# Patient Record
Sex: FEMALE | Race: WHITE | NOT HISPANIC OR LATINO | Employment: OTHER | URBAN - METROPOLITAN AREA
[De-identification: names, ages, dates, MRNs, and addresses within clinical notes are randomized per-mention and may not be internally consistent; named-entity substitution may affect disease eponyms.]

---

## 2017-01-10 ENCOUNTER — OFFICE VISIT (OUTPATIENT)
Dept: GASTROENTEROLOGY | Facility: CLINIC | Age: 77
End: 2017-01-10
Payer: MEDICARE

## 2017-01-10 VITALS
HEIGHT: 64 IN | SYSTOLIC BLOOD PRESSURE: 140 MMHG | HEART RATE: 98 BPM | DIASTOLIC BLOOD PRESSURE: 80 MMHG | BODY MASS INDEX: 44.04 KG/M2 | WEIGHT: 257.94 LBS

## 2017-01-10 DIAGNOSIS — R13.19 ESOPHAGEAL DYSPHAGIA: Primary | ICD-10-CM

## 2017-01-10 PROCEDURE — 99214 OFFICE O/P EST MOD 30 MIN: CPT | Mod: S$PBB,,, | Performed by: INTERNAL MEDICINE

## 2017-01-10 PROCEDURE — 99213 OFFICE O/P EST LOW 20 MIN: CPT | Mod: PBBFAC,PO | Performed by: INTERNAL MEDICINE

## 2017-01-10 PROCEDURE — 99999 PR PBB SHADOW E&M-EST. PATIENT-LVL III: CPT | Mod: PBBFAC,,, | Performed by: INTERNAL MEDICINE

## 2017-01-10 RX ORDER — ALBUTEROL SULFATE 90 UG/1
2 AEROSOL, METERED RESPIRATORY (INHALATION) EVERY 6 HOURS PRN
Qty: 1 INHALER | Refills: 11 | Status: SHIPPED | OUTPATIENT
Start: 2017-01-10 | End: 2017-10-17 | Stop reason: SDUPTHER

## 2017-01-10 NOTE — MR AVS SNAPSHOT
[facility]  [address] [facility] [address]  Phone: [phone]                  Wendy Ro   1/10/2017 9:40 AM   Office Visit    Description:  Female : 1940   Provider:  Angel Nixon MD   Department:  [facility]           Reason for Visit     Dysphagia           Diagnoses this Visit        Comments    Esophageal dysphagia    -  Primary            To Do List           Future Appointments        Provider Department Dept Phone    2017 10:00 AM SPECIMEN, [place] [facility] [phone]    2017 11:00 AM José Harrell MD [facility] [phone]    2017 8:10 AM LABORATORY, [place] [facility] [phone]    2017 11:40 AM Scarlett Gallardo DPM [facility] [phone]    2017 8:10 AM LABORATORY, [place] [facility] [phone]      Goals (5 Years of Data)     None      Follow-Up and Disposition     Return if symptoms worsen or fail to improve.      [facility]     [facility] Care [facility] -  Assistance  Registered nurses in the [facility] provide clinical advisement, health education, appointment booking, and other advisory services.  Call for this free service at [phone].             Medications           Message regarding Medications     Verify the changes and/or additions to your medication regime listed below are the same as discussed with your clinician today.  If any of these changes or additions are incorrect, please notify your healthcare provider.             Verify that the below list of medications is an accurate representation of the medications you are currently taking.  If none reported, the list may be blank. If incorrect, please contact your healthcare provider. Carry this list with you in case of emergency.           Current Medications     albuterol (VENTOLIN HFA) 90 mcg/Actuation  inhaler      alendronate (FOSAMAX) 70 MG tablet Take 1 tablet (70 mg total) by mouth every 7 days.    allopurinol (ZYLOPRIM) 100 MG tablet TAKE 1/2 TABLET BY MOUTH TWICE DAILY with the 150 mg already prescribed    allopurinol (ZYLOPRIM) 300 MG tablet Take 300 mg by mouth once daily.    amlodipine-olmesartan (JESSICA) 10-40 mg per tablet Take 1 tablet by mouth once daily.    calcium carbonate (OS-JESUS) 500 mg calcium (1,250 mg) tablet Take 1 tablet (500 mg total) by mouth once daily.    cetirizine (ZYRTEC) 10 MG tablet Take 10 mg by mouth once daily.    CHONDROITIN SULFATE A (CHONDROITIN SULFATE ORAL) Take by mouth.  Chondroitin Sulfate/Glucosamine Hydrochloride    DEXILANT 60 mg capsule Take 1 capsule (60 mg total) by mouth once daily.    docusate sodium (COLACE) 100 MG capsule Take 1 capsule (100 mg total) by mouth 2 (two) times daily.    ergocalciferol (VITAMIN D2) 50,000 unit Cap Take 50,000 Units by mouth.    escitalopram oxalate (LEXAPRO) 10 MG tablet     FERGON 240 mg (27 mg iron) tablet Take 1 tablet (325 mg total) by mouth 2 (two) times daily with meals.    ferrous gluconate (FERGON) 325 MG Tab Take 1 tablet (325 mg total) by mouth 2 (two) times daily with meals.    furosemide (LASIX) 40 MG tablet Take 1 tablet (40 mg total) by mouth once daily.    gabapentin (NEURONTIN) 300 MG capsule Take 300 mg by mouth 3 (three) times daily.    hydrOXYzine HCl (ATARAX) 25 MG tablet     indomethacin (INDOCIN SR) 75 mg CpSR CR capsule Take 1 capsule (75 mg total) by mouth 2 (two) times daily with meals.    lidocaine 5 % Crea Apply 1 application topically 2 (two) times daily as needed.    nystatin (MYCOSTATIN) powder Apply topically 4 (four) times daily.    ondansetron (ZOFRAN) 8 MG tablet Take 1 tablet (8 mg total) by mouth every 8 (eight) hours as needed.    pravastatin (PRAVACHOL) 20 MG tablet TAKE 1 TABLET BY MOUTH once daily    predniSONE (DELTASONE) 5 MG tablet Take 5 mg by mouth once daily.     SYNTHROID 50 mcg tablet  "Take 1 tablet (50 mcg total) by mouth once daily.    turmeric root extract 500 mg Cap Take by mouth 3 (three) times daily.    vit F-jotleol-hkae-rutin-hb196 (BIOFLEX) 399-48-92-40 mg Tab Take 2 tablets by mouth once daily.     VITAMIN D2 50,000 unit capsule Take 1 capsule (50,000 Units total) by mouth twice a week.           Clinical Reference Information           Vital Signs - Last Recorded  Most recent update: 1/10/2017  9:43 AM by Mal Clark LPN    BP Pulse Ht Wt BMI    (!) 140/80 98 5' 4" (1.626 m) 117 kg (257 lb 15 oz) 44.27 kg/m2      Blood Pressure          Most Recent Value    BP  (!)  140/80      Allergies as of 1/10/2017     Adenosine    Codeine    Duloxetine    Hydrocodone-acetaminophen    Keflex  [Cephalexin]    Macrolide Antibiotics    Opioids - Morphine Analogues    Opium    Opium (Anthroposophic)    Promethazine    Tramadol      Immunizations Administered on Date of Encounter - 1/10/2017     None      Orders Placed During Today's Visit      Normal Orders This Visit    Case request GI: ESOPHAGOGASTRODUODENOSCOPY (EGD)       "
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
## 2017-01-23 ENCOUNTER — LAB VISIT (OUTPATIENT)
Dept: LAB | Facility: HOSPITAL | Age: 77
End: 2017-01-23
Attending: FAMILY MEDICINE
Payer: MEDICARE

## 2017-01-23 DIAGNOSIS — N18.30 CHRONIC RENAL INSUFFICIENCY, STAGE III (MODERATE): ICD-10-CM

## 2017-01-23 LAB
ALBUMIN SERPL BCP-MCNC: 3.3 G/DL
ANION GAP SERPL CALC-SCNC: 8 MMOL/L
BUN SERPL-MCNC: 17 MG/DL
CALCIUM SERPL-MCNC: 9.3 MG/DL
CHLORIDE SERPL-SCNC: 106 MMOL/L
CO2 SERPL-SCNC: 29 MMOL/L
CREAT SERPL-MCNC: 1 MG/DL
EST. GFR  (AFRICAN AMERICAN): >60 ML/MIN/1.73 M^2
EST. GFR  (NON AFRICAN AMERICAN): 55.2 ML/MIN/1.73 M^2
GLUCOSE SERPL-MCNC: 115 MG/DL
PHOSPHATE SERPL-MCNC: 3.6 MG/DL
POTASSIUM SERPL-SCNC: 3.3 MMOL/L
PTH-INTACT SERPL-MCNC: 44 PG/ML
SODIUM SERPL-SCNC: 143 MMOL/L

## 2017-01-23 PROCEDURE — 80069 RENAL FUNCTION PANEL: CPT

## 2017-01-23 PROCEDURE — 36415 COLL VENOUS BLD VENIPUNCTURE: CPT | Mod: PO

## 2017-01-23 PROCEDURE — 83970 ASSAY OF PARATHORMONE: CPT

## 2017-01-27 ENCOUNTER — OFFICE VISIT (OUTPATIENT)
Dept: NEPHROLOGY | Facility: CLINIC | Age: 77
End: 2017-01-27
Payer: MEDICARE

## 2017-01-27 VITALS
OXYGEN SATURATION: 99 % | BODY MASS INDEX: 42.76 KG/M2 | WEIGHT: 249.13 LBS | HEART RATE: 96 BPM | TEMPERATURE: 98 F | SYSTOLIC BLOOD PRESSURE: 124 MMHG | DIASTOLIC BLOOD PRESSURE: 64 MMHG

## 2017-01-27 DIAGNOSIS — E11.40 CONTROLLED TYPE 2 DIABETES MELLITUS WITH DIABETIC NEUROPATHY, WITHOUT LONG-TERM CURRENT USE OF INSULIN: ICD-10-CM

## 2017-01-27 DIAGNOSIS — M1A.9XX0 CHRONIC GOUT WITHOUT TOPHUS, UNSPECIFIED CAUSE, UNSPECIFIED SITE: ICD-10-CM

## 2017-01-27 DIAGNOSIS — I15.2 HYPERTENSION ASSOCIATED WITH DIABETES: ICD-10-CM

## 2017-01-27 DIAGNOSIS — N28.1 RENAL CYST: ICD-10-CM

## 2017-01-27 DIAGNOSIS — E11.22 CONTROLLED TYPE 2 DIABETES MELLITUS WITH STAGE 3 CHRONIC KIDNEY DISEASE, WITHOUT LONG-TERM CURRENT USE OF INSULIN: ICD-10-CM

## 2017-01-27 DIAGNOSIS — N18.30 CONTROLLED TYPE 2 DIABETES MELLITUS WITH STAGE 3 CHRONIC KIDNEY DISEASE, WITHOUT LONG-TERM CURRENT USE OF INSULIN: ICD-10-CM

## 2017-01-27 DIAGNOSIS — N18.30 CHRONIC RENAL INSUFFICIENCY, STAGE III (MODERATE): Primary | ICD-10-CM

## 2017-01-27 DIAGNOSIS — E66.9 NON MORBID OBESITY, UNSPECIFIED OBESITY TYPE: ICD-10-CM

## 2017-01-27 DIAGNOSIS — E11.59 HYPERTENSION ASSOCIATED WITH DIABETES: ICD-10-CM

## 2017-01-27 PROCEDURE — 99999 PR PBB SHADOW E&M-EST. PATIENT-LVL II: CPT | Mod: PBBFAC,,, | Performed by: INTERNAL MEDICINE

## 2017-01-27 PROCEDURE — 99212 OFFICE O/P EST SF 10 MIN: CPT | Mod: PBBFAC,PO | Performed by: INTERNAL MEDICINE

## 2017-01-27 PROCEDURE — 99214 OFFICE O/P EST MOD 30 MIN: CPT | Mod: S$PBB,,, | Performed by: INTERNAL MEDICINE

## 2017-01-27 RX ORDER — ERGOCALCIFEROL 1.25 MG/1
50000 CAPSULE ORAL
COMMUNITY
End: 2017-01-27

## 2017-01-27 RX ORDER — AMLODIPINE AND OLMESARTAN MEDOXOMIL 10; 40 MG/1; MG/1
1 TABLET ORAL DAILY
COMMUNITY
End: 2017-08-18 | Stop reason: SDUPTHER

## 2017-01-27 RX ORDER — AMLODIPINE AND OLMESARTAN MEDOXOMIL 5; 40 MG/1; MG/1
1 TABLET ORAL
COMMUNITY
End: 2017-01-27

## 2017-01-27 RX ORDER — FENOFIBRIC ACID 135 MG/1
135 CAPSULE, DELAYED RELEASE ORAL
COMMUNITY
End: 2017-04-21 | Stop reason: ALTCHOICE

## 2017-01-27 NOTE — PROGRESS NOTES
"Subjective:       Patient ID: Wendy Ro is a 76 y.o. White female who presents for follow-up evaluation of Chronic Kidney Disease    HPI     She reports she is doing 'OK" she is having an EGD Monday for odynophagia. Also notes breakthrough reflux on PPI. Edema is about the same. Appetite is 'too good', her last Hba1c was 7.     Review of Systems   Constitutional: Negative for activity change, appetite change, fatigue and unexpected weight change.   HENT: Negative for facial swelling.    Respiratory: Negative for shortness of breath.    Cardiovascular: Positive for leg swelling. Negative for chest pain.   Gastrointestinal: Negative for abdominal pain.   Genitourinary: Negative for difficulty urinating and dysuria.   Musculoskeletal: Negative for arthralgias.   Neurological: Negative for weakness.       Objective:      Physical Exam   Constitutional: She is oriented to person, place, and time. She appears well-nourished. No distress.   HENT:   Mouth/Throat: Oropharynx is clear and moist.   Neck: No JVD present.   Cardiovascular: S1 normal and S2 normal.  Exam reveals no friction rub.    Pulmonary/Chest: Breath sounds normal. She has no wheezes. She has no rales.   Abdominal: Soft.   Musculoskeletal: She exhibits edema.   Neurological: She is alert and oriented to person, place, and time.   Skin: Skin is warm and dry.   Psychiatric: She has a normal mood and affect.   Vitals reviewed.      Assessment:       1. Chronic renal insufficiency, stage III (moderate)    2. Controlled type 2 diabetes mellitus with stage 3 chronic kidney disease, without long-term current use of insulin    3. Renal cyst    4. Hypertension associated with diabetes    5. Controlled type 2 diabetes mellitus with diabetic neuropathy, without long-term current use of insulin    6. Non morbid obesity, unspecified obesity type    7. Chronic gout without tophus, unspecified cause, unspecified site        Plan:             Mild CKD with stable " kidney function and proteinuria. Continue RAAS blockade for renal preservation    HTN is controlled    DM--fair control    MBD--no change in therapy    Gout--if flares continue consider changing to Uloric      RTC 6 months in french with labs prior

## 2017-01-30 ENCOUNTER — ANESTHESIA EVENT (OUTPATIENT)
Dept: ENDOSCOPY | Facility: HOSPITAL | Age: 77
End: 2017-01-30
Payer: MEDICARE

## 2017-01-30 ENCOUNTER — ANESTHESIA (OUTPATIENT)
Dept: ENDOSCOPY | Facility: HOSPITAL | Age: 77
End: 2017-01-30
Payer: MEDICARE

## 2017-01-30 ENCOUNTER — SURGERY (OUTPATIENT)
Age: 77
End: 2017-01-30

## 2017-01-30 ENCOUNTER — HOSPITAL ENCOUNTER (OUTPATIENT)
Facility: HOSPITAL | Age: 77
Discharge: HOME | End: 2017-01-30
Attending: INTERNAL MEDICINE | Admitting: INTERNAL MEDICINE
Payer: MEDICARE

## 2017-01-30 VITALS
WEIGHT: 248 LBS | OXYGEN SATURATION: 98 % | DIASTOLIC BLOOD PRESSURE: 91 MMHG | HEART RATE: 83 BPM | RESPIRATION RATE: 18 BRPM | TEMPERATURE: 98 F | HEIGHT: 64 IN | RESPIRATION RATE: 33 BRPM | SYSTOLIC BLOOD PRESSURE: 131 MMHG | BODY MASS INDEX: 42.34 KG/M2

## 2017-01-30 DIAGNOSIS — R13.10 DYSPHAGIA: ICD-10-CM

## 2017-01-30 LAB — POCT GLUCOSE: 125 MG/DL (ref 70–110)

## 2017-01-30 PROCEDURE — 25000003 PHARM REV CODE 250: Performed by: INTERNAL MEDICINE

## 2017-01-30 PROCEDURE — 63600175 PHARM REV CODE 636 W HCPCS: Performed by: NURSE ANESTHETIST, CERTIFIED REGISTERED

## 2017-01-30 PROCEDURE — 43235 EGD DIAGNOSTIC BRUSH WASH: CPT | Performed by: INTERNAL MEDICINE

## 2017-01-30 PROCEDURE — 37000008 HC ANESTHESIA 1ST 15 MINUTES: Performed by: INTERNAL MEDICINE

## 2017-01-30 PROCEDURE — 43235 EGD DIAGNOSTIC BRUSH WASH: CPT | Mod: ,,, | Performed by: INTERNAL MEDICINE

## 2017-01-30 PROCEDURE — 37000009 HC ANESTHESIA EA ADD 15 MINS: Performed by: INTERNAL MEDICINE

## 2017-01-30 PROCEDURE — 25000003 PHARM REV CODE 250: Performed by: NURSE ANESTHETIST, CERTIFIED REGISTERED

## 2017-01-30 PROCEDURE — 82962 GLUCOSE BLOOD TEST: CPT | Performed by: INTERNAL MEDICINE

## 2017-01-30 RX ORDER — SODIUM CHLORIDE, SODIUM LACTATE, POTASSIUM CHLORIDE, CALCIUM CHLORIDE 600; 310; 30; 20 MG/100ML; MG/100ML; MG/100ML; MG/100ML
INJECTION, SOLUTION INTRAVENOUS CONTINUOUS
Status: DISCONTINUED | OUTPATIENT
Start: 2017-01-30 | End: 2017-01-30 | Stop reason: HOSPADM

## 2017-01-30 RX ORDER — LIDOCAINE HCL/PF 100 MG/5ML
SYRINGE (ML) INTRAVENOUS
Status: DISCONTINUED | OUTPATIENT
Start: 2017-01-30 | End: 2017-01-30

## 2017-01-30 RX ORDER — PROPOFOL 10 MG/ML
INJECTION, EMULSION INTRAVENOUS
Status: DISCONTINUED | OUTPATIENT
Start: 2017-01-30 | End: 2017-01-30

## 2017-01-30 RX ORDER — ETOMIDATE 2 MG/ML
INJECTION INTRAVENOUS
Status: DISCONTINUED | OUTPATIENT
Start: 2017-01-30 | End: 2017-01-30

## 2017-01-30 RX ADMIN — SODIUM CHLORIDE, SODIUM LACTATE, POTASSIUM CHLORIDE, AND CALCIUM CHLORIDE: 600; 310; 30; 20 INJECTION, SOLUTION INTRAVENOUS at 08:01

## 2017-01-30 RX ADMIN — LIDOCAINE HYDROCHLORIDE 50 MG: 20 INJECTION, SOLUTION INTRAVENOUS at 09:01

## 2017-01-30 RX ADMIN — ETOMIDATE 20 MG: 2 INJECTION, SOLUTION INTRAVENOUS at 09:01

## 2017-01-30 RX ADMIN — PROPOFOL 30 MG: 10 INJECTION, EMULSION INTRAVENOUS at 09:01

## 2017-01-30 NOTE — PLAN OF CARE
Problem: Patient Care Overview  Goal: Plan of Care Review  Outcome: Outcome(s) achieved Date Met:  01/30/17  Family at bedside. Dr discussed findings with pt and family.pt doing well. No gi bleeding noted. No co of severe abd pain.vital signs stable.
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
## 2017-01-30 NOTE — TRANSFER OF CARE
"Anesthesia Transfer of Care Note    Patient: Wendy Ro    Procedure(s) Performed: Procedure(s) (LRB):  ESOPHAGOGASTRODUODENOSCOPY (EGD) (N/A)    Patient location: PACU    Anesthesia Type: MAC    Transport from OR: Transported from OR on room air with adequate spontaneous ventilation    Post pain: adequate analgesia    Post assessment: no apparent anesthetic complications    Post vital signs: stable    Level of consciousness: awake and alert    Nausea/Vomiting: no nausea/vomiting    Complications: none          Last vitals:   Visit Vitals    BP (!) 145/66 (BP Location: Right leg, Patient Position: Lying, BP Method: Automatic)    Pulse 89    Temp 36.7 °C (98.1 °F) (Oral)    Resp 16    Ht 5' 4" (1.626 m)    Wt 112.5 kg (248 lb)    SpO2 98%    Breastfeeding No    BMI 42.57 kg/m2     "

## 2017-01-30 NOTE — ANESTHESIA POSTPROCEDURE EVALUATION
"Anesthesia Post Evaluation    Patient: Wendy Ro    Procedure(s) Performed: Procedure(s) (LRB):  ESOPHAGOGASTRODUODENOSCOPY (EGD) (N/A)    Final Anesthesia Type: MAC  Patient location during evaluation: PACU  Patient participation: Yes- Able to Participate  Level of consciousness: awake and alert and oriented  Post-procedure vital signs: reviewed and stable  Pain management: adequate  Airway patency: patent    Anesthetic complications: no      Cardiovascular status: blood pressure returned to baseline  Respiratory status: unassisted, room air and spontaneous ventilation  Hydration status: euvolemic  Follow-up not needed.        Visit Vitals    BP (!) 145/66 (BP Location: Right leg, Patient Position: Lying, BP Method: Automatic)    Pulse 89    Temp 36.7 °C (98.1 °F) (Oral)    Resp 16    Ht 5' 4" (1.626 m)    Wt 112.5 kg (248 lb)    SpO2 98%    Breastfeeding No    BMI 42.57 kg/m2       Pain/Maureen Score: Maureen Score: 9 (1/30/2017  9:09 AM)      "

## 2017-01-30 NOTE — IP AVS SNAPSHOT
[address] [place] [address]           Patient Discharge Instructions     Our goal is to set you up for success. This packet includes information on your condition, medications, and your home care. It will help you to care for yourself so you don't get sicker and need to go back to the hospital.     Please ask your nurse if you have any questions.        There are many details to remember when preparing to leave the hospital. Here is what you will need to do:    1. Take your medicine. If you are prescribed medications, review your Medication List in the following pages. You may have new medications to  at the pharmacy and others that you'll need to stop taking. Review the instructions for how and when to take your medications. Talk with your doctor or nurses if you are unsure of what to do.     2. Go to your follow-up appointments. Specific follow-up information is listed in the following pages. Your may be contacted by a transition nurse or clinical provider about future appointments. Be sure we have all of the phone numbers to reach you, if needed. Please contact your provider's office if you are unable to make an appointment.     3. Watch for warning signs. Your doctor or nurse will give you detailed warning signs to watch for and when to call for assistance. These instructions may also include educational information about your condition. If you experience any of warning signs to your health, call your doctor.               ** Verify the list of medication(s) below is accurate and up to date. Carry this with you in case of emergency. If your medications have changed, please notify your healthcare provider.             Medication List      ASK your doctor about these medications        Additional Info                      albuterol 90 mcg/actuation inhaler   Commonly known as:  VENTOLIN HFA   Quantity:  1 Inhaler   Refills:  11   Dose:  2 puff     Instructions:  Inhale 2 puffs into the lungs every 6 (six) hours as needed for Wheezing.     Begin Date    AM    Noon    PM    Bedtime       alendronate 70 MG tablet   Commonly known as:  FOSAMAX   Quantity:  4 tablet   Refills:  11   Dose:  70 mg    Instructions:  Take 1 tablet (70 mg total) by mouth every 7 days.     Begin Date    AM    Noon    PM    Bedtime       * allopurinol 100 MG tablet   Commonly known as:  ZYLOPRIM   Quantity:  30 tablet   Refills:  11    Instructions:  TAKE 1/2 TABLET BY MOUTH TWICE DAILY with the 150 mg already prescribed     Begin Date    AM    Noon    PM    Bedtime       * allopurinol 300 MG tablet   Commonly known as:  ZYLOPRIM   Refills:  11   Dose:  300 mg    Instructions:  Take 300 mg by mouth once daily.     Begin Date    AM    Noon    PM    Bedtime       JESSICA 10-40 mg per tablet   Refills:  0   Dose:  1 tablet   Generic drug:  amlodipine-olmesartan    Instructions:  Take 1 tablet by mouth once daily.     Begin Date    AM    Noon    PM    Bedtime       BIOFLEX 170-59-55-40 mg Tab   Refills:  0   Dose:  2 tablet   Generic drug:  vit Z-mxdhucz-jejp-rutin-hb196    Instructions:  Take 2 tablets by mouth once daily.     Begin Date    AM    Noon    PM    Bedtime       calcium carbonate 500 mg calcium (1,250 mg) tablet   Commonly known as:  OS-JESUS   Refills:  0   Dose:  1 tablet    Instructions:  Take 1 tablet (500 mg total) by mouth once daily.     Begin Date    AM    Noon    PM    Bedtime       cetirizine 10 MG tablet   Commonly known as:  ZYRTEC   Refills:  0   Dose:  10 mg    Instructions:  Take 10 mg by mouth once daily.     Begin Date    AM    Noon    PM    Bedtime       DEXILANT 60 mg capsule   Quantity:  30 capsule   Refills:  11   Generic drug:  dexlansoprazole    Instructions:  Take 1 capsule (60 mg total) by mouth once daily.     Begin Date    AM    Noon    PM    Bedtime       docusate sodium 100 MG capsule   Commonly known as:  COLACE   Refills:  0   Dose:  100 mg     Instructions:  Take 1 capsule (100 mg total) by mouth 2 (two) times daily.     Begin Date    AM    Noon    PM    Bedtime       escitalopram oxalate 10 MG tablet   Commonly known as:  LEXAPRO   Refills:  0    Instructions:  daily as needed.     Begin Date    AM    Noon    PM    Bedtime       fenofibric acid 135 mg Cpdr   Commonly known as:  FIBRICOR   Refills:  0   Dose:  135 mg    Instructions:  Take 135 mg by mouth.     Begin Date    AM    Noon    PM    Bedtime       ferrous gluconate 325 MG Tab   Commonly known as:  FERGON   Quantity:  60 tablet   Refills:  11   Dose:  325 mg    Instructions:  Take 1 tablet (325 mg total) by mouth 2 (two) times daily with meals.     Begin Date    AM    Noon    PM    Bedtime       furosemide 40 MG tablet   Commonly known as:  LASIX   Quantity:  30 tablet   Refills:  11   Dose:  40 mg    Instructions:  Take 1 tablet (40 mg total) by mouth once daily.     Begin Date    AM    Noon    PM    Bedtime       lidocaine 5 % Crea   Quantity:  30 g   Refills:  0   Dose:  1 application    Instructions:  Apply 1 application topically 2 (two) times daily as needed.     Begin Date    AM    Noon    PM    Bedtime       NEURONTIN 300 MG capsule   Refills:  0   Dose:  300 mg   Generic drug:  gabapentin    Instructions:  Take 300 mg by mouth 3 (three) times daily.     Begin Date    AM    Noon    PM    Bedtime       nystatin powder   Commonly known as:  MYCOSTATIN   Quantity:  56.7 g   Refills:  11    Instructions:  Apply topically 4 (four) times daily.     Begin Date    AM    Noon    PM    Bedtime       ondansetron 8 MG tablet   Commonly known as:  ZOFRAN   Quantity:  24 tablet   Refills:  0   Dose:  8 mg    Instructions:  Take 1 tablet (8 mg total) by mouth every 8 (eight) hours as needed.     Begin Date    AM    Noon    PM    Bedtime       pravastatin 20 MG tablet   Commonly known as:  PRAVACHOL   Quantity:  30 tablet   Refills:  11    Instructions:  TAKE 1 TABLET BY MOUTH once daily     Begin Date     AM    Noon    PM    Bedtime       predniSONE 5 MG tablet   Commonly known as:  DELTASONE   Refills:  2   Dose:  5 mg   Indications:  Rheumatoid Arthritis    Instructions:  Take 5 mg by mouth once daily.     Begin Date    AM    Noon    PM    Bedtime       SYNTHROID 50 MCG tablet   Quantity:  30 tablet   Refills:  11   Generic drug:  levothyroxine    Instructions:  Take 1 tablet (50 mcg total) by mouth once daily.     Begin Date    AM    Noon    PM    Bedtime       turmeric root extract 500 mg Cap   Refills:  0    Instructions:  Take by mouth 3 (three) times daily.     Begin Date    AM    Noon    PM    Bedtime       VITAMIN D2 50,000 unit Cap   Quantity:  8 capsule   Refills:  11   Generic drug:  ergocalciferol    Instructions:  Take 1 capsule (50,000 Units total) by mouth twice a week.     Begin Date    AM    Noon    PM    Bedtime       * Notice:  This list has 2 medication(s) that are the same as other medications prescribed for you. Read the directions carefully, and ask your doctor or other care provider to review them with you.               Please bring to all follow up appointments:    1. A copy of your discharge instructions.  2. All medicines you are currently taking in their original bottles.  3. Identification and insurance card.    Please arrive 15 minutes ahead of scheduled appointment time.    Please call 24 hours in advance if you must reschedule your appointment and/or time.        Your Scheduled Appointments     Feb 17, 2017  8:10 AM CST   [facility] [place] [facility]    [address] [facility] [id number] [phone]            Feb 21, 2017 11:40 AM CST   Established Patient Visit with [facility] ([facility]    [address] [facility] [address]   [phone]            Jun 05, 2017  8:10 AM CDT   Non-Fasting Lab with [facility] [place] [facility]    [id number]  [address]  [place] [address]   [phone]            Jun 28, 2017  8:40 AM CDT   Non-Fasting Lab with [facility] [place] [facility]    [address] [facility]  [place] [address]   [phone]                  Admission Information     Date & Time Provider Department CSN    1/30/2017  7:32 AM Angel Nixon MD [facility] [id number]      Care Providers     Provider Role Specialty Primary office phone    Angel Nixon MD Attending Provider Gastroenterology [phone]    Angel Nixon MD Surgeon  Gastroenterology [phone]      Your Vitals Were     BP Pulse Temp Resp Height Weight    145/66 (BP Location: Right leg, Patient Position: Lying, BP Method: Automatic) 89 98.1 °F (36.7 °C) (Oral) 16 5' 4" (1.626 m) 112.5 kg (248 lb)    SpO2 BMI             98% 42.57 kg/m2         Recent Lab Values        7/23/2013 5/20/2014 11/3/2014 5/4/2015 11/10/2015 5/11/2016 6/16/2016 11/11/2016     10:25 AM 10:10 AM  9:38 AM  3:00 PM  7:50 AM  8:21 AM  9:33 AM  8:11 AM    A1C 5.9 5.7 6.0 5.8 6.0 6.2 6.0 7.0 (H)    Comment for A1C at  8:11 AM on 11/11/2016:  According to ADA guidelines, hemoglobin A1C <7.0% represents  optimal control in non-pregnant diabetic patients.  Different  metrics may apply to specific populations.   Standards of Medical Care in Diabetes - 2016.  For the purpose of screening for the presence of diabetes:  <5.7%     Consistent with the absence of diabetes  5.7-6.4%  Consistent with increasing risk for diabetes   (prediabetes)  >or=6.5%  Consistent with diabetes  Currently no consensus exists for use of hemoglobin A1C  for diagnosis of diabetes for children.        Allergies as of 1/30/2017        Reactions    Adenosine     Other reaction(s): asthma attack    Codeine     Other reaction(s): Nausea    Duloxetine     sleepy    Hydrocodone-acetaminophen     Other reaction(s): Nausea    Keflex  [Cephalexin]     Other reaction(s): pt " says can take penicillins  Other reaction(s): Nausea    Macrolide Antibiotics     Opioids - Morphine Analogues     Opium     Opium (Anthroposophic)     Promethazine     Other reaction(s): Nausea    Tramadol       [facility]     [facility] Nurse Care Line - 24/7 Assistance  Unless otherwise directed by your provider, please contact [facility], our nurse care line that is available for 24/7 assistance.     Registered nurses in the [facility] provide clinical advisement, health education, appointment booking, and other advisory services.  Call for this free service at [phone].        Advance Directives     An advance directive is a document which, in the event you are no longer able to make decisions for yourself, tells your healthcare team what kind of treatment you do or do not want to receive, or who you would like to make those decisions for you.  If you do not currently have an advance directive, [facility] encourages you to create one.  For more information call:  [phone]), [phone] ([phone]),  or log on to [email or web address].        Smoking Cessation     If you would like to quit smoking:   You may be eligible for free services if you are a [place] resident and started smoking cigarettes before September 1, 1988.  Call the Smoking Cessation [employer] (SCT) toll free at (864) 918-4838 or (764) 070-0852.   Call 8-981-QUIT-NOW if you do not meet the above criteria.            Language Assistance Services     ATTENTION: Language assistance services are available, free of charge. Please call [phone].      ATENCIÓN: Si habla español, tiene a osborne disposición servicios gratuitos de asistencia lingüística. Llame al [phone].     CHÚ Ý: N?u b?n nói Ti?ng Vi?t, có các d?ch v? h? tr? ngôn ng? mi?n phí dành cho b?n. G?i s? [phone].        Chronic Kindey Disease Education             Diabetes Discharge Instructions                                     [facility] complies with applicable Federal civil rights laws and does not discriminate on the basis of race, color, national origin, age, disability, or sex.

## 2017-01-30 NOTE — INTERVAL H&P NOTE
The patient has been examined and the H&P has been reviewed:    I concur with the findings and no changes have occurred since H&P was written.    Anesthesia/Surgery risks, benefits and alternative options discussed and understood by patient/family.          Active Hospital Problems    Diagnosis  POA    Dysphagia [R13.10]  Yes      Resolved Hospital Problems    Diagnosis Date Resolved POA   No resolved problems to display.

## 2017-01-30 NOTE — ANESTHESIA RELEASE NOTE
"Anesthesia Release from PACU Note    Patient: Wendy Ro    Procedure(s) Performed: Procedure(s) (LRB):  ESOPHAGOGASTRODUODENOSCOPY (EGD) (N/A)    Anesthesia type: MAC    Post pain: Adequate analgesia    Post assessment: no apparent anesthetic complications, tolerated procedure well and no evidence of recall    Last Vitals:   Visit Vitals    BP (!) 145/66 (BP Location: Right leg, Patient Position: Lying, BP Method: Automatic)    Pulse 89    Temp 36.7 °C (98.1 °F) (Oral)    Resp 16    Ht 5' 4" (1.626 m)    Wt 112.5 kg (248 lb)    SpO2 98%    Breastfeeding No    BMI 42.57 kg/m2       Post vital signs: stable    Level of consciousness: awake, alert  and oriented    Nausea/Vomiting: no nausea/no vomiting    Complications: none    Airway Patency: patent    Respiratory: unassisted, spontaneous ventilation, room air    Cardiovascular: stable    Hydration: euvolemic  "

## 2017-01-30 NOTE — BRIEF OP NOTE
[facility]  Brief Operative Note     SUMMARY     Surgery Date: 1/30/2017     Surgeon(s) and Role:     * Angel Nixon MD - Primary    Assisting Surgeon: None    Pre-op Diagnosis:  Esophageal dysphagia [R13.14]    Post-op Diagnosis:  Post-Op Diagnosis Codes:     * Esophageal dysphagia [R13.14]    Procedure(s) (LRB):  ESOPHAGOGASTRODUODENOSCOPY (EGD) (N/A)    Anesthesia: Monitor Anesthesia Care    Description of the findings of the procedure: Procedure completed. See Procedure note for details.     Findings/Key Components:  Procedure completed. See Procedure note for details.     Prosthetic/Devices: None    Estimated Blood Loss: None         Specimens:   Specimen     None          Discharge Note    SUMMARY     Admit Date: 1/30/2017    Discharge Date and Time: 1/30/2017    Hospital Course (synopsis of major diagnoses, care, treatment, and services provided during the course of the hospital stay): Procedure completed. See Procedure note for details.     Final Diagnosis: Post-Op Diagnosis Codes:     * Esophageal dysphagia [R13.14]    Disposition: Discharge home when discharge criteria met.    Follow Up/Patient Instructions: With primary care physician as previously scheduled.    Medications:  Reconciled Home Medications: Current Discharge Medication List      CONTINUE these medications which have NOT CHANGED    Details   alendronate (FOSAMAX) 70 MG tablet Take 1 tablet (70 mg total) by mouth every 7 days.  Qty: 4 tablet, Refills: 11      !! allopurinol (ZYLOPRIM) 100 MG tablet TAKE 1/2 TABLET BY MOUTH TWICE DAILY with the 150 mg already prescribed  Qty: 30 tablet, Refills: 11      !! allopurinol (ZYLOPRIM) 300 MG tablet Take 300 mg by mouth once daily.  Refills: 11      amlodipine-olmesartan (JESSICA) 10-40 mg per tablet Take 1 tablet by mouth once daily.      calcium carbonate (OS-JESUS) 500 mg calcium (1,250 mg) tablet Take 1 tablet (500 mg total) by mouth once daily.  Refills: 0      cetirizine  (ZYRTEC) 10 MG tablet Take 10 mg by mouth once daily.      DEXILANT 60 mg capsule Take 1 capsule (60 mg total) by mouth once daily.  Qty: 30 capsule, Refills: 11    Associated Diagnoses: Gastroesophageal reflux disease without esophagitis      docusate sodium (COLACE) 100 MG capsule Take 1 capsule (100 mg total) by mouth 2 (two) times daily.  Refills: 0    Associated Diagnoses: Slow transit constipation      escitalopram oxalate (LEXAPRO) 10 MG tablet daily as needed.       fenofibric acid (FIBRICOR) 135 mg CpDR Take 135 mg by mouth.      ferrous gluconate (FERGON) 325 MG Tab Take 1 tablet (325 mg total) by mouth 2 (two) times daily with meals.  Qty: 60 tablet, Refills: 11      furosemide (LASIX) 40 MG tablet Take 1 tablet (40 mg total) by mouth once daily.  Qty: 30 tablet, Refills: 11    Associated Diagnoses: Hypertension associated with diabetes      gabapentin (NEURONTIN) 300 MG capsule Take 300 mg by mouth 3 (three) times daily.      nystatin (MYCOSTATIN) powder Apply topically 4 (four) times daily.  Qty: 56.7 g, Refills: 11      pravastatin (PRAVACHOL) 20 MG tablet TAKE 1 TABLET BY MOUTH once daily  Qty: 30 tablet, Refills: 11      predniSONE (DELTASONE) 5 MG tablet Take 5 mg by mouth once daily.   Refills: 2      SYNTHROID 50 mcg tablet Take 1 tablet (50 mcg total) by mouth once daily.  Qty: 30 tablet, Refills: 11    Associated Diagnoses: Hypothyroidism due to acquired atrophy of thyroid      turmeric root extract 500 mg Cap Take by mouth 3 (three) times daily.      vit A-nlartbv-ixvc-rutin-hb196 (BIOFLEX) 550-28-17-40 mg Tab Take 2 tablets by mouth once daily.       VITAMIN D2 50,000 unit capsule Take 1 capsule (50,000 Units total) by mouth twice a week.  Qty: 8 capsule, Refills: 11    Associated Diagnoses: Vitamin D deficiency      albuterol (VENTOLIN HFA) 90 mcg/actuation inhaler Inhale 2 puffs into the lungs every 6 (six) hours as needed for Wheezing.  Qty: 1 Inhaler, Refills: 11      lidocaine 5 % Crea  Apply 1 application topically 2 (two) times daily as needed.  Qty: 30 g, Refills: 0      ondansetron (ZOFRAN) 8 MG tablet Take 1 tablet (8 mg total) by mouth every 8 (eight) hours as needed.  Qty: 24 tablet, Refills: 0    Associated Diagnoses: Nausea       !! - Potential duplicate medications found. Please discuss with provider.          Discharge Procedure Orders  Diet general     Activity as tolerated       Follow-up Information     Follow up with Uli James MD.    Specialty:  Family Medicine    Contact information:    [address] [facility] [address] [phone]

## 2017-02-17 ENCOUNTER — LAB VISIT (OUTPATIENT)
Dept: LAB | Facility: HOSPITAL | Age: 77
End: 2017-02-17
Attending: FAMILY MEDICINE
Payer: MEDICARE

## 2017-02-17 DIAGNOSIS — E78.5 HYPERLIPIDEMIA: ICD-10-CM

## 2017-02-17 LAB
ALBUMIN SERPL BCP-MCNC: 3.1 G/DL
ALP SERPL-CCNC: 57 U/L
ALT SERPL W/O P-5'-P-CCNC: 13 U/L
AST SERPL-CCNC: 17 U/L
BILIRUB DIRECT SERPL-MCNC: 0.2 MG/DL
BILIRUB SERPL-MCNC: 0.6 MG/DL
CHOLEST/HDLC SERPL: 4.3 {RATIO}
HDL/CHOLESTEROL RATIO: 23.5 %
HDLC SERPL-MCNC: 166 MG/DL
HDLC SERPL-MCNC: 39 MG/DL
LDLC SERPL CALC-MCNC: 88.8 MG/DL
NONHDLC SERPL-MCNC: 127 MG/DL
PROT SERPL-MCNC: 6.3 G/DL
TRIGL SERPL-MCNC: 191 MG/DL

## 2017-02-17 PROCEDURE — 80061 LIPID PANEL: CPT

## 2017-02-17 PROCEDURE — 80076 HEPATIC FUNCTION PANEL: CPT

## 2017-02-17 PROCEDURE — 36415 COLL VENOUS BLD VENIPUNCTURE: CPT | Mod: PO

## 2017-02-21 ENCOUNTER — OFFICE VISIT (OUTPATIENT)
Dept: PODIATRY | Facility: CLINIC | Age: 77
End: 2017-02-21
Payer: MEDICARE

## 2017-02-21 VITALS
DIASTOLIC BLOOD PRESSURE: 73 MMHG | SYSTOLIC BLOOD PRESSURE: 147 MMHG | BODY MASS INDEX: 42.51 KG/M2 | HEIGHT: 64 IN | WEIGHT: 249 LBS | HEART RATE: 83 BPM

## 2017-02-21 DIAGNOSIS — E11.49 TYPE II DIABETES MELLITUS WITH NEUROLOGICAL MANIFESTATIONS: Primary | ICD-10-CM

## 2017-02-21 DIAGNOSIS — B35.1 DERMATOPHYTOSIS OF NAIL: ICD-10-CM

## 2017-02-21 PROCEDURE — 11721 DEBRIDE NAIL 6 OR MORE: CPT | Mod: Q9,S$PBB,, | Performed by: PODIATRIST

## 2017-02-21 PROCEDURE — 99213 OFFICE O/P EST LOW 20 MIN: CPT | Mod: PBBFAC,PO | Performed by: PODIATRIST

## 2017-02-21 PROCEDURE — 99999 PR PBB SHADOW E&M-EST. PATIENT-LVL III: CPT | Mod: PBBFAC,,, | Performed by: PODIATRIST

## 2017-02-21 PROCEDURE — 99213 OFFICE O/P EST LOW 20 MIN: CPT | Mod: 25,S$PBB,, | Performed by: PODIATRIST

## 2017-02-21 NOTE — MR AVS SNAPSHOT
[facility]  [address] [facility]  [place] [address]  Phone: [phone]  Fax: [phone]                  Wendy Ro   2017 11:40 AM   Office Visit    Description:  Female : 1940   Provider:  Scarlett Gallardo DPM   Department:  [facility]           Reason for Visit     Routine Foot Care                To Do List           Future Appointments        Provider Department Dept Phone    2017 9:40 AM Scarlett Gallardo DPM [facility] [phone]    2017 8:10 AM [facility]AT[facility], [place] [facility] [phone]    2017 8:40 AM [facility], [place] [facility] [phone]    2017 9:05 AM [facility], [place] [facility] [phone]    2017 9:10 AM SPECIMEN, [place] [facility] [phone]      Goals (5 Years of Data)     None      [facility]     [facility] On Call Nurse Care Line -  Assistance  Registered nurses in the [facility] provide clinical advisement, health education, appointment booking, and other advisory services.  Call for this free service at [phone].             Medications           Message regarding Medications     Verify the changes and/or additions to your medication regime listed below are the same as discussed with your clinician today.  If any of these changes or additions are incorrect, please notify your healthcare provider.             Verify that the below list of medications is an accurate representation of the medications you are currently taking.  If none reported, the list may be blank. If incorrect, please contact your healthcare provider. Carry this list with you in case of emergency.           Current Medications     albuterol (VENTOLIN HFA) 90 mcg/actuation inhaler Inhale 2 puffs into the lungs every 6 (six) hours as needed for Wheezing.    alendronate (FOSAMAX) 70 MG tablet Take 1 tablet (70 mg total) by  mouth every 7 days.    allopurinol (ZYLOPRIM) 100 MG tablet TAKE 1/2 TABLET BY MOUTH TWICE DAILY with the 150 mg already prescribed    allopurinol (ZYLOPRIM) 300 MG tablet Take 300 mg by mouth once daily.    amlodipine-olmesartan (JESSICA) 10-40 mg per tablet Take 1 tablet by mouth once daily.    calcium carbonate (OS-JESUS) 500 mg calcium (1,250 mg) tablet Take 1 tablet (500 mg total) by mouth once daily.    cetirizine (ZYRTEC) 10 MG tablet Take 10 mg by mouth once daily.    DEXILANT 60 mg capsule Take 1 capsule (60 mg total) by mouth once daily.    docusate sodium (COLACE) 100 MG capsule Take 1 capsule (100 mg total) by mouth 2 (two) times daily.    escitalopram oxalate (LEXAPRO) 10 MG tablet daily as needed.     fenofibric acid (FIBRICOR) 135 mg CpDR Take 135 mg by mouth.    ferrous gluconate (FERGON) 325 MG Tab Take 1 tablet (325 mg total) by mouth 2 (two) times daily with meals.    furosemide (LASIX) 40 MG tablet Take 1 tablet (40 mg total) by mouth once daily.    gabapentin (NEURONTIN) 300 MG capsule Take 300 mg by mouth 3 (three) times daily.    lidocaine 5 % Crea Apply 1 application topically 2 (two) times daily as needed.    nystatin (MYCOSTATIN) powder Apply topically 4 (four) times daily.    ondansetron (ZOFRAN) 8 MG tablet Take 1 tablet (8 mg total) by mouth every 8 (eight) hours as needed.    pravastatin (PRAVACHOL) 20 MG tablet TAKE 1 TABLET BY MOUTH once daily    predniSONE (DELTASONE) 5 MG tablet Take 5 mg by mouth once daily.     SYNTHROID 50 mcg tablet Take 1 tablet (50 mcg total) by mouth once daily.    turmeric root extract 500 mg Cap Take by mouth 3 (three) times daily.    vit C-evaqavt-gzga-rutin-hb196 (BIOFLEX) 937-57-36-40 mg Tab Take 2 tablets by mouth once daily.     VITAMIN D2 50,000 unit capsule Take 1 capsule (50,000 Units total) by mouth twice a week.           Clinical Reference Information           Your Vitals Were     BP Pulse Height Weight BMI    147/73 (BP Location: Right arm, Patient  "Position: Sitting, BP Method: Automatic) 83 5' 4" (1.626 m) 112.9 kg (249 lb) 42.74 kg/m2      Blood Pressure          Most Recent Value    BP  (!)  147/73      Allergies as of 2/21/2017     Adenosine    Codeine    Duloxetine    Hydrocodone-acetaminophen    Keflex  [Cephalexin]    Macrolide Antibiotics    Opioids - Morphine Analogues    Opium    Opium (Anthroposophic)    Promethazine    Tramadol      Immunizations Administered on Date of Encounter - 2/21/2017     None      Language Assistance Services     ATTENTION: Language assistance services are available, free of charge. Please call [phone].      ATENCIÓN: Si habla español, tiene a osborne disposición servicios gratuitos de asistencia lingüística. Llame al [phone].     CHÚ Ý: N?u b?n nói Ti?ng Vi?t, có các d?ch v? h? tr? ngôn ng? mi?n phí dành cho b?n. G?i s? [phone].         [facility] complies with applicable Federal civil rights laws and does not discriminate on the basis of race, color, national origin, age, disability, or sex.        "
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
## 2017-02-27 RX ORDER — FERROUS GLUCONATE 240(27)MG
TABLET ORAL
Qty: 60 TABLET | Refills: 4 | Status: SHIPPED | OUTPATIENT
Start: 2017-02-27 | End: 2017-04-21 | Stop reason: SDUPTHER

## 2017-03-16 ENCOUNTER — LAB VISIT (OUTPATIENT)
Dept: LAB | Facility: HOSPITAL | Age: 77
End: 2017-03-16
Attending: INTERNAL MEDICINE
Payer: MEDICARE

## 2017-03-16 DIAGNOSIS — I11.9 MALIGNANT HYPERTENSIVE HEART DISEASE WITHOUT HEART FAILURE: Primary | ICD-10-CM

## 2017-03-16 LAB
ANION GAP SERPL CALC-SCNC: 12 MMOL/L
BUN SERPL-MCNC: 20 MG/DL
CALCIUM SERPL-MCNC: 9.8 MG/DL
CHLORIDE SERPL-SCNC: 106 MMOL/L
CO2 SERPL-SCNC: 25 MMOL/L
CREAT SERPL-MCNC: 1.3 MG/DL
EST. GFR  (AFRICAN AMERICAN): 46.4 ML/MIN/1.73 M^2
EST. GFR  (NON AFRICAN AMERICAN): 40.2 ML/MIN/1.73 M^2
GLUCOSE SERPL-MCNC: 127 MG/DL
POTASSIUM SERPL-SCNC: 3.6 MMOL/L
SODIUM SERPL-SCNC: 143 MMOL/L

## 2017-03-16 PROCEDURE — 80048 BASIC METABOLIC PNL TOTAL CA: CPT

## 2017-03-16 PROCEDURE — 36415 COLL VENOUS BLD VENIPUNCTURE: CPT | Mod: PO

## 2017-03-21 ENCOUNTER — TELEPHONE (OUTPATIENT)
Dept: NEPHROLOGY | Facility: CLINIC | Age: 77
End: 2017-03-21

## 2017-03-21 NOTE — TELEPHONE ENCOUNTER
"Patient called requesting last clinic note and labs sent to Dr. Faulkner.  I told her Dr. Harrell will be seeing patients in [place] starting on 4.21.  She said she has "trouble getting up and down."    I told her I would be happy to help get her information to Dr. Faulkner.  She will go by D to sign a SEBASTIAN.  "

## 2017-04-05 ENCOUNTER — TELEPHONE (OUTPATIENT)
Dept: NEPHROLOGY | Facility: CLINIC | Age: 77
End: 2017-04-05

## 2017-04-05 NOTE — TELEPHONE ENCOUNTER
----- Message from Karely Zapata sent at 4/5/2017 11:46 AM CDT -----  Contact: [phone]  Patient is requesting a call back from the nurse.  Please call the patient upon request at phone number [phone].

## 2017-04-06 NOTE — TELEPHONE ENCOUNTER
Spoke with patient yesterday. Requested neprhology info for appt was faxed 3.28.17 and again yesterday.  Signed SEBASTIAN was forwarded to HIM for complete processing.

## 2017-04-21 ENCOUNTER — OFFICE VISIT (OUTPATIENT)
Dept: NEPHROLOGY | Facility: CLINIC | Age: 77
End: 2017-04-21
Payer: MEDICARE

## 2017-04-21 VITALS
BODY MASS INDEX: 41.4 KG/M2 | OXYGEN SATURATION: 98 % | DIASTOLIC BLOOD PRESSURE: 74 MMHG | HEART RATE: 72 BPM | WEIGHT: 241.19 LBS | SYSTOLIC BLOOD PRESSURE: 142 MMHG

## 2017-04-21 DIAGNOSIS — N18.30 CHRONIC RENAL INSUFFICIENCY, STAGE III (MODERATE): Primary | ICD-10-CM

## 2017-04-21 DIAGNOSIS — E11.22 CONTROLLED TYPE 2 DIABETES MELLITUS WITH STAGE 3 CHRONIC KIDNEY DISEASE, WITHOUT LONG-TERM CURRENT USE OF INSULIN: ICD-10-CM

## 2017-04-21 DIAGNOSIS — I15.2 HYPERTENSION ASSOCIATED WITH DIABETES: ICD-10-CM

## 2017-04-21 DIAGNOSIS — R60.0 LOCALIZED EDEMA: ICD-10-CM

## 2017-04-21 DIAGNOSIS — E11.59 HYPERTENSION ASSOCIATED WITH DIABETES: ICD-10-CM

## 2017-04-21 DIAGNOSIS — M1A.9XX0 CHRONIC GOUT WITHOUT TOPHUS, UNSPECIFIED CAUSE, UNSPECIFIED SITE: ICD-10-CM

## 2017-04-21 DIAGNOSIS — N28.1 RENAL CYST: ICD-10-CM

## 2017-04-21 DIAGNOSIS — E66.9 NON MORBID OBESITY, UNSPECIFIED OBESITY TYPE: ICD-10-CM

## 2017-04-21 DIAGNOSIS — N18.30 CONTROLLED TYPE 2 DIABETES MELLITUS WITH STAGE 3 CHRONIC KIDNEY DISEASE, WITHOUT LONG-TERM CURRENT USE OF INSULIN: ICD-10-CM

## 2017-04-21 PROCEDURE — 99999 PR PBB SHADOW E&M-EST. PATIENT-LVL II: CPT | Mod: PBBFAC,,, | Performed by: INTERNAL MEDICINE

## 2017-04-21 PROCEDURE — 99214 OFFICE O/P EST MOD 30 MIN: CPT | Mod: S$PBB,,, | Performed by: INTERNAL MEDICINE

## 2017-04-21 PROCEDURE — 99212 OFFICE O/P EST SF 10 MIN: CPT | Mod: PBBFAC,PO | Performed by: INTERNAL MEDICINE

## 2017-04-21 RX ORDER — HYDRALAZINE HYDROCHLORIDE 10 MG/1
TABLET, FILM COATED ORAL
Refills: 11 | COMMUNITY
Start: 2017-02-26 | End: 2017-04-21 | Stop reason: SINTOL

## 2017-04-25 NOTE — PROGRESS NOTES
Subjective:       Patient ID: Wendy Ro is a 76 y.o. White female who presents for new evaluation of Chronic Kidney Disease; Leg Pain; and Edema    HPI     She developed LE edema last fall when her gabapentin was increased. No SOB nor orthopnea. She saw her cardiologist (Dr. Bess) and had doppler venous which per pt was negative for DVT. She was given the diagnosis of venous insufficiency and was prescribed compression stockings although she is having trouble with the fit. She has not increased salt load in her diet. She continues to ambulate with a walker. She is also on amlodipine but this is not new for her. She self discontinued Lasix since she felt she was urinating less    Review of Systems   Constitutional: Negative for activity change and appetite change.   HENT: Negative for facial swelling.    Eyes: Negative for visual disturbance.   Respiratory: Negative for shortness of breath.    Cardiovascular: Positive for leg swelling. Negative for chest pain.   Gastrointestinal: Negative for abdominal pain.   Genitourinary: Negative for difficulty urinating and dysuria.   Musculoskeletal: Negative for arthralgias.   Neurological: Negative for weakness.       Objective:      Physical Exam   Constitutional: She is oriented to person, place, and time. She appears well-nourished. No distress.   HENT:   Mouth/Throat: Oropharynx is clear and moist.   Neck: No JVD present.   Cardiovascular: S1 normal and S2 normal.  Exam reveals no friction rub.    Pulmonary/Chest: Breath sounds normal. She has no wheezes. She has no rales.   Abdominal: Soft.   Musculoskeletal: She exhibits edema.   Neurological: She is alert and oriented to person, place, and time.   Skin: Skin is warm and dry.   Psychiatric: She has a normal mood and affect.   Vitals reviewed.      Assessment:       1. Chronic renal insufficiency, stage III (moderate)    2. Hypertension associated with diabetes    3. Renal cyst    4. Non morbid obesity,  unspecified obesity type    5. Localized edema    6. Chronic gout without tophus, unspecified cause, unspecified site    7. Controlled type 2 diabetes mellitus with stage 3 chronic kidney disease, without long-term current use of insulin        Plan:             Reassured the patient that her CKD is stable and not the cause of increased edema.     Her edema is likely multifactorial--hydrostatic, mild CKD, and venous insufficiency. I think the worsening of her edema however is from the increase of gabapentin. I have asked her to decrease to her original dose of 300mg QD from 300mg TID. Additionally her amlodipine may be contributing to her edema.  I informed her that she will need some dose of maintenance Lasix for her hydrostatic edema, likely 20mg QD    She is to let me know how her edema is by mid week next week. Will also send my note to Dr. James

## 2017-04-27 ENCOUNTER — PATIENT MESSAGE (OUTPATIENT)
Dept: FAMILY MEDICINE | Facility: CLINIC | Age: 77
End: 2017-04-27

## 2017-04-28 ENCOUNTER — TELEPHONE (OUTPATIENT)
Dept: NEPHROLOGY | Facility: CLINIC | Age: 77
End: 2017-04-28

## 2017-04-28 NOTE — TELEPHONE ENCOUNTER
"She says the swelling in her legs has gone down since she decreased  gabapentin to once a day.  Haven't taken the fluid pill "I have been going to the bathroom 5-6 times a day. "  Reports drinking 16 oz bottle x3 a day.      March 20--Before she got the stockings her R leg was 19inches and her left leg was 19 inches.  Ankles  10 1/2 inches.  She put her legs up for 4 hours with no change.      March 22--R leg 17.5 inches Left leg 15.5  Inches Ankles 9.5 inches L and 10 inches for R  That night right  leg was 17 inches and  ankle 11inches and left was 15.5 inchesand ankle 11 inches.  Still without stockings.     She was taking the fluid pill and staying the same.    April 22 at 9pm L 17.5 inches R 18 inches both ankles 11 inches.  This is the day she started wearing the stockings.     SUN am  Calf 17 inches bilateral.  L ankle 10.5 inches and R ankle was 11 inches    April 23.  Bilateral calf 17 inches  And bilateral ankle 11inches    April 24 Left 16 inches and Right 17 inches and  Both ankles 11  Inches .  They were hurting so bad she took them off at 5p.  L 16.5 and R 17.5  Bilateral ankle 10.5.   She took off stockings at 5pm and measured again at 9p Calf 16.5  Inches L and 18 inches on R.  Bilateral ankles 11 inches.    April 25th L 16 inches R 17.5 inches ankles 10.5 inches bilateral  At 0930pm  17 inches bilateral and ankles 10.5 inches    April 26th  L 17 inches and R 18 inches at 0700  Bilateral ankles 11 inches.   0930pm.  L 17 inches and R 17.5 inches With ankles 10.5  Inches bilateral.      April 27th  L 16 inches R 17 inches  bilateral 10  Inches At 9pm L 16.5 inches R 16.5 inches and bilateral ankles 10.5 inches     April 28th   L 16 inches R 17 inches bilateral ankles 10.5 inches     She said they look the same to her.              "

## 2017-04-28 NOTE — TELEPHONE ENCOUNTER
Pt was to call me around Wednesday to tell me how her leg swelling is after I decreased her gabapentin. Please call her. Thank you

## 2017-05-02 ENCOUNTER — OFFICE VISIT (OUTPATIENT)
Dept: PODIATRY | Facility: CLINIC | Age: 77
End: 2017-05-02
Payer: MEDICARE

## 2017-05-02 ENCOUNTER — CLINICAL SUPPORT (OUTPATIENT)
Dept: FAMILY MEDICINE | Facility: CLINIC | Age: 77
End: 2017-05-02
Payer: MEDICARE

## 2017-05-02 VITALS
SYSTOLIC BLOOD PRESSURE: 135 MMHG | DIASTOLIC BLOOD PRESSURE: 71 MMHG | BODY MASS INDEX: 40.23 KG/M2 | SYSTOLIC BLOOD PRESSURE: 137 MMHG | WEIGHT: 234.38 LBS | HEART RATE: 72 BPM | DIASTOLIC BLOOD PRESSURE: 64 MMHG

## 2017-05-02 DIAGNOSIS — B35.1 DERMATOPHYTOSIS OF NAIL: ICD-10-CM

## 2017-05-02 DIAGNOSIS — E11.49 TYPE II DIABETES MELLITUS WITH NEUROLOGICAL MANIFESTATIONS: Primary | ICD-10-CM

## 2017-05-02 DIAGNOSIS — I15.2 HYPERTENSION ASSOCIATED WITH DIABETES: Primary | ICD-10-CM

## 2017-05-02 DIAGNOSIS — E11.59 HYPERTENSION ASSOCIATED WITH DIABETES: Primary | ICD-10-CM

## 2017-05-02 PROCEDURE — 99499 UNLISTED E&M SERVICE: CPT | Mod: S$PBB,,, | Performed by: FAMILY MEDICINE

## 2017-05-02 PROCEDURE — 99999 PR PBB SHADOW E&M-EST. PATIENT-LVL I: CPT | Mod: PBBFAC,,,

## 2017-05-02 PROCEDURE — 99499 UNLISTED E&M SERVICE: CPT | Mod: S$PBB,,, | Performed by: PODIATRIST

## 2017-05-02 PROCEDURE — 99999 PR PBB SHADOW E&M-EST. PATIENT-LVL II: CPT | Mod: PBBFAC,,, | Performed by: PODIATRIST

## 2017-05-02 PROCEDURE — 11721 DEBRIDE NAIL 6 OR MORE: CPT | Mod: Q9,S$PBB,, | Performed by: PODIATRIST

## 2017-05-02 PROCEDURE — 99211 OFF/OP EST MAY X REQ PHY/QHP: CPT | Mod: PBBFAC,27,25,PO

## 2017-05-02 RX ORDER — DULOXETIN HYDROCHLORIDE 60 MG/1
60 CAPSULE, DELAYED RELEASE ORAL DAILY
Refills: 5 | COMMUNITY
Start: 2017-04-12 | End: 2017-09-07

## 2017-05-02 NOTE — MR AVS SNAPSHOT
[facility]  [address]  [place] [address]  Phone: [phone]  Fax: [phone]                  Wendy Ro   2017 9:40 AM   Office Visit    Description:  Female : 1940   Provider:  Scarlett Gallardo DPM   Department:  [facility]           Reason for Visit     Routine Foot Care                To Do List           Future Appointments        Provider Department Dept Phone    2017 10:00 AM José Harrell MD [facility] [phone]    2017 8:10 AM LABORATORY, [place] [facility] [phone]    2017 8:40 AM LABORATORY, [place] [facility] [phone]    2017 9:40 AM Scarlett Gallardo DPM [facility] [phone]    2017 9:05 AM LABORATORY, [place] [facility] [phone]      Goals (5 Years of Data)     None      [facility]     [facility] Nurse Care Line -  Assistance  Unless otherwise directed by your provider, please contact [facility], our nurse care line that is available for  assistance.     Registered nurses in the [facility] provide: appointment scheduling, clinical advisement, health education, and other advisory services.  Call: [phone] (toll free)               Medications           Message regarding Medications     Verify the changes and/or additions to your medication regime listed below are the same as discussed with your clinician today.  If any of these changes or additions are incorrect, please notify your healthcare provider.             Verify that the below list of medications is an accurate representation of the medications you are currently taking.  If none reported, the list may be blank. If incorrect, please contact your healthcare provider. Carry this list with you in case of emergency.           Current Medications     albuterol (VENTOLIN HFA) 90 mcg/actuation inhaler Inhale 2 puffs into the  lungs every 6 (six) hours as needed for Wheezing.    alendronate (FOSAMAX) 70 MG tablet Take 1 tablet (70 mg total) by mouth every 7 days.    allopurinol (ZYLOPRIM) 100 MG tablet TAKE 1/2 TABLET BY MOUTH TWICE DAILY with the 150 mg already prescribed    allopurinol (ZYLOPRIM) 300 MG tablet Take 300 mg by mouth once daily.    amlodipine-olmesartan (JESSICA) 10-40 mg per tablet Take 1 tablet by mouth once daily.    calcium carbonate (OS-JESUS) 500 mg calcium (1,250 mg) tablet Take 1 tablet (500 mg total) by mouth once daily.    cetirizine (ZYRTEC) 10 MG tablet Take 10 mg by mouth once daily.    DEXILANT 60 mg capsule Take 1 capsule (60 mg total) by mouth once daily.    docusate sodium (COLACE) 100 MG capsule Take 1 capsule (100 mg total) by mouth 2 (two) times daily.    duloxetine (CYMBALTA) 60 MG capsule Take 60 mg by mouth once daily.    escitalopram oxalate (LEXAPRO) 10 MG tablet daily as needed.     ferrous gluconate (FERGON) 325 MG Tab Take 1 tablet (325 mg total) by mouth 2 (two) times daily with meals.    furosemide (LASIX) 40 MG tablet Take 1 tablet (40 mg total) by mouth once daily.    gabapentin (NEURONTIN) 300 MG capsule Take 300 mg by mouth 3 (three) times daily.    lidocaine 5 % Crea Apply 1 application topically 2 (two) times daily as needed.    nystatin (MYCOSTATIN) powder Apply topically 4 (four) times daily.    ondansetron (ZOFRAN) 8 MG tablet Take 1 tablet (8 mg total) by mouth every 8 (eight) hours as needed.    pravastatin (PRAVACHOL) 20 MG tablet TAKE 1 TABLET BY MOUTH once daily    predniSONE (DELTASONE) 5 MG tablet Take 5 mg by mouth once daily.     SYNTHROID 50 mcg tablet Take 1 tablet (50 mcg total) by mouth once daily.    turmeric root extract 500 mg Cap Take by mouth 3 (three) times daily.    vit D-swukbxm-dcts-rutin-hb196 (BIOFLEX) 848-29-91-40 mg Tab Take 2 tablets by mouth once daily.     VITAMIN D2 50,000 unit capsule Take 1 capsule (50,000 Units total) by mouth twice a week.            Clinical Reference Information           Your Vitals Were     BP Weight BMI          135/71 (BP Location: Left arm, Patient Position: Sitting) 106.3 kg (234 lb 6.4 oz) 40.23 kg/m2        Blood Pressure          Most Recent Value    BP  135/71      Allergies as of 5/2/2017     Adenosine    Codeine    Duloxetine    Hydrocodone-acetaminophen    Keflex  [Cephalexin]    Macrolide Antibiotics    Opioids - Morphine Analogues    Opium    Opium (Anthroposophic)    Promethazine    Tramadol      Immunizations Administered on Date of Encounter - 5/2/2017     None      Language Assistance Services     ATTENTION: Language assistance services are available, free of charge. Please call [phone].      ATENCIÓN: Si habla español, tiene a osborne disposición servicios gratuitos de asistencia lingüística. Llame al [phone].     CHÚ Ý: N?u b?n nói Ti?ng Vi?t, có các d?ch v? h? tr? ngôn ng? mi?n phí dành cho b?n. G?i s? [phone].         [facility] complies with applicable Federal civil rights laws and does not discriminate on the basis of race, color, national origin, age, disability, or sex.
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
## 2017-05-05 ENCOUNTER — OFFICE VISIT (OUTPATIENT)
Dept: NEPHROLOGY | Facility: CLINIC | Age: 77
End: 2017-05-05
Payer: MEDICARE

## 2017-05-05 VITALS
SYSTOLIC BLOOD PRESSURE: 118 MMHG | WEIGHT: 229.25 LBS | OXYGEN SATURATION: 98 % | HEART RATE: 66 BPM | HEIGHT: 64 IN | DIASTOLIC BLOOD PRESSURE: 68 MMHG | BODY MASS INDEX: 39.14 KG/M2

## 2017-05-05 DIAGNOSIS — E66.9 NON MORBID OBESITY, UNSPECIFIED OBESITY TYPE: ICD-10-CM

## 2017-05-05 DIAGNOSIS — R60.0 LOCALIZED EDEMA: ICD-10-CM

## 2017-05-05 DIAGNOSIS — E11.59 HYPERTENSION ASSOCIATED WITH DIABETES: ICD-10-CM

## 2017-05-05 DIAGNOSIS — I15.2 HYPERTENSION ASSOCIATED WITH DIABETES: ICD-10-CM

## 2017-05-05 DIAGNOSIS — N18.30 CHRONIC RENAL INSUFFICIENCY, STAGE III (MODERATE): Primary | ICD-10-CM

## 2017-05-05 DIAGNOSIS — E55.9 VITAMIN D DEFICIENCY: ICD-10-CM

## 2017-05-05 DIAGNOSIS — N28.1 RENAL CYST: ICD-10-CM

## 2017-05-05 PROCEDURE — 99214 OFFICE O/P EST MOD 30 MIN: CPT | Mod: PBBFAC,PN | Performed by: INTERNAL MEDICINE

## 2017-05-05 PROCEDURE — 99999 PR PBB SHADOW E&M-EST. PATIENT-LVL IV: CPT | Mod: PBBFAC,,, | Performed by: INTERNAL MEDICINE

## 2017-05-05 PROCEDURE — 99213 OFFICE O/P EST LOW 20 MIN: CPT | Mod: S$PBB,,, | Performed by: INTERNAL MEDICINE

## 2017-05-08 PROBLEM — R60.9 EDEMA: Status: ACTIVE | Noted: 2017-05-08

## 2017-05-08 NOTE — PROGRESS NOTES
Subjective:       Patient ID: Wendy Ro is a 76 y.o. White female who presents for re-evaluation of progression of Chronic Kidney Disease and Edema    HPI     She decreased gabapentin as instructed and edema is significantly better, she has lost close to 20lbs of fluid weight. She will take an extra gabapentin on occasion when her pain is severe. She also is wearing the compression stockings. No SOB    Review of Systems   Respiratory: Negative for cough and shortness of breath.    Cardiovascular: Positive for leg swelling (much improved).       Objective:      Physical Exam   Constitutional: She is oriented to person, place, and time. She appears well-nourished. No distress.   HENT:   Mouth/Throat: Oropharynx is clear and moist.   Neck: No JVD present.   Cardiovascular: S1 normal and S2 normal.    Pulmonary/Chest: Breath sounds normal. She has no wheezes. She has no rales.   Abdominal: Soft.   Musculoskeletal: She exhibits no edema.   Neurological: She is alert and oriented to person, place, and time.   Skin: Skin is warm and dry.   Psychiatric: She has a normal mood and affect.   Vitals reviewed.      Assessment:       1. Chronic renal insufficiency, stage III (moderate)    2. Renal cyst    3. Hypertension associated with diabetes    4. Non morbid obesity, unspecified obesity type    5. Localized edema        Plan:             Medication induced edema--much improved with decrease of gabapentin. She has not resumed Lasix but I advised her she should use it prn. Additionally she is on Norvasc which can contribute to her edema but at this point I don't recommend stopping it      RTC August in [place] with labs prior

## 2017-05-25 DIAGNOSIS — E11.9 TYPE 2 DIABETES MELLITUS WITHOUT COMPLICATION: ICD-10-CM

## 2017-05-31 DIAGNOSIS — E55.9 VITAMIN D DEFICIENCY: ICD-10-CM

## 2017-05-31 DIAGNOSIS — E03.4 HYPOTHYROIDISM DUE TO ACQUIRED ATROPHY OF THYROID: ICD-10-CM

## 2017-06-01 RX ORDER — LEVOTHYROXINE SODIUM 50 UG/1
50 TABLET ORAL
Qty: 30 TABLET | Refills: 11 | Status: SHIPPED | OUTPATIENT
Start: 2017-06-01 | End: 2017-10-17 | Stop reason: SDUPTHER

## 2017-06-01 RX ORDER — ERGOCALCIFEROL 1.25 MG/1
50000 CAPSULE ORAL
Qty: 4 CAPSULE | Refills: 11 | Status: SHIPPED | OUTPATIENT
Start: 2017-06-01 | End: 2017-10-17 | Stop reason: SDUPTHER

## 2017-06-05 ENCOUNTER — LAB VISIT (OUTPATIENT)
Dept: LAB | Facility: HOSPITAL | Age: 77
End: 2017-06-05
Attending: FAMILY MEDICINE
Payer: MEDICARE

## 2017-06-05 DIAGNOSIS — N28.9 ABNORMAL KIDNEY FUNCTION: ICD-10-CM

## 2017-06-05 LAB
ANION GAP SERPL CALC-SCNC: 9 MMOL/L
BUN SERPL-MCNC: 15 MG/DL
CALCIUM SERPL-MCNC: 9.2 MG/DL
CHLORIDE SERPL-SCNC: 105 MMOL/L
CO2 SERPL-SCNC: 27 MMOL/L
CREAT SERPL-MCNC: 1 MG/DL
EST. GFR  (AFRICAN AMERICAN): >60 ML/MIN/1.73 M^2
EST. GFR  (NON AFRICAN AMERICAN): 55.2 ML/MIN/1.73 M^2
GLUCOSE SERPL-MCNC: 101 MG/DL
POTASSIUM SERPL-SCNC: 4.5 MMOL/L
SODIUM SERPL-SCNC: 141 MMOL/L

## 2017-06-05 PROCEDURE — 80048 BASIC METABOLIC PNL TOTAL CA: CPT

## 2017-06-05 PROCEDURE — 36415 COLL VENOUS BLD VENIPUNCTURE: CPT | Mod: PO

## 2017-06-28 ENCOUNTER — LAB VISIT (OUTPATIENT)
Dept: LAB | Facility: HOSPITAL | Age: 77
End: 2017-06-28
Attending: FAMILY MEDICINE
Payer: MEDICARE

## 2017-06-28 DIAGNOSIS — E11.59 HYPERTENSION ASSOCIATED WITH DIABETES: ICD-10-CM

## 2017-06-28 DIAGNOSIS — E11.9 TYPE 2 DIABETES MELLITUS WITHOUT COMPLICATION: ICD-10-CM

## 2017-06-28 DIAGNOSIS — I15.2 HYPERTENSION ASSOCIATED WITH DIABETES: ICD-10-CM

## 2017-06-28 LAB
ANION GAP SERPL CALC-SCNC: 7 MMOL/L
BUN SERPL-MCNC: 14 MG/DL
CALCIUM SERPL-MCNC: 9.4 MG/DL
CHLORIDE SERPL-SCNC: 106 MMOL/L
CO2 SERPL-SCNC: 28 MMOL/L
CREAT SERPL-MCNC: 0.9 MG/DL
EST. GFR  (AFRICAN AMERICAN): >60 ML/MIN/1.73 M^2
EST. GFR  (NON AFRICAN AMERICAN): >60 ML/MIN/1.73 M^2
GLUCOSE SERPL-MCNC: 103 MG/DL
POTASSIUM SERPL-SCNC: 4.1 MMOL/L
SODIUM SERPL-SCNC: 141 MMOL/L

## 2017-06-28 PROCEDURE — 80048 BASIC METABOLIC PNL TOTAL CA: CPT

## 2017-06-28 PROCEDURE — 36415 COLL VENOUS BLD VENIPUNCTURE: CPT | Mod: PO

## 2017-06-28 PROCEDURE — 83036 HEMOGLOBIN GLYCOSYLATED A1C: CPT

## 2017-06-29 LAB
ESTIMATED AVG GLUCOSE: 128 MG/DL
HBA1C MFR BLD HPLC: 6.1 %

## 2017-07-06 DIAGNOSIS — K21.9 GASTROESOPHAGEAL REFLUX DISEASE WITHOUT ESOPHAGITIS: ICD-10-CM

## 2017-07-06 RX ORDER — DEXLANSOPRAZOLE 60 MG/1
1 CAPSULE, DELAYED RELEASE ORAL DAILY
Qty: 30 CAPSULE | Refills: 1 | Status: SHIPPED | OUTPATIENT
Start: 2017-07-06 | End: 2017-10-05 | Stop reason: SDUPTHER

## 2017-07-12 DIAGNOSIS — M1A.00X0 IDIOPATHIC CHRONIC GOUT WITHOUT TOPHUS, UNSPECIFIED SITE: ICD-10-CM

## 2017-07-12 RX ORDER — ALLOPURINOL 300 MG/1
TABLET ORAL
Qty: 30 TABLET | Refills: 11 | Status: SHIPPED | OUTPATIENT
Start: 2017-07-12 | End: 2017-10-18 | Stop reason: SDUPTHER

## 2017-07-18 ENCOUNTER — OFFICE VISIT (OUTPATIENT)
Dept: PODIATRY | Facility: CLINIC | Age: 77
End: 2017-07-18
Payer: MEDICARE

## 2017-07-18 VITALS
WEIGHT: 227.94 LBS | HEART RATE: 78 BPM | BODY MASS INDEX: 38.91 KG/M2 | DIASTOLIC BLOOD PRESSURE: 66 MMHG | RESPIRATION RATE: 18 BRPM | SYSTOLIC BLOOD PRESSURE: 123 MMHG | HEIGHT: 64 IN

## 2017-07-18 DIAGNOSIS — B35.1 DERMATOPHYTOSIS OF NAIL: ICD-10-CM

## 2017-07-18 DIAGNOSIS — E11.49 TYPE II DIABETES MELLITUS WITH NEUROLOGICAL MANIFESTATIONS: Primary | ICD-10-CM

## 2017-07-18 PROCEDURE — 99999 PR PBB SHADOW E&M-EST. PATIENT-LVL III: CPT | Mod: PBBFAC,,, | Performed by: PODIATRIST

## 2017-07-18 PROCEDURE — 11721 DEBRIDE NAIL 6 OR MORE: CPT | Mod: Q9,S$PBB,, | Performed by: PODIATRIST

## 2017-07-18 PROCEDURE — 99213 OFFICE O/P EST LOW 20 MIN: CPT | Mod: PBBFAC,PO | Performed by: PODIATRIST

## 2017-07-18 PROCEDURE — 11721 DEBRIDE NAIL 6 OR MORE: CPT | Mod: Q9,PBBFAC,PO | Performed by: PODIATRIST

## 2017-07-18 PROCEDURE — 99499 UNLISTED E&M SERVICE: CPT | Mod: S$PBB,,, | Performed by: PODIATRIST
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
## 2017-07-28 ENCOUNTER — LAB VISIT (OUTPATIENT)
Dept: LAB | Facility: HOSPITAL | Age: 77
End: 2017-07-28
Attending: INTERNAL MEDICINE
Payer: MEDICARE

## 2017-07-28 DIAGNOSIS — N18.30 CHRONIC RENAL INSUFFICIENCY, STAGE III (MODERATE): ICD-10-CM

## 2017-07-28 LAB
ALBUMIN SERPL BCP-MCNC: 3.1 G/DL
ANION GAP SERPL CALC-SCNC: 9 MMOL/L
BUN SERPL-MCNC: 15 MG/DL
CALCIUM SERPL-MCNC: 8.9 MG/DL
CHLORIDE SERPL-SCNC: 109 MMOL/L
CO2 SERPL-SCNC: 24 MMOL/L
CREAT SERPL-MCNC: 0.9 MG/DL
EST. GFR  (AFRICAN AMERICAN): >60 ML/MIN/1.73 M^2
EST. GFR  (NON AFRICAN AMERICAN): >60 ML/MIN/1.73 M^2
GLUCOSE SERPL-MCNC: 90 MG/DL
PHOSPHATE SERPL-MCNC: 3.1 MG/DL
POTASSIUM SERPL-SCNC: 4.2 MMOL/L
PTH-INTACT SERPL-MCNC: 46 PG/ML
SODIUM SERPL-SCNC: 142 MMOL/L

## 2017-07-28 PROCEDURE — 83970 ASSAY OF PARATHORMONE: CPT

## 2017-07-28 PROCEDURE — 80069 RENAL FUNCTION PANEL: CPT

## 2017-07-28 PROCEDURE — 36415 COLL VENOUS BLD VENIPUNCTURE: CPT | Mod: PO

## 2017-07-31 ENCOUNTER — TELEPHONE (OUTPATIENT)
Dept: NEPHROLOGY | Facility: CLINIC | Age: 77
End: 2017-07-31

## 2017-08-11 ENCOUNTER — OFFICE VISIT (OUTPATIENT)
Dept: NEPHROLOGY | Facility: CLINIC | Age: 77
End: 2017-08-11
Payer: MEDICARE

## 2017-08-11 VITALS
HEIGHT: 64 IN | BODY MASS INDEX: 40.31 KG/M2 | HEART RATE: 80 BPM | SYSTOLIC BLOOD PRESSURE: 148 MMHG | WEIGHT: 236.13 LBS | DIASTOLIC BLOOD PRESSURE: 70 MMHG

## 2017-08-11 DIAGNOSIS — E55.9 VITAMIN D DEFICIENCY: ICD-10-CM

## 2017-08-11 DIAGNOSIS — N18.9 CHRONIC KIDNEY DISEASE, UNSPECIFIED CKD STAGE: Primary | ICD-10-CM

## 2017-08-11 DIAGNOSIS — N18.30 CHRONIC RENAL INSUFFICIENCY, STAGE III (MODERATE): ICD-10-CM

## 2017-08-11 PROCEDURE — 99213 OFFICE O/P EST LOW 20 MIN: CPT | Mod: S$PBB,,, | Performed by: INTERNAL MEDICINE

## 2017-08-11 PROCEDURE — 1125F AMNT PAIN NOTED PAIN PRSNT: CPT | Mod: ,,, | Performed by: INTERNAL MEDICINE

## 2017-08-11 PROCEDURE — 3078F DIAST BP <80 MM HG: CPT | Mod: ,,, | Performed by: INTERNAL MEDICINE

## 2017-08-11 PROCEDURE — 99999 PR PBB SHADOW E&M-EST. PATIENT-LVL IV: CPT | Mod: PBBFAC,,, | Performed by: INTERNAL MEDICINE

## 2017-08-11 PROCEDURE — 3077F SYST BP >= 140 MM HG: CPT | Mod: ,,, | Performed by: INTERNAL MEDICINE

## 2017-08-11 PROCEDURE — 1159F MED LIST DOCD IN RCRD: CPT | Mod: ,,, | Performed by: INTERNAL MEDICINE

## 2017-08-11 PROCEDURE — 99214 OFFICE O/P EST MOD 30 MIN: CPT | Mod: PBBFAC,PN | Performed by: INTERNAL MEDICINE

## 2017-08-18 RX ORDER — AMLODIPINE AND OLMESARTAN MEDOXOMIL 10; 40 MG/1; MG/1
1 TABLET ORAL DAILY
Qty: 30 TABLET | Refills: 0 | Status: SHIPPED | OUTPATIENT
Start: 2017-08-18 | End: 2017-10-05 | Stop reason: SDUPTHER

## 2017-09-07 ENCOUNTER — OFFICE VISIT (OUTPATIENT)
Dept: FAMILY MEDICINE | Facility: CLINIC | Age: 77
End: 2017-09-07
Payer: MEDICARE

## 2017-09-07 ENCOUNTER — PATIENT MESSAGE (OUTPATIENT)
Dept: FAMILY MEDICINE | Facility: CLINIC | Age: 77
End: 2017-09-07

## 2017-09-07 VITALS
DIASTOLIC BLOOD PRESSURE: 76 MMHG | BODY MASS INDEX: 38.58 KG/M2 | WEIGHT: 226 LBS | HEIGHT: 64 IN | SYSTOLIC BLOOD PRESSURE: 133 MMHG | HEART RATE: 90 BPM

## 2017-09-07 DIAGNOSIS — B34.9 VIRAL ILLNESS: Primary | ICD-10-CM

## 2017-09-07 PROCEDURE — 1159F MED LIST DOCD IN RCRD: CPT | Mod: ,,, | Performed by: NURSE PRACTITIONER

## 2017-09-07 PROCEDURE — 3075F SYST BP GE 130 - 139MM HG: CPT | Mod: ,,, | Performed by: NURSE PRACTITIONER

## 2017-09-07 PROCEDURE — 96372 THER/PROPH/DIAG INJ SC/IM: CPT | Mod: PBBFAC,PO

## 2017-09-07 PROCEDURE — 99213 OFFICE O/P EST LOW 20 MIN: CPT | Mod: PBBFAC,PO | Performed by: NURSE PRACTITIONER

## 2017-09-07 PROCEDURE — 99999 PR PBB SHADOW E&M-EST. PATIENT-LVL III: CPT | Mod: PBBFAC,,, | Performed by: NURSE PRACTITIONER

## 2017-09-07 PROCEDURE — 99213 OFFICE O/P EST LOW 20 MIN: CPT | Mod: S$PBB,,, | Performed by: NURSE PRACTITIONER

## 2017-09-07 PROCEDURE — 3078F DIAST BP <80 MM HG: CPT | Mod: ,,, | Performed by: NURSE PRACTITIONER

## 2017-09-07 RX ORDER — DIPHENOXYLATE HYDROCHLORIDE AND ATROPINE SULFATE 2.5; .025 MG/1; MG/1
1 TABLET ORAL 4 TIMES DAILY PRN
Qty: 30 TABLET | Refills: 0 | Status: SHIPPED | OUTPATIENT
Start: 2017-09-07 | End: 2017-09-17

## 2017-09-07 RX ORDER — METHYLPREDNISOLONE ACETATE 40 MG/ML
40 INJECTION, SUSPENSION INTRA-ARTICULAR; INTRALESIONAL; INTRAMUSCULAR; SOFT TISSUE
Status: COMPLETED | OUTPATIENT
Start: 2017-09-07 | End: 2017-09-07

## 2017-09-07 RX ORDER — ONDANSETRON 8 MG/1
8 TABLET, ORALLY DISINTEGRATING ORAL EVERY 6 HOURS PRN
Qty: 30 TABLET | Refills: 0 | Status: SHIPPED | OUTPATIENT
Start: 2017-09-07 | End: 2020-08-04 | Stop reason: SDUPTHER

## 2017-09-07 RX ADMIN — METHYLPREDNISOLONE ACETATE 40 MG: 40 INJECTION, SUSPENSION INTRALESIONAL; INTRAMUSCULAR; INTRASYNOVIAL; SOFT TISSUE at 08:09

## 2017-09-07 NOTE — PROGRESS NOTES
Subjective:       Patient ID: Wendy Ro is a 77 y.o. female.    Chief Complaint: No chief complaint on file.    Diarrhea    This is a new problem. Episode onset: 3 days. The problem occurs 2 to 4 times per day. The problem has been unchanged. The stool consistency is described as watery. The patient states that diarrhea does not awaken her from sleep. Associated symptoms include arthralgias, chills, a fever, myalgias and sweats. Pertinent negatives include no abdominal pain, coughing, headaches or vomiting. Associated symptoms comments: Nausea, myalgia. Exacerbated by: eating. There are no known risk factors. She has tried nothing for the symptoms.       Review of Systems   Constitutional: Positive for chills, fatigue and fever. Negative for unexpected weight change.   HENT: Negative for ear pain and sore throat.    Eyes: Negative for pain and visual disturbance.   Respiratory: Negative for cough and shortness of breath.    Cardiovascular: Negative for chest pain and palpitations.   Gastrointestinal: Positive for diarrhea and nausea. Negative for abdominal pain and vomiting.   Musculoskeletal: Positive for arthralgias and myalgias.   Skin: Negative for color change and rash.   Neurological: Negative for dizziness and headaches.   Psychiatric/Behavioral: Negative for dysphoric mood and sleep disturbance. The patient is not nervous/anxious.        Vitals:    09/07/17 0748   BP: 133/76   Pulse: 90       Objective:     Current Outpatient Prescriptions   Medication Sig Dispense Refill    albuterol (VENTOLIN HFA) 90 mcg/actuation inhaler Inhale 2 puffs into the lungs every 6 (six) hours as needed for Wheezing. 1 Inhaler 11    alendronate (FOSAMAX) 70 MG tablet Take 1 tablet (70 mg total) by mouth every 7 days. 4 tablet 11    allopurinol (ZYLOPRIM) 300 MG tablet Take 1 tablet (300 mg total) by mouth once daily. 30 tablet 11    amlodipine-olmesartan (JESSICA) 10-40 mg per tablet Take 1 tablet by mouth once daily. 30  tablet 0    calcium carbonate (OS-JESUS) 500 mg calcium (1,250 mg) tablet Take 1 tablet (500 mg total) by mouth once daily.  0    cetirizine (ZYRTEC) 10 MG tablet Take 10 mg by mouth once daily.      dexlansoprazole (DEXILANT) 60 mg capsule Take 1 capsule (60 mg total) by mouth once daily. 30 capsule 1    docusate sodium (COLACE) 100 MG capsule Take 1 capsule (100 mg total) by mouth 2 (two) times daily. (Patient taking differently: Take 100 mg by mouth once daily. )  0    ergocalciferol (VITAMIN D2) 50,000 unit Cap Take 1 capsule (50,000 Units total) by mouth every 7 days. 4 capsule 11    escitalopram oxalate (LEXAPRO) 10 MG tablet daily as needed.       ferrous gluconate (FERGON) 325 MG Tab Take 1 tablet (325 mg total) by mouth 2 (two) times daily with meals. 60 tablet 11    furosemide (LASIX) 40 MG tablet Take 1 tablet (40 mg total) by mouth once daily. (Patient taking differently: Take 20 mg by mouth once daily. ) 30 tablet 11    gabapentin (NEURONTIN) 300 MG capsule Take 300 mg by mouth 3 (three) times daily.      levothyroxine (SYNTHROID) 50 MCG tablet Take 1 tablet (50 mcg total) by mouth before breakfast. 30 tablet 11    lidocaine 5 % Crea Apply 1 application topically 2 (two) times daily as needed. 30 g 0    nystatin (MYCOSTATIN) powder Apply topically 4 (four) times daily. (Patient taking differently: Apply topically 4 (four) times daily. Abdominal folds) 56.7 g 11    pravastatin (PRAVACHOL) 20 MG tablet TAKE 1 TABLET BY MOUTH once daily 30 tablet 11    turmeric root extract 500 mg Cap Take by mouth 3 (three) times daily.      vit Z-[id number]-giar-[id number] (BIOFLEX) 610-28-86-40 mg Tab Take 2 tablets by mouth once daily.       diphenoxylate-atropine 2.5-0.025 mg (LOMOTIL) 2.5-0.025 mg per tablet Take 1 tablet by mouth 4 (four) times daily as needed for Diarrhea. 30 tablet 0    ondansetron (ZOFRAN-ODT) 8 MG TbDL Take 1 tablet (8 mg total) by mouth every 6 (six) hours as needed. 30 tablet 0      No current facility-administered medications for this visit.        Physical Exam   Constitutional: She is oriented to person, place, and time. She appears well-developed and well-nourished. No distress.   HENT:   Head: Normocephalic and atraumatic.   Mouth/Throat: Mucous membranes are dry.   Eyes: EOM are normal. Pupils are equal, round, and reactive to light.   Neck: Normal range of motion. Neck supple.   Cardiovascular: Normal rate and regular rhythm.    Pulmonary/Chest: Effort normal and breath sounds normal.   Abdominal: Soft. Normal appearance. Bowel sounds are increased. There is generalized tenderness.   Musculoskeletal: Normal range of motion.   Neurological: She is alert and oriented to person, place, and time.   Skin: Skin is warm and dry. No rash noted.   Psychiatric: She has a normal mood and affect. Judgment normal.   Nursing note and vitals reviewed.      Assessment:       1. Viral illness        Plan:   Viral illness  -     ondansetron (ZOFRAN-ODT) 8 MG TbDL; Take 1 tablet (8 mg total) by mouth every 6 (six) hours as needed.  Dispense: 30 tablet; Refill: 0  -     diphenoxylate-atropine 2.5-0.025 mg (LOMOTIL) 2.5-0.025 mg per tablet; Take 1 tablet by mouth 4 (four) times daily as needed for Diarrhea.  Dispense: 30 tablet; Refill: 0  -     methylPREDNISolone acetate injection 40 mg; Inject 1 mL (40 mg total) into the muscle one time.    increase fluids, progress to bland diet    Return if symptoms worsen or fail to improve.

## 2017-09-19 ENCOUNTER — TELEPHONE (OUTPATIENT)
Dept: FAMILY MEDICINE | Facility: CLINIC | Age: 77
End: 2017-09-19

## 2017-09-19 DIAGNOSIS — Z12.31 ENCOUNTER FOR SCREENING MAMMOGRAM FOR BREAST CANCER: Primary | ICD-10-CM

## 2017-09-19 NOTE — TELEPHONE ENCOUNTER
----- Message from Maria T Parr sent at 9/19/2017  4:10 PM CDT -----  Contact: self   Patient would like to request order for a mammogram. Please call back at [phone].        Thanks,  Maria T Parr

## 2017-09-19 NOTE — TELEPHONE ENCOUNTER
I have signed for the following orders AND/OR meds.  Please call the patient and ask the patient to schedule the testing AND/OR inform about any medications that were sent.      Orders Placed This Encounter   Procedures    Mammo Digital Screening Bilateral With CAD     Standing Status:   Future     Standing Expiration Date:   11/19/2018     Order Specific Question:   May the Radiologist modify the order per protocol to meet the clinical needs of the patient?     Answer:   Yes

## 2017-10-03 ENCOUNTER — OFFICE VISIT (OUTPATIENT)
Dept: PODIATRY | Facility: CLINIC | Age: 77
End: 2017-10-03
Payer: MEDICARE

## 2017-10-03 VITALS
WEIGHT: 224.63 LBS | DIASTOLIC BLOOD PRESSURE: 68 MMHG | BODY MASS INDEX: 38.35 KG/M2 | SYSTOLIC BLOOD PRESSURE: 118 MMHG | HEIGHT: 64 IN | HEART RATE: 85 BPM

## 2017-10-03 DIAGNOSIS — B35.1 DERMATOPHYTOSIS OF NAIL: ICD-10-CM

## 2017-10-03 DIAGNOSIS — E11.49 TYPE II DIABETES MELLITUS WITH NEUROLOGICAL MANIFESTATIONS: Primary | ICD-10-CM

## 2017-10-03 PROCEDURE — 99213 OFFICE O/P EST LOW 20 MIN: CPT | Mod: PBBFAC,PO,25 | Performed by: PODIATRIST

## 2017-10-03 PROCEDURE — 99999 PR PBB SHADOW E&M-EST. PATIENT-LVL III: CPT | Mod: PBBFAC,,, | Performed by: PODIATRIST

## 2017-10-03 PROCEDURE — 99499 UNLISTED E&M SERVICE: CPT | Mod: S$PBB,,, | Performed by: PODIATRIST

## 2017-10-03 PROCEDURE — 11721 DEBRIDE NAIL 6 OR MORE: CPT | Mod: Q9,S$PBB,, | Performed by: PODIATRIST

## 2017-10-03 PROCEDURE — 11721 DEBRIDE NAIL 6 OR MORE: CPT | Mod: Q9,PBBFAC,PO | Performed by: PODIATRIST
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
## 2017-10-04 ENCOUNTER — PATIENT MESSAGE (OUTPATIENT)
Dept: FAMILY MEDICINE | Facility: CLINIC | Age: 77
End: 2017-10-04

## 2017-10-05 DIAGNOSIS — K21.9 GASTROESOPHAGEAL REFLUX DISEASE WITHOUT ESOPHAGITIS: ICD-10-CM

## 2017-10-06 RX ORDER — ALLOPURINOL 100 MG/1
TABLET ORAL
Qty: 30 TABLET | Refills: 0 | Status: SHIPPED | OUTPATIENT
Start: 2017-10-06 | End: 2017-10-18

## 2017-10-06 RX ORDER — AMLODIPINE AND OLMESARTAN MEDOXOMIL 10; 40 MG/1; MG/1
1 TABLET ORAL DAILY
Qty: 30 TABLET | Refills: 0 | Status: SHIPPED | OUTPATIENT
Start: 2017-10-06 | End: 2017-10-17 | Stop reason: SDUPTHER

## 2017-10-09 RX ORDER — DEXLANSOPRAZOLE 60 MG/1
1 CAPSULE, DELAYED RELEASE ORAL DAILY
Qty: 30 CAPSULE | Refills: 5 | Status: SHIPPED | OUTPATIENT
Start: 2017-10-09 | End: 2017-10-17 | Stop reason: SDUPTHER

## 2017-10-17 RX ORDER — GABAPENTIN 300 MG/1
300 CAPSULE ORAL 3 TIMES DAILY
Qty: 90 CAPSULE | Refills: 11 | Status: CANCELLED | OUTPATIENT
Start: 2017-10-17

## 2017-10-17 RX ORDER — ESCITALOPRAM OXALATE 10 MG/1
10 TABLET ORAL DAILY
Qty: 30 TABLET | Refills: 11 | Status: CANCELLED | OUTPATIENT
Start: 2017-10-17

## 2017-10-18 ENCOUNTER — OFFICE VISIT (OUTPATIENT)
Dept: FAMILY MEDICINE | Facility: CLINIC | Age: 77
End: 2017-10-18
Payer: MEDICARE

## 2017-10-18 ENCOUNTER — HOSPITAL ENCOUNTER (OUTPATIENT)
Dept: RADIOLOGY | Facility: HOSPITAL | Age: 77
Discharge: HOME | End: 2017-10-18
Attending: FAMILY MEDICINE
Payer: MEDICARE

## 2017-10-18 VITALS
SYSTOLIC BLOOD PRESSURE: 128 MMHG | WEIGHT: 223.75 LBS | HEIGHT: 64 IN | HEART RATE: 68 BPM | TEMPERATURE: 98 F | DIASTOLIC BLOOD PRESSURE: 69 MMHG | BODY MASS INDEX: 38.2 KG/M2

## 2017-10-18 VITALS — WEIGHT: 224 LBS | HEIGHT: 64 IN | BODY MASS INDEX: 38.24 KG/M2

## 2017-10-18 DIAGNOSIS — E11.40 CONTROLLED TYPE 2 DIABETES MELLITUS WITH DIABETIC NEUROPATHY, WITHOUT LONG-TERM CURRENT USE OF INSULIN: ICD-10-CM

## 2017-10-18 DIAGNOSIS — K21.9 GASTROESOPHAGEAL REFLUX DISEASE WITHOUT ESOPHAGITIS: ICD-10-CM

## 2017-10-18 DIAGNOSIS — Z12.31 ENCOUNTER FOR SCREENING MAMMOGRAM FOR BREAST CANCER: ICD-10-CM

## 2017-10-18 DIAGNOSIS — M1A.9XX0 CHRONIC GOUT WITHOUT TOPHUS, UNSPECIFIED CAUSE, UNSPECIFIED SITE: ICD-10-CM

## 2017-10-18 DIAGNOSIS — E11.43 DIABETIC AUTONOMIC NEUROPATHY ASSOCIATED WITH TYPE 2 DIABETES MELLITUS: ICD-10-CM

## 2017-10-18 DIAGNOSIS — R53.83 FATIGUE, UNSPECIFIED TYPE: Primary | ICD-10-CM

## 2017-10-18 DIAGNOSIS — E11.59 HYPERTENSION ASSOCIATED WITH DIABETES: ICD-10-CM

## 2017-10-18 DIAGNOSIS — M1A.00X0 IDIOPATHIC CHRONIC GOUT WITHOUT TOPHUS, UNSPECIFIED SITE: ICD-10-CM

## 2017-10-18 DIAGNOSIS — E11.22 CONTROLLED TYPE 2 DIABETES MELLITUS WITH STAGE 3 CHRONIC KIDNEY DISEASE, WITHOUT LONG-TERM CURRENT USE OF INSULIN: ICD-10-CM

## 2017-10-18 DIAGNOSIS — G47.9 SLEEP DISORDER: ICD-10-CM

## 2017-10-18 DIAGNOSIS — E78.2 COMBINED HYPERLIPIDEMIA ASSOCIATED WITH TYPE 2 DIABETES MELLITUS: ICD-10-CM

## 2017-10-18 DIAGNOSIS — N18.30 CHRONIC RENAL INSUFFICIENCY, STAGE III (MODERATE): ICD-10-CM

## 2017-10-18 DIAGNOSIS — E55.9 VITAMIN D DEFICIENCY: ICD-10-CM

## 2017-10-18 DIAGNOSIS — N18.30 CONTROLLED TYPE 2 DIABETES MELLITUS WITH STAGE 3 CHRONIC KIDNEY DISEASE, WITHOUT LONG-TERM CURRENT USE OF INSULIN: ICD-10-CM

## 2017-10-18 DIAGNOSIS — E11.69 COMBINED HYPERLIPIDEMIA ASSOCIATED WITH TYPE 2 DIABETES MELLITUS: ICD-10-CM

## 2017-10-18 DIAGNOSIS — J45.20 MILD INTERMITTENT ASTHMA WITHOUT COMPLICATION: ICD-10-CM

## 2017-10-18 DIAGNOSIS — M81.0 OSTEOPOROSIS, UNSPECIFIED OSTEOPOROSIS TYPE, UNSPECIFIED PATHOLOGICAL FRACTURE PRESENCE: ICD-10-CM

## 2017-10-18 DIAGNOSIS — I15.2 HYPERTENSION ASSOCIATED WITH DIABETES: ICD-10-CM

## 2017-10-18 DIAGNOSIS — E66.2 MORBID (SEVERE) OBESITY WITH ALVEOLAR HYPOVENTILATION: ICD-10-CM

## 2017-10-18 DIAGNOSIS — M06.9 RHEUMATOID ARTHRITIS, INVOLVING UNSPECIFIED SITE, UNSPECIFIED RHEUMATOID FACTOR PRESENCE: ICD-10-CM

## 2017-10-18 DIAGNOSIS — E03.4 HYPOTHYROIDISM DUE TO ACQUIRED ATROPHY OF THYROID: ICD-10-CM

## 2017-10-18 LAB
CREAT UR-MCNC: 114 MG/DL
PROT UR-MCNC: <7 MG/DL
PROT/CREAT RATIO, UR: NORMAL

## 2017-10-18 PROCEDURE — 99999 PR PBB SHADOW E&M-EST. PATIENT-LVL V: CPT | Mod: PBBFAC,,, | Performed by: FAMILY MEDICINE

## 2017-10-18 PROCEDURE — 77067 SCR MAMMO BI INCL CAD: CPT | Mod: TC

## 2017-10-18 PROCEDURE — 77067 SCR MAMMO BI INCL CAD: CPT | Mod: 26,,, | Performed by: RADIOLOGY

## 2017-10-18 PROCEDURE — 99215 OFFICE O/P EST HI 40 MIN: CPT | Mod: S$PBB,,, | Performed by: FAMILY MEDICINE

## 2017-10-18 PROCEDURE — 82570 ASSAY OF URINE CREATININE: CPT

## 2017-10-18 PROCEDURE — 77063 BREAST TOMOSYNTHESIS BI: CPT | Mod: 26,,, | Performed by: RADIOLOGY

## 2017-10-18 PROCEDURE — 99215 OFFICE O/P EST HI 40 MIN: CPT | Mod: PBBFAC,PO,25 | Performed by: FAMILY MEDICINE

## 2017-10-18 PROCEDURE — G0008 ADMIN INFLUENZA VIRUS VAC: HCPCS | Mod: PBBFAC,PO

## 2017-10-18 RX ORDER — FUROSEMIDE 40 MG/1
20 TABLET ORAL DAILY
Qty: 15 TABLET | Refills: 11 | Status: SHIPPED | OUTPATIENT
Start: 2017-10-18 | End: 2019-03-11 | Stop reason: SDUPTHER

## 2017-10-18 RX ORDER — PREDNISONE 5 MG/1
TABLET ORAL
Refills: 0 | COMMUNITY
Start: 2017-10-12

## 2017-10-18 RX ORDER — LEVOTHYROXINE SODIUM 50 UG/1
50 TABLET ORAL
Qty: 30 TABLET | Refills: 11 | Status: SHIPPED | OUTPATIENT
Start: 2017-10-18 | End: 2018-11-08 | Stop reason: SDUPTHER

## 2017-10-18 RX ORDER — ALBUTEROL SULFATE 90 UG/1
2 AEROSOL, METERED RESPIRATORY (INHALATION) EVERY 6 HOURS PRN
Qty: 1 INHALER | Refills: 11 | Status: SHIPPED | OUTPATIENT
Start: 2017-10-18 | End: 2019-03-11 | Stop reason: SDUPTHER

## 2017-10-18 RX ORDER — DEXLANSOPRAZOLE 60 MG/1
1 CAPSULE, DELAYED RELEASE ORAL DAILY
Qty: 30 CAPSULE | Refills: 11 | Status: SHIPPED | OUTPATIENT
Start: 2017-10-18 | End: 2018-11-08 | Stop reason: SDUPTHER

## 2017-10-18 RX ORDER — AMLODIPINE AND OLMESARTAN MEDOXOMIL 10; 40 MG/1; MG/1
1 TABLET ORAL DAILY
Qty: 30 TABLET | Refills: 0 | Status: SHIPPED | OUTPATIENT
Start: 2017-10-18 | End: 2017-12-18 | Stop reason: SDUPTHER

## 2017-10-18 RX ORDER — ERGOCALCIFEROL 1.25 MG/1
50000 CAPSULE ORAL
Qty: 4 CAPSULE | Refills: 11 | Status: SHIPPED | OUTPATIENT
Start: 2017-10-18 | End: 2018-11-08 | Stop reason: SDUPTHER

## 2017-10-18 RX ORDER — ALLOPURINOL 100 MG/1
100 TABLET ORAL DAILY
COMMUNITY
End: 2017-10-18 | Stop reason: SDUPTHER

## 2017-10-18 RX ORDER — ALLOPURINOL 100 MG/1
100 TABLET ORAL DAILY
Qty: 30 TABLET | Refills: 11 | Status: SHIPPED | OUTPATIENT
Start: 2017-10-18 | End: 2018-11-08 | Stop reason: SDUPTHER

## 2017-10-18 RX ORDER — PRAVASTATIN SODIUM 20 MG/1
20 TABLET ORAL DAILY
Qty: 30 TABLET | Refills: 11 | Status: SHIPPED | OUTPATIENT
Start: 2017-10-18 | End: 2018-08-28 | Stop reason: SDUPTHER

## 2017-10-18 RX ORDER — ALLOPURINOL 300 MG/1
300 TABLET ORAL DAILY
Qty: 30 TABLET | Refills: 11 | Status: SHIPPED | OUTPATIENT
Start: 2017-10-18 | End: 2018-11-08 | Stop reason: SDUPTHER

## 2017-10-18 RX ORDER — ALENDRONATE SODIUM 70 MG/1
70 TABLET ORAL
Qty: 4 TABLET | Refills: 11 | Status: SHIPPED | OUTPATIENT
Start: 2017-10-18 | End: 2017-12-18 | Stop reason: SDUPTHER

## 2017-10-18 RX ORDER — DIPHENOXYLATE HYDROCHLORIDE AND ATROPINE SULFATE 2.5; .025 MG/1; MG/1
TABLET ORAL
COMMUNITY
Start: 2017-09-07 | End: 2017-10-18

## 2017-10-18 NOTE — PROGRESS NOTES
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  -     pravastatin (PRAVACHOL) 20 MG tablet; Take 1 tablet (20 mg total) by mouth once daily.  -     Lipid panel; Future  -     Hepatic function panel; Future    Encounter for screening mammogram for breast cancer    Rheumatoid arthritis, involving unspecified site, unspecified rheumatoid factor presence  Comments:  sees Dr. Lemos for this.    Sleep disorder   -     Polysomnogram (CPAP will be added if patient meets diagnostic criteria.); Future    Morbid (severe) obesity with alveolar hypoventilation   -     Polysomnogram (CPAP will be added if patient meets diagnostic criteria.); Future    Other orders  -     Cancel: escitalopram oxalate (LEXAPRO) 10 MG tablet; Take 1 tablet (10 mg total) by mouth once daily.  -     ferrous gluconate (FERGON) 325 MG Tab; Take 1 tablet (325 mg total) by mouth 2 (two) times daily with meals.  -     Cancel: gabapentin (NEURONTIN) 300 MG capsule; Take 1 capsule (300 mg total) by mouth 3 (three) times daily.  -     Cancel: Mammo Digital Screening Bilat with CAD; Future  -     Cancel: Ambulatory referral to Optometry  -     Influenza - High Dose (65+) (PF) (IM)  -     allopurinol (ZYLOPRIM) 100 MG tablet; Take 1 tablet (100 mg total) by mouth once daily.    continue all of her current medicines.

## 2017-11-01 ENCOUNTER — TELEPHONE (OUTPATIENT)
Dept: FAMILY MEDICINE | Facility: CLINIC | Age: 77
End: 2017-11-01

## 2017-11-01 NOTE — TELEPHONE ENCOUNTER
----- Message from Uday Byrne sent at 11/1/2017  9:06 AM CDT -----  Contact: [phone] or [phone]   Pt would like to consult with the nurse about sleep study, pt having trouble with transportation.  Please call back at [phone] or [phone].  Formerly [facility]-

## 2017-11-01 NOTE — TELEPHONE ENCOUNTER
Pt cannot make it to [place] for the sleep study due to transportation issues. Please put in an external referral to have the sleep study done at [place].

## 2017-11-01 NOTE — TELEPHONE ENCOUNTER
Pt wants to have her sleep study done here in [place] because she is having issues getting to [place]. New order pending to have sleep study at [place].

## 2017-11-02 NOTE — TELEPHONE ENCOUNTER
I can't do that as an external.   Write it on a prescription and have one of the NP's sign it physically to be faxed to [place].

## 2017-11-13 RX ORDER — FERROUS GLUCONATE 240(27)MG
TABLET ORAL
Qty: 60 TABLET | Refills: 4 | Status: SHIPPED | OUTPATIENT
Start: 2017-11-13 | End: 2018-04-27 | Stop reason: SDUPTHER

## 2017-11-17 ENCOUNTER — OFFICE VISIT (OUTPATIENT)
Dept: OPTOMETRY | Facility: CLINIC | Age: 77
End: 2017-11-17
Payer: MEDICARE

## 2017-11-17 DIAGNOSIS — Z96.1 BILATERAL PSEUDOPHAKIA: ICD-10-CM

## 2017-11-17 DIAGNOSIS — H43.813 POSTERIOR VITREOUS DETACHMENT, BILATERAL: ICD-10-CM

## 2017-11-17 DIAGNOSIS — Z13.5 GLAUCOMA SCREENING: ICD-10-CM

## 2017-11-17 DIAGNOSIS — E11.9 DIABETES MELLITUS TYPE 2 WITHOUT RETINOPATHY: Primary | ICD-10-CM

## 2017-11-17 DIAGNOSIS — H31.012: ICD-10-CM

## 2017-11-17 PROCEDURE — 99999 PR PBB SHADOW E&M-EST. PATIENT-LVL III: CPT | Mod: PBBFAC,,, | Performed by: OPTOMETRIST

## 2017-11-17 PROCEDURE — 99213 OFFICE O/P EST LOW 20 MIN: CPT | Mod: PBBFAC,PO | Performed by: OPTOMETRIST

## 2017-11-17 PROCEDURE — 92014 COMPRE OPH EXAM EST PT 1/>: CPT | Mod: S$PBB,,, | Performed by: OPTOMETRIST

## 2017-11-17 RX ORDER — GABAPENTIN 300 MG/1
CAPSULE ORAL
Refills: 5 | COMMUNITY
Start: 2017-11-13 | End: 2018-04-27

## 2017-11-17 NOTE — PROGRESS NOTES
HPI     Blurred Vision    Additional comments: at near only -- distance VA good           Annual Exam    Additional comments: DLE 8-16 (glenn)   ocular health exam            Comments   Agree above  Diabetic exam  No new oc/ vis complaint         Last edited by FARRAH Cortes, OD on 11/17/2017  9:52 AM. (History)        ROS     Positive for: Eyes    Negative for: Constitutional, Gastrointestinal, Neurological, Skin,   Genitourinary, Musculoskeletal, HENT, Endocrine, Cardiovascular,   Respiratory, Psychiatric, Allergic/Imm, Heme/Lymph    Last edited by FARRAH Cortes, OD on 11/17/2017  9:47 AM. (History)        Assessment /Plan     For exam results, see Encounter Report.    Diabetes mellitus type 2 without retinopathy    Posterior vitreous detachment, bilateral    Chorioretinal scar, macular, left    Glaucoma screening    Bilateral pseudophakia      1. No ret/ no csme, gave info, control glucose, annual DFE  2. RD precautions given  3. Longstanding / Stable w/ good VA, continue amsler  Repeat OCT next exam  4. Not suspect  5. Stable OU    NC refraction--ok continue otc readers  Discussed and educated patient on current findings /plan.  RTC 1 year, prn if any changes / issues

## 2017-12-06 ENCOUNTER — TELEPHONE (OUTPATIENT)
Dept: FAMILY MEDICINE | Facility: CLINIC | Age: 77
End: 2017-12-06

## 2017-12-06 DIAGNOSIS — R53.83 FATIGUE, UNSPECIFIED TYPE: ICD-10-CM

## 2017-12-06 DIAGNOSIS — I15.2 HYPERTENSION ASSOCIATED WITH DIABETES: ICD-10-CM

## 2017-12-06 DIAGNOSIS — E11.59 HYPERTENSION ASSOCIATED WITH DIABETES: ICD-10-CM

## 2017-12-06 DIAGNOSIS — G47.30 SLEEP APNEA, UNSPECIFIED TYPE: Primary | ICD-10-CM

## 2017-12-06 NOTE — TELEPHONE ENCOUNTER
DONAL FROM THE SLEEP STUDY DEPT AT [facility] CALLED AND STATES THE ORDER SHE GOT FOR THE PATIENT HAS THE CODE (SLE 3 POLYSOMNOGRAM) SHE HAS NEVER SEEN THIS CODE. SHE SAID NORMALLY PEOPLE PUT SPLIT NIGHT OR TITRATION     ALSO, SHE HAS MEDICARE AND YOU WILL NEED TO ADD TO DIAGNOSIS MAYBE SUCH AS SLEEP APNEA.     WILL NEED OFFICE NOTES. PT IS SCHEDULED FOR NEXT WEEK.

## 2017-12-06 NOTE — TELEPHONE ENCOUNTER
According to Dr. James's previous note, patient scored an 11 on the ESS, putting her at an abnormal sleepiness level.  He also noted she's had increased fatigue.  According to her problem list, she has previously been treated with a machine for sleep apnea in the past.  The order is the normal sleep study order.  Please ask [facility] which order they would like for us to use for the sleep study/CPAP eval.

## 2017-12-06 NOTE — TELEPHONE ENCOUNTER
Dr. James, the order you put in for Ms. Ro's sleep study at [facility] needs to be changed. Please see my previous note regarding what Yoko from [facility] sleep department said.

## 2017-12-18 DIAGNOSIS — E11.59 HYPERTENSION ASSOCIATED WITH DIABETES: ICD-10-CM

## 2017-12-18 DIAGNOSIS — I15.2 HYPERTENSION ASSOCIATED WITH DIABETES: ICD-10-CM

## 2017-12-18 RX ORDER — ALENDRONATE SODIUM 70 MG/1
TABLET ORAL
Qty: 4 TABLET | Refills: 11 | Status: SHIPPED | OUTPATIENT
Start: 2017-12-18 | End: 2019-03-11 | Stop reason: SDUPTHER

## 2017-12-18 RX ORDER — AMLODIPINE AND OLMESARTAN MEDOXOMIL 10; 40 MG/1; MG/1
1 TABLET ORAL DAILY
Qty: 90 TABLET | Refills: 3 | Status: SHIPPED | OUTPATIENT
Start: 2017-12-18 | End: 2018-11-09

## 2017-12-19 ENCOUNTER — OFFICE VISIT (OUTPATIENT)
Dept: PODIATRY | Facility: CLINIC | Age: 77
End: 2017-12-19
Payer: MEDICARE

## 2017-12-19 VITALS
SYSTOLIC BLOOD PRESSURE: 128 MMHG | HEIGHT: 64 IN | HEART RATE: 103 BPM | DIASTOLIC BLOOD PRESSURE: 70 MMHG | BODY MASS INDEX: 39.27 KG/M2 | WEIGHT: 230 LBS

## 2017-12-19 DIAGNOSIS — B35.1 DERMATOPHYTOSIS OF NAIL: ICD-10-CM

## 2017-12-19 DIAGNOSIS — E11.49 TYPE II DIABETES MELLITUS WITH NEUROLOGICAL MANIFESTATIONS: Primary | ICD-10-CM

## 2017-12-19 PROCEDURE — 99999 PR PBB SHADOW E&M-EST. PATIENT-LVL III: CPT | Mod: PBBFAC,,, | Performed by: PODIATRIST

## 2017-12-19 PROCEDURE — 99213 OFFICE O/P EST LOW 20 MIN: CPT | Mod: PBBFAC,PO,25 | Performed by: PODIATRIST

## 2017-12-19 PROCEDURE — 99499 UNLISTED E&M SERVICE: CPT | Mod: S$PBB,,, | Performed by: PODIATRIST

## 2017-12-19 PROCEDURE — 11721 DEBRIDE NAIL 6 OR MORE: CPT | Mod: Q9,PBBFAC,PO | Performed by: PODIATRIST

## 2017-12-19 PROCEDURE — 11721 DEBRIDE NAIL 6 OR MORE: CPT | Mod: Q9,S$PBB,, | Performed by: PODIATRIST
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
## 2018-01-05 ENCOUNTER — TELEPHONE (OUTPATIENT)
Dept: FAMILY MEDICINE | Facility: CLINIC | Age: 78
End: 2018-01-05

## 2018-01-05 DIAGNOSIS — G47.30 SLEEP APNEA, UNSPECIFIED TYPE: Primary | ICD-10-CM

## 2018-01-05 NOTE — TELEPHONE ENCOUNTER
I sent to you this AM in the stack of papers.  She needs to schedule an appointment with Dr. Shanell Cedillo at [place] if she wants to stay in [place] or with Dr. Pitt at [facility].

## 2018-01-05 NOTE — TELEPHONE ENCOUNTER
Pt states she had her sleep study done on 12/14/17 in [place] and they were supposed to fax the results to you. Pt is asking what next step is.

## 2018-01-05 NOTE — TELEPHONE ENCOUNTER
----- Message from Zarina Roche sent at 1/5/2018  4:01 PM CST -----  Contact: Pt  Pt calling in regards to she had a sleep study done in [facility] in Dec 2017 and would like to speak with the nurse as to what the next step is. Pt can be reached at .[phone] (home)

## 2018-01-07 ENCOUNTER — PATIENT MESSAGE (OUTPATIENT)
Dept: FAMILY MEDICINE | Facility: CLINIC | Age: 78
End: 2018-01-07

## 2018-01-07 NOTE — PROGRESS NOTES
Patient, Wendy Ro (MRN #[id number]), presented with a recorded BMI of 39.48 kg/m^2 and a documented comorbidity(s):  - Diabetes Mellitus Type 2  to which the severe obesity is a contributing factor. This is consistent with the definition of severe obesity (BMI 35.0-35.9) with comorbidity (ICD-10 E66.01, Z68.35). The patient's severe obesity was monitored, evaluated, addressed and/or treated. This addendum to the medical record is made on 01/07/2018.

## 2018-01-08 NOTE — TELEPHONE ENCOUNTER
I have signed for the following orders AND/OR meds.  Please call the patient and ask the patient to schedule the testing AND/OR inform about any medications that were sent.      Orders Placed This Encounter   Procedures    Ambulatory referral to Pulmonology     Referral Type:   Consultation     Referred to Provider:   Shanell Cedillo MD     Requested Specialty:   Pulmonary Disease     Number of Visits Requested:   1

## 2018-02-16 ENCOUNTER — LAB VISIT (OUTPATIENT)
Dept: LAB | Facility: HOSPITAL | Age: 78
End: 2018-02-16
Attending: INTERNAL MEDICINE
Payer: MEDICARE

## 2018-02-16 DIAGNOSIS — N18.9 CHRONIC KIDNEY DISEASE, UNSPECIFIED CKD STAGE: ICD-10-CM

## 2018-02-16 LAB
CREAT UR-MCNC: 93 MG/DL
PROT UR-MCNC: <7 MG/DL
PROT/CREAT RATIO, UR: NORMAL

## 2018-02-16 PROCEDURE — 82570 ASSAY OF URINE CREATININE: CPT

## 2018-02-20 ENCOUNTER — OFFICE VISIT (OUTPATIENT)
Dept: PODIATRY | Facility: CLINIC | Age: 78
End: 2018-02-20
Payer: MEDICARE

## 2018-02-20 ENCOUNTER — TELEPHONE (OUTPATIENT)
Dept: FAMILY MEDICINE | Facility: CLINIC | Age: 78
End: 2018-02-20

## 2018-02-20 VITALS
HEART RATE: 83 BPM | BODY MASS INDEX: 38.69 KG/M2 | DIASTOLIC BLOOD PRESSURE: 77 MMHG | HEIGHT: 64 IN | WEIGHT: 226.63 LBS | SYSTOLIC BLOOD PRESSURE: 137 MMHG

## 2018-02-20 DIAGNOSIS — B35.1 DERMATOPHYTOSIS OF NAIL: ICD-10-CM

## 2018-02-20 DIAGNOSIS — E11.49 TYPE II DIABETES MELLITUS WITH NEUROLOGICAL MANIFESTATIONS: ICD-10-CM

## 2018-02-20 DIAGNOSIS — E11.9 TYPE 2 DIABETES MELLITUS WITHOUT COMPLICATION, UNSPECIFIED LONG TERM INSULIN USE STATUS: Primary | ICD-10-CM

## 2018-02-20 DIAGNOSIS — E11.9 COMPREHENSIVE DIABETIC FOOT EXAMINATION, TYPE 2 DM, ENCOUNTER FOR: Primary | ICD-10-CM

## 2018-02-20 PROCEDURE — 99213 OFFICE O/P EST LOW 20 MIN: CPT | Mod: S$PBB,25,, | Performed by: PODIATRIST

## 2018-02-20 PROCEDURE — 11721 DEBRIDE NAIL 6 OR MORE: CPT | Mod: Q9,PBBFAC,PO | Performed by: PODIATRIST

## 2018-02-20 PROCEDURE — 99213 OFFICE O/P EST LOW 20 MIN: CPT | Mod: PBBFAC,PO | Performed by: PODIATRIST

## 2018-02-20 PROCEDURE — 99999 PR PBB SHADOW E&M-EST. PATIENT-LVL III: CPT | Mod: PBBFAC,,, | Performed by: PODIATRIST

## 2018-02-20 PROCEDURE — 1159F MED LIST DOCD IN RCRD: CPT | Mod: ,,, | Performed by: PODIATRIST

## 2018-02-20 PROCEDURE — 11721 DEBRIDE NAIL 6 OR MORE: CPT | Mod: Q9,S$PBB,, | Performed by: PODIATRIST

## 2018-02-20 RX ORDER — ESCITALOPRAM OXALATE 10 MG/1
10 TABLET ORAL
COMMUNITY
Start: 2015-12-17 | End: 2018-08-01

## 2018-02-20 RX ORDER — COLCHICINE 0.6 MG/1
0.6 TABLET ORAL
COMMUNITY
Start: 2015-12-16 | End: 2018-08-01

## 2018-02-20 NOTE — TELEPHONE ENCOUNTER
----- Message from Uli James MD sent at 2/19/2018 11:14 PM CST -----  Book a1c in 6 months.  Notify of the appointment via MyChart or letter.   Refill Diabetes Meds x 6 months.    The patient's Health Maintenance that is due is below:  Lipid Panel due on 02/17/2018

## 2018-02-20 NOTE — PROGRESS NOTES
Patient, Wendy Ro (MRN #[id number]), presented with a recorded BMI of 38.9 kg/m^2 and a documented comorbidity(s):  - Diabetes Mellitus Type 2  to which the severe obesity is a contributing factor. This is consistent with the definition of severe obesity (BMI 35.0-35.9) with comorbidity (ICD-10 E66.01, Z68.35). The patient's severe obesity was monitored, evaluated, addressed and/or treated. This addendum to the medical record is made on 02/20/2018.

## 2018-02-20 NOTE — TELEPHONE ENCOUNTER
I have signed for the following orders AND/OR meds.  Please call the patient and ask the patient to schedule the testing AND/OR inform about any medications that were sent.      Orders Placed This Encounter   Procedures    Hemoglobin A1c     Standing Status:   Standing     Number of Occurrences:   99     Standing Expiration Date:   8/20/2033
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
## 2018-03-12 ENCOUNTER — TELEPHONE (OUTPATIENT)
Dept: FAMILY MEDICINE | Facility: CLINIC | Age: 78
End: 2018-03-12

## 2018-03-12 DIAGNOSIS — G47.30 SLEEP APNEA, UNSPECIFIED TYPE: ICD-10-CM

## 2018-03-12 NOTE — TELEPHONE ENCOUNTER
She had an abnormal sleep study at [facility] and needs to be on cpap.  Please confirm that she has gotten this done or get her in with a pulmonary specialist to follow and initiate this.         Wendy Ro Study Date: 2017 MR#: [id number]  1  POLYSOMNOGRAPHY REPORT  TYPE OF STUDY: CPAP Titration Study  Wendy Ro Study Date: 2017 MR#: [id number]  : 1940 BMI: 38.28 Neck: 143/4 inches  Referring Physician: Dr. Uli James  Interpreting Physician: Dr. Shanell Cedillo  HISTORY: Patient is a 77-year-old is 5 foot 4 inches and 223 pounds.  PMHx: Asthma, arthritis, HTN, kidney disease, diabetes, acid reflux  MEDICATIONS: Albuterol, alendronate, allopurinol, amlodipine-olmesartan, Bioflex, calcium  carbonate, cetirizine, dexlansoprazole, docusate sodium, ergocalciferol, ferrous gluconate,  furosemide, levothyroxine, lidocaine, nystatin, ondansetron, pravastatin, prednisone, turmeric  root extract  DAY OF STUDY: Nap: none  Alcohol: none  Caffeine: coffee  STUDY PROTOCOL: An attended all night polysomnogram with an initiation of positive  airway pressure therapy or a bi-level ventilation, using the following parameters: Left and right  frontal, central occipital EEG derivations, left and right EOG, submental EMG, left and right  tibialis EMG, CPAP flow, thoracic and abdominal respiratory effort with RIP belts, ECG,  snoring, and pulse oximetry.  TITRATION: Patient was titrated with a [place] mask with heated humidifier. CPAP pressure  titrated at 4, 5, 6 and 7 cm H2O.  POSITION: Patient spent 0 % in supine and 100 % in non-supine position.  SLEEP ARCHITECTURE: The total recording time was 472 minutes; the total sleep time was  352 minutes; sleep onset latency was 76.6 minutes. The patient had 7.97 % of N1 sleep, 56.9 %  N2, 15.79 % N3 and 19.35 % REM sleep with a REM latency of 48.5 minutes. The arousal  index was 7.85 per hour and the sleep efficiency was 74.5 %.  OXIMETRY: Patient had a mean oxygen  saturation of 93.13 %.  CARDIAC: Heart rate was 70.76 beat per minute. There was ___ significant cardiac rhythm  irregularities noted.  PERIODIC LIMB MOVEMENT: Leg movement index was 24.75 per hour with 1.4 per hour  associated with arousal.  ASSESSMENT:  1. ICSD-10 code: G47.33. Therapeutic CPAP at 7 cm of water in the side-lying position and in  Wendy Accmarco antonioo Study Date: 12/14/2017 MR#: [id number]  2

## 2018-04-27 ENCOUNTER — OFFICE VISIT (OUTPATIENT)
Dept: NEPHROLOGY | Facility: CLINIC | Age: 78
End: 2018-04-27
Payer: MEDICARE

## 2018-04-27 ENCOUNTER — LAB VISIT (OUTPATIENT)
Dept: LAB | Facility: HOSPITAL | Age: 78
End: 2018-04-27
Attending: FAMILY MEDICINE
Payer: MEDICARE

## 2018-04-27 VITALS
WEIGHT: 233.94 LBS | DIASTOLIC BLOOD PRESSURE: 73 MMHG | HEART RATE: 96 BPM | SYSTOLIC BLOOD PRESSURE: 151 MMHG | BODY MASS INDEX: 40.15 KG/M2

## 2018-04-27 DIAGNOSIS — M1A.9XX0 CHRONIC GOUT WITHOUT TOPHUS, UNSPECIFIED CAUSE, UNSPECIFIED SITE: ICD-10-CM

## 2018-04-27 DIAGNOSIS — N18.30 STAGE 3 CHRONIC KIDNEY DISEASE: ICD-10-CM

## 2018-04-27 DIAGNOSIS — M06.9 RHEUMATOID ARTHRITIS, INVOLVING UNSPECIFIED SITE, UNSPECIFIED RHEUMATOID FACTOR PRESENCE: ICD-10-CM

## 2018-04-27 DIAGNOSIS — E55.9 VITAMIN D DEFICIENCY: ICD-10-CM

## 2018-04-27 DIAGNOSIS — D50.9 IRON DEFICIENCY ANEMIA, UNSPECIFIED IRON DEFICIENCY ANEMIA TYPE: Primary | ICD-10-CM

## 2018-04-27 DIAGNOSIS — N18.30 CONTROLLED TYPE 2 DIABETES MELLITUS WITH STAGE 3 CHRONIC KIDNEY DISEASE, WITHOUT LONG-TERM CURRENT USE OF INSULIN: ICD-10-CM

## 2018-04-27 DIAGNOSIS — N18.9 CHRONIC KIDNEY DISEASE, UNSPECIFIED CKD STAGE: ICD-10-CM

## 2018-04-27 DIAGNOSIS — N28.1 RENAL CYST: ICD-10-CM

## 2018-04-27 DIAGNOSIS — I15.2 HYPERTENSION ASSOCIATED WITH DIABETES: ICD-10-CM

## 2018-04-27 DIAGNOSIS — E11.9 TYPE 2 DIABETES MELLITUS WITHOUT COMPLICATION: ICD-10-CM

## 2018-04-27 DIAGNOSIS — E11.22 CONTROLLED TYPE 2 DIABETES MELLITUS WITH STAGE 3 CHRONIC KIDNEY DISEASE, WITHOUT LONG-TERM CURRENT USE OF INSULIN: ICD-10-CM

## 2018-04-27 DIAGNOSIS — E11.59 HYPERTENSION ASSOCIATED WITH DIABETES: ICD-10-CM

## 2018-04-27 LAB
ESTIMATED AVG GLUCOSE: 131 MG/DL
HBA1C MFR BLD HPLC: 6.2 %

## 2018-04-27 PROCEDURE — 99214 OFFICE O/P EST MOD 30 MIN: CPT | Mod: PBBFAC,PO | Performed by: INTERNAL MEDICINE

## 2018-04-27 PROCEDURE — 99214 OFFICE O/P EST MOD 30 MIN: CPT | Mod: S$PBB,,, | Performed by: INTERNAL MEDICINE

## 2018-04-27 PROCEDURE — 83036 HEMOGLOBIN GLYCOSYLATED A1C: CPT

## 2018-04-27 PROCEDURE — 36415 COLL VENOUS BLD VENIPUNCTURE: CPT | Mod: PO

## 2018-04-27 PROCEDURE — 99999 PR PBB SHADOW E&M-EST. PATIENT-LVL IV: CPT | Mod: PBBFAC,,, | Performed by: INTERNAL MEDICINE

## 2018-04-30 ENCOUNTER — TELEPHONE (OUTPATIENT)
Dept: FAMILY MEDICINE | Facility: CLINIC | Age: 78
End: 2018-04-30

## 2018-04-30 NOTE — TELEPHONE ENCOUNTER
----- Message from Uli James MD sent at 4/27/2018  4:56 PM CDT -----  Book a1c in 6 months.  Notify of the appointment via MyChart or letter.   Refill Diabetes Meds x 6 months.    The patient's Health Maintenance that is due is below:  Lipid Panel due on 02/17/2018

## 2018-05-03 PROBLEM — N18.9 CKD (CHRONIC KIDNEY DISEASE): Status: ACTIVE | Noted: 2018-05-03

## 2018-05-03 NOTE — PROGRESS NOTES
Subjective:       Patient ID: Wendy Ro is a 77 y.o. White female who presents for re-evaluation of progression of Follow-up; Chronic Kidney Disease; and Edema    Edema   Associated symptoms include arthralgias. Pertinent negatives include no chest pain, coughing, fatigue or weakness.        She reports she is doing well. Her weight is stable and LE edema is controlled. No LUTS. No NSAID use. She follows a low sodium diet but admits to not pushing fluids due to increased frequency. No gout. Last Hba1c was 6.2    Review of Systems   Constitutional: Negative for activity change, appetite change, fatigue and unexpected weight change.   HENT: Negative for facial swelling.    Eyes: Negative for visual disturbance.   Respiratory: Negative for cough and shortness of breath.    Cardiovascular: Positive for leg swelling (much improved). Negative for chest pain.   Gastrointestinal: Negative for abdominal distention.   Genitourinary: Negative for difficulty urinating and dysuria.   Musculoskeletal: Positive for arthralgias and back pain.   Neurological: Negative for weakness.       Objective:      Physical Exam   Constitutional: She is oriented to person, place, and time. She appears well-nourished. No distress.   HENT:   Mouth/Throat: Oropharynx is clear and moist.   Neck: No JVD present.   Cardiovascular: S1 normal and S2 normal.  Exam reveals no friction rub.    Pulmonary/Chest: Breath sounds normal. She has no wheezes. She has no rales.   Abdominal: Soft.   Musculoskeletal: She exhibits no edema.   Neurological: She is alert and oriented to person, place, and time.   Skin: Skin is warm and dry.   Psychiatric: She has a normal mood and affect.   Vitals reviewed.      Assessment:       1. Iron deficiency anemia, unspecified iron deficiency anemia type    2. Chronic kidney disease, unspecified CKD stage    3. Stage 3 chronic kidney disease    4. Renal cyst    5. Hypertension associated with diabetes    6. Class 2  obesity with serious comorbidity and body mass index (BMI) of 38.0 to 38.9 in adult, unspecified obesity type    7. Controlled type 2 diabetes mellitus with stage 3 chronic kidney disease, without long-term current use of insulin    8. Vitamin D deficiency     9. Chronic gout without tophus, unspecified cause, unspecified site    10. Rheumatoid arthritis, involving unspecified site, unspecified rheumatoid factor presence        Plan:             CKD with stable kidney function. Continue RAAS blockade for renal preservation    HTN--typically controlled. Reviewed previous doctor visits and is usually controlled. BP log in one week    DM is controlled    Gout--continue allopurinol and prn colchicine     Mineral and Bone Disease--vitamin D is adequate      RTC 6 months

## 2018-05-15 ENCOUNTER — OFFICE VISIT (OUTPATIENT)
Dept: PODIATRY | Facility: CLINIC | Age: 78
End: 2018-05-15
Payer: MEDICARE

## 2018-05-15 VITALS
BODY MASS INDEX: 39.95 KG/M2 | DIASTOLIC BLOOD PRESSURE: 76 MMHG | RESPIRATION RATE: 20 BRPM | WEIGHT: 234 LBS | HEART RATE: 68 BPM | HEIGHT: 64 IN | SYSTOLIC BLOOD PRESSURE: 136 MMHG

## 2018-05-15 DIAGNOSIS — B35.1 DERMATOPHYTOSIS OF NAIL: ICD-10-CM

## 2018-05-15 DIAGNOSIS — E11.49 TYPE II DIABETES MELLITUS WITH NEUROLOGICAL MANIFESTATIONS: Primary | ICD-10-CM

## 2018-05-15 PROCEDURE — 11721 DEBRIDE NAIL 6 OR MORE: CPT | Mod: S$PBB,,, | Performed by: PODIATRIST

## 2018-05-15 PROCEDURE — 99213 OFFICE O/P EST LOW 20 MIN: CPT | Mod: PBBFAC,PO,25 | Performed by: PODIATRIST

## 2018-05-15 PROCEDURE — 99499 UNLISTED E&M SERVICE: CPT | Mod: S$PBB,,, | Performed by: PODIATRIST

## 2018-05-15 PROCEDURE — 99999 PR PBB SHADOW E&M-EST. PATIENT-LVL III: CPT | Mod: PBBFAC,,, | Performed by: PODIATRIST

## 2018-05-15 PROCEDURE — 11721 DEBRIDE NAIL 6 OR MORE: CPT | Mod: Q9,PBBFAC,PO | Performed by: PODIATRIST
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
## 2018-07-13 ENCOUNTER — TELEPHONE (OUTPATIENT)
Dept: FAMILY MEDICINE | Facility: CLINIC | Age: 78
End: 2018-07-13

## 2018-07-13 NOTE — TELEPHONE ENCOUNTER
----- Message from Chadwick Pastor sent at 7/13/2018  3:29 PM CDT -----  Contact: Pt  Please give pt a call at ..[phone] (home) regarding an appt for severe depression.

## 2018-07-13 NOTE — TELEPHONE ENCOUNTER
Scheduled pt with next available appointment with Dr. James 08/01/18 @ 11:20, pt advised Dr. James is out of the clinic for 2 weeks, offered pt sooner appt with a nurse practitioner, pt refused.

## 2018-07-16 ENCOUNTER — PATIENT MESSAGE (OUTPATIENT)
Dept: ADMINISTRATIVE | Facility: HOSPITAL | Age: 78
End: 2018-07-16

## 2018-07-17 ENCOUNTER — TELEPHONE (OUTPATIENT)
Dept: FAMILY MEDICINE | Facility: CLINIC | Age: 78
End: 2018-07-17

## 2018-07-17 ENCOUNTER — LAB VISIT (OUTPATIENT)
Dept: LAB | Facility: HOSPITAL | Age: 78
End: 2018-07-17
Attending: FAMILY MEDICINE
Payer: MEDICARE

## 2018-07-17 ENCOUNTER — OFFICE VISIT (OUTPATIENT)
Dept: PODIATRY | Facility: CLINIC | Age: 78
End: 2018-07-17
Payer: MEDICARE

## 2018-07-17 VITALS
HEART RATE: 81 BPM | WEIGHT: 232.19 LBS | HEIGHT: 64 IN | SYSTOLIC BLOOD PRESSURE: 137 MMHG | BODY MASS INDEX: 39.64 KG/M2 | DIASTOLIC BLOOD PRESSURE: 73 MMHG

## 2018-07-17 DIAGNOSIS — E78.2 COMBINED HYPERLIPIDEMIA ASSOCIATED WITH TYPE 2 DIABETES MELLITUS: ICD-10-CM

## 2018-07-17 DIAGNOSIS — E11.9 TYPE 2 DIABETES MELLITUS WITHOUT COMPLICATION: ICD-10-CM

## 2018-07-17 DIAGNOSIS — E11.49 TYPE II DIABETES MELLITUS WITH NEUROLOGICAL MANIFESTATIONS: Primary | ICD-10-CM

## 2018-07-17 DIAGNOSIS — B35.1 DERMATOPHYTOSIS OF NAIL: ICD-10-CM

## 2018-07-17 DIAGNOSIS — E11.69 COMBINED HYPERLIPIDEMIA ASSOCIATED WITH TYPE 2 DIABETES MELLITUS: ICD-10-CM

## 2018-07-17 LAB
CHOLEST SERPL-MCNC: 181 MG/DL
CHOLEST/HDLC SERPL: 4 {RATIO}
ESTIMATED AVG GLUCOSE: 140 MG/DL
HBA1C MFR BLD HPLC: 6.5 %
HDLC SERPL-MCNC: 45 MG/DL
HDLC SERPL: 24.9 %
LDLC SERPL CALC-MCNC: 112.4 MG/DL
NONHDLC SERPL-MCNC: 136 MG/DL
TRIGL SERPL-MCNC: 118 MG/DL

## 2018-07-17 PROCEDURE — 11721 DEBRIDE NAIL 6 OR MORE: CPT | Mod: Q9,S$PBB,, | Performed by: PODIATRIST

## 2018-07-17 PROCEDURE — 99999 PR PBB SHADOW E&M-EST. PATIENT-LVL III: CPT | Mod: PBBFAC,,, | Performed by: PODIATRIST

## 2018-07-17 PROCEDURE — 99499 UNLISTED E&M SERVICE: CPT | Mod: S$PBB,,, | Performed by: PODIATRIST

## 2018-07-17 PROCEDURE — 83036 HEMOGLOBIN GLYCOSYLATED A1C: CPT

## 2018-07-17 PROCEDURE — 36415 COLL VENOUS BLD VENIPUNCTURE: CPT | Mod: PO

## 2018-07-17 PROCEDURE — 11721 DEBRIDE NAIL 6 OR MORE: CPT | Mod: Q9,PBBFAC,PO | Performed by: PODIATRIST

## 2018-07-17 PROCEDURE — 99213 OFFICE O/P EST LOW 20 MIN: CPT | Mod: PBBFAC,PO,25 | Performed by: PODIATRIST

## 2018-07-17 PROCEDURE — 80061 LIPID PANEL: CPT
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
## 2018-07-17 NOTE — TELEPHONE ENCOUNTER
----- Message from Suze Kelly sent at 7/17/2018  9:36 AM CDT -----   Patient would like a letter for jury duty

## 2018-07-17 NOTE — TELEPHONE ENCOUNTER
----- Message from Suze Kelly sent at 7/17/2018  9:46 AM CDT -----   patient would like letter for jury duty due to transportation and  osteoporosis

## 2018-07-17 NOTE — LETTER
[facility], [place]  [address] [address] [address]  Phone: [phone]  Fax: [phone] July 17, 2018    Wendy Ro  [id number] [id number]      To Whom It May Concern:    Wendy Ro is unable to participate in jury duty due to mobility status complicated by multiple disease processes and limited transportation availability.    If you have any questions or concerns, please feel free to call my office.    Sincerely,        Tex Bear NP

## 2018-07-18 ENCOUNTER — PATIENT OUTREACH (OUTPATIENT)
Dept: ADMINISTRATIVE | Facility: HOSPITAL | Age: 78
End: 2018-07-18

## 2018-08-01 ENCOUNTER — OFFICE VISIT (OUTPATIENT)
Dept: FAMILY MEDICINE | Facility: CLINIC | Age: 78
End: 2018-08-01
Payer: MEDICARE

## 2018-08-01 ENCOUNTER — LAB VISIT (OUTPATIENT)
Dept: LAB | Facility: HOSPITAL | Age: 78
End: 2018-08-01
Attending: FAMILY MEDICINE
Payer: MEDICARE

## 2018-08-01 VITALS
TEMPERATURE: 98 F | HEIGHT: 64 IN | DIASTOLIC BLOOD PRESSURE: 81 MMHG | BODY MASS INDEX: 41.04 KG/M2 | HEART RATE: 71 BPM | WEIGHT: 240.38 LBS | SYSTOLIC BLOOD PRESSURE: 139 MMHG

## 2018-08-01 DIAGNOSIS — F33.2 SEVERE EPISODE OF RECURRENT MAJOR DEPRESSIVE DISORDER, WITHOUT PSYCHOTIC FEATURES: Primary | ICD-10-CM

## 2018-08-01 DIAGNOSIS — E66.01 CLASS 3 SEVERE OBESITY DUE TO EXCESS CALORIES WITH SERIOUS COMORBIDITY AND BODY MASS INDEX (BMI) OF 40.0 TO 44.9 IN ADULT: ICD-10-CM

## 2018-08-01 DIAGNOSIS — R25.2 SPASM: ICD-10-CM

## 2018-08-01 LAB
ALBUMIN SERPL BCP-MCNC: 3.5 G/DL
ALP SERPL-CCNC: 51 U/L
ALT SERPL W/O P-5'-P-CCNC: 13 U/L
ANION GAP SERPL CALC-SCNC: 8 MMOL/L
AST SERPL-CCNC: 15 U/L
BILIRUB SERPL-MCNC: 0.7 MG/DL
BUN SERPL-MCNC: 20 MG/DL
CALCIUM SERPL-MCNC: 9.9 MG/DL
CHLORIDE SERPL-SCNC: 107 MMOL/L
CO2 SERPL-SCNC: 28 MMOL/L
CREAT SERPL-MCNC: 1 MG/DL
EST. GFR  (AFRICAN AMERICAN): >60 ML/MIN/1.73 M^2
EST. GFR  (NON AFRICAN AMERICAN): 54.5 ML/MIN/1.73 M^2
GLUCOSE SERPL-MCNC: 108 MG/DL
MAGNESIUM SERPL-MCNC: 1.8 MG/DL
POTASSIUM SERPL-SCNC: 4.3 MMOL/L
PROT SERPL-MCNC: 6.6 G/DL
SODIUM SERPL-SCNC: 143 MMOL/L

## 2018-08-01 PROCEDURE — 36415 COLL VENOUS BLD VENIPUNCTURE: CPT | Mod: PO

## 2018-08-01 PROCEDURE — 99999 PR PBB SHADOW E&M-EST. PATIENT-LVL IV: CPT | Mod: PBBFAC,,, | Performed by: FAMILY MEDICINE

## 2018-08-01 PROCEDURE — 99214 OFFICE O/P EST MOD 30 MIN: CPT | Mod: S$PBB,,, | Performed by: FAMILY MEDICINE

## 2018-08-01 PROCEDURE — 83735 ASSAY OF MAGNESIUM: CPT

## 2018-08-01 PROCEDURE — 99214 OFFICE O/P EST MOD 30 MIN: CPT | Mod: PBBFAC,PO | Performed by: FAMILY MEDICINE

## 2018-08-01 PROCEDURE — 80053 COMPREHEN METABOLIC PANEL: CPT

## 2018-08-01 RX ORDER — BUPROPION HYDROCHLORIDE 150 MG/1
TABLET, EXTENDED RELEASE ORAL
Qty: 60 TABLET | Refills: 0 | Status: SHIPPED | OUTPATIENT
Start: 2018-08-01 | End: 2018-11-16
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
## 2018-08-02 NOTE — PROGRESS NOTES
The kidney function is a little lolw but this can be from medicine also.  The rest of the electrolytes all look great. The magnesium is also normal.    Please hydrate well and continue all medicine.  Recheck the labs in 1 year at the annual physical..

## 2018-08-18 RX ORDER — FERROUS GLUCONATE 240(27)MG
TABLET ORAL
Qty: 60 TABLET | Refills: 4 | Status: SHIPPED | OUTPATIENT
Start: 2018-08-18 | End: 2019-03-11

## 2018-08-23 ENCOUNTER — PATIENT OUTREACH (OUTPATIENT)
Dept: ADMINISTRATIVE | Facility: HOSPITAL | Age: 78
End: 2018-08-23

## 2018-08-28 DIAGNOSIS — E11.69 COMBINED HYPERLIPIDEMIA ASSOCIATED WITH TYPE 2 DIABETES MELLITUS: ICD-10-CM

## 2018-08-28 DIAGNOSIS — E78.2 COMBINED HYPERLIPIDEMIA ASSOCIATED WITH TYPE 2 DIABETES MELLITUS: ICD-10-CM

## 2018-08-28 RX ORDER — PRAVASTATIN SODIUM 20 MG/1
20 TABLET ORAL DAILY
Qty: 90 TABLET | Refills: 3 | Status: SHIPPED | OUTPATIENT
Start: 2018-08-28 | End: 2019-03-11 | Stop reason: SDUPTHER

## 2018-10-12 ENCOUNTER — LAB VISIT (OUTPATIENT)
Dept: LAB | Facility: HOSPITAL | Age: 78
End: 2018-10-12
Attending: FAMILY MEDICINE
Payer: MEDICARE

## 2018-10-12 DIAGNOSIS — D50.9 IRON DEFICIENCY ANEMIA, UNSPECIFIED IRON DEFICIENCY ANEMIA TYPE: ICD-10-CM

## 2018-10-12 DIAGNOSIS — N18.9 CHRONIC KIDNEY DISEASE, UNSPECIFIED CKD STAGE: ICD-10-CM

## 2018-10-12 LAB
ALBUMIN SERPL BCP-MCNC: 3.1 G/DL
ANION GAP SERPL CALC-SCNC: 7 MMOL/L
BASOPHILS # BLD AUTO: 0.05 K/UL
BASOPHILS NFR BLD: 0.7 %
BUN SERPL-MCNC: 16 MG/DL
CALCIUM SERPL-MCNC: 9.3 MG/DL
CHLORIDE SERPL-SCNC: 110 MMOL/L
CO2 SERPL-SCNC: 26 MMOL/L
CREAT SERPL-MCNC: 1 MG/DL
DIFFERENTIAL METHOD: ABNORMAL
EOSINOPHIL # BLD AUTO: 0.1 K/UL
EOSINOPHIL NFR BLD: 1.5 %
ERYTHROCYTE [DISTWIDTH] IN BLOOD BY AUTOMATED COUNT: 14.2 %
EST. GFR  (AFRICAN AMERICAN): >60 ML/MIN/1.73 M^2
EST. GFR  (NON AFRICAN AMERICAN): 54.5 ML/MIN/1.73 M^2
FERRITIN SERPL-MCNC: 213 NG/ML
GLUCOSE SERPL-MCNC: 119 MG/DL
HCT VFR BLD AUTO: 39.1 %
HGB BLD-MCNC: 11.7 G/DL
IMM GRANULOCYTES # BLD AUTO: 0.04 K/UL
IMM GRANULOCYTES NFR BLD AUTO: 0.6 %
IRON SERPL-MCNC: 64 UG/DL
LYMPHOCYTES # BLD AUTO: 2.9 K/UL
LYMPHOCYTES NFR BLD: 40.6 %
MCH RBC QN AUTO: 30.8 PG
MCHC RBC AUTO-ENTMCNC: 29.9 G/DL
MCV RBC AUTO: 103 FL
MONOCYTES # BLD AUTO: 0.6 K/UL
MONOCYTES NFR BLD: 8.6 %
NEUTROPHILS # BLD AUTO: 3.5 K/UL
NEUTROPHILS NFR BLD: 48 %
NRBC BLD-RTO: 0 /100 WBC
PHOSPHATE SERPL-MCNC: 3 MG/DL
PLATELET # BLD AUTO: 314 K/UL
PMV BLD AUTO: 11.1 FL
POTASSIUM SERPL-SCNC: 3.8 MMOL/L
PTH-INTACT SERPL-MCNC: 61 PG/ML
RBC # BLD AUTO: 3.8 M/UL
SATURATED IRON: 26 %
SODIUM SERPL-SCNC: 143 MMOL/L
TOTAL IRON BINDING CAPACITY: 247 UG/DL
TRANSFERRIN SERPL-MCNC: 167 MG/DL
WBC # BLD AUTO: 7.25 K/UL

## 2018-10-12 PROCEDURE — 82728 ASSAY OF FERRITIN: CPT

## 2018-10-12 PROCEDURE — 83970 ASSAY OF PARATHORMONE: CPT

## 2018-10-12 PROCEDURE — 80069 RENAL FUNCTION PANEL: CPT

## 2018-10-12 PROCEDURE — 83540 ASSAY OF IRON: CPT

## 2018-10-12 PROCEDURE — 85025 COMPLETE CBC W/AUTO DIFF WBC: CPT

## 2018-10-30 ENCOUNTER — IMMUNIZATION (OUTPATIENT)
Dept: FAMILY MEDICINE | Facility: CLINIC | Age: 78
End: 2018-10-30
Payer: MEDICARE

## 2018-10-30 ENCOUNTER — OFFICE VISIT (OUTPATIENT)
Dept: PODIATRY | Facility: CLINIC | Age: 78
End: 2018-10-30
Payer: MEDICARE

## 2018-10-30 VITALS
DIASTOLIC BLOOD PRESSURE: 76 MMHG | WEIGHT: 241.81 LBS | BODY MASS INDEX: 41.28 KG/M2 | HEART RATE: 73 BPM | SYSTOLIC BLOOD PRESSURE: 147 MMHG | HEIGHT: 64 IN

## 2018-10-30 DIAGNOSIS — E11.49 TYPE II DIABETES MELLITUS WITH NEUROLOGICAL MANIFESTATIONS: Primary | ICD-10-CM

## 2018-10-30 DIAGNOSIS — B35.1 DERMATOPHYTOSIS OF NAIL: ICD-10-CM

## 2018-10-30 PROCEDURE — 99999 PR PBB SHADOW E&M-EST. PATIENT-LVL III: CPT | Mod: PBBFAC,,, | Performed by: PODIATRIST

## 2018-10-30 PROCEDURE — 99213 OFFICE O/P EST LOW 20 MIN: CPT | Mod: PBBFAC,25,PO | Performed by: PODIATRIST

## 2018-10-30 PROCEDURE — 11721 DEBRIDE NAIL 6 OR MORE: CPT | Mod: Q9,PBBFAC,PO | Performed by: PODIATRIST

## 2018-10-30 PROCEDURE — 11721 DEBRIDE NAIL 6 OR MORE: CPT | Mod: Q9,S$PBB,, | Performed by: PODIATRIST

## 2018-10-30 PROCEDURE — 99499 UNLISTED E&M SERVICE: CPT | Mod: S$PBB,,, | Performed by: PODIATRIST

## 2018-10-30 PROCEDURE — 90662 IIV NO PRSV INCREASED AG IM: CPT | Mod: PBBFAC,PO

## 2018-11-08 DIAGNOSIS — E03.4 HYPOTHYROIDISM DUE TO ACQUIRED ATROPHY OF THYROID: ICD-10-CM

## 2018-11-08 DIAGNOSIS — E55.9 VITAMIN D DEFICIENCY: ICD-10-CM

## 2018-11-08 DIAGNOSIS — K21.9 GASTROESOPHAGEAL REFLUX DISEASE WITHOUT ESOPHAGITIS: ICD-10-CM

## 2018-11-08 DIAGNOSIS — M1A.00X0 IDIOPATHIC CHRONIC GOUT WITHOUT TOPHUS, UNSPECIFIED SITE: ICD-10-CM

## 2018-11-08 RX ORDER — ALLOPURINOL 100 MG/1
100 TABLET ORAL DAILY
Qty: 30 TABLET | Refills: 11 | Status: SHIPPED | OUTPATIENT
Start: 2018-11-08 | End: 2019-03-11 | Stop reason: SDUPTHER

## 2018-11-08 RX ORDER — ERGOCALCIFEROL 1.25 MG/1
50000 CAPSULE ORAL
Qty: 4 CAPSULE | Refills: 11 | Status: SHIPPED | OUTPATIENT
Start: 2018-11-08 | End: 2019-03-11 | Stop reason: SDUPTHER

## 2018-11-08 RX ORDER — ALLOPURINOL 300 MG/1
300 TABLET ORAL DAILY
Qty: 30 TABLET | Refills: 11 | Status: SHIPPED | OUTPATIENT
Start: 2018-11-08 | End: 2019-03-11 | Stop reason: SDUPTHER

## 2018-11-08 RX ORDER — LEVOTHYROXINE SODIUM 50 UG/1
50 TABLET ORAL
Qty: 30 TABLET | Refills: 11 | Status: SHIPPED | OUTPATIENT
Start: 2018-11-08 | End: 2019-03-11 | Stop reason: SDUPTHER

## 2018-11-08 RX ORDER — DEXLANSOPRAZOLE 60 MG/1
CAPSULE, DELAYED RELEASE ORAL
Qty: 30 CAPSULE | Refills: 11 | Status: SHIPPED | OUTPATIENT
Start: 2018-11-08 | End: 2019-03-11

## 2018-11-09 ENCOUNTER — OFFICE VISIT (OUTPATIENT)
Dept: NEPHROLOGY | Facility: CLINIC | Age: 78
End: 2018-11-09
Payer: MEDICARE

## 2018-11-09 VITALS
HEART RATE: 82 BPM | SYSTOLIC BLOOD PRESSURE: 152 MMHG | WEIGHT: 188.81 LBS | DIASTOLIC BLOOD PRESSURE: 90 MMHG | BODY MASS INDEX: 32.41 KG/M2

## 2018-11-09 DIAGNOSIS — M06.9 RHEUMATOID ARTHRITIS, INVOLVING UNSPECIFIED SITE, UNSPECIFIED RHEUMATOID FACTOR PRESENCE: ICD-10-CM

## 2018-11-09 DIAGNOSIS — N18.30 CKD (CHRONIC KIDNEY DISEASE) STAGE 3, GFR 30-59 ML/MIN: Primary | ICD-10-CM

## 2018-11-09 DIAGNOSIS — E11.40 CONTROLLED TYPE 2 DIABETES MELLITUS WITH DIABETIC NEUROPATHY, WITHOUT LONG-TERM CURRENT USE OF INSULIN: ICD-10-CM

## 2018-11-09 DIAGNOSIS — E53.8 FOLATE DEFICIENCY: ICD-10-CM

## 2018-11-09 DIAGNOSIS — N18.30 CONTROLLED TYPE 2 DIABETES MELLITUS WITH STAGE 3 CHRONIC KIDNEY DISEASE, WITHOUT LONG-TERM CURRENT USE OF INSULIN: ICD-10-CM

## 2018-11-09 DIAGNOSIS — E53.8 B12 DEFICIENCY: ICD-10-CM

## 2018-11-09 DIAGNOSIS — J30.1 SEASONAL ALLERGIC RHINITIS DUE TO POLLEN: ICD-10-CM

## 2018-11-09 DIAGNOSIS — E11.59 HYPERTENSION ASSOCIATED WITH DIABETES: ICD-10-CM

## 2018-11-09 DIAGNOSIS — E11.22 CONTROLLED TYPE 2 DIABETES MELLITUS WITH STAGE 3 CHRONIC KIDNEY DISEASE, WITHOUT LONG-TERM CURRENT USE OF INSULIN: ICD-10-CM

## 2018-11-09 DIAGNOSIS — N18.30 STAGE 3 CHRONIC KIDNEY DISEASE: ICD-10-CM

## 2018-11-09 DIAGNOSIS — N28.1 RENAL CYST: ICD-10-CM

## 2018-11-09 DIAGNOSIS — M1A.9XX0 CHRONIC GOUT WITHOUT TOPHUS, UNSPECIFIED CAUSE, UNSPECIFIED SITE: ICD-10-CM

## 2018-11-09 DIAGNOSIS — K21.9 GASTROESOPHAGEAL REFLUX DISEASE WITHOUT ESOPHAGITIS: ICD-10-CM

## 2018-11-09 DIAGNOSIS — I15.2 HYPERTENSION ASSOCIATED WITH DIABETES: ICD-10-CM

## 2018-11-09 DIAGNOSIS — E03.4 HYPOTHYROIDISM DUE TO ACQUIRED ATROPHY OF THYROID: ICD-10-CM

## 2018-11-09 DIAGNOSIS — E66.01 CLASS 3 SEVERE OBESITY DUE TO EXCESS CALORIES WITH SERIOUS COMORBIDITY AND BODY MASS INDEX (BMI) OF 40.0 TO 44.9 IN ADULT: ICD-10-CM

## 2018-11-09 DIAGNOSIS — J45.20 MILD INTERMITTENT ASTHMA WITHOUT COMPLICATION: ICD-10-CM

## 2018-11-09 DIAGNOSIS — K76.0 FATTY LIVER: ICD-10-CM

## 2018-11-09 DIAGNOSIS — E55.9 VITAMIN D DEFICIENCY: ICD-10-CM

## 2018-11-09 DIAGNOSIS — K31.84 GASTROPARESIS: ICD-10-CM

## 2018-11-09 PROCEDURE — 99214 OFFICE O/P EST MOD 30 MIN: CPT | Mod: S$PBB,,, | Performed by: INTERNAL MEDICINE

## 2018-11-09 PROCEDURE — 99999 PR PBB SHADOW E&M-EST. PATIENT-LVL II: CPT | Mod: PBBFAC,,, | Performed by: INTERNAL MEDICINE

## 2018-11-09 PROCEDURE — 99212 OFFICE O/P EST SF 10 MIN: CPT | Mod: PBBFAC,PO | Performed by: INTERNAL MEDICINE

## 2018-11-09 RX ORDER — TURMERIC 100 %
POWDER (GRAM) MISCELLANEOUS
COMMUNITY
End: 2019-03-11

## 2018-11-09 RX ORDER — ALBUTEROL SULFATE 90 UG/1
AEROSOL, METERED RESPIRATORY (INHALATION)
COMMUNITY
End: 2019-03-11

## 2018-11-09 RX ORDER — METHYLPREDNISOLONE 4 MG
TABLET, DOSE PACK ORAL
COMMUNITY
End: 2019-03-11

## 2018-11-09 RX ORDER — METOPROLOL SUCCINATE 25 MG/1
TABLET, EXTENDED RELEASE ORAL
COMMUNITY
End: 2019-03-11

## 2018-11-09 RX ORDER — COLCHICINE 0.6 MG/1
0.6 TABLET ORAL DAILY PRN
COMMUNITY
Start: 2015-12-16 | End: 2019-03-11 | Stop reason: SDUPTHER

## 2018-11-10 PROBLEM — R60.9 EDEMA: Status: RESOLVED | Noted: 2017-05-08 | Resolved: 2018-11-10

## 2018-11-11 NOTE — PROGRESS NOTES
Subjective:       Patient ID: Wendy Ro is a 78 y.o. White female who presents for re-evaluation of progression of Follow-up and Chronic Kidney Disease    Edema   Associated symptoms include arthralgias. Pertinent negatives include no chest pain, coughing, fatigue or weakness.      She reports she is doing well. Her weight is stable and LE edema is controlled, using Lasix prn. No LUTS. No NSAID use. She follows a low sodium diet but admits to not pushing fluids due to increased frequency. No gout. Last Hba1c was 6.4. She does plenty of 'brain exercises'     Her sister passed away 10/15 from renal failure, declined HD. 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   HENT: Negative for facial swelling.    Eyes: Negative for visual disturbance.   Respiratory: Negative for cough and shortness of breath.    Cardiovascular: Positive for leg swelling (much improved). Negative for chest pain.   Gastrointestinal: Negative for abdominal distention.   Genitourinary: Negative for difficulty urinating and dysuria.   Musculoskeletal: Positive for arthralgias and back pain.   Neurological: Negative for weakness.       Objective:      Physical Exam   Constitutional: She is oriented to person, place, and time. She appears well-nourished. No distress.   HENT:   Mouth/Throat: Oropharynx is clear and moist.   Neck: No JVD present.   Cardiovascular: S1 normal and S2 normal. Exam reveals no friction rub.   Pulmonary/Chest: Breath sounds normal. She has no wheezes. She has no rales.   Abdominal: Soft.   Musculoskeletal: She exhibits no edema.   Neurological: She is alert and oriented to person, place, and time.   Skin: Skin is warm and dry.   Psychiatric: She has a normal mood and affect.   Vitals reviewed.      Assessment:       1. CKD (chronic kidney disease) stage 3, GFR 30-59 ml/min    2. B12 deficiency    3. Folate  deficiency    4. Stage 3 chronic kidney disease    5. Renal cyst    6. Hypertension associated with diabetes    7. Controlled type 2 diabetes mellitus with stage 3 chronic kidney disease, without long-term current use of insulin    8. Vitamin D deficiency     9. Controlled type 2 diabetes mellitus with diabetic neuropathy, without long-term current use of insulin    10. Chronic gout without tophus, unspecified cause, unspecified site    11. Rheumatoid arthritis, involving unspecified site, unspecified rheumatoid factor presence    12. Fatty liver    13. Hypothyroidism due to acquired atrophy of thyroid    14. Mild intermittent asthma without complication    15. Gastroesophageal reflux disease without esophagitis    16. Gastroparesis    17. Class 3 severe obesity due to excess calories with serious comorbidity and body mass index (BMI) of 40.0 to 44.9 in adult    18. Seasonal allergic rhinitis due to pollen        Plan:             CKD with stable kidney function. Continue RAAS blockade for renal preservation    HTN--typically controlled. Reviewed previous doctor visits and is usually controlled. BP log in one week    DM is controlled    Gout--continue allopurinol and prn colchicine     Mineral and Bone Disease--vitamin D is adequate and PTH at goal. Continue D2    Anemia--with increased MCV will check B12 and folate. Might be increased 'reticulocyting'. No EtOH use    Volume status--stable      RTC 5 months
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
## 2018-11-16 ENCOUNTER — OFFICE VISIT (OUTPATIENT)
Dept: FAMILY MEDICINE | Facility: CLINIC | Age: 78
End: 2018-11-16
Payer: MEDICARE

## 2018-11-16 VITALS
HEART RATE: 77 BPM | TEMPERATURE: 98 F | BODY MASS INDEX: 42.34 KG/M2 | DIASTOLIC BLOOD PRESSURE: 66 MMHG | HEIGHT: 64 IN | SYSTOLIC BLOOD PRESSURE: 130 MMHG | WEIGHT: 248 LBS

## 2018-11-16 DIAGNOSIS — J06.9 UPPER RESPIRATORY TRACT INFECTION, UNSPECIFIED TYPE: ICD-10-CM

## 2018-11-16 DIAGNOSIS — M06.9 RHEUMATOID ARTHRITIS, INVOLVING UNSPECIFIED SITE, UNSPECIFIED RHEUMATOID FACTOR PRESENCE: ICD-10-CM

## 2018-11-16 PROCEDURE — 99999 PR PBB SHADOW E&M-EST. PATIENT-LVL III: CPT | Mod: PBBFAC,,, | Performed by: NURSE PRACTITIONER

## 2018-11-16 PROCEDURE — 99214 OFFICE O/P EST MOD 30 MIN: CPT | Mod: S$PBB,,, | Performed by: NURSE PRACTITIONER

## 2018-11-16 PROCEDURE — 99213 OFFICE O/P EST LOW 20 MIN: CPT | Mod: PBBFAC,PO | Performed by: NURSE PRACTITIONER

## 2018-11-16 RX ORDER — PREDNISONE 20 MG/1
20 TABLET ORAL 2 TIMES DAILY
Qty: 10 TABLET | Refills: 0 | Status: SHIPPED | OUTPATIENT
Start: 2018-11-16 | End: 2018-11-21

## 2018-11-16 RX ORDER — AMLODIPINE AND OLMESARTAN MEDOXOMIL 10; 40 MG/1; MG/1
1 TABLET ORAL DAILY
Qty: 90 TABLET | Refills: 1 | Status: SHIPPED | OUTPATIENT
Start: 2018-11-16 | End: 2019-03-11

## 2018-11-16 NOTE — PROGRESS NOTES
Subjective:       Patient ID: Wendy Ro is a 78 y.o. female.    Chief Complaint: Shortness of Breath    URI    The current episode started 1 to 4 weeks ago. The problem has been gradually worsening. There has been no fever. Associated symptoms include congestion, coughing and wheezing. Pertinent negatives include no diarrhea. Treatments tried: Albuterol, Zyrtec. The treatment provided no relief.     She has a history of rheumatoid arthritis, she has difficulty ambulating extended distances without having to stop.  She would like to try to get a walker that has a seat so that she can have a place to sit and help enable her to become more active    Review of Systems   Constitutional: Negative for fatigue and unexpected weight change.   HENT: Positive for congestion.    Eyes: Negative for pain and visual disturbance.   Respiratory: Positive for cough and wheezing.    Cardiovascular: Negative for palpitations.   Gastrointestinal: Negative for diarrhea.   Musculoskeletal: Negative for arthralgias and myalgias.   Skin: Negative for color change.   Neurological: Negative for dizziness.   Psychiatric/Behavioral: Negative for dysphoric mood and sleep disturbance. The patient is not nervous/anxious.        Vitals:    11/16/18 0748   BP: 130/66   Pulse: 77   Temp: 97.5 °F (36.4 °C)       Objective:     Current Outpatient Medications   Medication Sig Dispense Refill    albuterol (VENTOLIN HFA) 90 mcg/actuation inhaler Inhale 2 puffs into the lungs every 6 (six) hours as needed for Wheezing. 1 Inhaler 11    albuterol (VENTOLIN HFA) 90 mcg/actuation inhaler Ventolin HFA 90 mcg/actuation aerosol inhaler      ALBUTEROL SULFATE INHL Inhale into the lungs.      alendronate (FOSAMAX) 70 MG tablet TAKE 1 TABLET BY MOUTH EVERY 7 DAYS 4 tablet 11    allopurinol (ZYLOPRIM) 100 MG tablet Take 1 tablet (100 mg total) by mouth once daily. 30 tablet 11    allopurinol (ZYLOPRIM) 300 MG tablet Take 1 tablet (300 mg total) by mouth  once daily. 30 tablet 11    calcium carbonate (OS-JESUS) 500 mg calcium (1,250 mg) tablet Take 1 tablet (500 mg total) by mouth once daily.  0    cetirizine (ZYRTEC) 10 MG tablet Take 10 mg by mouth once daily.      DEXILANT 60 mg capsule Take 1 capsule (60 mg total) by mouth once daily. 30 capsule 11    docusate sodium (COLACE) 100 MG capsule Take 1 capsule (100 mg total) by mouth 2 (two) times daily. (Patient taking differently: Take 100 mg by mouth once daily. )  0    ergocalciferol (ERGOCALCIFEROL) 50,000 unit Cap Take 1 capsule (50,000 Units total) by mouth every 7 days. 4 capsule 11    FERGON 240 mg (27 mg iron) tablet Take 1 tablet (325 mg total) by mouth 2 (two) times daily with meals. 60 tablet 4    furosemide (LASIX) 40 MG tablet Take 0.5 tablets (20 mg total) by mouth once daily. (Patient taking differently: Take 20 mg by mouth daily as needed. ) 15 tablet 11    levothyroxine (SYNTHROID) 50 MCG tablet Take 1 tablet (50 mcg total) by mouth before breakfast. 30 tablet 11    lidocaine 5 % Crea Apply 1 application topically 2 (two) times daily as needed. 30 g 0    nystatin (MYCOSTATIN) powder Apply topically 4 (four) times daily. (Patient taking differently: Apply topically 4 (four) times daily. Abdominal folds) 56.7 g 11    ondansetron (ZOFRAN-ODT) 8 MG TbDL Take 1 tablet (8 mg total) by mouth every 6 (six) hours as needed. 30 tablet 0    pravastatin (PRAVACHOL) 20 MG tablet Take 1 tablet (20 mg total) by mouth once daily. 90 tablet 3    predniSONE (DELTASONE) 5 MG tablet Take 1 tablet (5 mg total) by mouth daily.  0    turmeric root extract 500 mg Cap Take by mouth 3 (three) times daily.      vit T-[id number]-pxes-rutin-hb196 (BIOFLEX) 846-76-69-40 mg Tab Take 2 tablets by mouth once daily.       amlodipine-olmesartan (JESSICA) 10-40 mg per tablet Take 1 tablet by mouth once daily. 90 tablet 1    colchicine (COLCRYS) 0.6 mg tablet Take 0.6 mg by mouth.      ferrous gluconate (FERGON) 325 MG Tab  Take 1 tablet (325 mg total) by mouth 2 (two) times daily with meals. 60 tablet 11    glucosamine sulfate (GLUCOSAMINE) 500 mg Tab Take by mouth.      iron/folic acid/B12/C/docusate (FERRALET 90 ORAL) Take by mouth.      metoprolol succinate (TOPROL-XL) 25 MG 24 hr tablet metoprolol succinate ER 25 mg tablet,extended release 24 hr      turmeric, bulk, 100 % Powd turmeric (bulk)   500 mg 1 po qd      walker Misc 1 Units by Misc.(Non-Drug; Combo Route) route once daily. Rolling walker with seat 1 each 0     No current facility-administered medications for this visit.        Physical Exam   Constitutional: She is oriented to person, place, and time. She appears well-developed and well-nourished. No distress.   HENT:   Head: Normocephalic and atraumatic.   Right Ear: Tympanic membrane normal.   Left Ear: Tympanic membrane normal.   Nose: Rhinorrhea present.   Mouth/Throat: Posterior oropharyngeal edema present.   Eyes: EOM are normal. Pupils are equal, round, and reactive to light.   Neck: Normal range of motion. Neck supple.   Cardiovascular: Normal rate and regular rhythm.   Pulmonary/Chest: Effort normal and breath sounds normal.   Dry cough   Musculoskeletal: Normal range of motion.   Neurological: She is alert and oriented to person, place, and time.   Skin: Skin is warm and dry. No rash noted.   Psychiatric: She has a normal mood and affect. Judgment normal.   Nursing note and vitals reviewed.      Assessment:       1. Upper respiratory tract infection, unspecified type    2. Rheumatoid arthritis, involving unspecified site, unspecified rheumatoid factor presence        Plan:   Upper respiratory tract infection, unspecified type    Rheumatoid arthritis, involving unspecified site, unspecified rheumatoid factor presence  -     walker Misc; 1 Units by Misc.(Non-Drug; Combo Route) route once daily. Rolling walker with seat  Dispense: 1 each; Refill: 0    Other orders  -     predniSONE (DELTASONE) 20 MG tablet;  Take 1 tablet (20 mg total) by mouth 2 (two) times daily. for 5 days  Dispense: 10 tablet; Refill: 0  -     amlodipine-olmesartan (JESSICA) 10-40 mg per tablet; Take 1 tablet by mouth once daily.  Dispense: 90 tablet; Refill: 1        No Follow-up on file.    There are no Patient Instructions on file for this visit.

## 2019-01-08 ENCOUNTER — TELEPHONE (OUTPATIENT)
Dept: FAMILY MEDICINE | Facility: CLINIC | Age: 79
End: 2019-01-08

## 2019-01-08 NOTE — TELEPHONE ENCOUNTER
----- Message from Marquita Doherty sent at 1/8/2019 12:45 PM CST -----  Contact: [employer] [facility] - gabby   Calling to get a PA on dexalant and Amlodipine omasartin ( herminia) and can be reached at [phone]//thanks/dbw

## 2019-02-04 ENCOUNTER — TELEPHONE (OUTPATIENT)
Dept: FAMILY MEDICINE | Facility: CLINIC | Age: 79
End: 2019-02-04

## 2019-02-04 DIAGNOSIS — Z12.39 SCREENING BREAST EXAMINATION: Primary | ICD-10-CM

## 2019-02-04 NOTE — TELEPHONE ENCOUNTER
----- Message from Jessica Peralta sent at 2/4/2019  7:48 AM CST -----  Contact: Pt request via [facility]   Message     ----- Message from [facility] System Message sent at 2/3/2019  9:04 PM CST -----    Appointment Request From: Wendy Ro    With Provider: Mammogram    Preferred Date Range: Any    Preferred Times: Any time    Reason for visit: Annual    Comments:  Annual visit

## 2019-02-25 ENCOUNTER — HOSPITAL ENCOUNTER (OUTPATIENT)
Dept: RADIOLOGY | Facility: HOSPITAL | Age: 79
Discharge: HOME | End: 2019-02-25
Attending: FAMILY MEDICINE
Payer: MEDICARE

## 2019-02-25 VITALS — WEIGHT: 248 LBS | HEIGHT: 64 IN | BODY MASS INDEX: 42.34 KG/M2

## 2019-02-25 DIAGNOSIS — Z12.39 SCREENING BREAST EXAMINATION: ICD-10-CM

## 2019-02-25 PROCEDURE — 77067 MAMMO DIGITAL SCREENING BILAT WITH TOMOSYNTHESIS_CAD: ICD-10-PCS | Mod: 26,,, | Performed by: RADIOLOGY

## 2019-02-25 PROCEDURE — 77063 BREAST TOMOSYNTHESIS BI: CPT | Mod: 26,,, | Performed by: RADIOLOGY

## 2019-02-25 PROCEDURE — 77067 SCR MAMMO BI INCL CAD: CPT | Mod: 26,,, | Performed by: RADIOLOGY

## 2019-02-25 PROCEDURE — 77067 SCR MAMMO BI INCL CAD: CPT | Mod: TC,PO

## 2019-02-25 PROCEDURE — 77063 MAMMO DIGITAL SCREENING BILAT WITH TOMOSYNTHESIS_CAD: ICD-10-PCS | Mod: 26,,, | Performed by: RADIOLOGY

## 2019-03-11 ENCOUNTER — HOSPITAL ENCOUNTER (OUTPATIENT)
Dept: RADIOLOGY | Facility: HOSPITAL | Age: 79
Discharge: HOME | End: 2019-03-11
Attending: FAMILY MEDICINE
Payer: MEDICARE

## 2019-03-11 ENCOUNTER — OFFICE VISIT (OUTPATIENT)
Dept: FAMILY MEDICINE | Facility: CLINIC | Age: 79
End: 2019-03-11
Payer: MEDICARE

## 2019-03-11 VITALS
SYSTOLIC BLOOD PRESSURE: 112 MMHG | WEIGHT: 249.81 LBS | HEIGHT: 64 IN | HEART RATE: 80 BPM | BODY MASS INDEX: 42.65 KG/M2 | DIASTOLIC BLOOD PRESSURE: 59 MMHG | TEMPERATURE: 98 F

## 2019-03-11 DIAGNOSIS — N18.30 CONTROLLED TYPE 2 DIABETES MELLITUS WITH STAGE 3 CHRONIC KIDNEY DISEASE, WITHOUT LONG-TERM CURRENT USE OF INSULIN: ICD-10-CM

## 2019-03-11 DIAGNOSIS — E11.22 CONTROLLED TYPE 2 DIABETES MELLITUS WITH STAGE 3 CHRONIC KIDNEY DISEASE, WITHOUT LONG-TERM CURRENT USE OF INSULIN: ICD-10-CM

## 2019-03-11 DIAGNOSIS — E79.0 HYPERURICEMIA: ICD-10-CM

## 2019-03-11 DIAGNOSIS — E11.69 COMBINED HYPERLIPIDEMIA ASSOCIATED WITH TYPE 2 DIABETES MELLITUS: ICD-10-CM

## 2019-03-11 DIAGNOSIS — I15.2 HYPERTENSION ASSOCIATED WITH DIABETES: ICD-10-CM

## 2019-03-11 DIAGNOSIS — E78.2 COMBINED HYPERLIPIDEMIA ASSOCIATED WITH TYPE 2 DIABETES MELLITUS: ICD-10-CM

## 2019-03-11 DIAGNOSIS — M81.0 OSTEOPOROSIS, UNSPECIFIED OSTEOPOROSIS TYPE, UNSPECIFIED PATHOLOGICAL FRACTURE PRESENCE: ICD-10-CM

## 2019-03-11 DIAGNOSIS — M1A.00X0 IDIOPATHIC CHRONIC GOUT WITHOUT TOPHUS, UNSPECIFIED SITE: ICD-10-CM

## 2019-03-11 DIAGNOSIS — M15.9 PRIMARY OSTEOARTHRITIS INVOLVING MULTIPLE JOINTS: ICD-10-CM

## 2019-03-11 DIAGNOSIS — E03.4 HYPOTHYROIDISM DUE TO ACQUIRED ATROPHY OF THYROID: ICD-10-CM

## 2019-03-11 DIAGNOSIS — E66.01 CLASS 3 SEVERE OBESITY DUE TO EXCESS CALORIES WITH SERIOUS COMORBIDITY AND BODY MASS INDEX (BMI) OF 40.0 TO 44.9 IN ADULT: ICD-10-CM

## 2019-03-11 DIAGNOSIS — J45.20 MILD INTERMITTENT ASTHMA WITHOUT COMPLICATION: ICD-10-CM

## 2019-03-11 DIAGNOSIS — E55.9 VITAMIN D DEFICIENCY: ICD-10-CM

## 2019-03-11 DIAGNOSIS — K21.9 GASTROESOPHAGEAL REFLUX DISEASE, ESOPHAGITIS PRESENCE NOT SPECIFIED: Primary | ICD-10-CM

## 2019-03-11 DIAGNOSIS — N18.30 STAGE 3 CHRONIC KIDNEY DISEASE: ICD-10-CM

## 2019-03-11 DIAGNOSIS — E11.59 HYPERTENSION ASSOCIATED WITH DIABETES: ICD-10-CM

## 2019-03-11 DIAGNOSIS — J43.2 CENTRILOBULAR EMPHYSEMA: ICD-10-CM

## 2019-03-11 PROBLEM — M15.0 PRIMARY OSTEOARTHRITIS INVOLVING MULTIPLE JOINTS: Status: ACTIVE | Noted: 2019-03-11

## 2019-03-11 PROCEDURE — 77080 DEXA BONE DENSITY SPINE HIP: ICD-10-PCS | Mod: 26,,, | Performed by: RADIOLOGY

## 2019-03-11 PROCEDURE — 77080 DXA BONE DENSITY AXIAL: CPT | Mod: 26,,, | Performed by: RADIOLOGY

## 2019-03-11 PROCEDURE — 99214 OFFICE O/P EST MOD 30 MIN: CPT | Mod: S$PBB,,, | Performed by: FAMILY MEDICINE

## 2019-03-11 PROCEDURE — 99999 PR PBB SHADOW E&M-EST. PATIENT-LVL III: ICD-10-PCS | Mod: PBBFAC,,, | Performed by: FAMILY MEDICINE

## 2019-03-11 PROCEDURE — 99214 PR OFFICE/OUTPT VISIT, EST, LEVL IV, 30-39 MIN: ICD-10-PCS | Mod: S$PBB,,, | Performed by: FAMILY MEDICINE

## 2019-03-11 PROCEDURE — 99999 PR PBB SHADOW E&M-EST. PATIENT-LVL III: CPT | Mod: PBBFAC,,, | Performed by: FAMILY MEDICINE

## 2019-03-11 PROCEDURE — 77080 DXA BONE DENSITY AXIAL: CPT | Mod: TC,PO

## 2019-03-11 PROCEDURE — 99213 OFFICE O/P EST LOW 20 MIN: CPT | Mod: PBBFAC,25,PO | Performed by: FAMILY MEDICINE

## 2019-03-11 RX ORDER — ALLOPURINOL 300 MG/1
300 TABLET ORAL DAILY
Qty: 30 TABLET | Refills: 11 | Status: SHIPPED | OUTPATIENT
Start: 2019-03-11 | End: 2020-08-04 | Stop reason: SDUPTHER

## 2019-03-11 RX ORDER — PANTOPRAZOLE SODIUM 40 MG/1
40 TABLET, DELAYED RELEASE ORAL DAILY
Qty: 30 TABLET | Refills: 11 | Status: SHIPPED | OUTPATIENT
Start: 2019-03-11 | End: 2020-05-04 | Stop reason: SDUPTHER

## 2019-03-11 RX ORDER — FUROSEMIDE 40 MG/1
20 TABLET ORAL DAILY PRN
Qty: 30 TABLET | Refills: 11 | Status: SHIPPED | OUTPATIENT
Start: 2019-03-11 | End: 2020-11-30

## 2019-03-11 RX ORDER — NYSTATIN 100000 [USP'U]/G
POWDER TOPICAL 4 TIMES DAILY
Qty: 60 G | Refills: 11 | Status: SHIPPED | OUTPATIENT
Start: 2019-03-11

## 2019-03-11 RX ORDER — PRAVASTATIN SODIUM 20 MG/1
20 TABLET ORAL DAILY
Qty: 30 TABLET | Refills: 11 | Status: SHIPPED | OUTPATIENT
Start: 2019-03-11 | End: 2021-03-11

## 2019-03-11 RX ORDER — ALLOPURINOL 100 MG/1
100 TABLET ORAL DAILY
Qty: 30 TABLET | Refills: 11 | Status: SHIPPED | OUTPATIENT
Start: 2019-03-11 | End: 2019-05-31

## 2019-03-11 RX ORDER — ALBUTEROL SULFATE 90 UG/1
2 AEROSOL, METERED RESPIRATORY (INHALATION) EVERY 6 HOURS PRN
Qty: 1 INHALER | Refills: 11 | Status: SHIPPED | OUTPATIENT
Start: 2019-03-11

## 2019-03-11 RX ORDER — OLMESARTAN MEDOXOMIL 40 MG/1
40 TABLET ORAL DAILY
Qty: 30 TABLET | Refills: 11 | Status: SHIPPED | OUTPATIENT
Start: 2019-03-11 | End: 2019-05-14 | Stop reason: ALTCHOICE

## 2019-03-11 RX ORDER — ALENDRONATE SODIUM 70 MG/1
70 TABLET ORAL
Qty: 4 TABLET | Refills: 11 | Status: SHIPPED | OUTPATIENT
Start: 2019-03-11 | End: 2021-03-22 | Stop reason: SDUPTHER

## 2019-03-11 RX ORDER — COLCHICINE 0.6 MG/1
0.6 TABLET ORAL DAILY PRN
Qty: 30 TABLET | Refills: 5 | Status: SHIPPED | OUTPATIENT
Start: 2019-03-11 | End: 2019-05-31

## 2019-03-11 RX ORDER — ERGOCALCIFEROL 1.25 MG/1
50000 CAPSULE ORAL
Qty: 4 CAPSULE | Refills: 11 | Status: SHIPPED | OUTPATIENT
Start: 2019-03-11 | End: 2020-03-10

## 2019-03-11 RX ORDER — AMLODIPINE BESYLATE 10 MG/1
10 TABLET ORAL DAILY
Qty: 30 TABLET | Refills: 11 | Status: SHIPPED | OUTPATIENT
Start: 2019-03-11 | End: 2019-08-19 | Stop reason: ALTCHOICE

## 2019-03-11 RX ORDER — LEVOTHYROXINE SODIUM 50 UG/1
50 TABLET ORAL
Qty: 30 TABLET | Refills: 11 | Status: SHIPPED | OUTPATIENT
Start: 2019-03-11 | End: 2020-08-04 | Stop reason: SDUPTHER
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
## 2019-03-18 ENCOUNTER — OFFICE VISIT (OUTPATIENT)
Dept: OPTOMETRY | Facility: CLINIC | Age: 79
End: 2019-03-18
Payer: MEDICARE

## 2019-03-18 DIAGNOSIS — Z13.5 GLAUCOMA SCREENING: ICD-10-CM

## 2019-03-18 DIAGNOSIS — H31.012: ICD-10-CM

## 2019-03-18 DIAGNOSIS — Z96.1 BILATERAL PSEUDOPHAKIA: ICD-10-CM

## 2019-03-18 DIAGNOSIS — H43.813 POSTERIOR VITREOUS DETACHMENT, BILATERAL: ICD-10-CM

## 2019-03-18 DIAGNOSIS — E11.9 DIABETES MELLITUS TYPE 2 WITHOUT RETINOPATHY: Primary | ICD-10-CM

## 2019-03-18 PROCEDURE — 99999 PR PBB SHADOW E&M-EST. PATIENT-LVL II: ICD-10-PCS | Mod: PBBFAC,,, | Performed by: OPTOMETRIST

## 2019-03-18 PROCEDURE — 92014 COMPRE OPH EXAM EST PT 1/>: CPT | Mod: S$PBB,,, | Performed by: OPTOMETRIST

## 2019-03-18 PROCEDURE — 99999 PR PBB SHADOW E&M-EST. PATIENT-LVL II: CPT | Mod: PBBFAC,,, | Performed by: OPTOMETRIST

## 2019-03-18 PROCEDURE — 92014 PR EYE EXAM, EST PATIENT,COMPREHESV: ICD-10-PCS | Mod: S$PBB,,, | Performed by: OPTOMETRIST

## 2019-03-18 PROCEDURE — 99212 OFFICE O/P EST SF 10 MIN: CPT | Mod: PBBFAC,PO | Performed by: OPTOMETRIST

## 2019-03-18 NOTE — PROGRESS NOTES
HPI     Annual Exam      Additional comments: DLE 11-17 (rosie)    ocular health exam               Diabetic Eye Exam      Additional comments: pt does not check BSL              Blurred Vision      Additional comments: at near only -- distance [facility] good              Comments     Hemoglobin A1C       Date                     Value               Ref Range             Status                10/12/2018               6.4 (H)             4.0 - 5.6 %           Final              Comment:    ADA Screening Guidelines:  5.7-6.4%  Consistent with   prediabetes  >or=6.5%  Consistent with diabetes  High levels of fetal   hemoglobin interfere with the HbA1C  assay. Heterozygous hemoglobin   variants (HbS, HgC, etc)do  not significantly interfere with this assay.     However, presence of multiple variants may affect accuracy.         07/17/2018               6.5 (H)             4.0 - 5.6 %           Final              Comment:    ADA Screening Guidelines:  5.7-6.4%  Consistent with   prediabetes  >or=6.5%  Consistent with diabetes  High levels of fetal   hemoglobin interfere with the HbA1C  assay. Heterozygous hemoglobin   variants (HbS, HgC, etc)do  not significantly interfere with this assay.     However, presence of multiple variants may affect accuracy.         04/27/2018               6.2 (H)             4.0 - 5.6 %           Final              Comment:    According to ADA guidelines, hemoglobin A1c <7.0% represents  optimal   control in non-pregnant diabetic patients. Different  metrics may apply to   specific patient populations.   Standards of Medical Care in   Diabetes-2016.  For the purpose of screening for the presence of   diabetes:  <5.7%     Consistent with the absence of diabetes  5.7-6.4%    Consistent with increasing risk for diabetes   (prediabetes)  >or=6.5%    Consistent with diabetes  Currently, no consensus exists for use of   hemoglobin A1c  for diagnosis of diabetes for children.  This Hemoglobin   A1c assay  has significant interference with fetal   hemoglobin   (HbF).   The results are invalid for patients with abnormal amounts of   HbF,     including those with known Hereditary Persistence   of Fetal Hemoglobin.   Heterozygous hemoglobin variants (HbAS, HbAC,   HbAD, HbAE, HbA2) do not   significantly interfere with this assay;   however, presence of multiple   variants in a sample may impact the %   interference.    ----------            Last edited by Juanita Ochoa on 3/18/2019 10:26 AM. (History)        ROS     Positive for: Eyes    Negative for: Constitutional, Gastrointestinal, Neurological, Skin,   Genitourinary, Musculoskeletal, HENT, Endocrine, Cardiovascular,   Respiratory, Psychiatric, Allergic/Imm, Heme/Lymph    Last edited by FARRAH Cortes, OD on 3/18/2019 10:36 AM. (History)        Assessment /Plan     For exam results, see Encounter Report.    Diabetes mellitus type 2 without retinopathy    Posterior vitreous detachment, bilateral    Chorioretinal scar, macular, left    Glaucoma screening    Bilateral pseudophakia      1. No ret/ no csme, gave info, control glucose, annual DFE  2. RD precautions given  3. Stable OS, good overall [facility] OU  4. Not suspect  5. Stable OU  Ok continue with otc only for near  No refraction charge    Discussed and educated patient on current findings /plan.  RTC 1 year, prn if any changes / issues

## 2019-03-18 NOTE — PATIENT INSTRUCTIONS
DIABETES AND THE EYE / DIABETIC RETINOPATHY    Diabetic retinopathy is a condition occurring in persons with diabetes, which causes progressive damage to the retina, the light sensitive lining at the back of the eye. It is a serious sight-threatening complication of diabetes.    Diabetic retinopathy is the result of damage to the tiny blood vessels that nourish the retina. They leak blood and other fluids that cause swelling of retinal tissue and clouding of vision. The condition usually affects both eyes. The longer a person has diabetes, the more likely they will develop diabetic retinopathy. If left untreated, diabetic retinopathy can cause blindness.  There are two basic types of diabetic retinopathy:    Background or nonproliferative diabetic retinopathy (NPDR)  Nonproliferative diabetic retinopathy (NPDR) is the earliest stage of diabetic retinopathy. With this condition, damaged blood vessels in the retina begin to leak extra fluid and small amounts of blood into the eye. Sometimes, deposits of cholesterol or other fats from the blood may leak into the retina. Many people with diabetes have mild NPDR, which usually does not affect their vision. However, if their vision is affected, it is the result of macular edema and macular ischemia.    If vision is affected due to macular changes, a consult with a Retina Specialist may be advised.  This is an ophthalmologist that treats retina conditions, including diabetic retinopathy.     Proliferative diabetic retinopathy (PDR)  Proliferative diabetic retinopathy (PDR) mainly occurs when many of the blood vessels in the retina close, preventing enough blood flow. In an attempt to supply blood to the area where the original vessels closed, the retina responds by growing new blood vessels. This is called neovascularization. However, these new blood vessels are abnormal and do not supply the retina with proper blood flow. The new vessels are also often accompanied by scar  "tissue that may cause the retina to wrinkle or detach. PDR may cause more severe vision loss than NPDR because it can affect both central and peripheral vision.     A patient diagnosed with proliferative diabetic eye disease will be referred to a retinal specialist for consultation.    Often there are no visual symptoms in the early stages of diabetic retinopathy. That is why our eye care professionals recommend that everyone with diabetes have a comprehensive dilated eye examination once a year. Early detection and treatment can limit the potential for significant vision loss from diabetic retinopathy.        FLASHES / FLOATERS / POSTERIOR VITREOUS DETACHMENT    Call the clinic if you have any further changes in symptoms.  Including:  Increased numbers of floaters or flashing lights, dimness or darkness that moves through or stays constant in your vision, or any pain in the eye (s).            DRY EYES:  Use Over The Counter artificial tears as needed for dry eye symptoms.  Some common brands include:  Systane, Optive, and Refresh.  These drops can be used as frequently as desired, but may be most helpful use during long periods of concentrated work.  For example, reading / working at the computer.  Avoid drops that "get redness out", as these contain medication that may further irritate the eyes.    ALLERGY EYES / SYMPTOMS:    Over the counter medications include--Zaditor and Alaway  Use as directed 1-2 drops daily for symptoms of itching / watering eyes.  These drops will not help for dry eye or exposure symptoms.    Using the Amsler Grid  If you are at risk for vision loss, you may be told to check your eyesight regularly using the Amsler grid. Below is the grid and instructions for using it.         The Amsler grid helps you track changes in your vision.    How to Use the Amsler Grid  1. Use the grid in a well-lighted area.  2. Wear glasses or contact lenses if you usually wear them.  3. Hold the grid at your " normal reading distance (about 16 inches).  4. Cover your left eye.  5. With your right eye, look at the dot in the center of the grid.  6. While looking at the dot, notice if any of the lines look wavy, if any lines disappear, or if the boxes change shape.  7. Write down on a piece of paper any vision changes from the last time you used the grid.  8. Now repeat the exercise, this time covering your right eye.  9. Call your doctor right away if you notice any vision changes.  How Often Should I Check My Vision?  Use the Amsler grid as often as your eye doctor suggests. Keep the grid where youll remember to use it. Call your eye doctor right away if you notice any changes with your eyesight. This includes if your vision improves.  © 4706-7012 [employer], [address] [place] [address], [place] [address]. All rights reserved. This information is not intended as a substitute for professional medical care. Always follow your healthcare professional's instructions.

## 2019-03-25 ENCOUNTER — LAB VISIT (OUTPATIENT)
Dept: LAB | Facility: HOSPITAL | Age: 79
End: 2019-03-25
Attending: FAMILY MEDICINE
Payer: MEDICARE

## 2019-03-25 DIAGNOSIS — E03.4 HYPOTHYROIDISM DUE TO ACQUIRED ATROPHY OF THYROID: ICD-10-CM

## 2019-03-25 DIAGNOSIS — E53.8 B12 DEFICIENCY: ICD-10-CM

## 2019-03-25 DIAGNOSIS — E79.0 HYPERURICEMIA: ICD-10-CM

## 2019-03-25 DIAGNOSIS — E11.22 CONTROLLED TYPE 2 DIABETES MELLITUS WITH STAGE 3 CHRONIC KIDNEY DISEASE, WITHOUT LONG-TERM CURRENT USE OF INSULIN: ICD-10-CM

## 2019-03-25 DIAGNOSIS — N18.30 CKD (CHRONIC KIDNEY DISEASE) STAGE 3, GFR 30-59 ML/MIN: ICD-10-CM

## 2019-03-25 DIAGNOSIS — E55.9 VITAMIN D DEFICIENCY: ICD-10-CM

## 2019-03-25 DIAGNOSIS — E53.8 FOLATE DEFICIENCY: ICD-10-CM

## 2019-03-25 DIAGNOSIS — N18.30 CONTROLLED TYPE 2 DIABETES MELLITUS WITH STAGE 3 CHRONIC KIDNEY DISEASE, WITHOUT LONG-TERM CURRENT USE OF INSULIN: ICD-10-CM

## 2019-03-25 DIAGNOSIS — E78.2 COMBINED HYPERLIPIDEMIA ASSOCIATED WITH TYPE 2 DIABETES MELLITUS: ICD-10-CM

## 2019-03-25 DIAGNOSIS — E11.69 COMBINED HYPERLIPIDEMIA ASSOCIATED WITH TYPE 2 DIABETES MELLITUS: ICD-10-CM

## 2019-03-25 LAB
25(OH)D3+25(OH)D2 SERPL-MCNC: 24 NG/ML (ref 30–96)
ALBUMIN SERPL BCP-MCNC: 3.2 G/DL (ref 3.5–5.2)
ANION GAP SERPL CALC-SCNC: 9 MMOL/L (ref 8–16)
BUN SERPL-MCNC: 23 MG/DL (ref 8–23)
CALCIUM SERPL-MCNC: 9.4 MG/DL (ref 8.7–10.5)
CHLORIDE SERPL-SCNC: 108 MMOL/L (ref 95–110)
CHOLEST SERPL-MCNC: 189 MG/DL (ref 120–199)
CHOLEST/HDLC SERPL: 3.9 {RATIO} (ref 2–5)
CO2 SERPL-SCNC: 25 MMOL/L (ref 23–29)
CREAT SERPL-MCNC: 1.3 MG/DL (ref 0.5–1.4)
EST. GFR  (AFRICAN AMERICAN): 45.7 ML/MIN/1.73 M^2
EST. GFR  (NON AFRICAN AMERICAN): 39.7 ML/MIN/1.73 M^2
ESTIMATED AVG GLUCOSE: 163 MG/DL (ref 68–131)
FOLATE SERPL-MCNC: 5.8 NG/ML (ref 4–24)
GLUCOSE SERPL-MCNC: 116 MG/DL (ref 70–110)
HBA1C MFR BLD HPLC: 7.3 % (ref 4–5.6)
HDLC SERPL-MCNC: 49 MG/DL (ref 40–75)
HDLC SERPL: 25.9 % (ref 20–50)
LDLC SERPL CALC-MCNC: 118.2 MG/DL (ref 63–159)
NONHDLC SERPL-MCNC: 140 MG/DL
PHOSPHATE SERPL-MCNC: 3.5 MG/DL (ref 2.7–4.5)
POTASSIUM SERPL-SCNC: 3.8 MMOL/L (ref 3.5–5.1)
SODIUM SERPL-SCNC: 142 MMOL/L (ref 136–145)
TRIGL SERPL-MCNC: 109 MG/DL (ref 30–150)
TSH SERPL DL<=0.005 MIU/L-ACNC: 2.18 UIU/ML (ref 0.4–4)
URATE SERPL-MCNC: 7.2 MG/DL (ref 2.4–5.7)
VIT B12 SERPL-MCNC: <146 PG/ML (ref 210–950)

## 2019-03-25 PROCEDURE — 36415 COLL VENOUS BLD VENIPUNCTURE: CPT | Mod: PO

## 2019-03-25 PROCEDURE — 83036 HEMOGLOBIN GLYCOSYLATED A1C: CPT

## 2019-03-25 PROCEDURE — 84443 ASSAY THYROID STIM HORMONE: CPT

## 2019-03-25 PROCEDURE — 80069 RENAL FUNCTION PANEL: CPT

## 2019-03-25 PROCEDURE — 84550 ASSAY OF BLOOD/URIC ACID: CPT

## 2019-03-25 PROCEDURE — 82746 ASSAY OF FOLIC ACID SERUM: CPT

## 2019-03-25 PROCEDURE — 80061 LIPID PANEL: CPT

## 2019-03-25 PROCEDURE — 82607 VITAMIN B-12: CPT

## 2019-03-25 PROCEDURE — 82306 VITAMIN D 25 HYDROXY: CPT

## 2019-03-26 ENCOUNTER — TELEPHONE (OUTPATIENT)
Dept: NEPHROLOGY | Facility: CLINIC | Age: 79
End: 2019-03-26

## 2019-03-26 DIAGNOSIS — E53.8 B12 DEFICIENCY: ICD-10-CM

## 2019-03-26 NOTE — TELEPHONE ENCOUNTER
Pt's B12 level from yesterday labs is very low. I noticed her levels throughout the past few years range low to high--she apparently was in B12 replacmeent in the past    Please find out   1. Which doctor previously prescribed so that I can inform them she is in need again and how did she receive the B12? Injection into muscle then oral tablets? Etc.    Thank you

## 2019-03-27 NOTE — TELEPHONE ENCOUNTER
States she used to see Dr. Brooke and got B12 shots and now she has changed to [place]. She was told she didn't need them anymore.  Advised patient she might need some supplement again and Dr. Harrell might be contacting her primary care to manage the B12.

## 2019-03-28 PROBLEM — E53.8 B12 DEFICIENCY: Status: ACTIVE | Noted: 2019-03-28

## 2019-03-28 RX ORDER — MAGNESIUM 200 MG
1 TABLET ORAL DAILY
Qty: 30 TABLET | Refills: 11 | Status: SHIPPED | OUTPATIENT
Start: 2019-03-28 | End: 2020-08-04 | Stop reason: SDUPTHER

## 2019-03-28 NOTE — TELEPHONE ENCOUNTER
She is B12 deficient.    Book a b12 in 3 months.   Start oral b12 that I started and sent to the pharmacy.       I have signed for the following orders AND/OR meds.  Please call the patient and ask the patient to schedule the testing AND/OR inform about any medications that were sent.      Orders Placed This Encounter   Procedures    Vitamin B12     Standing Status:   Future     Standing Expiration Date:   3/28/2020       Medications Ordered This Encounter   Medications    cyanocobalamin, vitamin B-12, 1,000 mcg Subl     Sig: Place 1 tablet under the tongue once daily.     Dispense:  30 tablet     Refill:  11

## 2019-04-01 RX ORDER — METFORMIN HYDROCHLORIDE 500 MG/1
500 TABLET ORAL 2 TIMES DAILY WITH MEALS
Qty: 60 TABLET | Refills: 5 | Status: SHIPPED | OUTPATIENT
Start: 2019-04-01 | End: 2019-12-30 | Stop reason: SDUPTHER

## 2019-04-01 RX ORDER — METFORMIN HYDROCHLORIDE 500 MG/1
500 TABLET ORAL 2 TIMES DAILY WITH MEALS
COMMUNITY
End: 2019-04-01 | Stop reason: SDUPTHER

## 2019-04-01 NOTE — TELEPHONE ENCOUNTER
----- Message from Uli James MD sent at 3/29/2019  5:11 PM CDT -----  The patient has an abnormal a1c.  Book the patient for an a1c in 3 months and send the patient the appointment for this. The patient will need to have a change in the medicine to include METFORMIN 500 MG TWICE A DAY.    Please make the change in the medcard to reflect this change and send a prescription for all of the patient's diabetes medications to the pharmacy for 3 months.

## 2019-04-02 ENCOUNTER — OFFICE VISIT (OUTPATIENT)
Dept: PODIATRY | Facility: CLINIC | Age: 79
End: 2019-04-02
Payer: MEDICARE

## 2019-04-02 VITALS
DIASTOLIC BLOOD PRESSURE: 67 MMHG | BODY MASS INDEX: 42.99 KG/M2 | WEIGHT: 250.44 LBS | SYSTOLIC BLOOD PRESSURE: 141 MMHG

## 2019-04-02 DIAGNOSIS — B35.1 DERMATOPHYTOSIS OF NAIL: ICD-10-CM

## 2019-04-02 DIAGNOSIS — E11.49 TYPE II DIABETES MELLITUS WITH NEUROLOGICAL MANIFESTATIONS: Primary | ICD-10-CM

## 2019-04-02 PROCEDURE — 99213 PR OFFICE/OUTPT VISIT, EST, LEVL III, 20-29 MIN: ICD-10-PCS | Mod: S$PBB,25,, | Performed by: PODIATRIST

## 2019-04-02 PROCEDURE — 11721 DEBRIDE NAIL 6 OR MORE: CPT | Mod: Q9,S$PBB,, | Performed by: PODIATRIST

## 2019-04-02 PROCEDURE — 99999 PR PBB SHADOW E&M-EST. PATIENT-LVL III: CPT | Mod: PBBFAC,,, | Performed by: PODIATRIST

## 2019-04-02 PROCEDURE — 11721 PR DEBRIDEMENT OF NAILS, 6 OR MORE: ICD-10-PCS | Mod: Q9,S$PBB,, | Performed by: PODIATRIST

## 2019-04-02 PROCEDURE — 11721 DEBRIDE NAIL 6 OR MORE: CPT | Mod: Q9,PBBFAC,PO | Performed by: PODIATRIST

## 2019-04-02 PROCEDURE — 99999 PR PBB SHADOW E&M-EST. PATIENT-LVL III: ICD-10-PCS | Mod: PBBFAC,,, | Performed by: PODIATRIST

## 2019-04-02 PROCEDURE — 99213 OFFICE O/P EST LOW 20 MIN: CPT | Mod: S$PBB,25,, | Performed by: PODIATRIST

## 2019-04-02 PROCEDURE — 99213 OFFICE O/P EST LOW 20 MIN: CPT | Mod: PBBFAC,PO | Performed by: PODIATRIST
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
## 2019-04-09 ENCOUNTER — TELEPHONE (OUTPATIENT)
Dept: FAMILY MEDICINE | Facility: CLINIC | Age: 79
End: 2019-04-09

## 2019-04-09 RX ORDER — DIPHENOXYLATE HYDROCHLORIDE AND ATROPINE SULFATE 2.5; .025 MG/1; MG/1
1 TABLET ORAL 4 TIMES DAILY PRN
Qty: 20 TABLET | Refills: 0 | Status: SHIPPED | OUTPATIENT
Start: 2019-04-09 | End: 2019-04-19

## 2019-04-09 NOTE — TELEPHONE ENCOUNTER
----- Message from Joseph Judd sent at 4/9/2019  8:13 AM CDT -----  Contact: pt   .Type:  Needs Medical Advice    Who Called:  Pt   Symptoms (please be specific): diarrehea   How long has patient had these symptoms:   Since Sunday   Pharmacy name and phone #:  [facility]   Would the patient rather a call back or a response via [facility]?  Callback   Best Call Back Number:  ..[phone] (home)    Additional Information:              ..  [facility] [place] - [place] [id number] [address]  Phone: [phone] Fax: [phone]

## 2019-04-09 NOTE — TELEPHONE ENCOUNTER
I have signed for the following orders AND/OR meds.  Please call the patient and ask the patient to schedule the testing AND/OR inform about any medications that were sent.      No orders of the defined types were placed in this encounter.      Medications Ordered This Encounter   Medications    diphenoxylate-atropine 2.5-0.025 mg (LOMOTIL) 2.5-0.025 mg per tablet     Sig: Take 1 tablet by mouth 4 (four) times daily as needed for Diarrhea.     Dispense:  20 tablet     Refill:  0

## 2019-04-09 NOTE — TELEPHONE ENCOUNTER
Pt states that she has been having diarrhea since Sunday. Pt states she cant come in for an appt because when ever she stands she goes to the bathroom on herself. Wants to know if you can call her in something to help with this. Please Advise.

## 2019-05-03 ENCOUNTER — OFFICE VISIT (OUTPATIENT)
Dept: NEPHROLOGY | Facility: CLINIC | Age: 79
End: 2019-05-03
Payer: MEDICARE

## 2019-05-03 ENCOUNTER — TELEPHONE (OUTPATIENT)
Dept: NEPHROLOGY | Facility: CLINIC | Age: 79
End: 2019-05-03

## 2019-05-03 VITALS
HEART RATE: 92 BPM | WEIGHT: 246 LBS | OXYGEN SATURATION: 97 % | DIASTOLIC BLOOD PRESSURE: 82 MMHG | BODY MASS INDEX: 42 KG/M2 | SYSTOLIC BLOOD PRESSURE: 130 MMHG | HEIGHT: 64 IN

## 2019-05-03 DIAGNOSIS — E79.0 HYPERURICEMIA: ICD-10-CM

## 2019-05-03 DIAGNOSIS — N18.30 CONTROLLED TYPE 2 DIABETES MELLITUS WITH STAGE 3 CHRONIC KIDNEY DISEASE, WITHOUT LONG-TERM CURRENT USE OF INSULIN: ICD-10-CM

## 2019-05-03 DIAGNOSIS — N18.30 STAGE 3 CHRONIC KIDNEY DISEASE: ICD-10-CM

## 2019-05-03 DIAGNOSIS — E53.8 B12 DEFICIENCY: ICD-10-CM

## 2019-05-03 DIAGNOSIS — E55.9 VITAMIN D DEFICIENCY: ICD-10-CM

## 2019-05-03 DIAGNOSIS — E03.4 HYPOTHYROIDISM DUE TO ACQUIRED ATROPHY OF THYROID: ICD-10-CM

## 2019-05-03 DIAGNOSIS — E78.2 COMBINED HYPERLIPIDEMIA ASSOCIATED WITH TYPE 2 DIABETES MELLITUS: ICD-10-CM

## 2019-05-03 DIAGNOSIS — E11.22 CONTROLLED TYPE 2 DIABETES MELLITUS WITH STAGE 3 CHRONIC KIDNEY DISEASE, WITHOUT LONG-TERM CURRENT USE OF INSULIN: ICD-10-CM

## 2019-05-03 DIAGNOSIS — K76.0 FATTY LIVER: ICD-10-CM

## 2019-05-03 DIAGNOSIS — E11.59 HYPERTENSION ASSOCIATED WITH DIABETES: ICD-10-CM

## 2019-05-03 DIAGNOSIS — N28.1 RENAL CYST: ICD-10-CM

## 2019-05-03 DIAGNOSIS — G47.30 SLEEP APNEA, UNSPECIFIED TYPE: ICD-10-CM

## 2019-05-03 DIAGNOSIS — I15.2 HYPERTENSION ASSOCIATED WITH DIABETES: ICD-10-CM

## 2019-05-03 DIAGNOSIS — E11.69 COMBINED HYPERLIPIDEMIA ASSOCIATED WITH TYPE 2 DIABETES MELLITUS: ICD-10-CM

## 2019-05-03 DIAGNOSIS — D64.9 ANEMIA, UNSPECIFIED TYPE: Primary | ICD-10-CM

## 2019-05-03 DIAGNOSIS — M1A.9XX0 CHRONIC GOUT WITHOUT TOPHUS, UNSPECIFIED CAUSE, UNSPECIFIED SITE: ICD-10-CM

## 2019-05-03 PROCEDURE — 99215 OFFICE O/P EST HI 40 MIN: CPT | Mod: PBBFAC,PO | Performed by: INTERNAL MEDICINE

## 2019-05-03 PROCEDURE — 99214 OFFICE O/P EST MOD 30 MIN: CPT | Mod: S$PBB,,, | Performed by: INTERNAL MEDICINE

## 2019-05-03 PROCEDURE — 99999 PR PBB SHADOW E&M-EST. PATIENT-LVL V: ICD-10-PCS | Mod: PBBFAC,,, | Performed by: INTERNAL MEDICINE

## 2019-05-03 PROCEDURE — 99214 PR OFFICE/OUTPT VISIT, EST, LEVL IV, 30-39 MIN: ICD-10-PCS | Mod: S$PBB,,, | Performed by: INTERNAL MEDICINE

## 2019-05-03 PROCEDURE — 99999 PR PBB SHADOW E&M-EST. PATIENT-LVL V: CPT | Mod: PBBFAC,,, | Performed by: INTERNAL MEDICINE

## 2019-05-03 RX ORDER — TRAMADOL HYDROCHLORIDE 50 MG/1
50 TABLET ORAL EVERY 8 HOURS PRN
Qty: 25 TABLET | Refills: 0 | Status: SHIPPED | OUTPATIENT
Start: 2019-05-03 | End: 2019-08-01

## 2019-05-03 RX ORDER — TRAMADOL HYDROCHLORIDE 50 MG/1
50 TABLET ORAL EVERY 8 HOURS PRN
Qty: 25 TABLET | Refills: 0 | Status: SHIPPED | OUTPATIENT
Start: 2019-05-03 | End: 2019-05-03 | Stop reason: SDUPTHER

## 2019-05-03 RX ORDER — TURMERIC 100 %
POWDER (GRAM) MISCELLANEOUS
COMMUNITY
End: 2019-05-03 | Stop reason: ALTCHOICE

## 2019-05-03 RX ORDER — ZINC GLUCONATE 50 MG
TABLET ORAL
Refills: 11 | COMMUNITY
Start: 2019-03-28 | End: 2019-05-03 | Stop reason: SDUPTHER

## 2019-05-03 RX ORDER — FERROUS GLUCONATE 240(27)MG
TABLET ORAL
Refills: 4 | COMMUNITY
Start: 2019-04-10 | End: 2019-05-03 | Stop reason: ALTCHOICE

## 2019-05-03 NOTE — TELEPHONE ENCOUNTER
Pt is requesting a referral to Hematology for anemia, order in. Pt lives in [place], (Dr. Vázquez is in [place] 1st and 3rd Weds qmonth) Order in. Also RTC 6 mo with labs prior, orders in. Thank you
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
## 2019-05-07 ENCOUNTER — TELEPHONE (OUTPATIENT)
Dept: NEPHROLOGY | Facility: CLINIC | Age: 79
End: 2019-05-07

## 2019-05-07 NOTE — TELEPHONE ENCOUNTER
----- Message from Maria T Parr sent at 5/7/2019  8:24 AM CDT -----  Contact: VANESSA  Patient;s son Roger Ro will be picking up rx. Please call back at [phone].      Thanks,  Maria T Parr

## 2019-05-10 ENCOUNTER — TELEPHONE (OUTPATIENT)
Dept: NEPHROLOGY | Facility: CLINIC | Age: 79
End: 2019-05-10

## 2019-05-14 ENCOUNTER — TELEPHONE (OUTPATIENT)
Dept: FAMILY MEDICINE | Facility: CLINIC | Age: 79
End: 2019-05-14

## 2019-05-14 RX ORDER — TELMISARTAN 80 MG/1
80 TABLET ORAL DAILY
Qty: 90 TABLET | Refills: 3 | Status: SHIPPED | OUTPATIENT
Start: 2019-05-14 | End: 2019-12-23

## 2019-05-14 NOTE — TELEPHONE ENCOUNTER
The patient is currently on an ARB medication named BENICAR and that type of medicine is not obtainable in the pharmacy.   A different blood pressure medicine may treat the blood pressure a little differently than the other medication that is being discontinued.  Therefore, we will need to schedule the patient to be seen in the clinic by my nurse practitioner so that the medication can be adjusted if needed.      I have signed for the following medication to be sent to the pharmacy.  Please call the patient and ask the patient to change to the new medication and stop the old one above.     No orders of the defined types were placed in this encounter.      Medications Ordered This Encounter   Medications    telmisartan (MICARDIS) 80 MG Tab     Sig: Take 1 tablet (80 mg total) by mouth once daily.     Dispense:  90 tablet     Refill:  3

## 2019-05-14 NOTE — TELEPHONE ENCOUNTER
Pt states her Benicar is on back order at the pharmacy. States they told her that they are not able to get this from the  and don't know when they can. Please advise. Pt is concerned her B/P will go too high.

## 2019-05-14 NOTE — TELEPHONE ENCOUNTER
----- Message from Carolina Trinh sent at 5/14/2019  3:37 PM CDT -----  Contact: pt  The pt states her Olmesarean 40mg medication is on back order, the pt wants to be advised and can be reached at [phone]///[id number]

## 2019-05-15 ENCOUNTER — PATIENT MESSAGE (OUTPATIENT)
Dept: FAMILY MEDICINE | Facility: CLINIC | Age: 79
End: 2019-05-15

## 2019-05-17 ENCOUNTER — PATIENT OUTREACH (OUTPATIENT)
Dept: ADMINISTRATIVE | Facility: HOSPITAL | Age: 79
End: 2019-05-17

## 2019-05-17 NOTE — PROGRESS NOTES
Health Maintenance reviewed. Spoke with pt concerning last eye exam.  Last exam was done by Dr. Cortes in [place].

## 2019-05-30 NOTE — PROGRESS NOTES
Subjective:       Patient ID: Wendy Ro is a 78 y.o. female.    Chief Complaint: Follow-up and Blood Pressure Check    Hypertension   This is a chronic problem. The current episode started more than 1 year ago. The problem is unchanged. The problem is controlled. Pertinent negatives include no chest pain, headaches, palpitations or shortness of breath. Past treatments include calcium channel blockers and angiotensin blockers.       Review of Systems   Constitutional: Negative for fatigue, fever and unexpected weight change.   HENT: Negative for ear pain and sore throat.    Eyes: Negative for pain and visual disturbance.   Respiratory: Negative for cough and shortness of breath.    Cardiovascular: Negative for chest pain and palpitations.   Gastrointestinal: Negative for abdominal pain, diarrhea and vomiting.   Musculoskeletal: Negative for arthralgias and myalgias.   Skin: Negative for color change and rash.   Neurological: Negative for dizziness and headaches.   Psychiatric/Behavioral: Negative for dysphoric mood and sleep disturbance. The patient is not nervous/anxious.        Vitals:    05/31/19 0807   BP: 134/74   Pulse: 70   Temp: 97.9 °F (36.6 °C)       Objective:     Current Outpatient Medications   Medication Sig Dispense Refill    albuterol (VENTOLIN HFA) 90 mcg/actuation inhaler Inhale 2 puffs into the lungs every 6 (six) hours as needed for Wheezing. 1 Inhaler 11    alendronate (FOSAMAX) 70 MG tablet Take 1 tablet (70 mg total) by mouth every 7 days. 4 tablet 11    allopurinol (ZYLOPRIM) 100 MG tablet Take 100 mg by mouth.      allopurinol (ZYLOPRIM) 300 MG tablet Take 1 tablet (300 mg total) by mouth once daily. 30 tablet 11    amLODIPine (NORVASC) 10 MG tablet Take 1 tablet (10 mg total) by mouth once daily. 30 tablet 11    calcium carbonate (OS-JESUS) 500 mg calcium (1,250 mg) tablet Take 1 tablet (500 mg total) by mouth once daily.  0    cetirizine (ZYRTEC) 10 MG tablet Take 10 mg by mouth  once daily.      cyanocobalamin, vitamin B-12, 1,000 mcg Subl Place 1 tablet under the tongue once daily. 30 tablet 11    docusate sodium (COLACE) 100 MG capsule Take 1 capsule (100 mg total) by mouth 2 (two) times daily. (Patient taking differently: Take 100 mg by mouth once daily. )  0    ergocalciferol (ERGOCALCIFEROL) 50,000 unit Cap Take 1 capsule (50,000 Units total) by mouth every 7 days. 4 capsule 11    ferrous gluconate (FERGON) 325 MG Tab Take 1 tablet (325 mg total) by mouth 2 (two) times daily with meals. 60 tablet 11    furosemide (LASIX) 40 MG tablet Take 0.5 tablets (20 mg total) by mouth daily as needed. 30 tablet 11    levothyroxine (SYNTHROID) 50 MCG tablet Take 1 tablet (50 mcg total) by mouth before breakfast. 30 tablet 11    lidocaine 5 % Crea Apply 1 application topically 2 (two) times daily as needed. 30 g 0    metFORMIN (GLUCOPHAGE) 500 MG tablet Take 1 tablet (500 mg total) by mouth 2 (two) times daily with meals. 60 tablet 5    nystatin (MYCOSTATIN) powder Apply topically 4 (four) times daily. 60 g 11    ondansetron (ZOFRAN-ODT) 8 MG TbDL Take 1 tablet (8 mg total) by mouth every 6 (six) hours as needed. 30 tablet 0    pantoprazole (PROTONIX) 40 MG tablet Take 1 tablet (40 mg total) by mouth once daily. 30 tablet 11    pravastatin (PRAVACHOL) 20 MG tablet Take 1 tablet (20 mg total) by mouth once daily. 30 tablet 11    predniSONE (DELTASONE) 5 MG tablet Take 1 tablet (5 mg total) by mouth daily.  0    telmisartan (MICARDIS) 40 MG Tab Take 80 mg by mouth once daily.  3    telmisartan (MICARDIS) 80 MG Tab Take 1 tablet (80 mg total) by mouth once daily. 90 tablet 3    traMADol (ULTRAM) 50 mg tablet Take 1 tablet (50 mg total) by mouth every 8 (eight) hours as needed for Pain. 25 tablet 0    turmeric root extract 500 mg Cap Take by mouth 3 (three) times daily.      vit [id number]-[id number] (BIOFLEX) 310-75-80-40 mg Tab Take 2 tablets by mouth once daily.        No  current facility-administered medications for this visit.        Physical Exam   Constitutional: She is oriented to person, place, and time. She appears well-developed and well-nourished. No distress.   HENT:   Head: Normocephalic and atraumatic.   Eyes: Pupils are equal, round, and reactive to light. EOM are normal.   Neck: Normal range of motion. Neck supple.   Cardiovascular: Normal rate and regular rhythm.   Pulmonary/Chest: Effort normal and breath sounds normal.   Musculoskeletal: Normal range of motion.   Neurological: She is alert and oriented to person, place, and time.   Skin: Skin is warm and dry. No rash noted.   Psychiatric: She has a normal mood and affect. Judgment normal.   Nursing note and vitals reviewed.      Assessment:       1. Hypertension associated with diabetes        Plan:   Hypertension associated with diabetes    continue current medications    No follow-ups on file.    There are no Patient Instructions on file for this visit.

## 2019-05-31 ENCOUNTER — OFFICE VISIT (OUTPATIENT)
Dept: FAMILY MEDICINE | Facility: CLINIC | Age: 79
End: 2019-05-31
Payer: MEDICARE

## 2019-05-31 VITALS
SYSTOLIC BLOOD PRESSURE: 134 MMHG | WEIGHT: 249.56 LBS | DIASTOLIC BLOOD PRESSURE: 74 MMHG | HEIGHT: 64 IN | BODY MASS INDEX: 42.61 KG/M2 | HEART RATE: 70 BPM | TEMPERATURE: 98 F

## 2019-05-31 DIAGNOSIS — I15.2 HYPERTENSION ASSOCIATED WITH DIABETES: Primary | ICD-10-CM

## 2019-05-31 DIAGNOSIS — E11.59 HYPERTENSION ASSOCIATED WITH DIABETES: Primary | ICD-10-CM

## 2019-05-31 PROCEDURE — 99213 OFFICE O/P EST LOW 20 MIN: CPT | Mod: S$PBB,,, | Performed by: NURSE PRACTITIONER

## 2019-05-31 PROCEDURE — 99213 OFFICE O/P EST LOW 20 MIN: CPT | Mod: PBBFAC,PO | Performed by: NURSE PRACTITIONER

## 2019-05-31 PROCEDURE — 99999 PR PBB SHADOW E&M-EST. PATIENT-LVL III: ICD-10-PCS | Mod: PBBFAC,,, | Performed by: NURSE PRACTITIONER

## 2019-05-31 PROCEDURE — 99999 PR PBB SHADOW E&M-EST. PATIENT-LVL III: CPT | Mod: PBBFAC,,, | Performed by: NURSE PRACTITIONER

## 2019-05-31 PROCEDURE — 99213 PR OFFICE/OUTPT VISIT, EST, LEVL III, 20-29 MIN: ICD-10-PCS | Mod: S$PBB,,, | Performed by: NURSE PRACTITIONER

## 2019-05-31 RX ORDER — OLMESARTAN MEDOXOMIL 40 MG/1
40 TABLET ORAL
COMMUNITY
Start: 2019-03-11 | End: 2019-05-31 | Stop reason: RX

## 2019-05-31 RX ORDER — TELMISARTAN 40 MG/1
80 TABLET ORAL DAILY
Refills: 3 | COMMUNITY
Start: 2019-05-14 | End: 2019-08-01

## 2019-05-31 RX ORDER — ALLOPURINOL 100 MG/1
100 TABLET ORAL
COMMUNITY
Start: 2019-03-11 | End: 2020-08-04 | Stop reason: SDUPTHER

## 2019-06-27 ENCOUNTER — LAB VISIT (OUTPATIENT)
Dept: LAB | Facility: HOSPITAL | Age: 79
End: 2019-06-27
Attending: FAMILY MEDICINE
Payer: MEDICARE

## 2019-06-27 DIAGNOSIS — E53.8 B12 DEFICIENCY: ICD-10-CM

## 2019-06-27 LAB — VIT B12 SERPL-MCNC: 383 PG/ML (ref 210–950)

## 2019-06-27 PROCEDURE — 82607 VITAMIN B-12: CPT

## 2019-07-02 ENCOUNTER — TELEPHONE (OUTPATIENT)
Dept: PODIATRY | Facility: CLINIC | Age: 79
End: 2019-07-02

## 2019-07-02 NOTE — TELEPHONE ENCOUNTER
Called patient to reschedule Podiatry appointment on Aug. 6, 2019 due to book out. No answer, left voice message requesting call back.

## 2019-07-14 RX ORDER — FERROUS GLUCONATE 240(27)MG
TABLET ORAL
Qty: 60 TABLET | Refills: 4 | Status: SHIPPED | OUTPATIENT
Start: 2019-07-14 | End: 2019-08-01

## 2019-07-17 ENCOUNTER — INITIAL CONSULT (OUTPATIENT)
Dept: HEMATOLOGY/ONCOLOGY | Facility: CLINIC | Age: 79
End: 2019-07-17
Payer: MEDICARE

## 2019-07-17 ENCOUNTER — LAB VISIT (OUTPATIENT)
Dept: LAB | Facility: HOSPITAL | Age: 79
End: 2019-07-17
Attending: INTERNAL MEDICINE
Payer: MEDICARE

## 2019-07-17 VITALS
DIASTOLIC BLOOD PRESSURE: 69 MMHG | HEIGHT: 64 IN | OXYGEN SATURATION: 97 % | BODY MASS INDEX: 44.05 KG/M2 | SYSTOLIC BLOOD PRESSURE: 137 MMHG | TEMPERATURE: 98 F | WEIGHT: 258 LBS | HEART RATE: 107 BPM

## 2019-07-17 DIAGNOSIS — D63.1 ANEMIA IN STAGE 3 CHRONIC KIDNEY DISEASE: ICD-10-CM

## 2019-07-17 DIAGNOSIS — N18.30 ANEMIA IN STAGE 3 CHRONIC KIDNEY DISEASE: ICD-10-CM

## 2019-07-17 DIAGNOSIS — D51.8 MACROCYTIC ANEMIA WITH VITAMIN B12 DEFICIENCY: ICD-10-CM

## 2019-07-17 DIAGNOSIS — D51.8 MACROCYTIC ANEMIA WITH VITAMIN B12 DEFICIENCY: Primary | ICD-10-CM

## 2019-07-17 LAB
ALBUMIN SERPL BCP-MCNC: 3.3 G/DL (ref 3.5–5.2)
ALP SERPL-CCNC: 55 U/L (ref 55–135)
ALT SERPL W/O P-5'-P-CCNC: 11 U/L (ref 10–44)
ANION GAP SERPL CALC-SCNC: 10 MMOL/L (ref 8–16)
AST SERPL-CCNC: 13 U/L (ref 10–40)
BASOPHILS # BLD AUTO: 0.02 K/UL (ref 0–0.2)
BASOPHILS NFR BLD: 0.2 % (ref 0–1.9)
BILIRUB SERPL-MCNC: 0.4 MG/DL (ref 0.1–1)
BUN SERPL-MCNC: 27 MG/DL (ref 8–23)
CALCIUM SERPL-MCNC: 9.6 MG/DL (ref 8.7–10.5)
CHLORIDE SERPL-SCNC: 109 MMOL/L (ref 95–110)
CO2 SERPL-SCNC: 23 MMOL/L (ref 23–29)
CREAT SERPL-MCNC: 1.3 MG/DL (ref 0.5–1.4)
CRP SERPL-MCNC: 10.9 MG/L (ref 0–8.2)
DIFFERENTIAL METHOD: ABNORMAL
EOSINOPHIL # BLD AUTO: 0 K/UL (ref 0–0.5)
EOSINOPHIL NFR BLD: 0.1 % (ref 0–8)
ERYTHROCYTE [DISTWIDTH] IN BLOOD BY AUTOMATED COUNT: 13.6 % (ref 11.5–14.5)
EST. GFR  (AFRICAN AMERICAN): 45.4 ML/MIN/1.73 M^2
EST. GFR  (NON AFRICAN AMERICAN): 39.4 ML/MIN/1.73 M^2
FERRITIN SERPL-MCNC: 115 NG/ML (ref 20–300)
GLUCOSE SERPL-MCNC: 169 MG/DL (ref 70–110)
HCT VFR BLD AUTO: 37.7 % (ref 37–48.5)
HGB BLD-MCNC: 11.9 G/DL (ref 12–16)
IRON SERPL-MCNC: 55 UG/DL (ref 30–160)
LYMPHOCYTES # BLD AUTO: 1.6 K/UL (ref 1–4.8)
LYMPHOCYTES NFR BLD: 17.3 % (ref 18–48)
MCH RBC QN AUTO: 31.3 PG (ref 27–31)
MCHC RBC AUTO-ENTMCNC: 31.6 G/DL (ref 32–36)
MCV RBC AUTO: 99 FL (ref 82–98)
MONOCYTES # BLD AUTO: 0.4 K/UL (ref 0.3–1)
MONOCYTES NFR BLD: 4.6 % (ref 4–15)
NEUTROPHILS # BLD AUTO: 7.3 K/UL (ref 1.8–7.7)
NEUTROPHILS NFR BLD: 77.8 % (ref 38–73)
PLATELET # BLD AUTO: 270 K/UL (ref 150–350)
PMV BLD AUTO: 9.3 FL (ref 9.2–12.9)
POTASSIUM SERPL-SCNC: 4.7 MMOL/L (ref 3.5–5.1)
PROT SERPL-MCNC: 6.5 G/DL (ref 6–8.4)
RBC # BLD AUTO: 3.8 M/UL (ref 4–5.4)
SATURATED IRON: 18 % (ref 20–50)
SODIUM SERPL-SCNC: 142 MMOL/L (ref 136–145)
TOTAL IRON BINDING CAPACITY: 303 UG/DL (ref 250–450)
TRANSFERRIN SERPL-MCNC: 205 MG/DL (ref 200–375)
WBC # BLD AUTO: 9.42 K/UL (ref 3.9–12.7)

## 2019-07-17 PROCEDURE — 85025 COMPLETE CBC W/AUTO DIFF WBC: CPT | Mod: PO

## 2019-07-17 PROCEDURE — 83615 LACTATE (LD) (LDH) ENZYME: CPT

## 2019-07-17 PROCEDURE — 86334 IMMUNOFIX E-PHORESIS SERUM: CPT

## 2019-07-17 PROCEDURE — 84165 PROTEIN E-PHORESIS SERUM: CPT

## 2019-07-17 PROCEDURE — 84165 PROTEIN E-PHORESIS SERUM: CPT | Mod: 26,,, | Performed by: PATHOLOGY

## 2019-07-17 PROCEDURE — 82728 ASSAY OF FERRITIN: CPT

## 2019-07-17 PROCEDURE — 83540 ASSAY OF IRON: CPT

## 2019-07-17 PROCEDURE — 99999 PR PBB SHADOW E&M-EST. PATIENT-LVL IV: ICD-10-PCS | Mod: PBBFAC,,, | Performed by: INTERNAL MEDICINE

## 2019-07-17 PROCEDURE — 86334 IMMUNOFIX E-PHORESIS SERUM: CPT | Mod: 26,,, | Performed by: PATHOLOGY

## 2019-07-17 PROCEDURE — 84165 PATHOLOGIST INTERPRETATION SPE: ICD-10-PCS | Mod: 26,,, | Performed by: PATHOLOGY

## 2019-07-17 PROCEDURE — 99999 PR PBB SHADOW E&M-EST. PATIENT-LVL IV: CPT | Mod: PBBFAC,,, | Performed by: INTERNAL MEDICINE

## 2019-07-17 PROCEDURE — 83010 ASSAY OF HAPTOGLOBIN QUANT: CPT

## 2019-07-17 PROCEDURE — 99204 OFFICE O/P NEW MOD 45 MIN: CPT | Mod: S$PBB,,, | Performed by: INTERNAL MEDICINE

## 2019-07-17 PROCEDURE — 86140 C-REACTIVE PROTEIN: CPT

## 2019-07-17 PROCEDURE — 83520 IMMUNOASSAY QUANT NOS NONAB: CPT | Mod: 59

## 2019-07-17 PROCEDURE — 80053 COMPREHEN METABOLIC PANEL: CPT

## 2019-07-17 PROCEDURE — 99214 OFFICE O/P EST MOD 30 MIN: CPT | Mod: PBBFAC,PO | Performed by: INTERNAL MEDICINE

## 2019-07-17 PROCEDURE — 36415 COLL VENOUS BLD VENIPUNCTURE: CPT | Mod: PO

## 2019-07-17 PROCEDURE — 99204 PR OFFICE/OUTPT VISIT, NEW, LEVL IV, 45-59 MIN: ICD-10-PCS | Mod: S$PBB,,, | Performed by: INTERNAL MEDICINE

## 2019-07-17 PROCEDURE — 86334 PATHOLOGIST INTERPRETATION IFE: ICD-10-PCS | Mod: 26,,, | Performed by: PATHOLOGY

## 2019-07-17 PROCEDURE — 82746 ASSAY OF FOLIC ACID SERUM: CPT

## 2019-07-17 NOTE — LETTER
July 17, 2019      José Harrell MD  [address] [facility] [address]  [address]  [facility] [address]           [facility]  [address] [address] [place] [address]  Phone: [phone]          Patient: Wendy Ro   MR Number: [id number]   YOB: 1940   Date of Visit: 7/17/2019       Dear Dr. José Harrell:    Thank you for referring Wendy Ro to me for evaluation. Attached you will find relevant portions of my assessment and plan of care.    If you have questions, please do not hesitate to call me. I look forward to following Wendy Ro along with you.    Sincerely,    Nathaniel Vázquez MD    Enclosure  CC:  No Recipients    If you would like to receive this communication electronically, please contact [email or web address] or [phone] to request more information on [employer] access.    For providers and/or their staff who would like to refer a patient to [facility], please contact us through our one-stop-shop provider referral line, [facility] , at [phone].    If you feel you have received this communication in error or would no longer like to receive these types of communications, please e-mail [email or web address]

## 2019-07-17 NOTE — PROGRESS NOTES
Subjective:      Patient ID: Wendy Ro is a 79 y.o. female.    Chief Complaint: Anemia    The patient is a 79-year-old  female who presents to the Hematology Oncology Clinic today in consultation to discuss further evaluation and  management recommendations for anemia.  The patient has been referred to me by Dr. José Harrell with Nephrology.  I have reviewed all the patient's relevant clinical history available in the medical record and have utilized this in my evaluation and management recommendations today.  Today the patient reports that she has chronic fatigue.  She has been taking oral vitamin B12 for treatment of vitamin B12 deficiency.  She denies any fevers, chills or night sweats.  She denies any loss of appetite or unintentional weight loss.  She denies any melena, hematochezia, hematemesis, hemoptysis or hematuria.  She denies any chest pain.  She has chronic dyspnea with exertion.  She uses the assistance of walker for ambulation.  She denies any nausea, vomiting or abdominal pain.  She denies any bowel or urinary complaints.    Review of Systems   Constitutional: Positive for fatigue. Negative for activity change, appetite change, chills, diaphoresis, fever and unexpected weight change.   HENT: Negative for congestion, dental problem, ear discharge, ear pain, hearing loss, mouth sores, postnasal drip, sinus pressure, sore throat, tinnitus, trouble swallowing and voice change.    Eyes: Negative for photophobia, pain and visual disturbance.   Respiratory: Negative for cough, chest tightness, shortness of breath and wheezing.    Cardiovascular: Negative for chest pain, palpitations and leg swelling.   Gastrointestinal: Negative for abdominal distention, abdominal pain, blood in stool, constipation, diarrhea, nausea and vomiting.   Endocrine: Negative for cold intolerance, heat intolerance, polydipsia, polyphagia and polyuria.   Genitourinary: Negative for difficulty urinating, dysuria,  flank pain, frequency, hematuria, urgency, vaginal bleeding and vaginal discharge.   Musculoskeletal: Positive for arthralgias. Negative for back pain, gait problem, joint swelling and myalgias.   Skin: Negative for pallor and rash.   Allergic/Immunologic: Negative for immunocompromised state.   Neurological: Negative for dizziness, syncope, weakness, light-headedness, numbness and headaches.   Hematological: Negative for adenopathy. Does not bruise/bleed easily.   Psychiatric/Behavioral: Negative for agitation, confusion and dysphoric mood. The patient is not nervous/anxious.        Medication List with Changes/Refills   Current Medications    ALBUTEROL (VENTOLIN HFA) 90 MCG/ACTUATION INHALER    Inhale 2 puffs into the lungs every 6 (six) hours as needed for Wheezing.    ALENDRONATE (FOSAMAX) 70 MG TABLET    Take 1 tablet (70 mg total) by mouth every 7 days.    ALLOPURINOL (ZYLOPRIM) 100 MG TABLET    Take 100 mg by mouth.    ALLOPURINOL (ZYLOPRIM) 300 MG TABLET    Take 1 tablet (300 mg total) by mouth once daily.    AMLODIPINE (NORVASC) 10 MG TABLET    Take 1 tablet (10 mg total) by mouth once daily.    CALCIUM CARBONATE (OS-JESUS) 500 MG CALCIUM (1,250 MG) TABLET    Take 1 tablet (500 mg total) by mouth once daily.    CETIRIZINE (ZYRTEC) 10 MG TABLET    Take 10 mg by mouth once daily.    CYANOCOBALAMIN, VITAMIN B-12, 1,000 MCG SUBL    Place 1 tablet under the tongue once daily.    DOCUSATE SODIUM (COLACE) 100 MG CAPSULE    Take 1 capsule (100 mg total) by mouth 2 (two) times daily.    ERGOCALCIFEROL (ERGOCALCIFEROL) 50,000 UNIT CAP    Take 1 capsule (50,000 Units total) by mouth every 7 days.    FERGON 240 MG (27 MG IRON) TABLET    Take 1 tablet (325 mg total) by mouth 2 (two) times daily with meals.    FERROUS GLUCONATE (FERGON) 325 MG TAB    Take 1 tablet (325 mg total) by mouth 2 (two) times daily with meals.    FUROSEMIDE (LASIX) 40 MG TABLET    Take 0.5 tablets (20 mg total) by mouth daily as needed.     LEVOTHYROXINE (SYNTHROID) 50 MCG TABLET    Take 1 tablet (50 mcg total) by mouth before breakfast.    LIDOCAINE 5 % CREA    Apply 1 application topically 2 (two) times daily as needed.    METFORMIN (GLUCOPHAGE) 500 MG TABLET    Take 1 tablet (500 mg total) by mouth 2 (two) times daily with meals.    NYSTATIN (MYCOSTATIN) POWDER    Apply topically 4 (four) times daily.    ONDANSETRON (ZOFRAN-ODT) 8 MG TBDL    Take 1 tablet (8 mg total) by mouth every 6 (six) hours as needed.    PANTOPRAZOLE (PROTONIX) 40 MG TABLET    Take 1 tablet (40 mg total) by mouth once daily.    PRAVASTATIN (PRAVACHOL) 20 MG TABLET    Take 1 tablet (20 mg total) by mouth once daily.    PREDNISONE (DELTASONE) 5 MG TABLET    Take 1 tablet (5 mg total) by mouth daily.    TELMISARTAN (MICARDIS) 40 MG TAB    Take 80 mg by mouth once daily.    TELMISARTAN (MICARDIS) 80 MG TAB    Take 1 tablet (80 mg total) by mouth once daily.    TRAMADOL (ULTRAM) 50 MG TABLET    Take 1 tablet (50 mg total) by mouth every 8 (eight) hours as needed for Pain.    TURMERIC ROOT EXTRACT 500 MG CAP    Take by mouth 3 (three) times daily.    VIT R-[id number]-YDYK-RUTIN- (BIOFLEX) 309-12-59-40 MG TAB    Take 2 tablets by mouth once daily.      Review of patient's allergies indicates:   Allergen Reactions    Adenosine      Other reaction(s): asthma attack    Codeine Nausea And Vomiting     Other reaction(s): Nausea    Duloxetine      sleepy    Hydrocodone-acetaminophen Nausea And Vomiting     Other reaction(s): Nausea    Keflex  [cephalexin] Nausea Only     Other reaction(s): pt says can take penicillins  Other reaction(s): Nausea    Macrolide antibiotics     Opioids - morphine analogues     Opium (anthroposophic)     Promethazine Nausea And Vomiting     Other reaction(s): Nausea    Tramadol        Lab: I have reviewed all of the patient's relevant lab work available in the medical record and have utilized this in my evaluation and management recommendations  today    Imaging: I have reviewed all of the patient's diagnostic/imaging results available in the medical record and have utilized this in my evaluation and management recommendations today.      Objective:     Vitals:    07/17/19 1540   BP: 137/69   Pulse: 107   Temp: 98.1 °F (36.7 °C)       Physical Exam   Constitutional: She is oriented to person, place, and time. She appears well-developed and well-nourished. She is active and cooperative.   HENT:   Head: Normocephalic and atraumatic.   Nose: Nose normal.   Mouth/Throat: Oropharynx is clear and moist. No oropharyngeal exudate.   Eyes: Pupils are equal, round, and reactive to light. No scleral icterus.   Neck: Neck supple. No thyromegaly present.   Cardiovascular: Normal rate, regular rhythm, normal heart sounds and intact distal pulses.   No murmur heard.  Pulmonary/Chest: Effort normal and breath sounds normal. No stridor. No respiratory distress. She has no wheezes. She has no rales.   Abdominal: Soft. Bowel sounds are normal. She exhibits no distension, no ascites and no mass. There is no hepatosplenomegaly. There is no tenderness. There is no rigidity, no rebound and no guarding.   Musculoskeletal: She exhibits no edema or tenderness.   Lymphadenopathy:     She has no cervical adenopathy.   Neurological: She is alert and oriented to person, place, and time. No cranial nerve deficit. She exhibits normal muscle tone. Coordination normal.   Skin: Skin is warm and dry. No rash noted. No pallor.   Psychiatric: She has a normal mood and affect. Her behavior is normal. Judgment and thought content normal.   Nursing note and vitals reviewed.      Assessment:     1. Macrocytic anemia with vitamin B12 deficiency    2. Anemia in stage 3 chronic kidney disease        Plan:   1.  I had a detailed discussion with the patient today with regard to the various possible etiologies for her anemia.  I will check lab work today for further evaluation.  We discussed that  etiologies included chronic kidney disease.  2.  She will continue daily vitamin B12 oral supplementation.  Her most recent vitamin B12 level was normal.    Further evaluation/management/follow up recommendations will be determined after review of lab results.  She knows to call for any additional questions or new problems.  Macrocytic anemia with vitamin B12 deficiency  -     CBC auto differential; Future; Expected date: 07/17/2019  -     CMP; Future; Expected date: 07/17/2019  -     Iron and TIBC; Future; Expected date: 07/17/2019  -     Ferritin; Future; Expected date: 07/17/2019  -     C-REACTIVE PROTEIN; Future; Expected date: 07/17/2019  -     Folate; Future; Expected date: 07/17/2019  -     Protein electrophoresis, serum; Future; Expected date: 07/17/2019  -     Immunoglobulin free LT chains blood; Future; Expected date: 07/17/2019  -     IMMUNOFIXATION ELECTROPHORESIS, SERUM; Future; Expected date: 07/17/2019  -     Lactate dehydrogenase; Future; Expected date: 07/17/2019  -     HAPTOGLOBIN; Future; Expected date: 07/17/2019    Anemia in stage 3 chronic kidney disease

## 2019-07-18 LAB
ALBUMIN SERPL ELPH-MCNC: 3.32 G/DL (ref 3.35–5.55)
ALPHA1 GLOB SERPL ELPH-MCNC: 0.29 G/DL (ref 0.17–0.41)
ALPHA2 GLOB SERPL ELPH-MCNC: 0.92 G/DL (ref 0.43–0.99)
B-GLOBULIN SERPL ELPH-MCNC: 0.81 G/DL (ref 0.5–1.1)
FOLATE SERPL-MCNC: 7.5 NG/ML (ref 4–24)
GAMMA GLOB SERPL ELPH-MCNC: 0.76 G/DL (ref 0.67–1.58)
HAPTOGLOB SERPL-MCNC: 244 MG/DL (ref 30–250)
INTERPRETATION SERPL IFE-IMP: NORMAL
KAPPA LC SER QL IA: 3.21 MG/DL (ref 0.33–1.94)
KAPPA LC/LAMBDA SER IA: 1.13 (ref 0.26–1.65)
LAMBDA LC SER QL IA: 2.83 MG/DL (ref 0.57–2.63)
LDH SERPL L TO P-CCNC: 203 U/L (ref 110–260)
PATHOLOGIST INTERPRETATION IFE: NORMAL
PATHOLOGIST INTERPRETATION SPE: NORMAL
PROT SERPL-MCNC: 6.1 G/DL (ref 6–8.4)

## 2019-08-01 ENCOUNTER — HOSPITAL ENCOUNTER (OUTPATIENT)
Dept: RADIOLOGY | Facility: HOSPITAL | Age: 79
Discharge: HOME | End: 2019-08-01
Attending: NURSE PRACTITIONER
Payer: MEDICARE

## 2019-08-01 ENCOUNTER — OFFICE VISIT (OUTPATIENT)
Dept: FAMILY MEDICINE | Facility: CLINIC | Age: 79
End: 2019-08-01
Payer: MEDICARE

## 2019-08-01 VITALS — WEIGHT: 250 LBS | TEMPERATURE: 99 F | HEIGHT: 64 IN | BODY MASS INDEX: 42.68 KG/M2

## 2019-08-01 DIAGNOSIS — R60.9 EDEMA, UNSPECIFIED TYPE: Primary | ICD-10-CM

## 2019-08-01 DIAGNOSIS — R60.9 EDEMA, UNSPECIFIED TYPE: ICD-10-CM

## 2019-08-01 DIAGNOSIS — L03.90 CELLULITIS, UNSPECIFIED CELLULITIS SITE: ICD-10-CM

## 2019-08-01 PROCEDURE — 93010 ELECTROCARDIOGRAM REPORT: CPT | Mod: S$PBB,,, | Performed by: INTERNAL MEDICINE

## 2019-08-01 PROCEDURE — 93010 EKG 12-LEAD: ICD-10-PCS | Mod: S$PBB,,, | Performed by: INTERNAL MEDICINE

## 2019-08-01 PROCEDURE — 93970 EXTREMITY STUDY: CPT | Mod: 26,,, | Performed by: RADIOLOGY

## 2019-08-01 PROCEDURE — 99999 PR PBB SHADOW E&M-EST. PATIENT-LVL V: CPT | Mod: PBBFAC,,, | Performed by: NURSE PRACTITIONER

## 2019-08-01 PROCEDURE — 99214 PR OFFICE/OUTPT VISIT, EST, LEVL IV, 30-39 MIN: ICD-10-PCS | Mod: S$PBB,,, | Performed by: NURSE PRACTITIONER

## 2019-08-01 PROCEDURE — 99999 PR PBB SHADOW E&M-EST. PATIENT-LVL V: ICD-10-PCS | Mod: PBBFAC,,, | Performed by: NURSE PRACTITIONER

## 2019-08-01 PROCEDURE — 93970 US LOWER EXTREMITY VEINS BILATERAL: ICD-10-PCS | Mod: 26,,, | Performed by: RADIOLOGY

## 2019-08-01 PROCEDURE — 93970 EXTREMITY STUDY: CPT | Mod: TC,PO

## 2019-08-01 PROCEDURE — 99215 OFFICE O/P EST HI 40 MIN: CPT | Mod: PBBFAC,PO | Performed by: NURSE PRACTITIONER

## 2019-08-01 PROCEDURE — 93005 ELECTROCARDIOGRAM TRACING: CPT | Mod: PBBFAC,PO | Performed by: INTERNAL MEDICINE

## 2019-08-01 PROCEDURE — 99214 OFFICE O/P EST MOD 30 MIN: CPT | Mod: S$PBB,,, | Performed by: NURSE PRACTITIONER

## 2019-08-01 RX ORDER — DOXYCYCLINE HYCLATE 100 MG
100 TABLET ORAL 2 TIMES DAILY
Qty: 20 TABLET | Refills: 0 | Status: SHIPPED | OUTPATIENT
Start: 2019-08-01 | End: 2019-08-11
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
## 2019-08-02 ENCOUNTER — PATIENT MESSAGE (OUTPATIENT)
Dept: FAMILY MEDICINE | Facility: CLINIC | Age: 79
End: 2019-08-02

## 2019-08-05 ENCOUNTER — PATIENT OUTREACH (OUTPATIENT)
Dept: ADMINISTRATIVE | Facility: HOSPITAL | Age: 79
End: 2019-08-05

## 2019-08-19 ENCOUNTER — OFFICE VISIT (OUTPATIENT)
Dept: FAMILY MEDICINE | Facility: CLINIC | Age: 79
End: 2019-08-19
Payer: MEDICARE

## 2019-08-19 VITALS
TEMPERATURE: 98 F | HEART RATE: 98 BPM | DIASTOLIC BLOOD PRESSURE: 74 MMHG | HEIGHT: 64 IN | BODY MASS INDEX: 42.11 KG/M2 | WEIGHT: 246.63 LBS | SYSTOLIC BLOOD PRESSURE: 134 MMHG

## 2019-08-19 DIAGNOSIS — R60.9 EDEMA, UNSPECIFIED TYPE: Primary | ICD-10-CM

## 2019-08-19 DIAGNOSIS — I15.2 HYPERTENSION ASSOCIATED WITH DIABETES: ICD-10-CM

## 2019-08-19 DIAGNOSIS — E11.59 HYPERTENSION ASSOCIATED WITH DIABETES: ICD-10-CM

## 2019-08-19 PROCEDURE — 99999 PR PBB SHADOW E&M-EST. PATIENT-LVL IV: ICD-10-PCS | Mod: PBBFAC,,, | Performed by: FAMILY MEDICINE

## 2019-08-19 PROCEDURE — 99213 PR OFFICE/OUTPT VISIT, EST, LEVL III, 20-29 MIN: ICD-10-PCS | Mod: S$PBB,,, | Performed by: FAMILY MEDICINE

## 2019-08-19 PROCEDURE — 99999 PR PBB SHADOW E&M-EST. PATIENT-LVL IV: CPT | Mod: PBBFAC,,, | Performed by: FAMILY MEDICINE

## 2019-08-19 PROCEDURE — 99213 OFFICE O/P EST LOW 20 MIN: CPT | Mod: S$PBB,,, | Performed by: FAMILY MEDICINE

## 2019-08-19 PROCEDURE — 99214 OFFICE O/P EST MOD 30 MIN: CPT | Mod: PBBFAC,PO | Performed by: FAMILY MEDICINE

## 2019-08-19 RX ORDER — METOPROLOL SUCCINATE 100 MG/1
100 TABLET, EXTENDED RELEASE ORAL DAILY
Qty: 30 TABLET | Refills: 0 | Status: SHIPPED | OUTPATIENT
Start: 2019-08-19 | End: 2019-10-21 | Stop reason: SDUPTHER

## 2019-08-19 NOTE — PROGRESS NOTES
"Subjective:      Patient ID: Wendy Ro is a 79 y.o. female.    Chief Complaint: Leg Swelling    HPIshe is f/u on her swelling. She states that she is feeling better. She was getting ace wraps and this helped her. She is able to wear her stockings.  They are sore to touch.     Edema   This is a chronic.  It is better today than before.  The current episode started more than 1 year ago. The problem occurs daily. The problem had been gradually worsening   Pertinent negatives include no abdominal pain, anorexia, arthralgias, change in bowel habit, chest pain, chills, congestion, coughing, diaphoresis, fatigue, fever, headaches, joint swelling, myalgias, nausea, neck pain, numbness, rash, sore throat, swollen glands, urinary symptoms, vertigo, visual change, vomiting or weakness. Associated symptoms comments: Bilateral leg pain, redness, blistering. Nothing aggravates the symptoms. She has tried nothing for the symptoms. The treatment provided no relief.   She had been trying to decrease her use of the lasix and this is what swole her.      I reviewed her labs and U/S of the legs.     Review of Systems    Objective:   /74   Pulse 98   Temp 98.1 °F (36.7 °C) (Oral)   Ht 5' 4" (1.626 m)   Wt 111.9 kg (246 lb 9.6 oz)   BMI 42.33 kg/m²     Physical Exam   Constitutional: She is oriented to person, place, and time. She appears well-developed and well-nourished.   Eyes: Pupils are equal, round, and reactive to light. EOM are normal.   Cardiovascular: Normal rate and regular rhythm.   Pulmonary/Chest: Effort normal and breath sounds normal. No stridor. No respiratory distress. She has no wheezes.   Abdominal: Soft.   Musculoskeletal: She exhibits edema (it is mild now.) and tenderness.   Neurological: She is alert and oriented to person, place, and time.       Assessment:     1. Edema, unspecified type    2. Hypertension associated with diabetes        Plan:   Wendy was seen today for leg " swelling.    Diagnoses and all orders for this visit:    Edema, unspecified type    Hypertension associated with diabetes    Other orders  -     metoprolol succinate (TOPROL-XL) 100 MG 24 hr tablet; Take 1 tablet (100 mg total) by mouth once daily.      Stop the norvasc.  rtc 2 weeks w trenna to check the bp.  Decrease water intake.

## 2019-10-08 ENCOUNTER — LAB VISIT (OUTPATIENT)
Dept: LAB | Facility: HOSPITAL | Age: 79
End: 2019-10-08
Attending: FAMILY MEDICINE
Payer: MEDICARE

## 2019-10-08 ENCOUNTER — OFFICE VISIT (OUTPATIENT)
Dept: FAMILY MEDICINE | Facility: CLINIC | Age: 79
End: 2019-10-08
Payer: MEDICARE

## 2019-10-08 VITALS
DIASTOLIC BLOOD PRESSURE: 86 MMHG | TEMPERATURE: 99 F | SYSTOLIC BLOOD PRESSURE: 146 MMHG | HEART RATE: 108 BPM | BODY MASS INDEX: 42 KG/M2 | WEIGHT: 246 LBS | HEIGHT: 64 IN

## 2019-10-08 DIAGNOSIS — K52.9 GASTROENTERITIS: ICD-10-CM

## 2019-10-08 DIAGNOSIS — R53.83 FATIGUE, UNSPECIFIED TYPE: ICD-10-CM

## 2019-10-08 DIAGNOSIS — R19.7 DIARRHEA, UNSPECIFIED TYPE: ICD-10-CM

## 2019-10-08 DIAGNOSIS — R60.9 EDEMA, UNSPECIFIED TYPE: Primary | ICD-10-CM

## 2019-10-08 DIAGNOSIS — L03.90 CELLULITIS, UNSPECIFIED CELLULITIS SITE: ICD-10-CM

## 2019-10-08 DIAGNOSIS — R60.9 EDEMA, UNSPECIFIED TYPE: ICD-10-CM

## 2019-10-08 LAB
ANION GAP SERPL CALC-SCNC: 12 MMOL/L (ref 8–16)
BASOPHILS # BLD AUTO: 0.04 K/UL (ref 0–0.2)
BASOPHILS NFR BLD: 0.4 % (ref 0–1.9)
BNP SERPL-MCNC: 20 PG/ML (ref 0–99)
BUN SERPL-MCNC: 16 MG/DL (ref 8–23)
CALCIUM SERPL-MCNC: 9.2 MG/DL (ref 8.7–10.5)
CHLORIDE SERPL-SCNC: 106 MMOL/L (ref 95–110)
CO2 SERPL-SCNC: 23 MMOL/L (ref 23–29)
CREAT SERPL-MCNC: 1.4 MG/DL (ref 0.5–1.4)
DIFFERENTIAL METHOD: ABNORMAL
EOSINOPHIL # BLD AUTO: 0.2 K/UL (ref 0–0.5)
EOSINOPHIL NFR BLD: 2 % (ref 0–8)
ERYTHROCYTE [DISTWIDTH] IN BLOOD BY AUTOMATED COUNT: 14.3 % (ref 11.5–14.5)
EST. GFR  (AFRICAN AMERICAN): 41.5 ML/MIN/1.73 M^2
EST. GFR  (NON AFRICAN AMERICAN): 36 ML/MIN/1.73 M^2
GLUCOSE SERPL-MCNC: 117 MG/DL (ref 70–110)
HCT VFR BLD AUTO: 38.9 % (ref 37–48.5)
HGB BLD-MCNC: 11.4 G/DL (ref 12–16)
IMM GRANULOCYTES # BLD AUTO: 0.03 K/UL (ref 0–0.04)
IMM GRANULOCYTES NFR BLD AUTO: 0.3 % (ref 0–0.5)
LYMPHOCYTES # BLD AUTO: 2.7 K/UL (ref 1–4.8)
LYMPHOCYTES NFR BLD: 29.3 % (ref 18–48)
MCH RBC QN AUTO: 30.3 PG (ref 27–31)
MCHC RBC AUTO-ENTMCNC: 29.3 G/DL (ref 32–36)
MCV RBC AUTO: 104 FL (ref 82–98)
MONOCYTES # BLD AUTO: 0.7 K/UL (ref 0.3–1)
MONOCYTES NFR BLD: 7.2 % (ref 4–15)
NEUTROPHILS # BLD AUTO: 5.6 K/UL (ref 1.8–7.7)
NEUTROPHILS NFR BLD: 60.8 % (ref 38–73)
NRBC BLD-RTO: 0 /100 WBC
PLATELET # BLD AUTO: 264 K/UL (ref 150–350)
PMV BLD AUTO: 11.3 FL (ref 9.2–12.9)
POTASSIUM SERPL-SCNC: 3.8 MMOL/L (ref 3.5–5.1)
RBC # BLD AUTO: 3.76 M/UL (ref 4–5.4)
SODIUM SERPL-SCNC: 141 MMOL/L (ref 136–145)
TSH SERPL DL<=0.005 MIU/L-ACNC: 3.96 UIU/ML (ref 0.4–4)
WBC # BLD AUTO: 9.17 K/UL (ref 3.9–12.7)

## 2019-10-08 PROCEDURE — 36415 COLL VENOUS BLD VENIPUNCTURE: CPT | Mod: PO

## 2019-10-08 PROCEDURE — 99214 PR OFFICE/OUTPT VISIT, EST, LEVL IV, 30-39 MIN: ICD-10-PCS | Mod: S$PBB,,, | Performed by: NURSE PRACTITIONER

## 2019-10-08 PROCEDURE — 99214 OFFICE O/P EST MOD 30 MIN: CPT | Mod: S$PBB,,, | Performed by: NURSE PRACTITIONER

## 2019-10-08 PROCEDURE — 99999 PR PBB SHADOW E&M-EST. PATIENT-LVL V: CPT | Mod: PBBFAC,,, | Performed by: NURSE PRACTITIONER

## 2019-10-08 PROCEDURE — 80048 BASIC METABOLIC PNL TOTAL CA: CPT

## 2019-10-08 PROCEDURE — 85025 COMPLETE CBC W/AUTO DIFF WBC: CPT

## 2019-10-08 PROCEDURE — 99215 OFFICE O/P EST HI 40 MIN: CPT | Mod: PBBFAC,PO | Performed by: NURSE PRACTITIONER

## 2019-10-08 PROCEDURE — 84443 ASSAY THYROID STIM HORMONE: CPT

## 2019-10-08 PROCEDURE — 99999 PR PBB SHADOW E&M-EST. PATIENT-LVL V: ICD-10-PCS | Mod: PBBFAC,,, | Performed by: NURSE PRACTITIONER

## 2019-10-08 PROCEDURE — 83880 ASSAY OF NATRIURETIC PEPTIDE: CPT

## 2019-10-08 RX ORDER — DICYCLOMINE HYDROCHLORIDE 10 MG/1
10 CAPSULE ORAL 2 TIMES DAILY PRN
Qty: 10 CAPSULE | Refills: 0 | Status: SHIPPED | OUTPATIENT
Start: 2019-10-08 | End: 2019-10-13

## 2019-10-08 RX ORDER — DOXYCYCLINE HYCLATE 100 MG
100 TABLET ORAL 2 TIMES DAILY
Qty: 20 TABLET | Refills: 0 | Status: SHIPPED | OUTPATIENT
Start: 2019-10-08 | End: 2019-10-18
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
## 2019-10-14 ENCOUNTER — TELEPHONE (OUTPATIENT)
Dept: FAMILY MEDICINE | Facility: CLINIC | Age: 79
End: 2019-10-14

## 2019-10-14 ENCOUNTER — TELEPHONE (OUTPATIENT)
Dept: VASCULAR SURGERY | Facility: CLINIC | Age: 79
End: 2019-10-14

## 2019-10-14 DIAGNOSIS — R60.9 EDEMA, UNSPECIFIED TYPE: Primary | ICD-10-CM

## 2019-10-14 NOTE — TELEPHONE ENCOUNTER
----- Message from Brynn ePna sent at 10/14/2019  2:30 PM CDT -----  Contact: Self  Patient requesting a call back regarding appointment on 10/17/19. She states that Dr. Puga's office called her and advised her that they do not do what she was scheduled for. They have cancelled the appointment. Please call patient back at [phone]

## 2019-10-14 NOTE — TELEPHONE ENCOUNTER
Pt informed Dr. Lorie montemayor does not handle edema in the extremities. Informed pt that will put in external referral to see provider outside [facility].

## 2019-10-14 NOTE — TELEPHONE ENCOUNTER
Pt informed message has been sent to Dr. Farrar's staff for scheduling her an appt. Pt informed staff may contact her for scheduling.

## 2019-10-14 NOTE — TELEPHONE ENCOUNTER
Dr Moulton does not treat edema or cellulitis.  If due to venous insufficiency, Dr Moulton is not accepting vein patients at this time.

## 2019-10-14 NOTE — TELEPHONE ENCOUNTER
----- Message from Shanell Alonzo LPN sent at 10/14/2019  3:01 PM CDT -----  Hello, would you be able to schedule this pt to be seen in the [facility]. Please contact us or the pt if this is possible. Thank you in advance.

## 2019-10-14 NOTE — TELEPHONE ENCOUNTER
Patient says she saw [facility] and was referred to Dr Puga for her legs. She states Dr Puga's office called and cancelled the appointment, stating that they do not see patients for this particular problem. She would like to be referred to someone else. Possibly local.

## 2019-10-17 NOTE — TELEPHONE ENCOUNTER
([facility] staff: read all previous notes in this message)     At her last visit she received a printed prescription for tramadol from me and we discussed her doctor trying gabapentin/Neurontin again.     Is this what she is referring to?       [address] [facility]        NAME: Rhonda Almeida is a 68 y o  female  : 1945    MRN: [id number]  DATE: 2019  TIME: 3:35 PM    Assessment and Plan   Injury of right wrist, initial encounter Zonia Noguera  1  Injury of right wrist, initial encounter  XR wrist 3+ vw right    Ambulatory referral to Orthopedic Surgery    CANCELED: XR hand 3+ vw left   2  Pain of right hand  Ambulatory referral to Orthopedic Surgery     RIGHT WRIST     INDICATION:   T14 90XA: Injury, unspecified, initial encounter      COMPARISON:  None     VIEWS:  XR WRIST 3+ VW RIGHT         FINDINGS:     There is no acute fracture or dislocation  Faint sclerotic density in the distal radius appears chronic      Degenerative changes of the 1st and 2nd carpal metacarpal (CMC) articulation      No lytic or blastic lesions are seen      Soft tissues are unremarkable      IMPRESSION:     No acute osseous abnormality  RIGHT HAND     INDICATION:   T14 90XA: Injury, unspecified, initial encounter      COMPARISON:  None     VIEWS:  XR HAND 3+ VW RIGHT         For the purposes of institution wide universal language the following terms will apply: (thumb=1st digit/finger, index finger=2nd digit/finger, long finger=3rd digit/finger, ring=4th digit/finger and small finger=5th digit/finger)     FINDINGS:     There is no acute fracture or dislocation  Faint sclerotic density in the distal radius appears chronic      Degenerative changes of the 1st and 2nd carpal metacarpal [facility]) articulation  No lytic or blastic lesions are seen      Soft tissues are unremarkable      IMPRESSION:     No acute osseous abnormality  Discussed x-ray results/chornic appearing changes at length with the patient  Answered all questions  Pre made thumb spica Splint- placed by medical staff for comfort, NV intact post-splinting    Patient Instructions     RICE- rest, ice, compression, elevation  Wrist splint while awake for comfort       Call Ortho today and follow-up with them in the next 1-2 days for further evaluation and treatment  Call Tristan Youssef to schedule an appointment: [phone]  Or the direct Ortho number at [phone] to schedule an appointment   Go to the ER immediately if any numbness, tingling, weakness, change in intensity or location of pain, arm pain or swelling, or other new or concerning symptoms    Chief Complaint     Chief Complaint   Patient presents with    Wrist Injury     Patient states she fell down hurting right wrist and hand it happened Saturday   Hand Injury         History of Present Illness       26-year-old female presents with right hand and wrist pain after an injury that occurred on Saturday/5 days ago  States it was a mechanical fall- denies any preceding symptoms such as chest pain, SOB, dizziness or other inciting factors  She states she was going up one stair outside her house when she went to step on a lantern bug when she missed the step, and fell forward onto a bush  She denies hitting her head or losing consciousness  States she used her right wrist/hand to get up and she thinks she moved it wrong in the process  Has not needed to take anything for the pain but noticed it looked slightly bruised and swollen around the right thumb  Also notes pain resolves at rest but worse with movements or palpation  Denies any numbness, tingling, weakness, headaches, dizziness, chest pain, shortness of breath, abdominal pain, neck pain, back pain, GI/ symptoms or other complaints  Denies any blood thinner use  Patient states she is only here to have her right wrist/hand evaluated  She is right-hand dominant      Review of Systems   Review of Systems   Constitutional: Negative for chills, fatigue and fever  HENT: Negative for facial swelling  Respiratory: Negative for chest tightness, shortness of breath and wheezing  Cardiovascular: Negative for chest pain and palpitations     Gastrointestinal: Negative for abdominal pain, diarrhea, nausea and vomiting  Musculoskeletal: Positive for arthralgias and joint swelling  Negative for back pain, myalgias and neck pain  Skin: Negative for rash and wound  Neurological: Negative for dizziness, syncope, weakness, light-headedness, numbness and headaches  All other systems reviewed and are negative          Current Medications       Current Outpatient Medications:     albuterol (2 5 mg/3 mL) 0 083 % nebulizer solution, Take 3 mL (2 5 mg total) by nebulization every 6 (six) hours as needed for wheezing, Disp: 75 mL, Rfl: 0    albuterol (PROAIR HFA) 90 mcg/act inhaler, Inhale 2 puffs every 6 (six) hours as needed , Disp: , Rfl:     ALPRAZolam (XANAX) 0 5 mg tablet, Take 1 tablet (0 5 mg total) by mouth 2 (two) times a day as needed for anxiety, Disp: 60 tablet, Rfl: 1    aspirin 81 MG tablet, Take 81 mg by mouth daily, Disp: , Rfl:     BAQSIMI TWO PACK 3 MG/DOSE POWD, Use as directed, Disp: , Rfl: 0    cholecalciferol (VITAMIN D3) 1,000 units tablet, Take 2,000 Units by mouth daily, Disp: , Rfl:     Co-Enzyme Q-10 100 MG CAPS, Take 1 capsule by mouth 2 (two) times a day  , Disp: , Rfl:     diclofenac sodium (VOLTAREN) 1 %, Apply 2 g topically 4 (four) times a day (Patient taking differently: Apply 2 g topically as needed ), Disp: 1 Tube, Rfl: 0    diltiazem (TIAZAC) 300 MG 24 hr capsule, TAKE 1 CAPSULE BY MOUTH EVERY DAY IN THE MORNING, Disp: , Rfl: 2    DULoxetine (CYMBALTA) 60 mg delayed release capsule, TAKE 1 CAPSULE BY MOUTH EVERY MORNING, Disp: 30 capsule, Rfl: 0    enalapril (VASOTEC) 20 mg tablet, Take 20 mg by mouth daily, Disp: , Rfl:     fluticasone (FLONASE) 50 mcg/act nasal spray, 2 sprays into each nostril daily, Disp: 16 g, Rfl: 0    insulin aspart (NovoLOG) 100 units/mL injection, Inject 12 Units under the skin 3 (three) times a day before meals (Patient taking differently: Inject under the skin 3 (three) times a day before meals ), Disp: , Rfl: 0    Insulin Disposable Pump (OMNIPOD DASH 5 PACK) MISC, CHANGE PODS EVERY 2 DAYS UTD, Disp: , Rfl: 3    insulin glargine (LANTUS) 100 units/mL subcutaneous injection, Inject 26 Units under the skin daily at bedtime, Disp: , Rfl:     isosorbide mononitrate (IMDUR) 30 mg 24 hr tablet, TAKE 1 TABLET DAILY AT BEDTIME, 30 DAYS, #30, 4 REFILLS, EXTENDED RELEASE , Disp: , Rfl: 6    levothyroxine 50 mcg tablet, Take 50 mcg by mouth daily, Disp: , Rfl:     metFORMIN (GLUCOPHAGE) 500 mg tablet, Take 500 mg by mouth 2 (two) times a day with meals, Disp: , Rfl:     nitroglycerin (NITROLINGUAL) 0 4 mg/spray spray, USE ONE SPRAY Q 5 MINUTES AS NEEDED FOR CHEST PAIN   IF NO RELIEF, CALL 911 , Disp: , Rfl: 1    rosuvastatin (CRESTOR) 20 MG tablet, Take 20 mg by mouth every evening, Disp: , Rfl: 2    tiZANidine (ZANAFLEX) 2 mg tablet, Take 2 mg by mouth daily at bedtime, Disp: , Rfl:     Current Allergies     Allergies as of 10/17/2019 - Reviewed 10/17/2019   Allergen Reaction Noted    Molds & smuts  09/13/2018    Other  09/13/2018    Pollen extract Other (See Comments) 04/17/2018            The following portions of the patient's history were reviewed and updated as appropriate: allergies, current medications, past family history, past medical history, past social history, past surgical history and problem list      Past Medical History:   Diagnosis Date    Acute embolism and thrombosis of unspecified deep veins of unspecified lower extremity ([address] )     Last Assessed:  5/18/17    Anemia     Anosmia     Anxiety     Arthritis     Asthma     Back pain     Bilateral macular retinal edema     CAD (coronary artery disease)     Cataract     Cervical disc herniation     Cervical radiculopathy     Cervical spinal stenosis     Cervical spondylolysis     Chronic mastoiditis     Complex endometrial hyperplasia     Depression     Diabetes mellitus ([address] )     DVT (deep venous thrombosis) ([address] )     Fibromyalgia     Hyperlipidemia     Hypertension     Hypothyroid     Lumbar radiculopathy     Obese     Spinal stenosis     Stomach ulcer     Thyroid disease        Past Surgical History:   Procedure Laterality Date    BACK SURGERY      CARPAL TUNNEL RELEASE      CATARACT EXTRACTION      CHOLECYSTECTOMY      COLONOSCOPY      CORONARY ANGIOPLASTY      CORONARY ARTERY BYPASS GRAFT      CYSTOSCOPY N/A 6/20/2017    Procedure: Katt Wall;  Surgeon: Germán Mckeon MD;  [facility]: [facility];  Service: Gynecology Oncology    WY LAPAROSCOPY W TOT HYSTERECTUTERUS <=250 GRAM  W TUBE/OVARY N/A 6/20/2017    Procedure: ROBOTIC HYSTERECTOMY; BILATERAL SALPINO-OOPHERECTOMY; umbilical hernia repair ;  Surgeon: Germán Mckeon MD;  Location: [facility];  Service: Gynecology Oncology    TONSILECTOMY AND ADNOIDECTOMY      UMBILICAL HERNIA REPAIR         Family History   Problem Relation Age of Onset    Arthritis Mother     Leukemia Mother     Other Mother         Anxiety, major depressive disorder, recurrent episode with atypical features    Coronary artery disease Father         Heart problem    Diabetes Father     Other Father         Infectious disease    Alzheimer's disease Maternal Grandmother     Other Maternal Grandfather         Heart problem    Other Daughter         Anxiety, major depressive disorder, recurrent episode with atypical features    Alcohol abuse Other         Grandparent    Cancer Family     Diabetes Family     Hypertension Family     Other Family         Reported prior back trouble, thyroid disorder         Medications have been verified  Objective   /66 (BP Location: Left arm, Patient Position: Sitting, Cuff Size: Large)   Pulse 65   Temp 97 5 °F (36 4 °C) (Temporal)   Resp 18   Ht 5' 1" (1 549 m)   Wt 88 5 kg (195 lb)   SpO2 97%   BMI 36 84 kg/m²        Physical Exam     Physical Exam   Constitutional: She is oriented to person, place, and time   She appears well-developed and well-nourished  No distress  Cardiovascular: Normal rate, regular rhythm and normal heart sounds  Pulmonary/Chest: Effort normal and breath sounds normal    Musculoskeletal:        Right elbow: Normal She exhibits normal range of motion  No tenderness found  Right wrist: She exhibits tenderness  She exhibits normal range of motion (But pain with movement in all directions), no swelling, no deformity and no laceration  Right forearm: Normal         Right hand: She exhibits tenderness  She exhibits normal range of motion, normal capillary refill, no deformity and no laceration  Normal sensation noted  Normal strength noted  Hands:  DIP/PIP flexion tendons intact  Full extension of the fingers  5/5  strength  Neurological: She is alert and oriented to person, place, and time  She has normal reflexes  No sensory deficit  NV intact with sensation and strong peripheral pulses  5/5 strength of UE bilaterally   Skin: Capillary refill takes less than 2 seconds  Psychiatric: She has a normal mood and affect  Her behavior is normal    Nursing note and vitals reviewed  Splint application  Date/Time: 10/17/2019 3:35 PM  Performed by: Diana Kurtz PA-C  Authorized by: Diana Kurtz PA-C     Consent:     Consent obtained:  Verbal    Consent given by:  Patient    Risks discussed:  Discoloration, numbness, pain and swelling    Alternatives discussed:  Referral, observation, alternative treatment, delayed treatment and no treatment  Pre-procedure details:     Sensation:  Normal  Procedure details:     Laterality:  Right    Location:  Wrist    Wrist:  R wrist    Splint type: Pre made thumb spica, static  Post-procedure details:     Pain:  Improved    Sensation:  Normal    Patient tolerance of procedure:   Tolerated well, no immediate complications

## 2019-10-21 RX ORDER — METOPROLOL SUCCINATE 100 MG/1
100 TABLET, EXTENDED RELEASE ORAL DAILY
Qty: 30 TABLET | Refills: 11 | Status: SHIPPED | OUTPATIENT
Start: 2019-10-21 | End: 2020-06-02

## 2019-10-29 ENCOUNTER — PATIENT MESSAGE (OUTPATIENT)
Dept: NEPHROLOGY | Facility: CLINIC | Age: 79
End: 2019-10-29

## 2019-11-19 ENCOUNTER — OFFICE VISIT (OUTPATIENT)
Dept: PODIATRY | Facility: CLINIC | Age: 79
End: 2019-11-19
Payer: MEDICARE

## 2019-11-19 VITALS
WEIGHT: 243.5 LBS | BODY MASS INDEX: 41.57 KG/M2 | HEART RATE: 64 BPM | DIASTOLIC BLOOD PRESSURE: 74 MMHG | SYSTOLIC BLOOD PRESSURE: 160 MMHG | HEIGHT: 64 IN

## 2019-11-19 DIAGNOSIS — E11.49 TYPE II DIABETES MELLITUS WITH NEUROLOGICAL MANIFESTATIONS: Primary | ICD-10-CM

## 2019-11-19 DIAGNOSIS — B35.1 DERMATOPHYTOSIS OF NAIL: ICD-10-CM

## 2019-11-19 DIAGNOSIS — R23.8 SKIN IRRITATION: ICD-10-CM

## 2019-11-19 PROCEDURE — 99999 PR PBB SHADOW E&M-EST. PATIENT-LVL III: CPT | Mod: PBBFAC,,, | Performed by: PODIATRIST

## 2019-11-19 PROCEDURE — 99213 OFFICE O/P EST LOW 20 MIN: CPT | Mod: PBBFAC,PO,25 | Performed by: PODIATRIST

## 2019-11-19 PROCEDURE — 11721 PR DEBRIDEMENT OF NAILS, 6 OR MORE: ICD-10-PCS | Mod: Q9,S$PBB,, | Performed by: PODIATRIST

## 2019-11-19 PROCEDURE — 99999 PR PBB SHADOW E&M-EST. PATIENT-LVL III: ICD-10-PCS | Mod: PBBFAC,,, | Performed by: PODIATRIST

## 2019-11-19 PROCEDURE — 11721 DEBRIDE NAIL 6 OR MORE: CPT | Mod: Q9,PBBFAC,PO | Performed by: PODIATRIST

## 2019-11-19 PROCEDURE — 99499 NO LOS: ICD-10-PCS | Mod: S$PBB,,, | Performed by: PODIATRIST

## 2019-11-19 PROCEDURE — 99499 UNLISTED E&M SERVICE: CPT | Mod: S$PBB,,, | Performed by: PODIATRIST

## 2019-11-19 PROCEDURE — 11721 DEBRIDE NAIL 6 OR MORE: CPT | Mod: Q9,S$PBB,, | Performed by: PODIATRIST

## 2019-11-19 RX ORDER — MUPIROCIN 20 MG/G
OINTMENT TOPICAL 3 TIMES DAILY
Qty: 30 G | Refills: 1 | Status: SHIPPED | OUTPATIENT
Start: 2019-11-19 | End: 2020-02-04
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
## 2019-12-12 ENCOUNTER — TELEPHONE (OUTPATIENT)
Dept: OPHTHALMOLOGY | Facility: CLINIC | Age: 79
End: 2019-12-12

## 2019-12-23 ENCOUNTER — OFFICE VISIT (OUTPATIENT)
Dept: NEPHROLOGY | Facility: CLINIC | Age: 79
End: 2019-12-23
Payer: MEDICARE

## 2019-12-23 VITALS
HEART RATE: 60 BPM | SYSTOLIC BLOOD PRESSURE: 128 MMHG | BODY MASS INDEX: 42.19 KG/M2 | OXYGEN SATURATION: 98 % | WEIGHT: 247.13 LBS | HEIGHT: 64 IN | DIASTOLIC BLOOD PRESSURE: 78 MMHG

## 2019-12-23 DIAGNOSIS — E11.69 COMBINED HYPERLIPIDEMIA ASSOCIATED WITH TYPE 2 DIABETES MELLITUS: ICD-10-CM

## 2019-12-23 DIAGNOSIS — I15.2 HYPERTENSION ASSOCIATED WITH DIABETES: ICD-10-CM

## 2019-12-23 DIAGNOSIS — G47.30 SLEEP APNEA, UNSPECIFIED TYPE: ICD-10-CM

## 2019-12-23 DIAGNOSIS — E53.8 FOLATE DEFICIENCY: ICD-10-CM

## 2019-12-23 DIAGNOSIS — D64.9 ANEMIA, UNSPECIFIED TYPE: ICD-10-CM

## 2019-12-23 DIAGNOSIS — N18.30 CONTROLLED TYPE 2 DIABETES MELLITUS WITH STAGE 3 CHRONIC KIDNEY DISEASE, WITHOUT LONG-TERM CURRENT USE OF INSULIN: ICD-10-CM

## 2019-12-23 DIAGNOSIS — E55.9 VITAMIN D DEFICIENCY: ICD-10-CM

## 2019-12-23 DIAGNOSIS — D50.9 IRON DEFICIENCY ANEMIA, UNSPECIFIED IRON DEFICIENCY ANEMIA TYPE: ICD-10-CM

## 2019-12-23 DIAGNOSIS — E66.01 CLASS 3 SEVERE OBESITY DUE TO EXCESS CALORIES WITH SERIOUS COMORBIDITY AND BODY MASS INDEX (BMI) OF 40.0 TO 44.9 IN ADULT: ICD-10-CM

## 2019-12-23 DIAGNOSIS — E11.59 HYPERTENSION ASSOCIATED WITH DIABETES: ICD-10-CM

## 2019-12-23 DIAGNOSIS — E11.22 CONTROLLED TYPE 2 DIABETES MELLITUS WITH STAGE 3 CHRONIC KIDNEY DISEASE, WITHOUT LONG-TERM CURRENT USE OF INSULIN: ICD-10-CM

## 2019-12-23 DIAGNOSIS — E03.4 HYPOTHYROIDISM DUE TO ACQUIRED ATROPHY OF THYROID: ICD-10-CM

## 2019-12-23 DIAGNOSIS — K31.84 GASTROPARESIS: ICD-10-CM

## 2019-12-23 DIAGNOSIS — E53.8 B12 DEFICIENCY: ICD-10-CM

## 2019-12-23 DIAGNOSIS — N18.30 STAGE 3 CHRONIC KIDNEY DISEASE: Primary | ICD-10-CM

## 2019-12-23 DIAGNOSIS — M06.9 RHEUMATOID ARTHRITIS, INVOLVING UNSPECIFIED SITE, UNSPECIFIED RHEUMATOID FACTOR PRESENCE: ICD-10-CM

## 2019-12-23 DIAGNOSIS — M1A.9XX0 CHRONIC GOUT WITHOUT TOPHUS, UNSPECIFIED CAUSE, UNSPECIFIED SITE: ICD-10-CM

## 2019-12-23 DIAGNOSIS — N28.1 RENAL CYST: ICD-10-CM

## 2019-12-23 DIAGNOSIS — J30.1 SEASONAL ALLERGIC RHINITIS DUE TO POLLEN: ICD-10-CM

## 2019-12-23 DIAGNOSIS — E78.2 COMBINED HYPERLIPIDEMIA ASSOCIATED WITH TYPE 2 DIABETES MELLITUS: ICD-10-CM

## 2019-12-23 DIAGNOSIS — E79.0 HYPERURICEMIA: ICD-10-CM

## 2019-12-23 DIAGNOSIS — K76.0 FATTY LIVER: ICD-10-CM

## 2019-12-23 PROCEDURE — 1159F MED LIST DOCD IN RCRD: CPT | Mod: ,,, | Performed by: INTERNAL MEDICINE

## 2019-12-23 PROCEDURE — 1159F PR MEDICATION LIST DOCUMENTED IN MEDICAL RECORD: ICD-10-PCS | Mod: ,,, | Performed by: INTERNAL MEDICINE

## 2019-12-23 PROCEDURE — 99214 OFFICE O/P EST MOD 30 MIN: CPT | Mod: S$PBB,,, | Performed by: INTERNAL MEDICINE

## 2019-12-23 PROCEDURE — 99213 OFFICE O/P EST LOW 20 MIN: CPT | Mod: PBBFAC,PO | Performed by: INTERNAL MEDICINE

## 2019-12-23 PROCEDURE — 99999 PR PBB SHADOW E&M-EST. PATIENT-LVL III: CPT | Mod: PBBFAC,,, | Performed by: INTERNAL MEDICINE

## 2019-12-23 PROCEDURE — 99999 PR PBB SHADOW E&M-EST. PATIENT-LVL III: ICD-10-PCS | Mod: PBBFAC,,, | Performed by: INTERNAL MEDICINE

## 2019-12-23 PROCEDURE — 99214 PR OFFICE/OUTPT VISIT, EST, LEVL IV, 30-39 MIN: ICD-10-PCS | Mod: S$PBB,,, | Performed by: INTERNAL MEDICINE

## 2019-12-23 RX ORDER — TELMISARTAN 40 MG/1
40 TABLET ORAL 2 TIMES DAILY
Refills: 3 | COMMUNITY
Start: 2019-10-08 | End: 2020-06-30

## 2019-12-23 RX ORDER — DICYCLOMINE HYDROCHLORIDE 10 MG/1
CAPSULE ORAL
COMMUNITY
Start: 2019-10-08 | End: 2019-12-23

## 2019-12-23 NOTE — PROGRESS NOTES
Subjective:       Patient ID: Wendy Ro is a 79 y.o. White female who presents for re-evaluation of progression of No chief complaint on file.    Edema   Associated symptoms include arthralgias. Pertinent negatives include no chest pain, coughing, fatigue or weakness.      She reports she is doing well. Her weight is stable and LE edema is controlled, using Lasix prn. No LUTS. No NSAID use. She follows a low sodium diet but admits to not pushing fluids due to increased frequency. No gout. Last Hba1c was 6.4. She does plenty of 'brain exercises'     Her sister passed away 10/15 from renal failure, declined HD. 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 No gout. Wants to see Heme for B12 deficiency. Needs PPI--hx of 'esophageal stretching. She complains of ongoing neuropathy. Back on metformin     Dec 2019 Interval history: three great grand kids staying with her. Keeping her busy but is 'a trade off' Treated for cellulitis 3X in past few months but has significant edema--has stopped Lasix. LOPEZ is about the same. Still on B12 and iron. No flu shot yet. Labs on Friday     Review of Systems   Constitutional: Negative for activity change, appetite change, fatigue and unexpected weight change.   HENT: Negative for facial swelling.    Eyes: Negative for visual disturbance.   Respiratory: Negative for cough and shortness of breath.    Cardiovascular: Positive for leg swelling (much improved). Negative for chest pain.   Gastrointestinal: Negative for abdominal distention.   Genitourinary: Negative for difficulty urinating and dysuria.   Musculoskeletal: Positive for arthralgias and back pain.   Neurological: Negative for weakness.       Objective:      Physical Exam   Constitutional: She is oriented to person, place, and time. She appears well-nourished. No distress.   HENT:   Mouth/Throat: Oropharynx is clear and moist.   Neck: No JVD present.   Cardiovascular: S1 normal and S2 normal. Exam  reveals no friction rub.   Pulmonary/Chest: Breath sounds normal. She has no wheezes. She has no rales.   Abdominal: Soft.   Musculoskeletal: She exhibits no edema.   Neurological: She is alert and oriented to person, place, and time.   Skin: Skin is warm and dry.   Psychiatric: She has a normal mood and affect.   Vitals reviewed.      Assessment:       1. Stage 3 chronic kidney disease    2. Renal cyst    3. Hypertension associated with diabetes    4. Combined hyperlipidemia associated with type 2 diabetes mellitus    5. Controlled type 2 diabetes mellitus with stage 3 chronic kidney disease, without long-term current use of insulin    6. Vitamin D deficiency     7. Chronic gout without tophus, unspecified cause, unspecified site    8. Anemia, unspecified type    9. Hypothyroidism due to acquired atrophy of thyroid    10. Fatty liver    11. Hyperuricemia    12. Sleep apnea, unspecified type    13. B12 deficiency    14. Folate deficiency    15. Rheumatoid arthritis, involving unspecified site, unspecified rheumatoid factor presence    16. Gastroparesis    17. Seasonal allergic rhinitis due to pollen    18. Iron deficiency anemia, unspecified iron deficiency anemia type    19. Class 3 severe obesity due to excess calories with serious comorbidity and body mass index (BMI) of 40.0 to 44.9 in adult        Plan:             CKD with stable kidney function. Continue RAAS blockade for renal preservation    HTN--controlled.     DM--Hba1c 6.9    Gout--continue allopurinol and prn colchicine     Mineral and Bone Disease-- Continue D2    Anemia--H&H stable, and overall improved    Volume status--resume Lasix, will hopefully decrease episodes of cellulitis       RTC 6 months

## 2019-12-27 ENCOUNTER — LAB VISIT (OUTPATIENT)
Dept: LAB | Facility: HOSPITAL | Age: 79
End: 2019-12-27
Attending: FAMILY MEDICINE
Payer: MEDICARE

## 2019-12-27 DIAGNOSIS — E11.9 TYPE 2 DIABETES MELLITUS WITHOUT COMPLICATION: ICD-10-CM

## 2019-12-27 LAB
ESTIMATED AVG GLUCOSE: 146 MG/DL (ref 68–131)
HBA1C MFR BLD HPLC: 6.7 % (ref 4–5.6)

## 2019-12-27 PROCEDURE — 36415 COLL VENOUS BLD VENIPUNCTURE: CPT | Mod: PO

## 2019-12-27 PROCEDURE — 83036 HEMOGLOBIN GLYCOSYLATED A1C: CPT

## 2019-12-30 DIAGNOSIS — E11.22 CONTROLLED TYPE 2 DIABETES MELLITUS WITH STAGE 3 CHRONIC KIDNEY DISEASE, WITHOUT LONG-TERM CURRENT USE OF INSULIN: Primary | ICD-10-CM

## 2019-12-30 DIAGNOSIS — N18.30 CONTROLLED TYPE 2 DIABETES MELLITUS WITH STAGE 3 CHRONIC KIDNEY DISEASE, WITHOUT LONG-TERM CURRENT USE OF INSULIN: Primary | ICD-10-CM

## 2019-12-30 RX ORDER — METFORMIN HYDROCHLORIDE 500 MG/1
500 TABLET ORAL 2 TIMES DAILY WITH MEALS
Qty: 60 TABLET | Refills: 5 | Status: SHIPPED | OUTPATIENT
Start: 2019-12-30 | End: 2020-11-30

## 2020-02-04 ENCOUNTER — OFFICE VISIT (OUTPATIENT)
Dept: FAMILY MEDICINE | Facility: CLINIC | Age: 80
End: 2020-02-04
Payer: MEDICARE

## 2020-02-04 VITALS
HEART RATE: 107 BPM | SYSTOLIC BLOOD PRESSURE: 159 MMHG | DIASTOLIC BLOOD PRESSURE: 78 MMHG | HEIGHT: 64 IN | WEIGHT: 243.63 LBS | BODY MASS INDEX: 41.59 KG/M2 | TEMPERATURE: 98 F

## 2020-02-04 DIAGNOSIS — T14.8XXA BLISTER: Primary | ICD-10-CM

## 2020-02-04 DIAGNOSIS — R60.0 EDEMA OF BOTH LEGS: ICD-10-CM

## 2020-02-04 PROCEDURE — 99213 OFFICE O/P EST LOW 20 MIN: CPT | Mod: S$PBB,,, | Performed by: NURSE PRACTITIONER

## 2020-02-04 PROCEDURE — 99213 PR OFFICE/OUTPT VISIT, EST, LEVL III, 20-29 MIN: ICD-10-PCS | Mod: S$PBB,,, | Performed by: NURSE PRACTITIONER

## 2020-02-04 PROCEDURE — 99215 OFFICE O/P EST HI 40 MIN: CPT | Mod: PBBFAC,PO | Performed by: NURSE PRACTITIONER

## 2020-02-04 PROCEDURE — 99999 PR PBB SHADOW E&M-EST. PATIENT-LVL V: ICD-10-PCS | Mod: PBBFAC,,, | Performed by: NURSE PRACTITIONER

## 2020-02-04 PROCEDURE — 99999 PR PBB SHADOW E&M-EST. PATIENT-LVL V: CPT | Mod: PBBFAC,,, | Performed by: NURSE PRACTITIONER

## 2020-02-04 RX ORDER — MUPIROCIN 20 MG/G
OINTMENT TOPICAL 3 TIMES DAILY
Qty: 30 G | Refills: 0 | Status: SHIPPED | OUTPATIENT
Start: 2020-02-04 | End: 2021-03-22 | Stop reason: SDUPTHER
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
## 2020-02-21 ENCOUNTER — OFFICE VISIT (OUTPATIENT)
Dept: FAMILY MEDICINE | Facility: CLINIC | Age: 80
End: 2020-02-21
Payer: MEDICARE

## 2020-02-21 VITALS
BODY MASS INDEX: 42.3 KG/M2 | TEMPERATURE: 98 F | HEIGHT: 64 IN | HEART RATE: 65 BPM | DIASTOLIC BLOOD PRESSURE: 71 MMHG | WEIGHT: 247.81 LBS | SYSTOLIC BLOOD PRESSURE: 158 MMHG

## 2020-02-21 DIAGNOSIS — G44.209 TENSION HEADACHE: ICD-10-CM

## 2020-02-21 DIAGNOSIS — M54.2 NECK PAIN ON LEFT SIDE: Primary | ICD-10-CM

## 2020-02-21 PROCEDURE — 99999 PR PBB SHADOW E&M-EST. PATIENT-LVL V: ICD-10-PCS | Mod: PBBFAC,,, | Performed by: NURSE PRACTITIONER

## 2020-02-21 PROCEDURE — 96372 THER/PROPH/DIAG INJ SC/IM: CPT | Mod: PBBFAC,PO

## 2020-02-21 PROCEDURE — 99214 PR OFFICE/OUTPT VISIT, EST, LEVL IV, 30-39 MIN: ICD-10-PCS | Mod: S$PBB,,, | Performed by: NURSE PRACTITIONER

## 2020-02-21 PROCEDURE — 99215 OFFICE O/P EST HI 40 MIN: CPT | Mod: PBBFAC,PO,25 | Performed by: NURSE PRACTITIONER

## 2020-02-21 PROCEDURE — 99214 OFFICE O/P EST MOD 30 MIN: CPT | Mod: S$PBB,,, | Performed by: NURSE PRACTITIONER

## 2020-02-21 PROCEDURE — 99999 PR PBB SHADOW E&M-EST. PATIENT-LVL V: CPT | Mod: PBBFAC,,, | Performed by: NURSE PRACTITIONER

## 2020-02-21 RX ORDER — CYCLOBENZAPRINE HCL 5 MG
5 TABLET ORAL 2 TIMES DAILY PRN
Qty: 14 TABLET | Refills: 0 | Status: SHIPPED | OUTPATIENT
Start: 2020-02-21 | End: 2020-03-02

## 2020-02-21 RX ORDER — KETOROLAC TROMETHAMINE 30 MG/ML
30 INJECTION, SOLUTION INTRAMUSCULAR; INTRAVENOUS
Status: COMPLETED | OUTPATIENT
Start: 2020-02-21 | End: 2020-02-21

## 2020-02-21 RX ADMIN — KETOROLAC TROMETHAMINE 30 MG: 30 INJECTION, SOLUTION INTRAMUSCULAR at 10:02

## 2020-02-21 NOTE — PROGRESS NOTES
Subjective:       Patient ID: Wendy Ro is a 79 y.o. female.    Chief Complaint: Headache and Facial Pain (L side)    Neck Pain    This is a new problem. Episode onset: 10-14 days. The problem occurs constantly. The problem has been unchanged. The pain is associated with a sleep position. The pain is present in the anterior neck. The quality of the pain is described as aching. The pain is moderate. The symptoms are aggravated by position and twisting. The pain is same all the time. Associated symptoms include headaches. Pertinent negatives include no chest pain or fever. She has tried acetaminophen for the symptoms. The treatment provided no relief.       Review of Systems   Constitutional: Negative for fatigue, fever and unexpected weight change.   HENT: Negative for ear pain and sore throat.    Eyes: Negative for pain and visual disturbance.   Respiratory: Negative for cough and shortness of breath.    Cardiovascular: Negative for chest pain and palpitations.   Gastrointestinal: Negative for abdominal pain, diarrhea and vomiting.   Musculoskeletal: Positive for neck pain. Negative for arthralgias and myalgias.   Skin: Negative for color change and rash.   Neurological: Positive for headaches. Negative for dizziness.   Psychiatric/Behavioral: Negative for dysphoric mood and sleep disturbance. The patient is not nervous/anxious.        Vitals:    02/21/20 1015   BP: (!) 158/71   Pulse: 65   Temp: 97.5 °F (36.4 °C)       Objective:     Current Outpatient Medications   Medication Sig Dispense Refill    albuterol (VENTOLIN HFA) 90 mcg/actuation inhaler Inhale 2 puffs into the lungs every 6 (six) hours as needed for Wheezing. 1 Inhaler 11    alendronate (FOSAMAX) 70 MG tablet Take 1 tablet (70 mg total) by mouth every 7 days. 4 tablet 11    allopurinol (ZYLOPRIM) 100 MG tablet Take 100 mg by mouth. Take 100 mg in the evening      allopurinol (ZYLOPRIM) 300 MG tablet Take 1 tablet (300 mg total) by mouth once  daily. 30 tablet 11    calcium carbonate (OS-JESUS) 500 mg calcium (1,250 mg) tablet Take 1 tablet (500 mg total) by mouth once daily.  0    cetirizine (ZYRTEC) 10 MG tablet Take 10 mg by mouth once daily.      cyanocobalamin, vitamin B-12, 1,000 mcg Subl Place 1 tablet under the tongue once daily. 30 tablet 11    docusate sodium (COLACE) 100 MG capsule Take 1 capsule (100 mg total) by mouth 2 (two) times daily. (Patient taking differently: Take 100 mg by mouth once daily. )  0    ergocalciferol (ERGOCALCIFEROL) 50,000 unit Cap Take 1 capsule (50,000 Units total) by mouth every 7 days. 4 capsule 11    ferrous gluconate (FERGON) 325 MG Tab Take 1 tablet (325 mg total) by mouth 2 (two) times daily with meals. 60 tablet 11    furosemide (LASIX) 40 MG tablet Take 0.5 tablets (20 mg total) by mouth daily as needed. 30 tablet 11    levothyroxine (SYNTHROID) 50 MCG tablet Take 1 tablet (50 mcg total) by mouth before breakfast. 30 tablet 11    metFORMIN (GLUCOPHAGE) 500 MG tablet Take 1 tablet (500 mg total) by mouth 2 (two) times daily with meals. 60 tablet 5    metoprolol succinate (TOPROL-XL) 100 MG 24 hr tablet Take 1 tablet (100 mg total) by mouth once daily. 30 tablet 11    mupirocin (BACTROBAN) 2 % ointment Apply topically 3 (three) times daily. 30 g 0    nystatin (MYCOSTATIN) powder Apply topically 4 (four) times daily. 60 g 11    ondansetron (ZOFRAN-ODT) 8 MG TbDL Take 1 tablet (8 mg total) by mouth every 6 (six) hours as needed. 30 tablet 0    pantoprazole (PROTONIX) 40 MG tablet Take 1 tablet (40 mg total) by mouth once daily. 30 tablet 11    pravastatin (PRAVACHOL) 20 MG tablet Take 1 tablet (20 mg total) by mouth once daily. 30 tablet 11    predniSONE (DELTASONE) 5 MG tablet Take 1 tablet (5 mg total) by mouth daily.  0    telmisartan (MICARDIS) 40 MG Tab Take 40 mg by mouth 2 (two) times daily.   3    turmeric root extract 500 mg Cap Take by mouth 3 (three) times daily.      vit  [id number] (BIOFLEX) 566-56-09-40 mg Tab Take 2 tablets by mouth once daily.       cyclobenzaprine (FLEXERIL) 5 MG tablet Take 1 tablet (5 mg total) by mouth 2 (two) times daily as needed for Muscle spasms. 14 tablet 0     Current Facility-Administered Medications   Medication Dose Route Frequency Provider Last Rate Last Dose    ketorolac injection 30 mg  30 mg Intramuscular 1 time in Clinic/HOD Arun Arenas NP           Physical Exam   Constitutional: She is oriented to person, place, and time. She appears well-developed and well-nourished. No distress.   HENT:   Head: Normocephalic and atraumatic.   Eyes: Pupils are equal, round, and reactive to light. EOM are normal.   Neck: Normal range of motion. Neck supple.   Cardiovascular: Normal rate and regular rhythm.   Pulmonary/Chest: Effort normal and breath sounds normal.   Musculoskeletal: Normal range of motion.        Cervical back: She exhibits tenderness.        Back:    Neurological: She is alert and oriented to person, place, and time.   Skin: Skin is warm and dry. No rash noted.   Psychiatric: She has a normal mood and affect. Judgment normal.   Nursing note and vitals reviewed.      Assessment:       1. Neck pain on left side    2. Tension headache        Plan:   Neck pain on left side  -     ketorolac injection 30 mg    Tension headache    Other orders  -     cyclobenzaprine (FLEXERIL) 5 MG tablet; Take 1 tablet (5 mg total) by mouth 2 (two) times daily as needed for Muscle spasms.  Dispense: 14 tablet; Refill: 0        Follow up if symptoms worsen or fail to improve.    Patient Instructions   Heat to area

## 2020-03-23 ENCOUNTER — TELEPHONE (OUTPATIENT)
Dept: PODIATRY | Facility: CLINIC | Age: 80
End: 2020-03-23

## 2020-03-23 NOTE — TELEPHONE ENCOUNTER
Called to let patient know that we are canceling/rescheduling podiatry appointment on 3/31/20 due to Covid-19.    I left a detailed voice message stating Patient may call back to reschedule appointment within 60 days.

## 2020-05-01 ENCOUNTER — TELEPHONE (OUTPATIENT)
Dept: FAMILY MEDICINE | Facility: CLINIC | Age: 80
End: 2020-05-01

## 2020-05-01 NOTE — TELEPHONE ENCOUNTER
Patient called and informed of recommendations. Audio visit scheduled with PCP on Monday @ 7:40 am.

## 2020-05-01 NOTE — TELEPHONE ENCOUNTER
----- Message from Brian Borden sent at 5/1/2020 12:43 PM CDT -----  Contact: pt  Pt called and stated she fell and hit her backside on the bed.     Pt said her buttocks is hurting and would like to talk to the nurse    Pt can be reached at [phone]

## 2020-05-01 NOTE — TELEPHONE ENCOUNTER
Patient would need appointment..  She is at risk for fracture due to chronic prednisone use as well as osteoporosis.  Also appears to have chronic kidney disease so we would not be able to use NSAIDs.  She can increase the Tylenol up to 2 pills every 6 hr.

## 2020-05-01 NOTE — TELEPHONE ENCOUNTER
Pt states that she had a fall yesterday against her wood bed frame. Hit her hip and the back side buttocks area. Pt states its hurting to sit. States she knows its not broke cause she is able to walk around with no problems. Pt has been taking tylenol 325 mg q 4 hours for the pain. Is wanting to know if she can have something a little bit stronger. Please Advise.

## 2020-05-04 ENCOUNTER — OFFICE VISIT (OUTPATIENT)
Dept: FAMILY MEDICINE | Facility: CLINIC | Age: 80
End: 2020-05-04
Payer: MEDICARE

## 2020-05-04 DIAGNOSIS — J43.2 CENTRILOBULAR EMPHYSEMA: ICD-10-CM

## 2020-05-04 DIAGNOSIS — E03.4 HYPOTHYROIDISM DUE TO ACQUIRED ATROPHY OF THYROID: ICD-10-CM

## 2020-05-04 DIAGNOSIS — E11.59 HYPERTENSION ASSOCIATED WITH DIABETES: ICD-10-CM

## 2020-05-04 DIAGNOSIS — K21.9 GASTROESOPHAGEAL REFLUX DISEASE, ESOPHAGITIS PRESENCE NOT SPECIFIED: ICD-10-CM

## 2020-05-04 DIAGNOSIS — I15.2 HYPERTENSION ASSOCIATED WITH DIABETES: ICD-10-CM

## 2020-05-04 DIAGNOSIS — S70.02XA CONTUSION OF LEFT HIP, INITIAL ENCOUNTER: Primary | ICD-10-CM

## 2020-05-04 DIAGNOSIS — E11.40 CONTROLLED TYPE 2 DIABETES MELLITUS WITH DIABETIC NEUROPATHY, WITHOUT LONG-TERM CURRENT USE OF INSULIN: ICD-10-CM

## 2020-05-04 DIAGNOSIS — N18.30 STAGE 3 CHRONIC KIDNEY DISEASE: ICD-10-CM

## 2020-05-04 DIAGNOSIS — M81.0 OSTEOPOROSIS, UNSPECIFIED OSTEOPOROSIS TYPE, UNSPECIFIED PATHOLOGICAL FRACTURE PRESENCE: ICD-10-CM

## 2020-05-04 PROCEDURE — 99442 PR PHYSICIAN TELEPHONE EVALUATION 11-20 MIN: CPT | Mod: 95,,, | Performed by: FAMILY MEDICINE

## 2020-05-04 PROCEDURE — 99442 PR PHYSICIAN TELEPHONE EVALUATION 11-20 MIN: ICD-10-PCS | Mod: 95,,, | Performed by: FAMILY MEDICINE

## 2020-05-04 RX ORDER — PANTOPRAZOLE SODIUM 40 MG/1
40 TABLET, DELAYED RELEASE ORAL DAILY
Qty: 30 TABLET | Refills: 11 | Status: SHIPPED | OUTPATIENT
Start: 2020-05-04 | End: 2021-06-17 | Stop reason: SDUPTHER

## 2020-05-04 RX ORDER — CYCLOBENZAPRINE HCL 5 MG
5 TABLET ORAL 3 TIMES DAILY PRN
Qty: 30 TABLET | Refills: 0 | Status: SHIPPED | OUTPATIENT
Start: 2020-05-04 | End: 2020-05-14

## 2020-05-04 NOTE — PROGRESS NOTES
Established Patient - Audio Only Telehealth Visit     The patient location is: home  The chief complaint leading to consultation is: fall/hip pain  Visit type: Virtual visit with audio only (telephone)  Total time spent with patient: 14 min       The reason for the audio only service rather than synchronous audio and video virtual visit was related to technical difficulties or patient preference/necessity.     Each patient to whom I provide medical services by telemedicine is:  (1) informed of the relationship between the physician and patient and the respective role of any other health care provider with respect to management of the patient; and (2) notified that they may decline to receive medical services by telemedicine and may withdraw from such care at any time. Patient verbally consented to receive this service via voice-only telephone call.       HPI: last week, she slipped down the back of the side of the bed as she was sitting and it has been shifted away from the box spring.  She hit her left hip as she was going down on the bed frame.  It hurt really bad.  She is not sure if she scraped it or what. She got up and she can walk. It does not hurt to walk at all.  It hurts to sit.  The pain is now a 5/10 now and it was a 15/10!  No numbness.  Walking helps her pain.  This occurred on the 30th.  No numbness in the leg.  No weakness in the leg. She states sitting is the worst.  She is taking 2 tylenol every 6 hours.    She is walking and she has been sitting on a heating pad.      Assessment and plan:  Diagnoses and all orders for this visit:    Contusion of left hip, initial encounter    Hypertension associated with diabetes  -     Comprehensive metabolic panel; Future    Hypothyroidism due to acquired atrophy of thyroid    Osteoporosis, unspecified osteoporosis type, unspecified pathological fracture presence    Controlled type 2 diabetes mellitus with diabetic neuropathy, without long-term current use of  insulin  -     Comprehensive metabolic panel; Future  -     Hemoglobin A1C; Future    Centrilobular emphysema    Stage 3 chronic kidney disease  -     Comprehensive metabolic panel; Future    Other orders  -     cyclobenzaprine (FLEXERIL) 5 MG tablet; Take 1 tablet (5 mg total) by mouth 3 (three) times daily as needed for Muscle spasms.      Use heat for this.   she will get labs and her bp checked in June here in the office.   Cont albuterol.   Check dexa next year.  Walk and use heat.    14 min was spent with her.                     This service was not originating from a related E/M service provided within the previous 7 days nor will  to an E/M service or procedure within the next 24 hours or my soonest available appointment.  Prevailing standard of care was able to be met in this audio-only visit.

## 2020-05-31 ENCOUNTER — PATIENT OUTREACH (OUTPATIENT)
Dept: ADMINISTRATIVE | Facility: OTHER | Age: 80
End: 2020-05-31

## 2020-05-31 DIAGNOSIS — Z12.31 BREAST CANCER SCREENING BY MAMMOGRAM: Primary | ICD-10-CM

## 2020-06-02 ENCOUNTER — LAB VISIT (OUTPATIENT)
Dept: LAB | Facility: HOSPITAL | Age: 80
End: 2020-06-02
Attending: INTERNAL MEDICINE
Payer: MEDICARE

## 2020-06-02 ENCOUNTER — CLINICAL SUPPORT (OUTPATIENT)
Dept: FAMILY MEDICINE | Facility: CLINIC | Age: 80
End: 2020-06-02
Payer: MEDICARE

## 2020-06-02 ENCOUNTER — OFFICE VISIT (OUTPATIENT)
Dept: PODIATRY | Facility: CLINIC | Age: 80
End: 2020-06-02
Payer: MEDICARE

## 2020-06-02 VITALS
WEIGHT: 238 LBS | HEIGHT: 64 IN | SYSTOLIC BLOOD PRESSURE: 160 MMHG | BODY MASS INDEX: 40.63 KG/M2 | HEART RATE: 73 BPM | DIASTOLIC BLOOD PRESSURE: 76 MMHG

## 2020-06-02 VITALS — HEART RATE: 71 BPM | DIASTOLIC BLOOD PRESSURE: 67 MMHG | SYSTOLIC BLOOD PRESSURE: 158 MMHG

## 2020-06-02 DIAGNOSIS — I15.2 HYPERTENSION ASSOCIATED WITH DIABETES: ICD-10-CM

## 2020-06-02 DIAGNOSIS — E11.49 TYPE II DIABETES MELLITUS WITH NEUROLOGICAL MANIFESTATIONS: Primary | ICD-10-CM

## 2020-06-02 DIAGNOSIS — E11.59 HYPERTENSION ASSOCIATED WITH DIABETES: ICD-10-CM

## 2020-06-02 DIAGNOSIS — B35.1 DERMATOPHYTOSIS OF NAIL: ICD-10-CM

## 2020-06-02 DIAGNOSIS — E11.40 CONTROLLED TYPE 2 DIABETES MELLITUS WITH DIABETIC NEUROPATHY, WITHOUT LONG-TERM CURRENT USE OF INSULIN: ICD-10-CM

## 2020-06-02 DIAGNOSIS — Z01.30 BP CHECK: Primary | ICD-10-CM

## 2020-06-02 DIAGNOSIS — N18.30 STAGE 3 CHRONIC KIDNEY DISEASE: ICD-10-CM

## 2020-06-02 DIAGNOSIS — E11.9 TYPE 2 DIABETES MELLITUS WITHOUT COMPLICATION: ICD-10-CM

## 2020-06-02 LAB — PTH-INTACT SERPL-MCNC: 101 PG/ML (ref 9–77)

## 2020-06-02 PROCEDURE — 80069 RENAL FUNCTION PANEL: CPT

## 2020-06-02 PROCEDURE — 99999 PR PBB SHADOW E&M-EST. PATIENT-LVL III: ICD-10-PCS | Mod: PBBFAC,,,

## 2020-06-02 PROCEDURE — 99213 OFFICE O/P EST LOW 20 MIN: CPT | Mod: PBBFAC,PO | Performed by: PODIATRIST

## 2020-06-02 PROCEDURE — 99999 PR PBB SHADOW E&M-EST. PATIENT-LVL III: CPT | Mod: PBBFAC,,, | Performed by: PODIATRIST

## 2020-06-02 PROCEDURE — 36415 COLL VENOUS BLD VENIPUNCTURE: CPT | Mod: PO

## 2020-06-02 PROCEDURE — 80053 COMPREHEN METABOLIC PANEL: CPT

## 2020-06-02 PROCEDURE — 99213 OFFICE O/P EST LOW 20 MIN: CPT | Mod: S$PBB,25,, | Performed by: PODIATRIST

## 2020-06-02 PROCEDURE — 83036 HEMOGLOBIN GLYCOSYLATED A1C: CPT

## 2020-06-02 PROCEDURE — 83970 ASSAY OF PARATHORMONE: CPT

## 2020-06-02 PROCEDURE — 11721 DEBRIDE NAIL 6 OR MORE: CPT | Mod: Q9,PBBFAC,PO | Performed by: PODIATRIST

## 2020-06-02 PROCEDURE — 99213 OFFICE O/P EST LOW 20 MIN: CPT | Mod: PBBFAC,27,PO

## 2020-06-02 PROCEDURE — 11721 PR DEBRIDEMENT OF NAILS, 6 OR MORE: ICD-10-PCS | Mod: Q9,S$PBB,, | Performed by: PODIATRIST

## 2020-06-02 PROCEDURE — 11721 DEBRIDE NAIL 6 OR MORE: CPT | Mod: Q9,S$PBB,, | Performed by: PODIATRIST

## 2020-06-02 PROCEDURE — 99999 PR PBB SHADOW E&M-EST. PATIENT-LVL III: CPT | Mod: PBBFAC,,,

## 2020-06-02 PROCEDURE — 99213 PR OFFICE/OUTPT VISIT, EST, LEVL III, 20-29 MIN: ICD-10-PCS | Mod: S$PBB,25,, | Performed by: PODIATRIST

## 2020-06-02 PROCEDURE — 99999 PR PBB SHADOW E&M-EST. PATIENT-LVL III: ICD-10-PCS | Mod: PBBFAC,,, | Performed by: PODIATRIST

## 2020-06-02 RX ORDER — METOPROLOL SUCCINATE 200 MG/1
200 TABLET, EXTENDED RELEASE ORAL DAILY
Qty: 30 TABLET | Refills: 0 | Status: SHIPPED | OUTPATIENT
Start: 2020-06-02 | End: 2020-08-04 | Stop reason: SDUPTHER
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
## 2020-06-02 NOTE — PROGRESS NOTES
Change the toprol xl from 100 mg a day to 200 mg a day.  I sent this to [facility].  Instruct her on this and book an OV with Arun in 2 weeks to document the bp.

## 2020-06-02 NOTE — PROGRESS NOTES
Pt in clinic for BP check. /67 HR 71. Pt denies any signs or symptoms of elevated BP. Pt states she did take her medications this morning. PCP out of office today. PCP staff notified. PCP notified by Epic message. Advised pt our staff will contact her once PCP has reviewed BP reading and advise if any adjustments are needed at this time. Pt verbalized understanding.

## 2020-06-03 LAB
ALBUMIN SERPL BCP-MCNC: 3.3 G/DL (ref 3.5–5.2)
ALBUMIN SERPL BCP-MCNC: 3.3 G/DL (ref 3.5–5.2)
ALP SERPL-CCNC: 39 U/L (ref 55–135)
ALT SERPL W/O P-5'-P-CCNC: 9 U/L (ref 10–44)
ANION GAP SERPL CALC-SCNC: 6 MMOL/L (ref 8–16)
ANION GAP SERPL CALC-SCNC: 6 MMOL/L (ref 8–16)
AST SERPL-CCNC: 10 U/L (ref 10–40)
BILIRUB SERPL-MCNC: 0.2 MG/DL (ref 0.1–1)
BUN SERPL-MCNC: 22 MG/DL (ref 8–23)
BUN SERPL-MCNC: 22 MG/DL (ref 8–23)
CALCIUM SERPL-MCNC: 8.6 MG/DL (ref 8.7–10.5)
CALCIUM SERPL-MCNC: 8.6 MG/DL (ref 8.7–10.5)
CHLORIDE SERPL-SCNC: 114 MMOL/L (ref 95–110)
CHLORIDE SERPL-SCNC: 114 MMOL/L (ref 95–110)
CO2 SERPL-SCNC: 24 MMOL/L (ref 23–29)
CO2 SERPL-SCNC: 24 MMOL/L (ref 23–29)
CREAT SERPL-MCNC: 1.5 MG/DL (ref 0.5–1.4)
CREAT SERPL-MCNC: 1.5 MG/DL (ref 0.5–1.4)
EST. GFR  (AFRICAN AMERICAN): 38.2 ML/MIN/1.73 M^2
EST. GFR  (AFRICAN AMERICAN): 38.2 ML/MIN/1.73 M^2
EST. GFR  (NON AFRICAN AMERICAN): 33.1 ML/MIN/1.73 M^2
EST. GFR  (NON AFRICAN AMERICAN): 33.1 ML/MIN/1.73 M^2
ESTIMATED AVG GLUCOSE: 128 MG/DL (ref 68–131)
ESTIMATED AVG GLUCOSE: 128 MG/DL (ref 68–131)
GLUCOSE SERPL-MCNC: 108 MG/DL (ref 70–110)
GLUCOSE SERPL-MCNC: 108 MG/DL (ref 70–110)
HBA1C MFR BLD HPLC: 6.1 % (ref 4–5.6)
HBA1C MFR BLD HPLC: 6.1 % (ref 4–5.6)
PHOSPHATE SERPL-MCNC: 3.5 MG/DL (ref 2.7–4.5)
POTASSIUM SERPL-SCNC: 4.8 MMOL/L (ref 3.5–5.1)
POTASSIUM SERPL-SCNC: 4.8 MMOL/L (ref 3.5–5.1)
PROT SERPL-MCNC: 6 G/DL (ref 6–8.4)
SODIUM SERPL-SCNC: 144 MMOL/L (ref 136–145)
SODIUM SERPL-SCNC: 144 MMOL/L (ref 136–145)

## 2020-06-04 ENCOUNTER — TELEPHONE (OUTPATIENT)
Dept: FAMILY MEDICINE | Facility: CLINIC | Age: 80
End: 2020-06-04

## 2020-06-04 NOTE — TELEPHONE ENCOUNTER
----- Message from Uli James MD sent at 6/2/2020  3:48 PM CDT -----  Change the toprol xl from 100 mg a day to 200 mg a day.  I sent this to [facility].  Instruct her on this and book an OV with Arun in 2 weeks to document the bp.

## 2020-06-04 NOTE — TELEPHONE ENCOUNTER
Spoke with patient and gave instructions on new dosage of medication. Scheduled f/u appointment with Arun Arenas.

## 2020-06-09 NOTE — PROGRESS NOTES
The diabetes is controlled.  However, the kidney function is a little worse.  I need for a visit to be scheduled with Dr. Harrell in the near future to follow this up.  She saw her last 1 year ago.  Recheck the a1c in 6 months.

## 2020-06-10 ENCOUNTER — PATIENT OUTREACH (OUTPATIENT)
Dept: ADMINISTRATIVE | Facility: OTHER | Age: 80
End: 2020-06-10

## 2020-06-15 ENCOUNTER — OFFICE VISIT (OUTPATIENT)
Dept: NEPHROLOGY | Facility: CLINIC | Age: 80
End: 2020-06-15
Payer: MEDICARE

## 2020-06-15 VITALS
DIASTOLIC BLOOD PRESSURE: 82 MMHG | SYSTOLIC BLOOD PRESSURE: 148 MMHG | HEIGHT: 64 IN | HEART RATE: 66 BPM | BODY MASS INDEX: 41.55 KG/M2 | TEMPERATURE: 99 F | OXYGEN SATURATION: 98 % | WEIGHT: 243.38 LBS

## 2020-06-15 DIAGNOSIS — N28.1 RENAL CYST: ICD-10-CM

## 2020-06-15 DIAGNOSIS — M1A.9XX0 CHRONIC GOUT WITHOUT TOPHUS, UNSPECIFIED CAUSE, UNSPECIFIED SITE: ICD-10-CM

## 2020-06-15 DIAGNOSIS — J45.20 MILD INTERMITTENT ASTHMA WITHOUT COMPLICATION: ICD-10-CM

## 2020-06-15 DIAGNOSIS — D50.9 IRON DEFICIENCY ANEMIA, UNSPECIFIED IRON DEFICIENCY ANEMIA TYPE: ICD-10-CM

## 2020-06-15 DIAGNOSIS — M06.9 RHEUMATOID ARTHRITIS, INVOLVING UNSPECIFIED SITE, UNSPECIFIED RHEUMATOID FACTOR PRESENCE: ICD-10-CM

## 2020-06-15 DIAGNOSIS — E11.40 CONTROLLED TYPE 2 DIABETES MELLITUS WITH DIABETIC NEUROPATHY, WITHOUT LONG-TERM CURRENT USE OF INSULIN: ICD-10-CM

## 2020-06-15 DIAGNOSIS — E11.69 COMBINED HYPERLIPIDEMIA ASSOCIATED WITH TYPE 2 DIABETES MELLITUS: ICD-10-CM

## 2020-06-15 DIAGNOSIS — E11.22 CONTROLLED TYPE 2 DIABETES MELLITUS WITH STAGE 3 CHRONIC KIDNEY DISEASE, WITHOUT LONG-TERM CURRENT USE OF INSULIN: ICD-10-CM

## 2020-06-15 DIAGNOSIS — N18.30 CKD (CHRONIC KIDNEY DISEASE) STAGE 3, GFR 30-59 ML/MIN: ICD-10-CM

## 2020-06-15 DIAGNOSIS — E66.01 CLASS 3 SEVERE OBESITY DUE TO EXCESS CALORIES WITH SERIOUS COMORBIDITY AND BODY MASS INDEX (BMI) OF 40.0 TO 44.9 IN ADULT: ICD-10-CM

## 2020-06-15 DIAGNOSIS — E79.0 HYPERURICEMIA: ICD-10-CM

## 2020-06-15 DIAGNOSIS — K76.0 FATTY LIVER: ICD-10-CM

## 2020-06-15 DIAGNOSIS — D64.9 ANEMIA, UNSPECIFIED TYPE: ICD-10-CM

## 2020-06-15 DIAGNOSIS — N18.30 STAGE 3 CHRONIC KIDNEY DISEASE: Primary | ICD-10-CM

## 2020-06-15 DIAGNOSIS — E78.2 COMBINED HYPERLIPIDEMIA ASSOCIATED WITH TYPE 2 DIABETES MELLITUS: ICD-10-CM

## 2020-06-15 DIAGNOSIS — N18.30 CONTROLLED TYPE 2 DIABETES MELLITUS WITH STAGE 3 CHRONIC KIDNEY DISEASE, WITHOUT LONG-TERM CURRENT USE OF INSULIN: ICD-10-CM

## 2020-06-15 DIAGNOSIS — E53.8 B12 DEFICIENCY: ICD-10-CM

## 2020-06-15 DIAGNOSIS — K31.84 GASTROPARESIS: ICD-10-CM

## 2020-06-15 DIAGNOSIS — G47.30 SLEEP APNEA, UNSPECIFIED TYPE: ICD-10-CM

## 2020-06-15 DIAGNOSIS — E53.8 FOLATE DEFICIENCY: ICD-10-CM

## 2020-06-15 DIAGNOSIS — E55.9 VITAMIN D DEFICIENCY: ICD-10-CM

## 2020-06-15 DIAGNOSIS — I15.2 HYPERTENSION ASSOCIATED WITH DIABETES: ICD-10-CM

## 2020-06-15 DIAGNOSIS — E11.59 HYPERTENSION ASSOCIATED WITH DIABETES: ICD-10-CM

## 2020-06-15 DIAGNOSIS — E03.4 HYPOTHYROIDISM DUE TO ACQUIRED ATROPHY OF THYROID: ICD-10-CM

## 2020-06-15 DIAGNOSIS — J30.1 SEASONAL ALLERGIC RHINITIS DUE TO POLLEN: ICD-10-CM

## 2020-06-15 DIAGNOSIS — K21.9 GASTROESOPHAGEAL REFLUX DISEASE WITHOUT ESOPHAGITIS: ICD-10-CM

## 2020-06-15 PROCEDURE — 99999 PR PBB SHADOW E&M-EST. PATIENT-LVL III: ICD-10-PCS | Mod: PBBFAC,,, | Performed by: INTERNAL MEDICINE

## 2020-06-15 PROCEDURE — 99214 PR OFFICE/OUTPT VISIT, EST, LEVL IV, 30-39 MIN: ICD-10-PCS | Mod: S$PBB,,, | Performed by: INTERNAL MEDICINE

## 2020-06-15 PROCEDURE — 99213 OFFICE O/P EST LOW 20 MIN: CPT | Mod: PBBFAC,PO | Performed by: INTERNAL MEDICINE

## 2020-06-15 PROCEDURE — 99214 OFFICE O/P EST MOD 30 MIN: CPT | Mod: S$PBB,,, | Performed by: INTERNAL MEDICINE

## 2020-06-15 PROCEDURE — 99999 PR PBB SHADOW E&M-EST. PATIENT-LVL III: CPT | Mod: PBBFAC,,, | Performed by: INTERNAL MEDICINE

## 2020-06-30 ENCOUNTER — OFFICE VISIT (OUTPATIENT)
Dept: FAMILY MEDICINE | Facility: CLINIC | Age: 80
End: 2020-06-30
Payer: MEDICARE

## 2020-06-30 VITALS
WEIGHT: 240.19 LBS | SYSTOLIC BLOOD PRESSURE: 158 MMHG | DIASTOLIC BLOOD PRESSURE: 84 MMHG | HEIGHT: 64 IN | TEMPERATURE: 98 F | BODY MASS INDEX: 41.01 KG/M2 | HEART RATE: 57 BPM

## 2020-06-30 DIAGNOSIS — I10 ESSENTIAL HYPERTENSION: Primary | ICD-10-CM

## 2020-06-30 PROCEDURE — 99213 PR OFFICE/OUTPT VISIT, EST, LEVL III, 20-29 MIN: ICD-10-PCS | Mod: S$PBB,,, | Performed by: NURSE PRACTITIONER

## 2020-06-30 PROCEDURE — 99213 OFFICE O/P EST LOW 20 MIN: CPT | Mod: S$PBB,,, | Performed by: NURSE PRACTITIONER

## 2020-06-30 PROCEDURE — 99999 PR PBB SHADOW E&M-EST. PATIENT-LVL V: ICD-10-PCS | Mod: PBBFAC,,, | Performed by: NURSE PRACTITIONER

## 2020-06-30 PROCEDURE — 99215 OFFICE O/P EST HI 40 MIN: CPT | Mod: PBBFAC,PO | Performed by: NURSE PRACTITIONER

## 2020-06-30 PROCEDURE — 99999 PR PBB SHADOW E&M-EST. PATIENT-LVL V: CPT | Mod: PBBFAC,,, | Performed by: NURSE PRACTITIONER

## 2020-06-30 RX ORDER — TELMISARTAN AND HYDROCHLORTHIAZIDE 40; 12.5 MG/1; MG/1
1 TABLET ORAL 2 TIMES DAILY
Qty: 180 TABLET | Refills: 3 | Status: SHIPPED | OUTPATIENT
Start: 2020-06-30 | End: 2020-06-30

## 2020-06-30 RX ORDER — AMLODIPINE BESYLATE 2.5 MG/1
2.5 TABLET ORAL DAILY
Qty: 30 TABLET | Refills: 0 | Status: SHIPPED | OUTPATIENT
Start: 2020-06-30 | End: 2020-11-30

## 2020-06-30 RX ORDER — TELMISARTAN 40 MG/1
40 TABLET ORAL 2 TIMES DAILY
Qty: 90 TABLET | Refills: 3
Start: 2020-06-30 | End: 2020-09-10

## 2020-06-30 NOTE — PATIENT INSTRUCTIONS
"Hold norvasc if BP <150/90  Monitor BP daily; keep log x 2 w  Return log to clinic for review  F/U with cardiologist as scheduled  Report to ER immediately if symptoms worsen    Amlodipine: Patient drug information  Access [employer] here.  Copyright 1141-6045 [employer] All rights reserved.  (For additional information see "Amlodipine: Drug information" and see "Amlodipine: Pediatric drug information")  Brand Names: US   Katerzia;   Norvasc    Brand Names: Surinder   ACCEL-AmLODIPine [DSC];   ACT AmLODIPine;   AG-AmLODIPine;   APO-AmLODIPine;   Auro-AmLODIPine;   BIO-AmLODIPine;   DOM-AmLODIPine;   GD-AmLODIPine [DSC];   JAMP-AmLODIPine;   M-Amlodipine;   Mar-AmLODIPine;   MINT-AmLODIPine;   MYLAN-AmLODIPine;   Norvasc;   NRA-Amlodipine;   PDP-Amlodipine;   PHARMA-AmLODIPine;   PMS-AmLODIPine;   Priva-AmLODIPine;   RAN-AmLODIPine;   KWABENA-AmLODIPine;   SANDOZ AmLODIPine;   SANDOZ-AmLODIPine;   Septa-AmLODIPine;   TEVA-AmLODIPine;   VAN-AmLODIPine [DSC]    What is this drug used for?    It is used to treat high blood pressure.    It is used to treat some types of chest pain (angina).    It may be given to you for other reasons. Talk with the doctor.    What do I need to tell my doctor BEFORE I take this drug?    If you have an allergy to amlodipine or any other part of this drug.    If you are allergic to this drug; any part of this drug; or any other drugs, foods, or substances. Tell your doctor about the allergy and what signs you had.    This drug may interact with other drugs or health problems.    Tell your doctor and pharmacist about all of your drugs (prescription or OTC, natural products, vitamins) and health problems. You must check to make sure that it is safe for you to take this drug with all of your drugs and health problems. Do not start, stop, or change the dose of any drug without checking with your doctor.    What are some things I need to know or do while I take this drug?    For all uses of " this drug:    Tell all of your health care providers that you take this drug. This includes your doctors, nurses, pharmacists, and dentists.    Avoid driving and doing other tasks or actions that call for you to be alert until you see how this drug affects you.    To lower the chance of feeling dizzy or passing out, rise slowly if you have been sitting or lying down. Be careful going up and down stairs.    Check your blood pressure as you have been told.    It is rare, but worse chest pain and heart attack can happen after this drug is first started or after the dose is raised. The risk may be greater in people who have very bad heart blood vessel disease. Talk with the doctor.    If you are taking this drug and have high blood pressure, talk with your doctor before using OTC products that may raise blood pressure. These include cough or cold drugs, diet pills, stimulants, ibuprofen or like products, and some natural products or aids.    Talk with your doctor before you drink alcohol.    Liver problems have happened with this drug. Sometimes, liver problems have needed to be treated in the hospital. Talk with the doctor.    If you are 65 or older, use this drug with care. You could have more side effects.    Tell your doctor if you are pregnant, plan on getting pregnant, or are breast-feeding. You will need to talk about the benefits and risks to you and the baby.    For chest pain:    Do not use this drug to treat sudden chest pain. It will not help. Talk with your doctor.    What are some side effects that I need to call my doctor about right away?    WARNING/CAUTION: Even though it may be rare, some people may have very bad and sometimes deadly side effects when taking a drug. Tell your doctor or get medical help right away if you have any of the following signs or symptoms that may be related to a very bad side effect:    Signs of an allergic reaction, like rash; hives; itching; red, swollen, blistered, or  peeling skin with or without fever; wheezing; tightness in the chest or throat; trouble breathing, swallowing, or talking; unusual hoarseness; or swelling of the mouth, face, lips, tongue, or throat.    Signs of liver problems like dark urine, feeling tired, not hungry, upset stomach or stomach pain, light-colored stools, throwing up, or yellow skin or eyes.    Very bad dizziness or passing out.    Chest pain that is new or worse.    Fast or abnormal heartbeat.    Swelling.    Stiff muscles, shakiness, or muscle movements that are not normal.    What are some other side effects of this drug?    All drugs may cause side effects. However, many people have no side effects or only have minor side effects. Call your doctor or get medical help if any of these side effects or any other side effects bother you or do not go away:    Feeling dizzy, sleepy, tired, or weak.    Flushing.    Upset stomach.    Stomach pain.    These are not all of the side effects that may occur. If you have questions about side effects, call your doctor. Call your doctor for medical advice about side effects.    You may report side effects to your national health agency.    How is this drug best taken?    Use this drug as ordered by your doctor. Read all information given to you. Follow all instructions closely.    All products:    Take this drug at the same time of day.    Take with or without food.    Keep taking this drug as you have been told by your doctor or other health care provider, even if you feel well.    Liquid (suspension):    Shake well before use.    Measure liquid doses carefully. Use the measuring device that comes with this drug. If there is none, ask the pharmacist for a device to measure this drug.    What do I do if I miss a dose?    Take a missed dose as soon as you think about it.    If it has been 12 hours or more since the missed dose, skip the missed dose and go back to your normal time.    Do not take 2 doses at the  same time or extra doses.    How do I store and/or throw out this drug?    All products:    Protect from heat and light.    Store in a dry place. Do not store in a bathroom.    Keep all drugs in a safe place. Keep all drugs out of the reach of children and pets.    Throw away unused or  drugs. Do not flush down a toilet or pour down a drain unless you are told to do so. Check with your pharmacist if you have questions about the best way to throw out drugs. There may be drug take-back programs in your area.    Tablets:    Store at room temperature.    Liquid (suspension):    Store in a refrigerator. Do not freeze.    General drug facts    If your symptoms or health problems do not get better or if they become worse, call your doctor.    Do not share your drugs with others and do not take anyone else's drugs.    Some drugs may have another patient information leaflet. If you have any questions about this drug, please talk with your doctor, nurse, pharmacist, or other health care provider.    If you think there has been an overdose, call your poison control center or get medical care right away. Be ready to tell or show what was taken, how much, and when it happened.
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
## 2020-08-04 DIAGNOSIS — B34.9 VIRAL ILLNESS: ICD-10-CM

## 2020-08-04 DIAGNOSIS — E79.0 HYPERURICEMIA: ICD-10-CM

## 2020-08-04 DIAGNOSIS — M1A.00X0 IDIOPATHIC CHRONIC GOUT WITHOUT TOPHUS, UNSPECIFIED SITE: ICD-10-CM

## 2020-08-04 DIAGNOSIS — E03.4 HYPOTHYROIDISM DUE TO ACQUIRED ATROPHY OF THYROID: ICD-10-CM

## 2020-08-04 RX ORDER — ALLOPURINOL 300 MG/1
300 TABLET ORAL DAILY
Qty: 30 TABLET | Refills: 11 | Status: SHIPPED | OUTPATIENT
Start: 2020-08-04 | End: 2021-09-23

## 2020-08-04 RX ORDER — METOPROLOL SUCCINATE 200 MG/1
200 TABLET, EXTENDED RELEASE ORAL DAILY
Qty: 30 TABLET | Refills: 11 | Status: SHIPPED | OUTPATIENT
Start: 2020-08-04 | End: 2021-03-11

## 2020-08-04 RX ORDER — ALLOPURINOL 100 MG/1
100 TABLET ORAL DAILY
Qty: 30 TABLET | Refills: 11 | Status: SHIPPED | OUTPATIENT
Start: 2020-08-04 | End: 2021-06-17 | Stop reason: SDUPTHER

## 2020-08-04 RX ORDER — ONDANSETRON 8 MG/1
8 TABLET, ORALLY DISINTEGRATING ORAL EVERY 6 HOURS PRN
Qty: 30 TABLET | Refills: 11 | Status: SHIPPED | OUTPATIENT
Start: 2020-08-04 | End: 2021-03-22 | Stop reason: SDUPTHER

## 2020-08-04 RX ORDER — MAGNESIUM 200 MG
1 TABLET ORAL DAILY
Qty: 30 TABLET | Refills: 11 | Status: SHIPPED | OUTPATIENT
Start: 2020-08-04 | End: 2021-06-17 | Stop reason: SDUPTHER

## 2020-08-04 RX ORDER — LEVOTHYROXINE SODIUM 50 UG/1
50 TABLET ORAL
Qty: 30 TABLET | Refills: 11 | Status: SHIPPED | OUTPATIENT
Start: 2020-08-04 | End: 2021-07-30

## 2020-08-04 NOTE — TELEPHONE ENCOUNTER
----- Message from Gladys Carcamo sent at 8/4/2020  9:58 AM CDT -----  Contact: pt  Patient is asking that nurse call her back at [phone]. Patient states pharmacy and herself have requested refill be fill several times with no answer.

## 2020-08-05 ENCOUNTER — TELEPHONE (OUTPATIENT)
Dept: FAMILY MEDICINE | Facility: CLINIC | Age: 80
End: 2020-08-05

## 2020-08-05 NOTE — TELEPHONE ENCOUNTER
Returned call to patient and she states that she has had recent shoulder surgery and has somehow twisted her back. She is having difficulty getting around and would like to see if Dr James can write her a prescription for a wheel chair. She will come  once available. Please advise.

## 2020-08-05 NOTE — TELEPHONE ENCOUNTER
----- Message from Lizett Batista sent at 8/5/2020  2:59 PM CDT -----  Regarding: Wheelchair order  Pt is requesting call back in regards to questions about getting order for wheelchair          Pls call back at [phone]

## 2020-08-06 ENCOUNTER — TELEPHONE (OUTPATIENT)
Dept: FAMILY MEDICINE | Facility: CLINIC | Age: 80
End: 2020-08-06

## 2020-08-06 NOTE — TELEPHONE ENCOUNTER
----- Message from Alexandru Brewster sent at 8/6/2020 11:05 AM CDT -----  Regarding: return call  Contact: pt  Type:  Patient Returning Call    Who Called:Wendy Ro  Who Left Message for Patient:  Does the patient know what this is regarding?:  Would the patient rather a call back or a response via [facility]? Call back  Best Call Back Number:[phone]  Additional Information:

## 2020-08-17 ENCOUNTER — PATIENT OUTREACH (OUTPATIENT)
Dept: ADMINISTRATIVE | Facility: OTHER | Age: 80
End: 2020-08-17

## 2020-08-17 DIAGNOSIS — E11.40 CONTROLLED TYPE 2 DIABETES MELLITUS WITH DIABETIC NEUROPATHY, WITHOUT LONG-TERM CURRENT USE OF INSULIN: Primary | ICD-10-CM

## 2020-08-17 NOTE — PROGRESS NOTES
Chart reviewed.   Immunizations: Triggered Imm Registry     Orders placed: eye exam   Upcoming appts to satisfy RE[facility] topics: n/a

## 2020-09-18 ENCOUNTER — TELEPHONE (OUTPATIENT)
Dept: FAMILY MEDICINE | Facility: CLINIC | Age: 80
End: 2020-09-18

## 2020-09-18 NOTE — TELEPHONE ENCOUNTER
----- Message from Javier Lopez sent at 9/18/2020 11:52 AM CDT -----  Elvi,with [facility], would like to have most recent office visit notes for wheelchair referral faxed to [phone]. Please call back at [phone].  Md Abel

## 2020-09-29 ENCOUNTER — PATIENT MESSAGE (OUTPATIENT)
Dept: OTHER | Facility: OTHER | Age: 80
End: 2020-09-29

## 2020-09-30 ENCOUNTER — TELEPHONE (OUTPATIENT)
Dept: FAMILY MEDICINE | Facility: CLINIC | Age: 80
End: 2020-09-30

## 2020-09-30 NOTE — TELEPHONE ENCOUNTER
----- Message from Galina Garcia sent at 9/30/2020 11:13 AM CDT -----  Type:  Patient Returning Call    Who Called: Pt daughter (Alexanrdu)  Who Left Message for Patient:  Dr James office   Does the patient know what this is regarding?:   Would the patient rather a call back or a response via [facility]?   Call back  Best Call Back Number:  [phone]  Additional Information:  please call again/thanks/ruslan

## 2020-09-30 NOTE — TELEPHONE ENCOUNTER
----- Message from Johanna Zheng sent at 9/30/2020  1:12 PM CDT -----  Regarding: appt  Contact: daughter- Alexandru Horvath is returning a call about an appt, please call her back at [phone]

## 2020-09-30 NOTE — TELEPHONE ENCOUNTER
----- Message from Brandy Ruiz sent at 9/30/2020 10:57 AM CDT -----  Regarding: same day  Pt requesting an appt for same day for celulitus in both legs and nausea. [phone]

## 2020-10-22 PROCEDURE — G0180 MD CERTIFICATION HHA PATIENT: HCPCS | Mod: ,,, | Performed by: FAMILY MEDICINE

## 2020-10-22 PROCEDURE — G0180 PR HOME HEALTH MD CERTIFICATION: ICD-10-PCS | Mod: ,,, | Performed by: FAMILY MEDICINE

## 2020-11-24 ENCOUNTER — OFFICE VISIT (OUTPATIENT)
Dept: NEPHROLOGY | Facility: CLINIC | Age: 80
End: 2020-11-24
Payer: MEDICARE

## 2020-11-24 VITALS
WEIGHT: 214 LBS | BODY MASS INDEX: 36.54 KG/M2 | HEIGHT: 64 IN | OXYGEN SATURATION: 98 % | SYSTOLIC BLOOD PRESSURE: 140 MMHG | DIASTOLIC BLOOD PRESSURE: 78 MMHG | HEART RATE: 67 BPM

## 2020-11-24 DIAGNOSIS — E11.59 HYPERTENSION ASSOCIATED WITH DIABETES: ICD-10-CM

## 2020-11-24 DIAGNOSIS — K76.0 FATTY LIVER: ICD-10-CM

## 2020-11-24 DIAGNOSIS — M1A.9XX0 CHRONIC GOUT WITHOUT TOPHUS, UNSPECIFIED CAUSE, UNSPECIFIED SITE: ICD-10-CM

## 2020-11-24 DIAGNOSIS — M81.0 OSTEOPOROSIS, UNSPECIFIED OSTEOPOROSIS TYPE, UNSPECIFIED PATHOLOGICAL FRACTURE PRESENCE: ICD-10-CM

## 2020-11-24 DIAGNOSIS — E78.2 COMBINED HYPERLIPIDEMIA ASSOCIATED WITH TYPE 2 DIABETES MELLITUS: ICD-10-CM

## 2020-11-24 DIAGNOSIS — E66.01 CLASS 3 SEVERE OBESITY DUE TO EXCESS CALORIES WITH SERIOUS COMORBIDITY AND BODY MASS INDEX (BMI) OF 40.0 TO 44.9 IN ADULT: ICD-10-CM

## 2020-11-24 DIAGNOSIS — E03.4 HYPOTHYROIDISM DUE TO ACQUIRED ATROPHY OF THYROID: ICD-10-CM

## 2020-11-24 DIAGNOSIS — E11.22 CONTROLLED TYPE 2 DIABETES MELLITUS WITH STAGE 3 CHRONIC KIDNEY DISEASE, WITHOUT LONG-TERM CURRENT USE OF INSULIN: ICD-10-CM

## 2020-11-24 DIAGNOSIS — J45.20 MILD INTERMITTENT ASTHMA WITHOUT COMPLICATION: ICD-10-CM

## 2020-11-24 DIAGNOSIS — N18.30 CONTROLLED TYPE 2 DIABETES MELLITUS WITH STAGE 3 CHRONIC KIDNEY DISEASE, WITHOUT LONG-TERM CURRENT USE OF INSULIN: ICD-10-CM

## 2020-11-24 DIAGNOSIS — E04.1 THYROID NODULE: ICD-10-CM

## 2020-11-24 DIAGNOSIS — K21.9 GASTROESOPHAGEAL REFLUX DISEASE WITHOUT ESOPHAGITIS: ICD-10-CM

## 2020-11-24 DIAGNOSIS — N28.1 RENAL CYST: ICD-10-CM

## 2020-11-24 DIAGNOSIS — J43.2 CENTRILOBULAR EMPHYSEMA: ICD-10-CM

## 2020-11-24 DIAGNOSIS — E53.8 B12 DEFICIENCY: ICD-10-CM

## 2020-11-24 DIAGNOSIS — E11.43 DIABETIC AUTONOMIC NEUROPATHY ASSOCIATED WITH TYPE 2 DIABETES MELLITUS: ICD-10-CM

## 2020-11-24 DIAGNOSIS — E55.9 VITAMIN D DEFICIENCY: ICD-10-CM

## 2020-11-24 DIAGNOSIS — E11.40 CONTROLLED TYPE 2 DIABETES MELLITUS WITH DIABETIC NEUROPATHY, WITHOUT LONG-TERM CURRENT USE OF INSULIN: ICD-10-CM

## 2020-11-24 DIAGNOSIS — N18.32 STAGE 3B CHRONIC KIDNEY DISEASE: Primary | ICD-10-CM

## 2020-11-24 DIAGNOSIS — I15.2 HYPERTENSION ASSOCIATED WITH DIABETES: ICD-10-CM

## 2020-11-24 DIAGNOSIS — D64.9 ANEMIA, UNSPECIFIED TYPE: ICD-10-CM

## 2020-11-24 DIAGNOSIS — E11.69 COMBINED HYPERLIPIDEMIA ASSOCIATED WITH TYPE 2 DIABETES MELLITUS: ICD-10-CM

## 2020-11-24 DIAGNOSIS — G47.30 SLEEP APNEA, UNSPECIFIED TYPE: ICD-10-CM

## 2020-11-24 DIAGNOSIS — E79.0 HYPERURICEMIA: ICD-10-CM

## 2020-11-24 DIAGNOSIS — R53.83 FATIGUE, UNSPECIFIED TYPE: ICD-10-CM

## 2020-11-24 PROCEDURE — 99999 PR PBB SHADOW E&M-EST. PATIENT-LVL III: ICD-10-PCS | Mod: PBBFAC,,, | Performed by: INTERNAL MEDICINE

## 2020-11-24 PROCEDURE — 99214 PR OFFICE/OUTPT VISIT, EST, LEVL IV, 30-39 MIN: ICD-10-PCS | Mod: S$PBB,,, | Performed by: INTERNAL MEDICINE

## 2020-11-24 PROCEDURE — 99214 OFFICE O/P EST MOD 30 MIN: CPT | Mod: S$PBB,,, | Performed by: INTERNAL MEDICINE

## 2020-11-24 PROCEDURE — 99213 OFFICE O/P EST LOW 20 MIN: CPT | Mod: PBBFAC,PO | Performed by: INTERNAL MEDICINE

## 2020-11-24 PROCEDURE — 99999 PR PBB SHADOW E&M-EST. PATIENT-LVL III: CPT | Mod: PBBFAC,,, | Performed by: INTERNAL MEDICINE

## 2020-11-24 RX ORDER — ERGOCALCIFEROL 1.25 MG/1
50000 CAPSULE ORAL
COMMUNITY
Start: 2020-10-21 | End: 2021-06-17 | Stop reason: SDUPTHER

## 2020-11-24 RX ORDER — FOLIC ACID 1 MG/1
1000 TABLET ORAL
COMMUNITY
Start: 2020-10-21 | End: 2021-03-22 | Stop reason: SDUPTHER

## 2020-11-24 RX ORDER — OXYCODONE HYDROCHLORIDE 5 MG/1
5 TABLET ORAL
COMMUNITY
Start: 2020-10-06 | End: 2020-11-30

## 2020-11-24 RX ORDER — LIDOCAINE HYDROCHLORIDE 20 MG/ML
JELLY TOPICAL
COMMUNITY

## 2020-11-24 RX ORDER — POTASSIUM CHLORIDE 750 MG/1
10 TABLET, EXTENDED RELEASE ORAL DAILY
COMMUNITY
Start: 2020-10-21 | End: 2021-03-22 | Stop reason: SDUPTHER

## 2020-11-24 RX ORDER — AMOXICILLIN AND CLAVULANATE POTASSIUM 500; 125 MG/1; MG/1
1 TABLET, FILM COATED ORAL 2 TIMES DAILY WITH MEALS
Qty: 14 TABLET | Refills: 0 | Status: SHIPPED | OUTPATIENT
Start: 2020-11-24 | End: 2020-11-30

## 2020-11-24 NOTE — PROGRESS NOTES
Subjective:       Patient ID: Wendy Ro is a 80 y.o. White female who presents for re-evaluation of progression of Chronic Kidney Disease    Edema  Associated symptoms include arthralgias. Pertinent negatives include no chest pain, coughing, fatigue or weakness.      She reports she is doing well. Her weight is stable and LE edema is controlled, using Lasix prn. No LUTS. No NSAID use. She follows a low sodium diet but admits to not pushing fluids due to increased frequency. No gout. Last Hba1c was 6.4. She does plenty of 'brain exercises'     Her sister passed away 10/15 from renal failure, declined HD. 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 No gout. Wants to see Heme for B12 deficiency. Needs PPI--hx of 'esophageal stretching. She complains of ongoing neuropathy. Back on metformin     Dec 2019 Interval history: three great grand kids staying with her. Keeping her busy but is 'a trade off' Treated for cellulitis 3X in past few months but has significant edema--has stopped Lasix. LOPEZ is about the same. Still on B12 and iron. No flu shot yet. Labs on Friday June 2020: Feeling well. Not shopping, son and grand dtr are running her errands. Sleep problems, CPAP is OK. She is currently on Lasix 40mg 1/2 daily     Nov 2020: She is here earlier than scheduled for hospital follow up, admitted for :E cellulitis and PRASAD which was improving at time of discharge. Her LE edema is painful currently. Her Lasix was reduced to 10mg QOD since hospital stay.     Review of Systems   Constitutional: Negative for activity change, appetite change, fatigue and unexpected weight change.   HENT: Negative for facial swelling.    Eyes: Negative for visual disturbance.   Respiratory: Negative for cough and shortness of breath.    Cardiovascular: Positive for leg swelling (much improved). Negative for chest pain.   Gastrointestinal: Negative for abdominal distention.   Genitourinary: Negative for difficulty  urinating and dysuria.   Musculoskeletal: Positive for arthralgias and back pain.   Neurological: Negative for weakness.       Objective:      Physical Exam  Vitals signs reviewed.   Constitutional:       General: She is not in acute distress.  Neck:      Vascular: No JVD.   Cardiovascular:      Heart sounds: S1 normal and S2 normal. No friction rub.   Pulmonary:      Breath sounds: Normal breath sounds. No wheezing or rales.   Abdominal:      Palpations: Abdomen is soft.   Musculoskeletal:      Right lower leg: Edema present.      Left lower leg: Edema present.   Skin:     General: Skin is warm and dry.      Findings: Erythema (LLE) present.   Neurological:      Mental Status: She is alert and oriented to person, place, and time.         Assessment:       1. Stage 3b chronic kidney disease    2. Controlled type 2 diabetes mellitus with stage 3 chronic kidney disease, without long-term current use of insulin    3. Chronic gout without tophus, unspecified cause, unspecified site    4. Hypertension associated with diabetes    5. Combined hyperlipidemia associated with type 2 diabetes mellitus    6. Vitamin D deficiency     7. Anemia, unspecified type    8. Hypothyroidism due to acquired atrophy of thyroid    9. Hyperuricemia    10. Fatty liver    11. Renal cyst    12. Sleep apnea, unspecified type    13. Class 3 severe obesity due to excess calories with serious comorbidity and body mass index (BMI) of 40.0 to 44.9 in adult    14. Fatigue, unspecified type    15. Osteoporosis, unspecified osteoporosis type, unspecified pathological fracture presence    16. Gastroesophageal reflux disease without esophagitis    17. Diabetic autonomic neuropathy associated with type 2 diabetes mellitus    18. Controlled type 2 diabetes mellitus with diabetic neuropathy, without long-term current use of insulin    19. B12 deficiency    20. Mild intermittent asthma without complication    21. Centrilobular emphysema    22. Thyroid nodule         Plan:             CKD with PRASAD in the setting of cellulitis, improving since hospital admission. Follow up labs on Monday    HTN--controlled given age    DM--Hba1c 5.6, excellent control, denies hypoglycemia    Gout--continue allopurinol and prn colchicine     Mineral and Bone Disease-- Continue D2    Anemia--H&H stable, and overall improved    Volume status--INCREASE LASIX to 10mg QD     LABS on 11/30

## 2020-11-30 ENCOUNTER — OFFICE VISIT (OUTPATIENT)
Dept: FAMILY MEDICINE | Facility: CLINIC | Age: 80
End: 2020-11-30
Payer: MEDICARE

## 2020-11-30 VITALS
WEIGHT: 215 LBS | BODY MASS INDEX: 36.7 KG/M2 | HEIGHT: 64 IN | SYSTOLIC BLOOD PRESSURE: 137 MMHG | DIASTOLIC BLOOD PRESSURE: 68 MMHG | HEART RATE: 61 BPM

## 2020-11-30 DIAGNOSIS — R60.9 CHRONIC EDEMA: ICD-10-CM
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
## 2020-11-30 DIAGNOSIS — N17.9 AKI (ACUTE KIDNEY INJURY): Primary | ICD-10-CM

## 2020-11-30 PROCEDURE — 99999 PR PBB SHADOW E&M-EST. PATIENT-LVL V: ICD-10-PCS | Mod: PBBFAC,,, | Performed by: FAMILY MEDICINE

## 2020-11-30 PROCEDURE — 99215 OFFICE O/P EST HI 40 MIN: CPT | Mod: PBBFAC,PO | Performed by: FAMILY MEDICINE

## 2020-11-30 PROCEDURE — 99212 OFFICE O/P EST SF 10 MIN: CPT | Mod: S$PBB,,, | Performed by: FAMILY MEDICINE

## 2020-11-30 PROCEDURE — 99212 PR OFFICE/OUTPT VISIT, EST, LEVL II, 10-19 MIN: ICD-10-PCS | Mod: S$PBB,,, | Performed by: FAMILY MEDICINE

## 2020-11-30 PROCEDURE — 99999 PR PBB SHADOW E&M-EST. PATIENT-LVL V: CPT | Mod: PBBFAC,,, | Performed by: FAMILY MEDICINE

## 2020-11-30 RX ORDER — FUROSEMIDE 20 MG/1
20 TABLET ORAL DAILY
COMMUNITY
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
## 2020-12-01 ENCOUNTER — TELEPHONE (OUTPATIENT)
Dept: NEPHROLOGY | Facility: CLINIC | Age: 80
End: 2020-12-01

## 2020-12-01 NOTE — TELEPHONE ENCOUNTER
----- Message from José Harrell MD sent at 12/1/2020 12:17 PM CST -----  Please contact the patient and let them know that their labs look good:  1) Kidney function is stable  2) Urine sample is normal    Please also cancl Dec appt, I saw her 11/24 for hos;itla follow up so Dec can be cancelled    Thank you

## 2020-12-07 ENCOUNTER — TELEPHONE (OUTPATIENT)
Dept: NEPHROLOGY | Facility: CLINIC | Age: 80
End: 2020-12-07

## 2020-12-11 ENCOUNTER — PATIENT MESSAGE (OUTPATIENT)
Dept: OTHER | Facility: OTHER | Age: 80
End: 2020-12-11

## 2021-01-09 ENCOUNTER — DOCUMENT SCAN (OUTPATIENT)
Dept: HOME HEALTH SERVICES | Facility: HOSPITAL | Age: 81
End: 2021-01-09
Payer: MEDICARE

## 2021-01-21 ENCOUNTER — LAB VISIT (OUTPATIENT)
Dept: LAB | Facility: HOSPITAL | Age: 81
End: 2021-01-21
Attending: INTERNAL MEDICINE
Payer: MEDICARE

## 2021-01-21 DIAGNOSIS — N18.32 STAGE 3B CHRONIC KIDNEY DISEASE: ICD-10-CM

## 2021-01-21 LAB
ALBUMIN SERPL BCP-MCNC: 3.3 G/DL (ref 3.5–5.2)
ANION GAP SERPL CALC-SCNC: 8 MMOL/L (ref 8–16)
BUN SERPL-MCNC: 20 MG/DL (ref 8–23)
CALCIUM SERPL-MCNC: 9.2 MG/DL (ref 8.7–10.5)
CHLORIDE SERPL-SCNC: 109 MMOL/L (ref 95–110)
CO2 SERPL-SCNC: 25 MMOL/L (ref 23–29)
CREAT SERPL-MCNC: 1.6 MG/DL (ref 0.5–1.4)
EST. GFR  (AFRICAN AMERICAN): 35.1 ML/MIN/1.73 M^2
EST. GFR  (NON AFRICAN AMERICAN): 30.4 ML/MIN/1.73 M^2
GLUCOSE SERPL-MCNC: 114 MG/DL (ref 70–110)
PHOSPHATE SERPL-MCNC: 3.7 MG/DL (ref 2.7–4.5)
POTASSIUM SERPL-SCNC: 4.1 MMOL/L (ref 3.5–5.1)
SODIUM SERPL-SCNC: 142 MMOL/L (ref 136–145)

## 2021-01-21 PROCEDURE — 36415 COLL VENOUS BLD VENIPUNCTURE: CPT | Mod: PO

## 2021-01-21 PROCEDURE — 83970 ASSAY OF PARATHORMONE: CPT

## 2021-01-21 PROCEDURE — 80069 RENAL FUNCTION PANEL: CPT

## 2021-01-22 ENCOUNTER — EXTERNAL HOME HEALTH (OUTPATIENT)
Dept: HOME HEALTH SERVICES | Facility: HOSPITAL | Age: 81
End: 2021-01-22
Payer: MEDICARE

## 2021-01-22 LAB — PTH-INTACT SERPL-MCNC: 59 PG/ML (ref 9–77)

## 2021-01-23 ENCOUNTER — DOCUMENT SCAN (OUTPATIENT)
Dept: HOME HEALTH SERVICES | Facility: HOSPITAL | Age: 81
End: 2021-01-23
Payer: MEDICARE

## 2021-03-03 ENCOUNTER — TELEPHONE (OUTPATIENT)
Dept: NEPHROLOGY | Facility: CLINIC | Age: 81
End: 2021-03-03

## 2021-03-11 ENCOUNTER — OFFICE VISIT (OUTPATIENT)
Dept: NEPHROLOGY | Facility: CLINIC | Age: 81
End: 2021-03-11
Payer: MEDICARE

## 2021-03-11 VITALS
SYSTOLIC BLOOD PRESSURE: 128 MMHG | BODY MASS INDEX: 35.36 KG/M2 | HEART RATE: 60 BPM | OXYGEN SATURATION: 99 % | TEMPERATURE: 98 F | WEIGHT: 207.13 LBS | HEIGHT: 64 IN | DIASTOLIC BLOOD PRESSURE: 80 MMHG

## 2021-03-11 DIAGNOSIS — E55.9 VITAMIN D DEFICIENCY: ICD-10-CM

## 2021-03-11 DIAGNOSIS — K21.9 GASTROESOPHAGEAL REFLUX DISEASE WITHOUT ESOPHAGITIS: ICD-10-CM

## 2021-03-11 DIAGNOSIS — J43.2 CENTRILOBULAR EMPHYSEMA: ICD-10-CM

## 2021-03-11 DIAGNOSIS — K76.0 FATTY LIVER: ICD-10-CM

## 2021-03-11 DIAGNOSIS — E11.69 COMBINED HYPERLIPIDEMIA ASSOCIATED WITH TYPE 2 DIABETES MELLITUS: ICD-10-CM

## 2021-03-11 DIAGNOSIS — M1A.9XX0 CHRONIC GOUT WITHOUT TOPHUS, UNSPECIFIED CAUSE, UNSPECIFIED SITE: ICD-10-CM

## 2021-03-11 DIAGNOSIS — I15.2 HYPERTENSION ASSOCIATED WITH DIABETES: ICD-10-CM

## 2021-03-11 DIAGNOSIS — E53.8 B12 DEFICIENCY: ICD-10-CM

## 2021-03-11 DIAGNOSIS — E11.22 CONTROLLED TYPE 2 DIABETES MELLITUS WITH STAGE 3 CHRONIC KIDNEY DISEASE, WITHOUT LONG-TERM CURRENT USE OF INSULIN: ICD-10-CM

## 2021-03-11 DIAGNOSIS — N18.32 STAGE 3B CHRONIC KIDNEY DISEASE: Primary | ICD-10-CM

## 2021-03-11 DIAGNOSIS — E11.59 HYPERTENSION ASSOCIATED WITH DIABETES: ICD-10-CM

## 2021-03-11 DIAGNOSIS — E03.4 HYPOTHYROIDISM DUE TO ACQUIRED ATROPHY OF THYROID: ICD-10-CM

## 2021-03-11 DIAGNOSIS — E11.40 CONTROLLED TYPE 2 DIABETES MELLITUS WITH DIABETIC NEUROPATHY, WITHOUT LONG-TERM CURRENT USE OF INSULIN: ICD-10-CM

## 2021-03-11 DIAGNOSIS — E79.0 HYPERURICEMIA: ICD-10-CM

## 2021-03-11 DIAGNOSIS — N28.1 RENAL CYST: ICD-10-CM

## 2021-03-11 DIAGNOSIS — E11.43 DIABETIC AUTONOMIC NEUROPATHY ASSOCIATED WITH TYPE 2 DIABETES MELLITUS: ICD-10-CM

## 2021-03-11 DIAGNOSIS — E04.1 THYROID NODULE: ICD-10-CM

## 2021-03-11 DIAGNOSIS — E78.2 COMBINED HYPERLIPIDEMIA ASSOCIATED WITH TYPE 2 DIABETES MELLITUS: ICD-10-CM

## 2021-03-11 DIAGNOSIS — K31.84 GASTROPARESIS: ICD-10-CM

## 2021-03-11 DIAGNOSIS — D64.9 ANEMIA, UNSPECIFIED TYPE: ICD-10-CM

## 2021-03-11 DIAGNOSIS — G47.30 SLEEP APNEA, UNSPECIFIED TYPE: ICD-10-CM

## 2021-03-11 DIAGNOSIS — N18.30 CONTROLLED TYPE 2 DIABETES MELLITUS WITH STAGE 3 CHRONIC KIDNEY DISEASE, WITHOUT LONG-TERM CURRENT USE OF INSULIN: ICD-10-CM

## 2021-03-11 PROCEDURE — 99999 PR PBB SHADOW E&M-EST. PATIENT-LVL III: ICD-10-PCS | Mod: PBBFAC,,, | Performed by: INTERNAL MEDICINE

## 2021-03-11 PROCEDURE — 99215 OFFICE O/P EST HI 40 MIN: CPT | Mod: S$PBB,,, | Performed by: INTERNAL MEDICINE

## 2021-03-11 PROCEDURE — 99213 OFFICE O/P EST LOW 20 MIN: CPT | Mod: PBBFAC,PO | Performed by: INTERNAL MEDICINE

## 2021-03-11 PROCEDURE — 99999 PR PBB SHADOW E&M-EST. PATIENT-LVL III: CPT | Mod: PBBFAC,,, | Performed by: INTERNAL MEDICINE

## 2021-03-11 PROCEDURE — 99215 PR OFFICE/OUTPT VISIT, EST, LEVL V, 40-54 MIN: ICD-10-PCS | Mod: S$PBB,,, | Performed by: INTERNAL MEDICINE

## 2021-03-11 RX ORDER — GABAPENTIN 100 MG/1
100 CAPSULE ORAL DAILY
COMMUNITY
Start: 2021-03-10 | End: 2021-03-22 | Stop reason: SDUPTHER

## 2021-03-11 RX ORDER — LACTULOSE 10 G/15ML
SOLUTION ORAL; RECTAL
COMMUNITY
Start: 2021-03-10 | End: 2021-09-23 | Stop reason: ALTCHOICE

## 2021-03-11 RX ORDER — HYDRALAZINE HYDROCHLORIDE 10 MG/1
10 TABLET, FILM COATED ORAL 2 TIMES DAILY
COMMUNITY
Start: 2021-03-10 | End: 2021-03-22 | Stop reason: SDUPTHER

## 2021-03-11 RX ORDER — SIMVASTATIN 10 MG/1
10 TABLET, FILM COATED ORAL NIGHTLY
COMMUNITY
Start: 2021-03-10 | End: 2021-03-22 | Stop reason: SDUPTHER

## 2021-03-11 RX ORDER — AMLODIPINE BESYLATE 2.5 MG/1
2.5 TABLET ORAL DAILY
COMMUNITY
Start: 2021-03-10 | End: 2021-03-22 | Stop reason: SDUPTHER

## 2021-03-11 RX ORDER — METOPROLOL SUCCINATE 100 MG/1
200 TABLET, EXTENDED RELEASE ORAL DAILY
COMMUNITY
Start: 2020-12-11 | End: 2021-03-22 | Stop reason: SDUPTHER

## 2021-03-17 ENCOUNTER — TELEPHONE (OUTPATIENT)
Dept: NEPHROLOGY | Facility: CLINIC | Age: 81
End: 2021-03-17

## 2021-03-17 DIAGNOSIS — N18.32 STAGE 3B CHRONIC KIDNEY DISEASE: Primary | ICD-10-CM

## 2021-03-17 DIAGNOSIS — D50.9 IRON DEFICIENCY ANEMIA, UNSPECIFIED IRON DEFICIENCY ANEMIA TYPE: ICD-10-CM

## 2021-03-22 ENCOUNTER — LAB VISIT (OUTPATIENT)
Dept: LAB | Facility: HOSPITAL | Age: 81
End: 2021-03-22
Attending: INTERNAL MEDICINE
Payer: MEDICARE

## 2021-03-22 ENCOUNTER — OFFICE VISIT (OUTPATIENT)
Dept: FAMILY MEDICINE | Facility: CLINIC | Age: 81
End: 2021-03-22
Payer: MEDICARE

## 2021-03-22 VITALS
HEIGHT: 64 IN | SYSTOLIC BLOOD PRESSURE: 160 MMHG | DIASTOLIC BLOOD PRESSURE: 96 MMHG | TEMPERATURE: 98 F | WEIGHT: 208 LBS | BODY MASS INDEX: 35.51 KG/M2 | HEART RATE: 79 BPM | OXYGEN SATURATION: 100 %

## 2021-03-22 DIAGNOSIS — N18.32 STAGE 3B CHRONIC KIDNEY DISEASE: ICD-10-CM

## 2021-03-22 DIAGNOSIS — I15.2 HYPERTENSION ASSOCIATED WITH DIABETES: ICD-10-CM

## 2021-03-22 DIAGNOSIS — D50.9 IRON DEFICIENCY ANEMIA, UNSPECIFIED IRON DEFICIENCY ANEMIA TYPE: ICD-10-CM

## 2021-03-22 DIAGNOSIS — R29.898 WEAKNESS OF BOTH LOWER EXTREMITIES: ICD-10-CM

## 2021-03-22 DIAGNOSIS — E11.59 HYPERTENSION ASSOCIATED WITH DIABETES: ICD-10-CM

## 2021-03-22 DIAGNOSIS — Z09 HOSPITAL DISCHARGE FOLLOW-UP: Primary | ICD-10-CM

## 2021-03-22 DIAGNOSIS — R11.0 NAUSEA: ICD-10-CM

## 2021-03-22 DIAGNOSIS — M81.0 OSTEOPOROSIS, UNSPECIFIED OSTEOPOROSIS TYPE, UNSPECIFIED PATHOLOGICAL FRACTURE PRESENCE: ICD-10-CM

## 2021-03-22 LAB
BASOPHILS # BLD AUTO: 0.03 K/UL (ref 0–0.2)
BASOPHILS NFR BLD: 0.4 % (ref 0–1.9)
DIFFERENTIAL METHOD: ABNORMAL
EOSINOPHIL # BLD AUTO: 0.1 K/UL (ref 0–0.5)
EOSINOPHIL NFR BLD: 1.5 % (ref 0–8)
ERYTHROCYTE [DISTWIDTH] IN BLOOD BY AUTOMATED COUNT: 14.1 % (ref 11.5–14.5)
HCT VFR BLD AUTO: 37.8 % (ref 37–48.5)
HGB BLD-MCNC: 11.3 G/DL (ref 12–16)
IMM GRANULOCYTES # BLD AUTO: 0.02 K/UL (ref 0–0.04)
IMM GRANULOCYTES NFR BLD AUTO: 0.3 % (ref 0–0.5)
LYMPHOCYTES # BLD AUTO: 2.7 K/UL (ref 1–4.8)
LYMPHOCYTES NFR BLD: 35.6 % (ref 18–48)
MCH RBC QN AUTO: 31.1 PG (ref 27–31)
MCHC RBC AUTO-ENTMCNC: 29.9 G/DL (ref 32–36)
MCV RBC AUTO: 104 FL (ref 82–98)
MONOCYTES # BLD AUTO: 0.6 K/UL (ref 0.3–1)
MONOCYTES NFR BLD: 7.7 % (ref 4–15)
NEUTROPHILS # BLD AUTO: 4.1 K/UL (ref 1.8–7.7)
NEUTROPHILS NFR BLD: 54.5 % (ref 38–73)
NRBC BLD-RTO: 0 /100 WBC
PLATELET # BLD AUTO: 233 K/UL (ref 150–350)
PMV BLD AUTO: 11.3 FL (ref 9.2–12.9)
PTH-INTACT SERPL-MCNC: 84 PG/ML (ref 9–77)
RBC # BLD AUTO: 3.63 M/UL (ref 4–5.4)
WBC # BLD AUTO: 7.44 K/UL (ref 3.9–12.7)

## 2021-03-22 PROCEDURE — 99999 PR PBB SHADOW E&M-EST. PATIENT-LVL IV: CPT | Mod: PBBFAC,,, | Performed by: NURSE PRACTITIONER

## 2021-03-22 PROCEDURE — 85025 COMPLETE CBC W/AUTO DIFF WBC: CPT | Performed by: INTERNAL MEDICINE

## 2021-03-22 PROCEDURE — 82728 ASSAY OF FERRITIN: CPT | Performed by: INTERNAL MEDICINE

## 2021-03-22 PROCEDURE — 99214 OFFICE O/P EST MOD 30 MIN: CPT | Mod: S$PBB,,, | Performed by: NURSE PRACTITIONER

## 2021-03-22 PROCEDURE — 80069 RENAL FUNCTION PANEL: CPT | Performed by: INTERNAL MEDICINE

## 2021-03-22 PROCEDURE — 99214 PR OFFICE/OUTPT VISIT, EST, LEVL IV, 30-39 MIN: ICD-10-PCS | Mod: S$PBB,,, | Performed by: NURSE PRACTITIONER

## 2021-03-22 PROCEDURE — 99214 OFFICE O/P EST MOD 30 MIN: CPT | Mod: PBBFAC,PO | Performed by: NURSE PRACTITIONER

## 2021-03-22 PROCEDURE — 80061 LIPID PANEL: CPT | Performed by: NURSE PRACTITIONER

## 2021-03-22 PROCEDURE — 36415 COLL VENOUS BLD VENIPUNCTURE: CPT | Mod: PO | Performed by: NURSE PRACTITIONER

## 2021-03-22 PROCEDURE — 99999 PR PBB SHADOW E&M-EST. PATIENT-LVL IV: ICD-10-PCS | Mod: PBBFAC,,, | Performed by: NURSE PRACTITIONER

## 2021-03-22 PROCEDURE — 83540 ASSAY OF IRON: CPT | Performed by: INTERNAL MEDICINE

## 2021-03-22 PROCEDURE — 83970 ASSAY OF PARATHORMONE: CPT | Performed by: INTERNAL MEDICINE

## 2021-03-22 RX ORDER — MUPIROCIN 20 MG/G
OINTMENT TOPICAL 3 TIMES DAILY
Qty: 30 G | Refills: 5 | Status: SHIPPED | OUTPATIENT
Start: 2021-03-22

## 2021-03-22 RX ORDER — ALENDRONATE SODIUM 70 MG/1
70 TABLET ORAL
Qty: 4 TABLET | Refills: 11 | Status: SHIPPED | OUTPATIENT
Start: 2021-03-22

## 2021-03-22 RX ORDER — POTASSIUM CHLORIDE 750 MG/1
10 TABLET, EXTENDED RELEASE ORAL DAILY
Qty: 30 TABLET | Refills: 5 | Status: SHIPPED | OUTPATIENT
Start: 2021-03-22

## 2021-03-22 RX ORDER — FOLIC ACID 1 MG/1
1000 TABLET ORAL DAILY
Qty: 30 TABLET | Refills: 5 | Status: SHIPPED | OUTPATIENT
Start: 2021-03-22

## 2021-03-22 RX ORDER — GABAPENTIN 100 MG/1
100 CAPSULE ORAL DAILY
Qty: 30 CAPSULE | Refills: 5 | Status: SHIPPED | OUTPATIENT
Start: 2021-03-22 | End: 2021-09-23

## 2021-03-22 RX ORDER — AMLODIPINE BESYLATE 2.5 MG/1
2.5 TABLET ORAL DAILY
Qty: 30 TABLET | Refills: 5 | Status: SHIPPED | OUTPATIENT
Start: 2021-03-22

## 2021-03-22 RX ORDER — SIMVASTATIN 10 MG/1
10 TABLET, FILM COATED ORAL NIGHTLY
Qty: 30 TABLET | Refills: 5 | Status: SHIPPED | OUTPATIENT
Start: 2021-03-22

## 2021-03-22 RX ORDER — HYDRALAZINE HYDROCHLORIDE 10 MG/1
10 TABLET, FILM COATED ORAL 2 TIMES DAILY
Qty: 60 TABLET | Refills: 5 | Status: SHIPPED | OUTPATIENT
Start: 2021-03-22

## 2021-03-22 RX ORDER — ONDANSETRON 8 MG/1
8 TABLET, ORALLY DISINTEGRATING ORAL EVERY 6 HOURS PRN
Qty: 30 TABLET | Refills: 11 | Status: SHIPPED | OUTPATIENT
Start: 2021-03-22

## 2021-03-22 RX ORDER — METOPROLOL SUCCINATE 100 MG/1
200 TABLET, EXTENDED RELEASE ORAL 2 TIMES DAILY
Qty: 120 TABLET | Refills: 5 | Status: SHIPPED | OUTPATIENT
Start: 2021-03-22 | End: 2021-04-21

## 2021-03-23 LAB
ALBUMIN SERPL BCP-MCNC: 3.4 G/DL (ref 3.5–5.2)
ANION GAP SERPL CALC-SCNC: 12 MMOL/L (ref 8–16)
BUN SERPL-MCNC: 21 MG/DL (ref 8–23)
CALCIUM SERPL-MCNC: 8.9 MG/DL (ref 8.7–10.5)
CHLORIDE SERPL-SCNC: 108 MMOL/L (ref 95–110)
CHOLEST SERPL-MCNC: 172 MG/DL (ref 120–199)
CHOLEST/HDLC SERPL: 3.2 {RATIO} (ref 2–5)
CO2 SERPL-SCNC: 24 MMOL/L (ref 23–29)
CREAT SERPL-MCNC: 1.5 MG/DL (ref 0.5–1.4)
EST. GFR  (AFRICAN AMERICAN): 37.9 ML/MIN/1.73 M^2
EST. GFR  (NON AFRICAN AMERICAN): 32.9 ML/MIN/1.73 M^2
FERRITIN SERPL-MCNC: 75 NG/ML (ref 20–300)
GLUCOSE SERPL-MCNC: 101 MG/DL (ref 70–110)
HDLC SERPL-MCNC: 54 MG/DL (ref 40–75)
HDLC SERPL: 31.4 % (ref 20–50)
IRON SERPL-MCNC: 48 UG/DL (ref 30–160)
LDLC SERPL CALC-MCNC: 93.6 MG/DL (ref 63–159)
NONHDLC SERPL-MCNC: 118 MG/DL
PHOSPHATE SERPL-MCNC: 3.2 MG/DL (ref 2.7–4.5)
POTASSIUM SERPL-SCNC: 3.6 MMOL/L (ref 3.5–5.1)
SATURATED IRON: 18 % (ref 20–50)
SODIUM SERPL-SCNC: 144 MMOL/L (ref 136–145)
TOTAL IRON BINDING CAPACITY: 271 UG/DL (ref 250–450)
TRANSFERRIN SERPL-MCNC: 183 MG/DL (ref 200–375)
TRIGL SERPL-MCNC: 122 MG/DL (ref 30–150)

## 2021-04-28 ENCOUNTER — PATIENT MESSAGE (OUTPATIENT)
Dept: FAMILY MEDICINE | Facility: CLINIC | Age: 81
End: 2021-04-28

## 2021-06-17 DIAGNOSIS — K21.00 GASTROESOPHAGEAL REFLUX DISEASE WITH ESOPHAGITIS, UNSPECIFIED WHETHER HEMORRHAGE: ICD-10-CM

## 2021-06-18 RX ORDER — ERGOCALCIFEROL 1.25 MG/1
50000 CAPSULE ORAL
Qty: 90 CAPSULE | Refills: 11 | Status: SHIPPED | OUTPATIENT
Start: 2021-06-18

## 2021-06-18 RX ORDER — ALLOPURINOL 100 MG/1
100 TABLET ORAL DAILY
Qty: 30 TABLET | Refills: 11 | Status: SHIPPED | OUTPATIENT
Start: 2021-06-18 | End: 2021-09-23

## 2021-06-18 RX ORDER — PANTOPRAZOLE SODIUM 40 MG/1
40 TABLET, DELAYED RELEASE ORAL DAILY
Qty: 30 TABLET | Refills: 11 | Status: SHIPPED | OUTPATIENT
Start: 2021-06-18 | End: 2022-06-18

## 2021-06-18 RX ORDER — MAGNESIUM 200 MG
1 TABLET ORAL DAILY
Qty: 30 TABLET | Refills: 11 | Status: SHIPPED | OUTPATIENT
Start: 2021-06-18 | End: 2022-07-25

## 2021-07-27 ENCOUNTER — TELEPHONE (OUTPATIENT)
Dept: FAMILY MEDICINE | Facility: CLINIC | Age: 81
End: 2021-07-27

## 2021-07-27 DIAGNOSIS — R50.9 FEVER: ICD-10-CM

## 2021-08-04 ENCOUNTER — PATIENT MESSAGE (OUTPATIENT)
Dept: ADMINISTRATIVE | Facility: HOSPITAL | Age: 81
End: 2021-08-04

## 2021-08-24 ENCOUNTER — TELEPHONE (OUTPATIENT)
Dept: FAMILY MEDICINE | Facility: CLINIC | Age: 81
End: 2021-08-24

## 2021-08-27 ENCOUNTER — OFFICE VISIT (OUTPATIENT)
Dept: FAMILY MEDICINE | Facility: CLINIC | Age: 81
End: 2021-08-27
Payer: MEDICARE

## 2021-08-27 DIAGNOSIS — Z87.09 HISTORY OF ACUTE RESPIRATORY FAILURE: ICD-10-CM

## 2021-08-27 DIAGNOSIS — Z86.16 HISTORY OF 2019 NOVEL CORONAVIRUS DISEASE (COVID-19): Primary | ICD-10-CM

## 2021-08-27 PROCEDURE — 99443 PR PHYSICIAN TELEPHONE EVALUATION 21-30 MIN: CPT | Mod: 95,,, | Performed by: FAMILY MEDICINE

## 2021-08-27 PROCEDURE — 99443 PR PHYSICIAN TELEPHONE EVALUATION 21-30 MIN: ICD-10-PCS | Mod: 95,,, | Performed by: FAMILY MEDICINE

## 2021-08-27 RX ORDER — LEVOTHYROXINE SODIUM 50 UG/1
50 TABLET ORAL
COMMUNITY

## 2021-08-27 RX ORDER — METOPROLOL TARTRATE 100 MG/1
100 TABLET ORAL 2 TIMES DAILY
COMMUNITY

## 2021-08-27 RX ORDER — DICYCLOMINE HYDROCHLORIDE 10 MG/1
10 CAPSULE ORAL 2 TIMES DAILY PRN
Qty: 40 CAPSULE | Refills: 5 | Status: SHIPPED | OUTPATIENT
Start: 2021-08-27 | End: 2021-09-23

## 2021-09-22 ENCOUNTER — TELEPHONE (OUTPATIENT)
Dept: FAMILY MEDICINE | Facility: CLINIC | Age: 81
End: 2021-09-22

## 2021-09-23 ENCOUNTER — DOCUMENT SCAN (OUTPATIENT)
Dept: HOME HEALTH SERVICES | Facility: HOSPITAL | Age: 81
End: 2021-09-23
Payer: MEDICARE

## 2021-09-23 ENCOUNTER — OFFICE VISIT (OUTPATIENT)
Dept: FAMILY MEDICINE | Facility: CLINIC | Age: 81
End: 2021-09-23
Payer: MEDICARE

## 2021-09-23 VITALS
DIASTOLIC BLOOD PRESSURE: 74 MMHG | TEMPERATURE: 98 F | OXYGEN SATURATION: 98 % | HEIGHT: 64 IN | HEART RATE: 68 BPM | WEIGHT: 197 LBS | SYSTOLIC BLOOD PRESSURE: 125 MMHG | BODY MASS INDEX: 33.63 KG/M2

## 2021-09-23 DIAGNOSIS — K57.92 DIVERTICULITIS: ICD-10-CM

## 2021-09-23 DIAGNOSIS — K59.09 CHRONIC CONSTIPATION: Primary | ICD-10-CM

## 2021-09-23 PROCEDURE — 99999 PR PBB SHADOW E&M-EST. PATIENT-LVL III: ICD-10-PCS | Mod: PBBFAC,,, | Performed by: NURSE PRACTITIONER

## 2021-09-23 PROCEDURE — 99213 OFFICE O/P EST LOW 20 MIN: CPT | Mod: PBBFAC,PO | Performed by: NURSE PRACTITIONER

## 2021-09-23 PROCEDURE — 99999 PR PBB SHADOW E&M-EST. PATIENT-LVL III: CPT | Mod: PBBFAC,,, | Performed by: NURSE PRACTITIONER

## 2021-09-23 PROCEDURE — 99213 PR OFFICE/OUTPT VISIT, EST, LEVL III, 20-29 MIN: ICD-10-PCS | Mod: S$PBB,,, | Performed by: NURSE PRACTITIONER

## 2021-09-23 PROCEDURE — 99213 OFFICE O/P EST LOW 20 MIN: CPT | Mod: S$PBB,,, | Performed by: NURSE PRACTITIONER

## 2021-09-23 RX ORDER — DOCUSATE SODIUM 100 MG/1
100 CAPSULE, LIQUID FILLED ORAL 2 TIMES DAILY
Refills: 0 | COMMUNITY
Start: 2021-09-23

## 2021-09-23 RX ORDER — HUMAN INSULIN 100 [IU]/ML
INJECTION, SOLUTION SUBCUTANEOUS
COMMUNITY
Start: 2021-08-20

## 2021-09-23 RX ORDER — POLYETHYLENE GLYCOL 3350 17 G/17G
POWDER, FOR SOLUTION ORAL
COMMUNITY
Start: 2021-08-20

## 2021-09-23 RX ORDER — MICONAZOLE NITRATE 2 G/100G
OINTMENT TOPICAL 2 TIMES DAILY
COMMUNITY
Start: 2021-08-20

## 2021-09-23 RX ORDER — TELMISARTAN 40 MG/1
40 TABLET ORAL DAILY
COMMUNITY
Start: 2021-08-20

## 2021-10-01 ENCOUNTER — TELEPHONE (OUTPATIENT)
Dept: FAMILY MEDICINE | Facility: CLINIC | Age: 81
End: 2021-10-01

## 2021-10-08 ENCOUNTER — DOCUMENT SCAN (OUTPATIENT)
Dept: HOME HEALTH SERVICES | Facility: HOSPITAL | Age: 81
End: 2021-10-08
Payer: MEDICARE

## 2021-10-20 PROCEDURE — G0179 MD RECERTIFICATION HHA PT: HCPCS | Mod: ,,, | Performed by: FAMILY MEDICINE

## 2021-10-20 PROCEDURE — G0179 PR HOME HEALTH MD RECERTIFICATION: ICD-10-PCS | Mod: ,,, | Performed by: FAMILY MEDICINE

## 2021-10-28 LAB — HBA1C MFR BLD: 5.4 % (ref ?–5.7)

## 2021-11-03 ENCOUNTER — PATIENT MESSAGE (OUTPATIENT)
Dept: ADMINISTRATIVE | Facility: HOSPITAL | Age: 81
End: 2021-11-03
Payer: MEDICARE

## 2021-11-05 ENCOUNTER — EXTERNAL HOME HEALTH (OUTPATIENT)
Dept: HOME HEALTH SERVICES | Facility: HOSPITAL | Age: 81
End: 2021-11-05
Payer: MEDICARE

## 2021-11-05 ENCOUNTER — PATIENT OUTREACH (OUTPATIENT)
Dept: HOME HEALTH SERVICES | Facility: HOSPITAL | Age: 81
End: 2021-11-05
Payer: MEDICARE

## 2021-11-08 ENCOUNTER — TELEPHONE (OUTPATIENT)
Dept: FAMILY MEDICINE | Facility: CLINIC | Age: 81
End: 2021-11-08
Payer: MEDICARE

## 2021-11-08 DIAGNOSIS — Z13.820 SCREENING FOR OSTEOPOROSIS: Primary | ICD-10-CM

## 2021-11-08 DIAGNOSIS — Z12.31 VISIT FOR SCREENING MAMMOGRAM: ICD-10-CM

## 2021-11-08 DIAGNOSIS — M81.0 OSTEOPOROSIS, UNSPECIFIED OSTEOPOROSIS TYPE, UNSPECIFIED PATHOLOGICAL FRACTURE PRESENCE: ICD-10-CM

## 2021-11-12 ENCOUNTER — PATIENT OUTREACH (OUTPATIENT)
Dept: ADMINISTRATIVE | Facility: HOSPITAL | Age: 81
End: 2021-11-12
Payer: MEDICARE

## 2021-11-16 ENCOUNTER — HOSPITAL ENCOUNTER (OUTPATIENT)
Dept: RADIOLOGY | Facility: HOSPITAL | Age: 81
Discharge: HOME | End: 2021-11-16
Attending: FAMILY MEDICINE
Payer: MEDICARE

## 2021-11-16 DIAGNOSIS — M81.0 OSTEOPOROSIS, UNSPECIFIED OSTEOPOROSIS TYPE, UNSPECIFIED PATHOLOGICAL FRACTURE PRESENCE: ICD-10-CM

## 2021-11-16 PROCEDURE — 77080 DXA BONE DENSITY AXIAL: CPT | Mod: TC,PO

## 2021-11-16 PROCEDURE — 77080 DXA BONE DENSITY AXIAL: CPT | Mod: 26,,, | Performed by: RADIOLOGY

## 2021-11-16 PROCEDURE — 77080 DEXA BONE DENSITY SPINE HIP: ICD-10-PCS | Mod: 26,,, | Performed by: RADIOLOGY

## 2021-11-17 ENCOUNTER — HOSPITAL ENCOUNTER (OUTPATIENT)
Dept: RADIOLOGY | Facility: HOSPITAL | Age: 81
Discharge: HOME | End: 2021-11-17
Attending: FAMILY MEDICINE
Payer: MEDICARE

## 2021-11-17 VITALS — BODY MASS INDEX: 33.63 KG/M2 | HEIGHT: 64 IN | WEIGHT: 197 LBS

## 2021-11-17 DIAGNOSIS — Z12.31 VISIT FOR SCREENING MAMMOGRAM: ICD-10-CM

## 2021-11-17 PROCEDURE — 77067 SCR MAMMO BI INCL CAD: CPT | Mod: TC,PO

## 2021-11-17 PROCEDURE — 77063 MAMMO DIGITAL SCREENING BILAT WITH TOMO: ICD-10-PCS | Mod: 26,,, | Performed by: RADIOLOGY

## 2021-11-17 PROCEDURE — 77063 BREAST TOMOSYNTHESIS BI: CPT | Mod: 26,,, | Performed by: RADIOLOGY

## 2021-11-17 PROCEDURE — 77067 MAMMO DIGITAL SCREENING BILAT WITH TOMO: ICD-10-PCS | Mod: 26,,, | Performed by: RADIOLOGY

## 2021-11-17 PROCEDURE — 77067 SCR MAMMO BI INCL CAD: CPT | Mod: 26,,, | Performed by: RADIOLOGY

## 2021-11-18 DIAGNOSIS — M81.0 OSTEOPOROSIS, UNSPECIFIED OSTEOPOROSIS TYPE, UNSPECIFIED PATHOLOGICAL FRACTURE PRESENCE: Primary | ICD-10-CM

## 2021-11-30 ENCOUNTER — IMMUNIZATION (OUTPATIENT)
Dept: FAMILY MEDICINE | Facility: CLINIC | Age: 81
End: 2021-11-30
Payer: MEDICARE

## 2021-11-30 PROCEDURE — G0008 ADMIN INFLUENZA VIRUS VAC: HCPCS | Mod: PBBFAC,PO

## 2021-11-30 PROCEDURE — 90694 VACC AIIV4 NO PRSRV 0.5ML IM: CPT | Mod: PBBFAC,PO

## 2022-01-11 ENCOUNTER — TELEPHONE (OUTPATIENT)
Dept: ENDOCRINOLOGY | Facility: CLINIC | Age: 82
End: 2022-01-11
Payer: MEDICARE

## 2022-01-11 NOTE — TELEPHONE ENCOUNTER
Spoke with patient regarding scheduling Endocrinology appt, pt declined due to transportation issues.

## 2022-02-10 DIAGNOSIS — E78.2 COMBINED HYPERLIPIDEMIA ASSOCIATED WITH TYPE 2 DIABETES MELLITUS: ICD-10-CM

## 2022-02-10 DIAGNOSIS — E11.69 COMBINED HYPERLIPIDEMIA ASSOCIATED WITH TYPE 2 DIABETES MELLITUS: ICD-10-CM

## 2022-04-28 LAB — CRC RECOMMENDATION EXT: NORMAL

## 2022-05-03 ENCOUNTER — PATIENT OUTREACH (OUTPATIENT)
Dept: ADMINISTRATIVE | Facility: HOSPITAL | Age: 82
End: 2022-05-03
Payer: MEDICARE

## 2022-05-26 ENCOUNTER — PATIENT MESSAGE (OUTPATIENT)
Dept: FAMILY MEDICINE | Facility: CLINIC | Age: 82
End: 2022-05-26
Payer: MEDICARE

## 2022-07-08 ENCOUNTER — PATIENT MESSAGE (OUTPATIENT)
Dept: FAMILY MEDICINE | Facility: CLINIC | Age: 82
End: 2022-07-08
Payer: MEDICARE

## 2022-08-30 LAB
ALBUMIN: 3.7 G/DL (ref 3.5–4.8)
ALP SERPL-CCNC: 43 U/L (ref 28–116)
ALT SERPL W P-5'-P-CCNC: 10 U/L (ref 5–41)
ANION GAP SERPL CALC-SCNC: 9 MMOL/L (ref 7–16)
AST SERPL-CCNC: 12 U/L (ref 10–34)
BILIRUB SERPL-MCNC: 0.5 MG/DL (ref 0.4–2)
BUN SERPL-MCNC: 42 MG/DL (ref 8–20)
CALCIUM SERPL-MCNC: 9.3 MG/DL (ref 8.9–10.3)
CHLORIDE SERPL-SCNC: 110 MMOL/L (ref 101–111)
CO2 SERPL-SCNC: 22 MMOL/L (ref 22–32)
CREAT SERPL-MCNC: 1.8 MG/DL (ref 0.6–1.1)
EST. GFR  (AFRICAN AMERICAN): 33 ML/MIN/1.73 M2
EST. GFR  (NON AFRICAN AMERICAN): 28 ML/MIN/1.73 M2
GLUCOSE SERPL-MCNC: 118 MG/DL (ref 65–99)
POTASSIUM SERPL-SCNC: 4.6 MMOL/L (ref 3.6–5.1)
PROT SNV-MCNC: 6 G/DL (ref 6.1–7.9)
SODIUM BLD-SCNC: 141 MMOL/L (ref 136–144)

## 2022-09-12 ENCOUNTER — PATIENT OUTREACH (OUTPATIENT)
Dept: ADMINISTRATIVE | Facility: HOSPITAL | Age: 82
End: 2022-09-12
Payer: MEDICARE

## 2022-11-09 ENCOUNTER — TELEPHONE (OUTPATIENT)
Dept: ADMINISTRATIVE | Facility: HOSPITAL | Age: 82
End: 2022-11-09
Payer: MEDICARE

## 2022-12-05 ENCOUNTER — TELEPHONE (OUTPATIENT)
Dept: ADMINISTRATIVE | Facility: CLINIC | Age: 82
End: 2022-12-05
Payer: MEDICARE

## 2022-12-05 ENCOUNTER — PATIENT MESSAGE (OUTPATIENT)
Dept: ADMINISTRATIVE | Facility: CLINIC | Age: 82
End: 2022-12-05
Payer: MEDICARE

## 2022-12-05 NOTE — TELEPHONE ENCOUNTER
Called pt; no answer; left message informing patient I was calling to confirm her virtual EAWV on 12/5/22 at 7:00am and to see if she needed any help with setting up for virtual appt or e-pre check and to login 15 minutes prior to scheduled appt; left my name & number for pt to return my call if she has any questions or concerns; sent message through portal

## 2022-12-06 ENCOUNTER — TELEPHONE (OUTPATIENT)
Dept: ADMINISTRATIVE | Facility: CLINIC | Age: 82
End: 2022-12-06
Payer: MEDICARE

## 2022-12-06 NOTE — TELEPHONE ENCOUNTER
Called pt; informed pt I was just making a reminder call for her virtual visit today at 7:00am and to see if she needed any help; pt stated she would like to cancel the appt and not reschedule cause she no longer sees providers at [facility]; pt stated she also changed to a pcp that is not an [facility] provider; it was to close to the appt time so I was not able to cancel the appt but I did dwight in the appt notes about pt wanting to cancel the appt

## 2022-12-23 ENCOUNTER — PATIENT MESSAGE (OUTPATIENT)
Dept: FAMILY MEDICINE | Facility: CLINIC | Age: 82
End: 2022-12-23
Payer: MEDICARE

## 2023-05-15 NOTE — PROGRESS NOTES
Subjective:       Patient ID: Wendy Ro is a 77 y.o. White female who presents for follow-up evaluation of Chronic Kidney Disease    HPI  Review of Systems    Objective:      Physical Exam    Assessment:       1. Chronic kidney disease, unspecified CKD stage    2. Vitamin D deficiency    3. Chronic renal insufficiency, stage III (moderate)        Plan:             CKD is stable    Jeanie Chacon(Attending)

## 2023-07-12 NOTE — PROGRESS NOTES
Subjective:       Patient ID: Wendy Ro is a 80 y.o. White female who presents for re-evaluation of progression of Chronic Kidney Disease    Edema  Associated symptoms include arthralgias. Pertinent negatives include no chest pain, coughing, fatigue or weakness.      She reports she is doing well. Her weight is stable and LE edema is controlled, using Lasix prn. No LUTS. No NSAID use. She follows a low sodium diet but admits to not pushing fluids due to increased frequency. No gout. Last Hba1c was 6.4. She does plenty of 'brain exercises'     Her sister passed away 10/15 from renal failure, declined HD. 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 No gout. Wants to see Heme for B12 deficiency. Needs PPI--hx of 'esophageal stretching. She complains of ongoing neuropathy. Back on metformin     Dec 2019 Interval history: three great grand kids staying with her. Keeping her busy but is 'a trade off' Treated for cellulitis 3X in past few months but has significant edema--has stopped Lasix. LOPEZ is about the same. Still on B12 and iron. No flu shot yet. Labs on Friday June 2020: Feeling well. Not shopping, son and grand dtr are running her errands. Sleep problems, CPAP is OK. She is currently on Lasix 40mg 1/2 daily     Review of Systems   Constitutional: Negative for activity change, appetite change, fatigue and unexpected weight change.   HENT: Negative for facial swelling.    Eyes: Negative for visual disturbance.   Respiratory: Negative for cough and shortness of breath.    Cardiovascular: Positive for leg swelling (much improved). Negative for chest pain.   Gastrointestinal: Negative for abdominal distention.   Genitourinary: Negative for difficulty urinating and dysuria.   Musculoskeletal: Positive for arthralgias and back pain.   Neurological: Negative for weakness.       Objective:      Physical Exam  Vitals signs reviewed.   Constitutional:       General: She is not in acute  distress.  Neck:      Vascular: No JVD.   Cardiovascular:      Heart sounds: S1 normal and S2 normal. No friction rub.   Pulmonary:      Breath sounds: Normal breath sounds. No wheezing or rales.   Abdominal:      Palpations: Abdomen is soft.   Skin:     General: Skin is warm and dry.   Neurological:      Mental Status: She is alert and oriented to person, place, and time.         Assessment:       1. Stage 3 chronic kidney disease    2. Renal cyst    3. Hypertension associated with diabetes    4. Combined hyperlipidemia associated with type 2 diabetes mellitus    5. Controlled type 2 diabetes mellitus with stage 3 chronic kidney disease, without long-term current use of insulin    6. Vitamin D deficiency     7. Chronic gout without tophus, unspecified cause, unspecified site    8. Anemia, unspecified type    9. Hypothyroidism due to acquired atrophy of thyroid    10. Fatty liver    11. Hyperuricemia    12. Sleep apnea, unspecified type    13. B12 deficiency    14. Folate deficiency    15. Rheumatoid arthritis, involving unspecified site, unspecified rheumatoid factor presence    16. Gastroparesis    17. Seasonal allergic rhinitis due to pollen    18. Iron deficiency anemia, unspecified iron deficiency anemia type    19. Class 3 severe obesity due to excess calories with serious comorbidity and body mass index (BMI) of 40.0 to 44.9 in adult    20. CKD (chronic kidney disease) stage 3, GFR 30-59 ml/min    21. Controlled type 2 diabetes mellitus with diabetic neuropathy, without long-term current use of insulin    22. Mild intermittent asthma without complication    23. Gastroesophageal reflux disease without esophagitis        Plan:             CKD with stable kidney function. Continue RAAS blockade for renal preservation    HTN--controlled given age    DM--Hba1c 6.1, excellent control, denies hypoglycemia    Gout--continue allopurinol and prn colchicine     Mineral and Bone Disease-- Continue D2    Anemia--H&H  stable, and overall improved    Volume status--continue Lasix      RTC 6 months   Show Repair Surgeon Variable: Yes

## 2024-08-21 ENCOUNTER — PATIENT MESSAGE (OUTPATIENT)
Dept: FAMILY MEDICINE | Facility: CLINIC | Age: 84
End: 2024-08-21
Payer: MEDICARE

## 2024-08-21 ENCOUNTER — TELEPHONE (OUTPATIENT)
Dept: FAMILY MEDICINE | Facility: CLINIC | Age: 84
End: 2024-08-21
Payer: MEDICARE

## 2024-09-11 ENCOUNTER — PATIENT MESSAGE (OUTPATIENT)
Dept: FAMILY MEDICINE | Facility: CLINIC | Age: 84
End: 2024-09-11
Payer: MEDICARE

## 2024-12-07 NOTE — Clinical Note
Change the toprol xl from 100 mg a day to 200 mg a day.  I sent this to [facility].  Instruct her on this and book an OV with Arun in 2 weeks to document the bp.  
Pt in clinic for BP check. /67 HR 71. Pt denies any signs or symptoms of elevated BP. Pt states she did take her medications this morning. PCP out of office today. PCP staff notified. PCP notified by Epic message. Advised pt our staff will contact her once PCP has reviewed BP reading and advise if any adjustments are needed at this time. Pt verbalized understanding. 
Left arm;

## 2024-12-20 NOTE — TELEPHONE ENCOUNTER
----- Message from Meron Melchor sent at 12/6/2017  9:05 AM CST -----  Contact: Yoko/[facility]  Please call Yoko @ [phone] regarding order for pt sleep Study, states she left message on 11/9 and no one return her call.   denies pain/discomfort (Rating = 0)

## 2025-01-28 ENCOUNTER — ANESTHESIA EVENT (OUTPATIENT)
Dept: SURGERY | Facility: HOSPITAL | Age: 85
End: 2025-01-28
Payer: MEDICARE

## 2025-01-28 ENCOUNTER — HOSPITAL ENCOUNTER (INPATIENT)
Facility: HOSPITAL | Age: 85
LOS: 6 days | Discharge: HOME HEALTH | DRG: 579 | End: 2025-02-03
Attending: EMERGENCY MEDICINE | Admitting: STUDENT IN AN ORGANIZED HEALTH CARE EDUCATION/TRAINING PROGRAM
Payer: MEDICARE

## 2025-01-28 DIAGNOSIS — L89.150 PRESSURE INJURY OF SACRAL REGION, UNSTAGEABLE: ICD-10-CM

## 2025-01-28 DIAGNOSIS — L08.9 DECUBITUS ULCER, STAGE 4 WITH INFECTION: Primary | ICD-10-CM

## 2025-01-28 DIAGNOSIS — E66.01 MORBID OBESITY: ICD-10-CM

## 2025-01-28 DIAGNOSIS — N39.0 URINARY TRACT INFECTION ASSOCIATED WITH INDWELLING URETHRAL CATHETER, INITIAL ENCOUNTER: ICD-10-CM

## 2025-01-28 DIAGNOSIS — T83.511A URINARY TRACT INFECTION ASSOCIATED WITH INDWELLING URETHRAL CATHETER, INITIAL ENCOUNTER: ICD-10-CM

## 2025-01-28 DIAGNOSIS — L89.94 DECUBITUS ULCER, STAGE 4 WITH INFECTION: Primary | ICD-10-CM

## 2025-01-28 DIAGNOSIS — L89.94: ICD-10-CM

## 2025-01-28 DIAGNOSIS — I10 CHRONIC HYPERTENSION: ICD-10-CM

## 2025-01-28 DIAGNOSIS — Z13.6 SCREENING FOR CARDIOVASCULAR CONDITION: ICD-10-CM

## 2025-01-28 DIAGNOSIS — R07.9 CHEST PAIN: ICD-10-CM

## 2025-01-28 PROBLEM — L89.154 PRESSURE INJURY OF SACRAL REGION, STAGE 4: Status: ACTIVE | Noted: 2025-01-28

## 2025-01-28 PROBLEM — J96.10 CHRONIC RESPIRATORY FAILURE: Status: ACTIVE | Noted: 2025-01-28

## 2025-01-28 LAB
ALBUMIN SERPL BCP-MCNC: 2.6 G/DL (ref 3.5–5.2)
ALP SERPL-CCNC: 73 U/L (ref 40–150)
ALT SERPL W/O P-5'-P-CCNC: 13 U/L (ref 10–44)
ANION GAP SERPL CALC-SCNC: 13 MMOL/L (ref 8–16)
APTT PPP: 25.6 SEC (ref 21–32)
AST SERPL-CCNC: 22 U/L (ref 10–40)
BACTERIA #/AREA URNS HPF: ABNORMAL /HPF
BASOPHILS # BLD AUTO: 0.03 K/UL (ref 0–0.2)
BASOPHILS NFR BLD: 0.3 % (ref 0–1.9)
BILIRUB SERPL-MCNC: 0.4 MG/DL (ref 0.1–1)
BILIRUB UR QL STRIP: NEGATIVE
BUN SERPL-MCNC: 20 MG/DL (ref 8–23)
CALCIUM SERPL-MCNC: 10 MG/DL (ref 8.7–10.5)
CAOX CRY URNS QL MICRO: ABNORMAL
CHLORIDE SERPL-SCNC: 99 MMOL/L (ref 95–110)
CK SERPL-CCNC: 13 U/L (ref 20–180)
CLARITY UR: ABNORMAL
CO2 SERPL-SCNC: 27 MMOL/L (ref 23–29)
COLOR UR: YELLOW
CREAT SERPL-MCNC: 1.4 MG/DL (ref 0.5–1.4)
CRP SERPL-MCNC: 131.2 MG/L (ref 0–8.2)
DIFFERENTIAL METHOD BLD: ABNORMAL
EOSINOPHIL # BLD AUTO: 0.3 K/UL (ref 0–0.5)
EOSINOPHIL NFR BLD: 3 % (ref 0–8)
ERYTHROCYTE [DISTWIDTH] IN BLOOD BY AUTOMATED COUNT: 13.8 % (ref 11.5–14.5)
ERYTHROCYTE [SEDIMENTATION RATE] IN BLOOD BY WESTERGREN METHOD: >120 MM/HR (ref 0–36)
EST. GFR  (NO RACE VARIABLE): 37 ML/MIN/1.73 M^2
GLUCOSE SERPL-MCNC: 128 MG/DL (ref 70–110)
GLUCOSE UR QL STRIP: NEGATIVE
HCT VFR BLD AUTO: 32.6 % (ref 37–48.5)
HCV AB SERPL QL IA: NEGATIVE
HEP C VIRUS HOLD SPECIMEN: NORMAL
HGB BLD-MCNC: 10.1 G/DL (ref 12–16)
HGB UR QL STRIP: NEGATIVE
HIV 1+2 AB+HIV1 P24 AG SERPL QL IA: NEGATIVE
HYALINE CASTS #/AREA URNS LPF: 9 /LPF
IMM GRANULOCYTES # BLD AUTO: 0.04 K/UL (ref 0–0.04)
IMM GRANULOCYTES NFR BLD AUTO: 0.4 % (ref 0–0.5)
INR PPP: 0.9 (ref 0.8–1.2)
KETONES UR QL STRIP: NEGATIVE
LACTATE SERPL-SCNC: 1.2 MMOL/L (ref 0.5–2.2)
LACTATE SERPL-SCNC: 1.7 MMOL/L (ref 0.5–2.2)
LEUKOCYTE ESTERASE UR QL STRIP: ABNORMAL
LYMPHOCYTES # BLD AUTO: 2 K/UL (ref 1–4.8)
LYMPHOCYTES NFR BLD: 20.2 % (ref 18–48)
MAGNESIUM SERPL-MCNC: 1.9 MG/DL (ref 1.6–2.6)
MCH RBC QN AUTO: 31.9 PG (ref 27–31)
MCHC RBC AUTO-ENTMCNC: 31 G/DL (ref 32–36)
MCV RBC AUTO: 103 FL (ref 82–98)
MICROSCOPIC COMMENT: ABNORMAL
MONOCYTES # BLD AUTO: 0.4 K/UL (ref 0.3–1)
MONOCYTES NFR BLD: 4.3 % (ref 4–15)
NEUTROPHILS # BLD AUTO: 6.9 K/UL (ref 1.8–7.7)
NEUTROPHILS NFR BLD: 71.8 % (ref 38–73)
NITRITE UR QL STRIP: POSITIVE
NRBC BLD-RTO: 0 /100 WBC
PH UR STRIP: 7 [PH] (ref 5–8)
PLATELET # BLD AUTO: 259 K/UL (ref 150–450)
PMV BLD AUTO: 10 FL (ref 9.2–12.9)
POTASSIUM SERPL-SCNC: 4.2 MMOL/L (ref 3.5–5.1)
PROCALCITONIN SERPL IA-MCNC: 0.1 NG/ML
PROT SERPL-MCNC: 7.2 G/DL (ref 6–8.4)
PROT UR QL STRIP: ABNORMAL
PROTHROMBIN TIME: 10.9 SEC (ref 9–12.5)
RBC # BLD AUTO: 3.17 M/UL (ref 4–5.4)
RBC #/AREA URNS HPF: 13 /HPF (ref 0–4)
SODIUM SERPL-SCNC: 139 MMOL/L (ref 136–145)
SP GR UR STRIP: 1.01 (ref 1–1.03)
URN SPEC COLLECT METH UR: ABNORMAL
UROBILINOGEN UR STRIP-ACNC: NEGATIVE EU/DL
WBC # BLD AUTO: 9.66 K/UL (ref 3.9–12.7)
WBC #/AREA URNS HPF: >100 /HPF (ref 0–5)
WBC CLUMPS URNS QL MICRO: ABNORMAL

## 2025-01-28 PROCEDURE — 86803 HEPATITIS C AB TEST: CPT | Performed by: EMERGENCY MEDICINE

## 2025-01-28 PROCEDURE — 83605 ASSAY OF LACTIC ACID: CPT | Mod: 91 | Performed by: EMERGENCY MEDICINE

## 2025-01-28 PROCEDURE — 87389 HIV-1 AG W/HIV-1&-2 AB AG IA: CPT | Performed by: EMERGENCY MEDICINE

## 2025-01-28 PROCEDURE — 21400001 HC TELEMETRY ROOM

## 2025-01-28 PROCEDURE — 25000003 PHARM REV CODE 250: Performed by: STUDENT IN AN ORGANIZED HEALTH CARE EDUCATION/TRAINING PROGRAM

## 2025-01-28 PROCEDURE — 86140 C-REACTIVE PROTEIN: CPT | Performed by: EMERGENCY MEDICINE

## 2025-01-28 PROCEDURE — 11000001 HC ACUTE MED/SURG PRIVATE ROOM

## 2025-01-28 PROCEDURE — 87186 SC STD MICRODIL/AGAR DIL: CPT | Performed by: EMERGENCY MEDICINE

## 2025-01-28 PROCEDURE — 84145 PROCALCITONIN (PCT): CPT | Performed by: EMERGENCY MEDICINE

## 2025-01-28 PROCEDURE — 93005 ELECTROCARDIOGRAM TRACING: CPT

## 2025-01-28 PROCEDURE — 85651 RBC SED RATE NONAUTOMATED: CPT | Performed by: EMERGENCY MEDICINE

## 2025-01-28 PROCEDURE — 99285 EMERGENCY DEPT VISIT HI MDM: CPT | Mod: 25

## 2025-01-28 PROCEDURE — 83735 ASSAY OF MAGNESIUM: CPT | Performed by: EMERGENCY MEDICINE

## 2025-01-28 PROCEDURE — 96375 TX/PRO/DX INJ NEW DRUG ADDON: CPT

## 2025-01-28 PROCEDURE — 25000003 PHARM REV CODE 250: Performed by: EMERGENCY MEDICINE

## 2025-01-28 PROCEDURE — 80053 COMPREHEN METABOLIC PANEL: CPT | Performed by: EMERGENCY MEDICINE

## 2025-01-28 PROCEDURE — 82550 ASSAY OF CK (CPK): CPT | Performed by: EMERGENCY MEDICINE

## 2025-01-28 PROCEDURE — 96365 THER/PROPH/DIAG IV INF INIT: CPT

## 2025-01-28 PROCEDURE — 63600175 PHARM REV CODE 636 W HCPCS: Performed by: EMERGENCY MEDICINE

## 2025-01-28 PROCEDURE — 85025 COMPLETE CBC W/AUTO DIFF WBC: CPT | Performed by: EMERGENCY MEDICINE

## 2025-01-28 PROCEDURE — 87088 URINE BACTERIA CULTURE: CPT | Performed by: EMERGENCY MEDICINE

## 2025-01-28 PROCEDURE — 87086 URINE CULTURE/COLONY COUNT: CPT | Performed by: EMERGENCY MEDICINE

## 2025-01-28 PROCEDURE — 87077 CULTURE AEROBIC IDENTIFY: CPT | Performed by: EMERGENCY MEDICINE

## 2025-01-28 PROCEDURE — 87154 CUL TYP ID BLD PTHGN 6+ TRGT: CPT | Performed by: EMERGENCY MEDICINE

## 2025-01-28 PROCEDURE — 87040 BLOOD CULTURE FOR BACTERIA: CPT | Performed by: EMERGENCY MEDICINE

## 2025-01-28 PROCEDURE — 81000 URINALYSIS NONAUTO W/SCOPE: CPT | Performed by: EMERGENCY MEDICINE

## 2025-01-28 PROCEDURE — 85730 THROMBOPLASTIN TIME PARTIAL: CPT | Performed by: EMERGENCY MEDICINE

## 2025-01-28 PROCEDURE — 85610 PROTHROMBIN TIME: CPT | Performed by: EMERGENCY MEDICINE

## 2025-01-28 RX ORDER — IBUPROFEN 200 MG
24 TABLET ORAL
Status: DISCONTINUED | OUTPATIENT
Start: 2025-01-28 | End: 2025-02-03 | Stop reason: HOSPADM

## 2025-01-28 RX ORDER — ONDANSETRON HYDROCHLORIDE 2 MG/ML
4 INJECTION, SOLUTION INTRAVENOUS EVERY 8 HOURS PRN
Status: DISCONTINUED | OUTPATIENT
Start: 2025-01-28 | End: 2025-02-03 | Stop reason: HOSPADM

## 2025-01-28 RX ORDER — TALC
6 POWDER (GRAM) TOPICAL NIGHTLY PRN
Status: DISCONTINUED | OUTPATIENT
Start: 2025-01-28 | End: 2025-02-03 | Stop reason: HOSPADM

## 2025-01-28 RX ORDER — HYDRALAZINE HYDROCHLORIDE 20 MG/ML
5 INJECTION INTRAMUSCULAR; INTRAVENOUS EVERY 6 HOURS PRN
Status: DISCONTINUED | OUTPATIENT
Start: 2025-01-28 | End: 2025-02-03 | Stop reason: HOSPADM

## 2025-01-28 RX ORDER — MEPERIDINE HYDROCHLORIDE 25 MG/ML
25 INJECTION INTRAMUSCULAR; INTRAVENOUS; SUBCUTANEOUS
Status: COMPLETED | OUTPATIENT
Start: 2025-01-28 | End: 2025-01-28

## 2025-01-28 RX ORDER — GABAPENTIN 300 MG/1
300 CAPSULE ORAL 3 TIMES DAILY
COMMUNITY

## 2025-01-28 RX ORDER — POLYETHYLENE GLYCOL 3350 17 G/17G
17 POWDER, FOR SOLUTION ORAL 2 TIMES DAILY PRN
Status: DISCONTINUED | OUTPATIENT
Start: 2025-01-28 | End: 2025-02-03 | Stop reason: HOSPADM

## 2025-01-28 RX ORDER — METOPROLOL SUCCINATE 50 MG/1
100 TABLET, EXTENDED RELEASE ORAL 2 TIMES DAILY
Status: DISCONTINUED | OUTPATIENT
Start: 2025-01-28 | End: 2025-02-03 | Stop reason: HOSPADM

## 2025-01-28 RX ORDER — ALBUTEROL SULFATE 0.83 MG/ML
2.5 SOLUTION RESPIRATORY (INHALATION) EVERY 4 HOURS PRN
Status: DISCONTINUED | OUTPATIENT
Start: 2025-01-28 | End: 2025-02-03 | Stop reason: HOSPADM

## 2025-01-28 RX ORDER — ALBUTEROL SULFATE 90 UG/1
2 INHALANT RESPIRATORY (INHALATION) EVERY 6 HOURS PRN
Status: DISCONTINUED | OUTPATIENT
Start: 2025-01-28 | End: 2025-01-28

## 2025-01-28 RX ORDER — VANCOMYCIN 2 GRAM/500 ML IN 0.9 % SODIUM CHLORIDE INTRAVENOUS
2000 ONCE
Status: COMPLETED | OUTPATIENT
Start: 2025-01-28 | End: 2025-01-28

## 2025-01-28 RX ORDER — FUROSEMIDE 20 MG/1
20 TABLET ORAL DAILY
Status: DISCONTINUED | OUTPATIENT
Start: 2025-01-29 | End: 2025-02-03 | Stop reason: HOSPADM

## 2025-01-28 RX ORDER — ALLOPURINOL 100 MG/1
100 TABLET ORAL DAILY
COMMUNITY

## 2025-01-28 RX ORDER — GABAPENTIN 300 MG/1
300 CAPSULE ORAL 3 TIMES DAILY
Status: DISCONTINUED | OUTPATIENT
Start: 2025-01-28 | End: 2025-01-31

## 2025-01-28 RX ORDER — NALOXONE HCL 0.4 MG/ML
0.02 VIAL (ML) INJECTION
Status: DISCONTINUED | OUTPATIENT
Start: 2025-01-28 | End: 2025-02-03 | Stop reason: HOSPADM

## 2025-01-28 RX ORDER — ONDANSETRON HYDROCHLORIDE 2 MG/ML
4 INJECTION, SOLUTION INTRAVENOUS
Status: COMPLETED | OUTPATIENT
Start: 2025-01-28 | End: 2025-01-28

## 2025-01-28 RX ORDER — SODIUM CHLORIDE 0.9 % (FLUSH) 0.9 %
10 SYRINGE (ML) INJECTION EVERY 12 HOURS PRN
Status: DISCONTINUED | OUTPATIENT
Start: 2025-01-28 | End: 2025-02-03 | Stop reason: HOSPADM

## 2025-01-28 RX ORDER — PROCHLORPERAZINE EDISYLATE 5 MG/ML
2.5 INJECTION INTRAMUSCULAR; INTRAVENOUS EVERY 8 HOURS PRN
Status: DISCONTINUED | OUTPATIENT
Start: 2025-01-28 | End: 2025-02-03 | Stop reason: HOSPADM

## 2025-01-28 RX ORDER — POTASSIUM CITRATE 10 MEQ/1
10 TABLET, EXTENDED RELEASE ORAL DAILY
COMMUNITY
Start: 2025-01-17

## 2025-01-28 RX ORDER — SENNOSIDES 8.6 MG/1
2 TABLET ORAL 2 TIMES DAILY PRN
Status: DISCONTINUED | OUTPATIENT
Start: 2025-01-28 | End: 2025-02-03 | Stop reason: HOSPADM

## 2025-01-28 RX ORDER — POTASSIUM CITRATE 10 MEQ/1
10 TABLET, EXTENDED RELEASE ORAL DAILY
Status: DISCONTINUED | OUTPATIENT
Start: 2025-01-29 | End: 2025-02-03 | Stop reason: HOSPADM

## 2025-01-28 RX ORDER — ACETAMINOPHEN 325 MG/1
650 TABLET ORAL EVERY 6 HOURS PRN
Status: DISCONTINUED | OUTPATIENT
Start: 2025-01-28 | End: 2025-02-03 | Stop reason: HOSPADM

## 2025-01-28 RX ORDER — LABETALOL HYDROCHLORIDE 5 MG/ML
20 INJECTION, SOLUTION INTRAVENOUS
Status: COMPLETED | OUTPATIENT
Start: 2025-01-28 | End: 2025-01-28

## 2025-01-28 RX ORDER — LEVOTHYROXINE SODIUM 50 UG/1
50 TABLET ORAL
Status: DISCONTINUED | OUTPATIENT
Start: 2025-01-29 | End: 2025-02-03 | Stop reason: HOSPADM

## 2025-01-28 RX ORDER — LANOLIN ALCOHOL/MO/W.PET/CERES
400 CREAM (GRAM) TOPICAL 2 TIMES DAILY
Status: DISCONTINUED | OUTPATIENT
Start: 2025-01-28 | End: 2025-02-03 | Stop reason: HOSPADM

## 2025-01-28 RX ORDER — IBUPROFEN 200 MG
16 TABLET ORAL
Status: DISCONTINUED | OUTPATIENT
Start: 2025-01-28 | End: 2025-02-03 | Stop reason: HOSPADM

## 2025-01-28 RX ORDER — LANOLIN ALCOHOL/MO/W.PET/CERES
400 CREAM (GRAM) TOPICAL 2 TIMES DAILY
COMMUNITY

## 2025-01-28 RX ORDER — GLUCAGON 1 MG
1 KIT INJECTION
Status: DISCONTINUED | OUTPATIENT
Start: 2025-01-28 | End: 2025-02-03 | Stop reason: HOSPADM

## 2025-01-28 RX ORDER — ASPIRIN 81 MG/1
81 TABLET ORAL DAILY
COMMUNITY

## 2025-01-28 RX ORDER — FOLIC ACID 1 MG/1
1000 TABLET ORAL DAILY
Status: DISCONTINUED | OUTPATIENT
Start: 2025-01-29 | End: 2025-02-03 | Stop reason: HOSPADM

## 2025-01-28 RX ORDER — SENNOSIDES 8.6 MG
2 TABLET ORAL 2 TIMES DAILY PRN
COMMUNITY
Start: 2024-10-31

## 2025-01-28 RX ORDER — ALLOPURINOL 100 MG/1
100 TABLET ORAL DAILY
Status: DISCONTINUED | OUTPATIENT
Start: 2025-01-29 | End: 2025-02-03 | Stop reason: HOSPADM

## 2025-01-28 RX ADMIN — GABAPENTIN 300 MG: 300 CAPSULE ORAL at 10:01

## 2025-01-28 RX ADMIN — ACETAMINOPHEN 650 MG: 325 TABLET ORAL at 08:01

## 2025-01-28 RX ADMIN — VANCOMYCIN HYDROCHLORIDE 2000 MG: 500 INJECTION, POWDER, LYOPHILIZED, FOR SOLUTION INTRAVENOUS at 02:01

## 2025-01-28 RX ADMIN — METOPROLOL SUCCINATE 100 MG: 50 TABLET, EXTENDED RELEASE ORAL at 08:01

## 2025-01-28 RX ADMIN — ONDANSETRON 4 MG: 2 INJECTION INTRAMUSCULAR; INTRAVENOUS at 12:01

## 2025-01-28 RX ADMIN — PIPERACILLIN SODIUM AND TAZOBACTAM SODIUM 4.5 G: 4; .5 INJECTION, POWDER, FOR SOLUTION INTRAVENOUS at 01:01

## 2025-01-28 RX ADMIN — METOPROLOL SUCCINATE 100 MG: 50 TABLET, EXTENDED RELEASE ORAL at 01:01

## 2025-01-28 RX ADMIN — LABETALOL HYDROCHLORIDE 20 MG: 5 INJECTION INTRAVENOUS at 12:01

## 2025-01-28 RX ADMIN — Medication 400 MG: at 10:01

## 2025-01-28 RX ADMIN — PIPERACILLIN SODIUM AND TAZOBACTAM SODIUM 4.5 G: 4; .5 INJECTION, POWDER, FOR SOLUTION INTRAVENOUS at 08:01

## 2025-01-28 RX ADMIN — MEPERIDINE HYDROCHLORIDE 25 MG: 25 INJECTION INTRAMUSCULAR; INTRAVENOUS; SUBCUTANEOUS at 01:01

## 2025-01-28 NOTE — PHARMACY MED REC
"Admission Medication History     The home medication history was taken by Estefany Carcamo.    You may go to "Admission" then "Reconcile Home Medications" tabs to review and/or act upon these items.     The home medication list has been updated by the Pharmacy department.   Please read ALL comments highlighted in yellow.   Please address this information as you see fit.    Feel free to contact us if you have any questions or require assistance.      The medications listed below were removed from the home medication list. Please reorder if appropriate:  Patient reports no longer taking the following medication(s):  Aloe vesta antifungal 2% ointment  Os-lele 1250 mg  Vitamin B-12  Colace 100 mg  Hydralazine 10 mg  Xylocaine 2% jelly  Amlodipine 2.5 mg  Bactroan 2% ointment  Novolin R Regular U-100 insulin  Mycostatin powder  Zofran-ODT 8 mg  Glycolax 17 gram/dose powder  Simvastain 10mg  Tumeric root extract 500 mg  Vitamin Dx 1250 mg  Bio flex 423-94-02-40 mg  Prednisone 5 mg  Fergon 240 mg    Medications listed below were obtained from: Patient/family and Analytic software- [employer], patient brought medications from home      LAST MED REC COMPLETED:   Estefany Carcamo  [id number]      Current Outpatient Medications on File Prior to Encounter   Medication Sig Dispense Refill Last Dose/Taking    allopurinoL (ZYLOPRIM) 100 MG tablet Take 100 mg by mouth once daily.   1/28/2025    aspirin (ECOTRIN) 81 MG EC tablet Take 81 mg by mouth once daily.   1/28/2025    cetirizine (ZYRTEC) 10 MG tablet Take 10 mg by mouth once daily.   1/28/2025    folic acid (FOLVITE) 1 MG tablet Take 1 tablet (1,000 mcg total) by mouth once daily. 30 tablet 5 1/28/2025    furosemide (LASIX) 20 MG tablet Take 20 mg by mouth once daily.    1/28/2025    gabapentin (NEURONTIN) 300 MG capsule Take 300 mg by mouth 3 (three) times daily.   1/27/2025    levothyroxine (SYNTHROID) 50 MCG tablet Take 50 mcg by mouth before breakfast.   1/28/2025    " magnesium oxide (MAG-OX) 400 mg (241.3 mg magnesium) tablet Take 400 mg by mouth 2 (two) times daily.   1/27/2025    metoprolol tartrate (LOPRESSOR) 100 MG tablet Take 100 mg by mouth 2 (two) times daily.   1/28/2025    potassium citrate (UROCIT-K) 10 mEq (1,080 mg) TbSR Take 10 mEq by mouth once daily.   1/27/2025    albuterol (VENTOLIN HFA) 90 mcg/actuation inhaler Inhale 2 puffs into the lungs every 6 (six) hours as needed for Wheezing. 1 Inhaler 11 Unknown    alendronate (FOSAMAX) 70 MG tablet Take 1 tablet (70 mg total) by mouth every 7 days. 4 tablet 11 Unknown    SENNA 8.6 mg tablet Take 2 tablets by mouth 2 (two) times daily as needed.   Unknown                           .

## 2025-01-28 NOTE — CONSULTS
[facility] - Emergency Dept.  Wound Care    Patient Name:  Wendy Ro   MRN:  [id number]  Date: 1/28/2025  Diagnosis: <principal problem not specified>    History:     Past Medical History:   Diagnosis Date    Arthritis     B12 deficiency 3/28/2019    Chest pain 2012    past Hx, turned out to be asthma, gerd, stress test reported unremarkable per pt    Chronic gout     Chronic renal insufficiency, stage III (moderate)     Dr. Harrell    Class 3 severe obesity due to excess calories with serious comorbidity in adult 1/31/2012    The patient presents with obesity.  Denies bulimia, amenorrhea, cold intolerance, edema, hip pain, hirsutism, knee pain, polydipsia, polyuria, thirst and weakness.  The patient does not perform regular exercise.  Previous treatments for obesity :self-directed dieting without success.  The patient and I discussed the importance of exercise.   Wt Readings from Last 4 Encounters: 03/11/19 113.3 kg (2    Combined hyperlipidemia associated with type 2 diabetes mellitus     Concussion 07/28/2016    DM (diabetes mellitus) type II controlled with renal manifestation     DM (diabetes mellitus) type II controlled, neurological manifestation     no meds diet controlled//    Fatty liver     Gastroparesis     GERD (gastroesophageal reflux disease) 10/20/2014    UGI performed at [facility].    Hiatal hernia     Hypertension associated with diabetes     Hypothyroid 7/10/2012    Hypothyroidism     Intermittent asthma     last problem was around 2012    Osteoporosis     Osteoporosis 11/30/2016    Peripheral autonomic neuropathy due to diabetes mellitus     PONV (postoperative nausea and vomiting)     Severe, prefers Zofran, avoid narcotics if possible    Rheumatoid arthritis     on steroid daily    Sleep apnea     not compliant with BiPap, sleeps with HOB up    Thyroid nodule        Social History     Socioeconomic History    Marital status:    Tobacco Use    Smoking status: Passive Smoke Exposure - Never  "Smoker    Smokeless tobacco: Never   Substance and Sexual Activity    Alcohol use: No    Drug use: No     Social Drivers of Health     Food Insecurity: Unknown (9/14/2024)    Received from [facility]    Hunger Vital Sign     Ran Out of Food in the Last Year: Never true   Transportation Needs: Unknown (9/14/2024)    Received from [facility]    PRAPARE - Transportation     Lack of Transportation (Medical): No   Housing Stability: Unknown (9/14/2024)    Received from [facility]    Housing Stability Vital Sign     Homeless in the Last Year: No       Precautions:     Allergies as of 01/28/2025 - Reviewed 01/28/2025   Allergen Reaction Noted    Adenosine  01/06/2012    Codeine Nausea And Vomiting 01/31/2005    Duloxetine  05/04/2015    Hydrocodone-acetaminophen Nausea And Vomiting 02/22/2011    Keflex  [cephalexin] Nausea Only 12/02/2010    Macrolide antibiotics  05/04/2015    Opioids - morphine analogues  05/04/2015    Opium (anthroposophic)  05/04/2015    Promethazine Nausea And Vomiting 02/24/2011    Tramadol  05/04/2015       WO Assessment Details/Treatment     Consulted on this 83 y/o F patient due to present on admission wound to sacrum. Patient admitted from home via ambulance for evaluation of sacral wound. Daughter present at bedside. Reports hx of "white pus" from wound 1 week ago, but that wound was evaluated by HH nurse Friday with no reports of worsening. Daughter reports she viewed wound Sunday and noted severity as change from prior. She reports patient is bedbound at baseline, had recent left ankle fracture (had cast removed the beginning of this month), and has chronic indwelling Bo[place] catheter that was just changed 1 week ago.    Today, patient is seen in ER. She is awake and alert, follows commands. C/o some pain to BLE during care.    -Skin tear to Right forearm noted with full thickness tissue loss, jagged edges, and hypergranulation tissue to wound bed. "   -Ulceration noted to left clavicle that measures 1.5x1.5x0.1cm. wound bed black eschar and some red moist tissue also present, maroon surrounding. Unknown etiology of this ulceration, patient reports suspected wart that was picked at.   These wounds are cleansed with vashe and allowed to dwell, patted dry. Intact [place] wound skin painted with cavilon, and wounds dressed with aquacel ag advantage and foam dressings. Recommend change dressing twice weekly by nursing staff.  -BLE with dry flaky peeling scales of skin noted, much of this is easily removed while cleansing.   -DTPI noted to left lateral calf with multiple well defined patches of maroon discoloration, entire area measures 8x2cm. Painted with cavilon and left DILEEP.  -DTPI ntoed to right lateral calf that measures 3x1cm. Painted with cavilon and left DILEEP.  -Right heel DTPI noted with maroon discoloration under intact callus. Measures 2.5x2cm. Painted with cavilon, heel foam dressing applied.  -Left heel intact. Painted with cavilon  -patient then turned for sacral assessment.  Unstageable pressure injury noted to left sacrum that measures [id number] with 0.4cm undermining noted from 3-6 o'clock. Small amount creamy tan drainage noted on dressing (ER staff reported moderate amount tan creamy drainage on arrival). Wound bed is 50% black slough and eschar and 50% moist yellow and tan slough and adipose tissue. Malodorous. Surrounding skin is maroon, edges are irregular. Due to overall appearance and condition of wound, suspect with continue to evolve and worsen with increased necrotic tissue.   At this visit, cleansed with vashe and allowed to dwell, patted dry. Vashe moistened gauze dressing applied and secured with large sacral foam dressing. Recommend general surgery consult to consider debridement of this unstageable sacral pressure injury. Patient and daughter updated in room on findings and recommendations, and secure chat   message to Dr. San.          Patient is currently in ER on stretcher. Will order sizewise/agiliti specialty [employer] ADRIANA bed.     Heel offloading boots applied and turned with foam wedge at this visit.    Recommendations made to primary team for wound care, pressure injury prevention interventions; consult general surgery for debridement of sacral unstageable pressure injury, family to consider goals of care - may ultimately need fecal diversion if cannot prevent fecal contamination with standard measures post debridement.        01/28/25 1410   WOCN Assessment   WOCN Total Time (mins) 90   Visit Date 01/28/25   Visit Time 1320   Consult Type New   WOCN Speciality Wound;Continence   Wound pressure;deep tissue injury;skin tear;At risk for pressure Injury;other   Continence Type Urinary  (chronic indwelling urinary catheter)   Intervention assessed;applied;chart review;coordination of care;orders;team conference;consult other service   Teaching on-going   Pressure Injury Prevention    Check Moisture Management Pad Done   Sacral Foam Dressing Apply  (treatment dressing\)   Heel protection technique Heel boot   Heel preventative measures Apply   Check Medical Devices Done        Wound 01/28/25 1122 Pressure Injury Left Sacral spine   Date First Assessed/Time First Assessed: 01/28/25 1122   Present on Original Admission: Yes  Primary Wound Type: Pressure Injury  Side: Left  Location: Sacral spine  Is this injury device related?: No   Wound Image     Pressure Injury Stage U   Dressing Appearance Moist drainage   Drainage Amount Small   Drainage Characteristics/Odor Tan;Creamy   Appearance Black;Tan;Yellow;Necrotic;Eschar;Slough   Tissue loss description Full thickness   Black (%), Wound Tissue Color 50 %   Yellow (%), Wound Tissue Color 50 %   Periwound Area Maroon;Denuded   Wound Edges Irregular   Wound Length (cm) 6 cm   Wound Width (cm) 9 cm   Wound Depth (cm) 1 cm   Wound Volume (cm^3) 54 cm^3   Wound Surface Area (cm^2) 54 cm^2    Undermining (depth (cm)/location) 0.4cm from 3-6 o'clock   Care Cleansed with:;Antimicrobial agent;Applied:;Skin Barrier   Dressing Applied;Gauze, wet to moist;Foam  (Vashe moistened gauze, foam)   Dressing Change Due 01/29/25        Wound 01/28/25 1123 Pressure Injury Left lateral Calf   Date First Assessed/Time First Assessed: 01/28/25 1123   Primary Wound Type: Pressure Injury  Side: Left  Orientation: lateral  Location: Calf   Wound Image    Pressure Injury Stage DTPI   Dressing Appearance Open to air   Drainage Amount None   Appearance Maroon   Tissue loss description Not applicable   Periwound Area Intact;Dry   Wound Edges Defined   Wound Length (cm) 8 cm   Wound Width (cm) 2 cm   Wound Surface Area (cm^2) 16 cm^2   Care Cleansed with:;Antimicrobial agent;Applied:;Skin Barrier        Wound 01/28/25 Pressure Injury Right lateral Calf   Date First Assessed: 01/28/25   Present on Original Admission: Yes  Primary Wound Type: Pressure Injury  Side: Right  Orientation: lateral  Location: Calf   Wound Image    Pressure Injury Stage DTPI   Dressing Appearance Open to air   Drainage Amount None   Appearance Maroon;Red   Tissue loss description Not applicable   Wound Edges Defined   Wound Length (cm) 3 cm   Wound Width (cm) 1 cm   Wound Surface Area (cm^2) 3 cm^2   Care Cleansed with:;Antimicrobial agent;Applied:;Skin Barrier        Wound 01/28/25 Pressure Injury Right Heel   Date First Assessed: 01/28/25   Present on Original Admission: Yes  Primary Wound Type: Pressure Injury  Side: Right  Location: Heel   Wound Image    Pressure Injury Stage DTPI   Dressing Appearance Open to air   Drainage Amount None   Appearance Maroon;Tan   Wound Edges Callused   Wound Length (cm) 2.5 cm   Wound Width (cm) 2 cm   Wound Surface Area (cm^2) 5 cm^2   Care Cleansed with:;Antimicrobial agent;Applied:;Skin Barrier   Dressing Foam   Dressing Change Due 01/31/25        Wound 01/28/25 Skin Tear Right proximal;lower Arm   Date First  Assessed: 01/28/25   Present on Original Admission: Yes  Primary Wound Type: Skin Tear  Side: Right  Orientation: proximal;lower  Location: Arm   Wound Image    Dressing Appearance Intact;Moist drainage   Drainage Amount Small   Drainage Characteristics/Odor Serosanguineous   Appearance Red;Pink;Moist;Other (see comments);Hypergranulation   Tissue loss description Full thickness   Periwound Area Intact   Wound Edges Jagged   Wound Length (cm) 4 cm   Wound Width (cm) 3 cm   Wound Depth (cm) 0.1 cm   Wound Volume (cm^3) 1.2 cm^3   Wound Surface Area (cm^2) 12 cm^2   Care Cleansed with:;Antimicrobial agent;Applied:;Skin Barrier   Dressing Hydrofiber;Silver;Foam   Dressing Change Due 01/31/25        Wound 01/28/25 Ulceration Left Clavicle   Date First Assessed: 01/28/25   Present on Original Admission: Yes  Primary Wound Type: Ulceration  Side: Left  Location: Clavicle   Wound Image    Dressing Appearance Intact;Moist drainage   Drainage Amount Small   Drainage Characteristics/Odor Serosanguineous   Appearance Black;Eschar;Red;Moist   Tissue loss description Full thickness   Black (%), Wound Tissue Color 75 %   Red (%), Wound Tissue Color 25 %   Periwound Area Maroon   Wound Edges Open   Wound Length (cm) 1.5 cm   Wound Width (cm) 1.5 cm   Wound Depth (cm) 0.1 cm   Wound Volume (cm^3) 0.225 cm^3   Wound Surface Area (cm^2) 2.25 cm^2   Care Cleansed with:;Antimicrobial agent;Applied:;Skin Barrier   Dressing Hydrofiber;Silver;Foam   Dressing Change Due 01/31/25 01/28/2025

## 2025-01-28 NOTE — ASSESSMENT & PLAN NOTE
Admit to medicine  Okay for diet today. NPO after midnight for debridement in the OR in the morning.  IVF, IV antibiotics  Remainder of care per primary

## 2025-01-28 NOTE — ED PROVIDER NOTES
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 01/30/25 11:05 AM   Result Value Ref Range    Vancomycin, Random 26.5 Not established ug/mL   CBC Auto Differential    Collection Time: 01/31/25  6:31 AM   Result Value Ref Range    WBC 8.08 3.90 - 12.70 K/uL    RBC 2.51 (L) 4.00 - 5.40 M/uL    Hemoglobin 7.8 (L) 12.0 - 16.0 g/dL    Hematocrit 25.3 (L) 37.0 - 48.5 %     (H) 82 - 98 fL    MCH 31.1 (H) 27.0 - 31.0 pg    MCHC 30.8 (L) 32.0 - 36.0 g/dL    RDW 13.6 11.5 - 14.5 %    Platelets 261 150 - 450 K/uL    MPV 10.3 9.2 - 12.9 fL    Immature Granulocytes 0.4 0.0 - 0.5 %    Gran # (ANC) 4.5 1.8 - 7.7 K/uL    Immature Grans (Abs) 0.03 0.00 - 0.04 K/uL    Lymph # 2.1 1.0 - 4.8 K/uL    Mono # 0.5 0.3 - 1.0 K/uL    Eos # 0.9 (H) 0.0 - 0.5 K/uL    Baso # 0.04 0.00 - 0.20 K/uL    nRBC 0 0 /100 WBC    Gran % 55.5 38.0 - 73.0 %    Lymph % 26.4 18.0 - 48.0 %    Mono % 6.7 4.0 - 15.0 %    Eosinophil % 10.5 (H) 0.0 - 8.0 %    Basophil % 0.5 0.0 - 1.9 %    Differential Method Automated    Comprehensive Metabolic Panel    Collection Time: 01/31/25  6:31 AM   Result Value Ref Range    Sodium 138 136 - 145 mmol/L    Potassium 4.1 3.5 - 5.1 mmol/L    Chloride 103 95 - 110 mmol/L    CO2 27 23 - 29 mmol/L    Glucose 97 70 - 110 mg/dL    BUN 21 8 - 23 mg/dL    Creatinine 1.6 (H) 0.5 - 1.4 mg/dL    Calcium 8.8 8.7 - 10.5 mg/dL    Total Protein 5.4 (L) 6.0 - 8.4 g/dL    Albumin 1.8  (L) 3.5 - 5.2 g/dL    Total Bilirubin 0.2 0.1 - 1.0 mg/dL    Alkaline Phosphatase 44 40 - 150 U/L    AST 13 10 - 40 U/L    ALT 8 (L) 10 - 44 U/L    eGFR 32 (A) >60 mL/min/1.73 m^2    Anion Gap 8 8 - 16 mmol/L   Magnesium    Collection Time: 01/31/25  6:31 AM   Result Value Ref Range    Magnesium 2.0 1.6 - 2.6 mg/dL   Phosphorus    Collection Time: 01/31/25  6:31 AM   Result Value Ref Range    Phosphorus 3.1 2.7 - 4.5 mg/dL   CBC Auto Differential    Collection Time: 02/01/25  7:11 AM   Result Value Ref Range    WBC 7.35 3.90 - 12.70 K/uL    RBC 2.62 (L) 4.00 - 5.40 M/uL    Hemoglobin 8.1 (L) 12.0 - 16.0 g/dL    Hematocrit 26.6 (L) 37.0 - 48.5 %     (H) 82 - 98 fL    MCH 30.9 27.0 - 31.0 pg    MCHC 30.5 (L) 32.0 - 36.0 g/dL    RDW 13.8 11.5 - 14.5 %    Platelets 288 150 - 450 K/uL    MPV 10.3 9.2 - 12.9 fL    Immature Granulocytes 0.3 0.0 - 0.5 %    Gran # (ANC) 3.5 1.8 - 7.7 K/uL    Immature Grans (Abs) 0.02 0.00 - 0.04 K/uL    Lymph # 2.4 1.0 - 4.8 K/uL    Mono # 0.6 0.3 - 1.0 K/uL    Eos # 0.9 (H) 0.0 - 0.5 K/uL    Baso # 0.03 0.00 - 0.20 K/uL    nRBC 0 0 /100 WBC    Gran % 47.3 38.0 - 73.0 %    Lymph % 32.7 18.0 - 48.0 %    Mono % 7.6 4.0 - 15.0 %    Eosinophil % 11.7 (H) 0.0 - 8.0 %    Basophil % 0.4 0.0 - 1.9 %    Differential Method Automated    Comprehensive Metabolic Panel    Collection Time: 02/01/25  7:11 AM   Result Value Ref Range    Sodium 136 136 - 145 mmol/L    Potassium 3.7 3.5 - 5.1 mmol/L    Chloride 100 95 - 110 mmol/L    CO2 31 (H) 23 - 29 mmol/L    Glucose 90 70 - 110 mg/dL    BUN 19 8 - 23 mg/dL    Creatinine 1.4 0.5 - 1.4 mg/dL    Calcium 8.8 8.7 - 10.5 mg/dL    Total Protein 5.5 (L) 6.0 - 8.4 g/dL    Albumin 1.9 (L) 3.5 - 5.2 g/dL    Total Bilirubin 0.2 0.1 - 1.0 mg/dL    Alkaline Phosphatase 44 40 - 150 U/L    AST 10 10 - 40 U/L    ALT 6 (L) 10 - 44 U/L    eGFR 37 (A) >60 mL/min/1.73 m^2    Anion Gap 5 (L) 8 - 16 mmol/L   Magnesium    Collection Time: 02/01/25  7:11 AM   Result Value  Ref Range    Magnesium 1.9 1.6 - 2.6 mg/dL   Phosphorus    Collection Time: 02/01/25  7:11 AM   Result Value Ref Range    Phosphorus 3.1 2.7 - 4.5 mg/dL   CBC Auto Differential    Collection Time: 02/02/25  6:28 AM   Result Value Ref Range    WBC 7.22 3.90 - 12.70 K/uL    RBC 2.51 (L) 4.00 - 5.40 M/uL    Hemoglobin 7.7 (L) 12.0 - 16.0 g/dL    Hematocrit 25.6 (L) 37.0 - 48.5 %     (H) 82 - 98 fL    MCH 30.7 27.0 - 31.0 pg    MCHC 30.1 (L) 32.0 - 36.0 g/dL    RDW 14.0 11.5 - 14.5 %    Platelets 300 150 - 450 K/uL    MPV 10.1 9.2 - 12.9 fL    Immature Granulocytes 0.3 0.0 - 0.5 %    Gran # (ANC) 3.1 1.8 - 7.7 K/uL    Immature Grans (Abs) 0.02 0.00 - 0.04 K/uL    Lymph # 2.7 1.0 - 4.8 K/uL    Mono # 0.5 0.3 - 1.0 K/uL    Eos # 0.8 (H) 0.0 - 0.5 K/uL    Baso # 0.05 0.00 - 0.20 K/uL    nRBC 0 0 /100 WBC    Gran % 43.3 38.0 - 73.0 %    Lymph % 37.7 18.0 - 48.0 %    Mono % 6.6 4.0 - 15.0 %    Eosinophil % 11.4 (H) 0.0 - 8.0 %    Basophil % 0.7 0.0 - 1.9 %    Differential Method Automated    Comprehensive Metabolic Panel    Collection Time: 02/02/25  6:28 AM   Result Value Ref Range    Sodium 137 136 - 145 mmol/L    Potassium 3.8 3.5 - 5.1 mmol/L    Chloride 99 95 - 110 mmol/L    CO2 32 (H) 23 - 29 mmol/L    Glucose 95 70 - 110 mg/dL    BUN 17 8 - 23 mg/dL    Creatinine 1.3 0.5 - 1.4 mg/dL    Calcium 8.7 8.7 - 10.5 mg/dL    Total Protein 5.4 (L) 6.0 - 8.4 g/dL    Albumin 1.9 (L) 3.5 - 5.2 g/dL    Total Bilirubin 0.1 0.1 - 1.0 mg/dL    Alkaline Phosphatase 41 40 - 150 U/L    AST 10 10 - 40 U/L    ALT 8 (L) 10 - 44 U/L    eGFR 41 (A) >60 mL/min/1.73 m^2    Anion Gap 6 (L) 8 - 16 mmol/L   Magnesium    Collection Time: 02/02/25  6:28 AM   Result Value Ref Range    Magnesium 1.8 1.6 - 2.6 mg/dL   Phosphorus    Collection Time: 02/02/25  6:28 AM   Result Value Ref Range    Phosphorus 3.1 2.7 - 4.5 mg/dL         Imaging Results:  Imaging Results              X-Ray Sacrum And Coccyx (Final result)  Result time 01/28/25  13:12:36      Final result by Dariel Fine MD (Timothy) (01/28/25 13:12:36)                   Impression:      No evidence of osteomyelitis      Electronically signed by: Dariel Fine MD  Date:    01/28/2025  Time:    13:12               Narrative:    EXAMINATION:  XR SACRUM AND COCCYX    CLINICAL HISTORY:  Pressure ulcer of unspecified site, stage 4    TECHNIQUE:  Two or three views of the sacrum and coccyx were performed.  Three views.    COMPARISON:  None    FINDINGS:  Previous fusion of the lower spine lumbar spine and lumbosacral junction.  No acute fracture or dislocation.  No acute bony changes.                                       X-Ray Chest AP Portable (Final result)  Result time 01/28/25 12:22:24      Final result by Dariel Fine MD (Timothy) (01/28/25 12:22:24)                   Impression:      No lung infiltrates.      Electronically signed by: Dariel Fine MD  Date:    01/28/2025  Time:    12:22               Narrative:    EXAMINATION:  XR CHEST AP PORTABLE    CLINICAL HISTORY:  , Sepsis;    COMPARISON:  Chest, 04/12/2016    FINDINGS:  One view.  Atherosclerosis of thoracic aorta.  Mild cardiomegaly.  Clear lungs.                                       The EKG was ordered, reviewed, and independently interpreted by the ED provider.  Interpretation time: 12:07 PM  Rate: 98 BPM  Rhythm: normal sinus rhythm  Interpretation: Nonspecific T wave abnormality. No STEMI.             The Emergency Provider reviewed the vital signs and test results, which are outlined above.     ED Discussion       1:36 PM: Discussed pt's case with Dr. Jennings (General Surgery) who recommends getting wound care to evaluate.     1:55 PM: Discussed pt's case with wound care who recommend general surgery consult for debridement    1:56 PM: Discussed case with ASHLEY Lieberman ([facility] Medicine). Dr. Contreras agrees with current care and management of pt and accepts admission.   Admitting Service: [facility]  medicine  Admitting Physician: Dr. Contreras  Admit to: med/tele inpt    1:58 PM: Re-evaluated pt. I have discussed test results, shared treatment plan, and the need for admission with patient and family at bedside. Pt and family express understanding at this time and agree with all information. All questions answered. Pt and family have no further questions or concerns at this time. Pt is ready for admit.            Medical Decision Making  DDX: 1. Infected pressure ulcer 2. Dehydration 3. UTI 4. Sepsis    Clinically has infected sacral decubitus ulcer, xray negative for osteo, CXR normal, wbc normal, kidney function normal, procal and lactate normal, sed rate and crp markedly elevated, also has UTI, [employer] IV antibiotics initiated, wound care consulted and recommended surgical debridement, gen surgery and hospital med consulted for admission    Amount and/or Complexity of Data Reviewed  Independent Historian: caregiver  Labs: ordered. Decision-making details documented in ED Course.  Radiology: ordered. Decision-making details documented in ED Course.  ECG/medicine tests: ordered and independent interpretation performed. Decision-making details documented in ED Course.  Discussion of management or test interpretation with external provider(s): General Surgery and Wound Care    Risk  Prescription drug management.  Decision regarding hospitalization.  Diagnosis or treatment significantly limited by social determinants of health.                ED Medication(s):  Medications   labetaloL injection 20 mg (20 mg Intravenous Given 1/28/25 1249)   meperidine (PF) injection 25 mg (25 mg Intravenous Given 1/28/25 1304)   ondansetron injection 4 mg (4 mg Intravenous Given 1/28/25 1249)   vancomycin 2 g in 0.9% sodium chloride 500 mL IVPB (0 mg Intravenous Stopped 1/28/25 1616)   mupirocin 2 % ointment ( Nasal Given 2/2/25 2059)   vancomycin (VANCOCIN) 1,000 mg in 0.9% NaCl 250 mL IVPB (admixture device) (0 mg  Intravenous Stopped 1/29/25 1318)       Discharge Medication List as of 2/3/2025  2:45 PM        START taking these medications    Details   cefTRIAXone (ROCEPHIN) 2 gram injection Inject 2 g into the vein once daily. for 14 days, Starting Mon 2/3/2025, Until Mon 2/17/2025, No Print      pantoprazole (PROTONIX) 40 MG tablet Take 1 tablet (40 mg total) by mouth once daily., Starting Tue 2/4/2025, Until Wed 2/4/2026, Normal              Contact information for after-discharge care       Durable Medical Equipment       [facility] .    Service: Durable Medical Equipment  Contact information:  [address] [place] [id number]  [phone]                     Dialysis/Infusion       [facility] .    Service: Infusion and IV Therapy  Contact information:  [address] [place] [phone]                     Home Medical Care       [facility] .    Service: Home Health Services  Contact information:  [address] [place] [address] [address]  [facility] [address]  [phone]                                       Scribe Attestation:   Scribe #1: I performed the above scribed service and the documentation accurately describes the services I performed. I attest to the accuracy of the note.     Attending:   Physician Attestation Statement for Scribe #1: I, Patricia San MD, personally performed the services described in this documentation, as scribed by Sami Estrada, in my presence, and it is both accurate and complete.           Clinical Impression       ICD-10-CM ICD-9-CM   1. Decubitus ulcer, stage 4 with infection  L89.94 707.00    L08.9 707.24   2. Screening for cardiovascular condition  Z13.6 V81.2   3. Stage IV decubitus ulcer  L89.94 707.00     707.24   4. Stage IV decubitus ulcer  L89.94 707.00     707.24   5. Urinary tract infection associated with indwelling urethral catheter, initial encounter  T83.511A  996.64    N39.0 599.0   6. Morbid obesity  E66.01 278.01   7. Chronic hypertension  I10 401.9   8. Chest pain  R07.9 786.50   9. Pressure injury of sacral region, unstageable  L89.150 707.03     707.25       Disposition:   Disposition: Admitted  Condition: Patricia Garcia MD  02/10/25 1029
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
## 2025-01-28 NOTE — CONSULTS
Pharmacokinetic Initial Assessment: IV Vancomycin    Assessment/Plan:    Initiate intravenous vancomycin with loading dose of 2000 mg once with subsequent doses when random concentrations are less than 20 mcg/mL  Desired empiric serum trough concentration is 10 to 20 mcg/mL  Draw vancomycin random level on 1/29/25 at 0830 (18 hour level.  Pharmacy will continue to follow and monitor vancomycin.    Please contact pharmacy at extension [phone] for questions regarding this assessment.    Thank you for the consult,   Ingrid Stanley PharmD       Patient brief summary:  Wendy Ro is a 84 y.o. female initiated on antimicrobial therapy with IV Vancomycin for treatment of suspected skin & soft tissue infection    Drug Allergies:   Review of patient's allergies indicates:   Allergen Reactions    Adenosine      Other reaction(s): asthma attack    Codeine Nausea And Vomiting     Other reaction(s): Nausea    Duloxetine      sleepy    Hydrocodone-acetaminophen Nausea And Vomiting     Other reaction(s): Nausea    Keflex  [cephalexin] Nausea Only     Other reaction(s): pt says can take penicillins  Other reaction(s): Nausea    Macrolide antibiotics     Opioids - morphine analogues     Opium (anthroposophic)     Promethazine Nausea And Vomiting     Other reaction(s): Nausea    Tramadol        Actual Body Weight:   82.6 kg    Renal Function:   Estimated Creatinine Clearance: 31.7 mL/min (based on SCr of 1.4 mg/dL).,     Dialysis Method (if applicable):   N/A    CBC (last 72 hours):  Recent Labs   Lab Result Units 01/28/25  1137   WBC K/uL 9.66   Hemoglobin g/dL 10.1*   Hematocrit % 32.6*   Platelets K/uL 259   Gran % % 71.8   Lymph % % 20.2   Mono % % 4.3   Eosinophil % % 3.0   Basophil % % 0.3   Differential Method  Automated       Metabolic Panel (last 72 hours):  Recent Labs   Lab Result Units 01/28/25  1137 01/28/25  1144   Sodium mmol/L 139  --    Potassium mmol/L 4.2  --    Chloride mmol/L 99  --    CO2 mmol/L 27  --   "  Glucose mg/dL 128*  --    Glucose, UA   --  Negative   BUN mg/dL 20  --    Creatinine mg/dL 1.4  --    Albumin g/dL 2.6*  --    Total Bilirubin mg/dL 0.4  --    Alkaline Phosphatase U/L 73  --    AST U/L 22  --    ALT U/L 13  --    Magnesium mg/dL 1.9  --        Drug levels (last 3 results):  No results for input(s): "VANCOMYCINRA", "VANCORANDOM", "VANCOMYCINPE", "VANCOPEAK", "VANCOMYCINTR", "VANCOTROUGH" in the last 72 hours.    Microbiologic Results:  Microbiology Results (last 7 days)       Procedure Component Value Units Date/Time    Urine culture [[id number]] Collected: 01/28/25 1144    Order Status: No result Specimen: Urine Updated: 01/28/25 1158    Blood culture x two cultures. Draw prior to antibiotics. [[id number]] Collected: 01/28/25 1149    Order Status: Sent Specimen: Blood from Peripheral, Forearm, Left     Blood culture x two cultures. Draw prior to antibiotics. [[id number]] Collected: 01/28/25 1133    Order Status: Sent Specimen: Blood from Peripheral, Forearm, Left           Thank you for allowing us to participate in this patient's care.     Ingrid Stanley PharmD 01/28/2025 3:12 PM    "

## 2025-01-28 NOTE — HPI
84 year old female with multiple medical conditions who presented to the ED for evaluation of her sacral decubitus wound.  She has been bed-bound since September after a fall when she fractured her left fibula.  She has been at home on hospice, and she presented here for worsening appearance of the sacral wound.  No leukocytosis on presentation.  She has a chronic indwelling catheter with UTI on presentation.  She has never had the area debrided before.
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
## 2025-01-28 NOTE — PLAN OF CARE
[facility] - Emergency Dept.  Initial Discharge Assessment       Primary Care Provider: Uli James MD    Admission Diagnosis: Decubitus ulcer, stage 4 with infection [L89.94, L08.9]    Admission Date: 1/28/2025  Expected Discharge Date:     Transition of Care Barriers: (P) None    Payor: MEDICARE / Plan: MEDICARE PART A & B / Product Type: Government /     Extended Emergency Contact Information  Primary Emergency Contact: Alexandru Torre   [place]  Mobile Phone: [phone]  Relation: Daughter  Secondary Emergency Contact: Faith Ro  Address: [address]  Mobile Phone: [phone]  Relation: Grandchild    Discharge Plan A: (P) Home         [facility] [place] [facility] [address] [address]  [address] [facility] [address]  Phone: [phone] Fax: [phone]      Initial Assessment (most recent)       Adult Discharge Assessment - 01/28/25 1424          Discharge Assessment    Assessment Type Discharge Planning Assessment     Confirmed/corrected address, phone number and insurance Yes     Confirmed Demographics Correct on Facesheet     Source of Information patient     Does patient/caregiver understand observation status Yes (P)      Communicated ANDREW with patient/caregiver No (P)      Reason For Admission Wound (P)      People in Home alone (P)      Facility Arrived From: Home (P)      Do you expect to return to your current living situation? Yes (P)      Do you have help at home or someone to help you manage your care at home? Yes (P)      Who are your caregiver(s) and their phone number(s)? Itzel Torre  (P)      Prior to hospitilization cognitive status: Alert/Oriented (P)      Current cognitive status: Alert/Oriented (P)      Walking or Climbing Stairs Difficulty yes (P)      Walking or Climbing Stairs ambulation difficulty, requires equipment;ambulation difficulty,  assistance 1 person;ambulation difficulty, dependent (P)      Dressing/Bathing Difficulty yes (P)      Dressing/Bathing bathing difficulty, assistance 1 person;bathing difficulty, dependent;bathing difficulty, requires equipment;dressing difficulty, requires equipment;dressing difficulty, assistance 1 person;dressing difficulty, dependent (P)      Home Accessibility wheelchair accessible (P)      Home Layout Able to live on 1st floor (P)      Equipment Currently Used at Home oxygen;hospital bed;grab bar;wheelchair (P)      Readmission within 30 days? No (P)      Patient currently being followed by outpatient case management? No (P)      Do you currently have service(s) that help you manage your care at home? Yes (P)      Name and Contact number of agency [facility] (P)      Is the pt/caregiver preference to resume services with current agency No (P)      Do you take prescription medications? Yes (P)      Do you have prescription coverage? Yes (P)      Coverage MEDICARE - MEDICARE PART A & B (P)      Do you have any problems affording any of your prescribed medications? No (P)      Is the patient taking medications as prescribed? yes (P)      Who is going to help you get home at discharge? Daughter Alexandru Torre  (P)      How do you get to doctors appointments? family or friend will provide (P)      Are you on dialysis? No (P)      Do you take coumadin? No (P)      Discharge Plan A Home (P)      DME Needed Upon Discharge  none (P)      Discharge Plan discussed with: Patient;Adult children (P)      Transition of Care Barriers None (P)         Physical Activity    On average, how many days per week do you engage in moderate to strenuous exercise (like a brisk walk)? 0 days (P)      On average, how many minutes do you engage in exercise at this level? 0 min (P)         Financial Resource Strain    How hard is it for you to pay for the very basics like food, housing, medical care, and heating? Not  hard at all (P)         Housing Stability    In the last 12 months, was there a time when you were not able to pay the mortgage or rent on time? No (P)      At any time in the past 12 months, were you homeless or living in a shelter (including now)? No (P)         Transportation Needs    Has the lack of transportation kept you from medical appointments, meetings, work or from getting things needed for daily living? No (P)         Food Insecurity    Within the past 12 months, you worried that your food would run out before you got the money to buy more. Never true (P)      Within the past 12 months, the food you bought just didn't last and you didn't have money to get more. Never true (P)         Stress    Do you feel stress - tense, restless, nervous, or anxious, or unable to sleep at night because your mind is troubled all the time - these days? Not at all (P)         Social Isolation    How often do you feel lonely or isolated from those around you?  Never (P)         Alcohol Use    Q1: How often do you have a drink containing alcohol? Never (P)      Q2: How many drinks containing alcohol do you have on a typical day when you are drinking? Patient does not drink (P)      Q3: How often do you have six or more drinks on one occasion? Never (P)         [email or web address]    In the past 12 months has the electric, gas, oil, or water company threatened to shut off services in your home? No (P)         Health Literacy    How often do you need to have someone help you when you read instructions, pamphlets, or other written material from your doctor or pharmacy? Never (P)

## 2025-01-29 ENCOUNTER — ANESTHESIA (OUTPATIENT)
Dept: SURGERY | Facility: HOSPITAL | Age: 85
End: 2025-01-29
Payer: MEDICARE

## 2025-01-29 LAB
ACINETOBACTER CALCOACETICUS/BAUMANNII COMPLEX: NOT DETECTED
ALBUMIN SERPL BCP-MCNC: 2 G/DL (ref 3.5–5.2)
ALP SERPL-CCNC: 55 U/L (ref 40–150)
ALT SERPL W/O P-5'-P-CCNC: 9 U/L (ref 10–44)
ANION GAP SERPL CALC-SCNC: 5 MMOL/L (ref 8–16)
AST SERPL-CCNC: 15 U/L (ref 10–40)
BACTEROIDES FRAGILIS: NOT DETECTED
BASOPHILS # BLD AUTO: 0.03 K/UL (ref 0–0.2)
BASOPHILS NFR BLD: 0.4 % (ref 0–1.9)
BILIRUB SERPL-MCNC: 0.5 MG/DL (ref 0.1–1)
BUN SERPL-MCNC: 20 MG/DL (ref 8–23)
CALCIUM SERPL-MCNC: 9.2 MG/DL (ref 8.7–10.5)
CANDIDA ALBICANS: NOT DETECTED
CANDIDA AURIS: NOT DETECTED
CANDIDA GLABRATA: NOT DETECTED
CANDIDA KRUSEI: NOT DETECTED
CANDIDA PARAPSILOSIS: NOT DETECTED
CANDIDA TROPICALIS: NOT DETECTED
CHLORIDE SERPL-SCNC: 98 MMOL/L (ref 95–110)
CO2 SERPL-SCNC: 34 MMOL/L (ref 23–29)
CREAT SERPL-MCNC: 1.3 MG/DL (ref 0.5–1.4)
CRYPTOCOCCUS NEOFORMANS/GATTII: NOT DETECTED
CTX-M GENE (ESBL PRODUCER): NOT DETECTED
DIFFERENTIAL METHOD BLD: ABNORMAL
ENTEROBACTER CLOACAE COMPLEX: NOT DETECTED
ENTEROBACTERALES: ABNORMAL
ENTEROCOCCUS FAECALIS: NOT DETECTED
ENTEROCOCCUS FAECIUM: NOT DETECTED
EOSINOPHIL # BLD AUTO: 0.4 K/UL (ref 0–0.5)
EOSINOPHIL NFR BLD: 5.6 % (ref 0–8)
ERYTHROCYTE [DISTWIDTH] IN BLOOD BY AUTOMATED COUNT: 13.8 % (ref 11.5–14.5)
ESCHERICHIA COLI: DETECTED
EST. GFR  (NO RACE VARIABLE): 41 ML/MIN/1.73 M^2
GLUCOSE SERPL-MCNC: 100 MG/DL (ref 70–110)
HAEMOPHILUS INFLUENZAE: NOT DETECTED
HCT VFR BLD AUTO: 26 % (ref 37–48.5)
HGB BLD-MCNC: 8 G/DL (ref 12–16)
IMM GRANULOCYTES # BLD AUTO: 0.03 K/UL (ref 0–0.04)
IMM GRANULOCYTES NFR BLD AUTO: 0.4 % (ref 0–0.5)
IMP GENE (CARBAPENEM RESISTANT): NOT DETECTED
KLEBSIELLA AEROGENES: NOT DETECTED
KLEBSIELLA OXYTOCA: NOT DETECTED
KLEBSIELLA PNEUMONIAE GROUP: NOT DETECTED
KPC RESISTANCE GENE (CARBAPENEM): NOT DETECTED
LISTERIA MONOCYTOGENES: NOT DETECTED
LYMPHOCYTES # BLD AUTO: 2 K/UL (ref 1–4.8)
LYMPHOCYTES NFR BLD: 28.1 % (ref 18–48)
MAGNESIUM SERPL-MCNC: 1.8 MG/DL (ref 1.6–2.6)
MCH RBC QN AUTO: 31.5 PG (ref 27–31)
MCHC RBC AUTO-ENTMCNC: 30.8 G/DL (ref 32–36)
MCR-1: NOT DETECTED
MCV RBC AUTO: 102 FL (ref 82–98)
MEC A/C AND MREJ (MRSA): ABNORMAL
MEC A/C: ABNORMAL
MONOCYTES # BLD AUTO: 0.6 K/UL (ref 0.3–1)
MONOCYTES NFR BLD: 8.8 % (ref 4–15)
NDM GENE (CARBAPENEM RESISTANT): NOT DETECTED
NEISSERIA MENINGITIDIS: NOT DETECTED
NEUTROPHILS # BLD AUTO: 4.1 K/UL (ref 1.8–7.7)
NEUTROPHILS NFR BLD: 56.7 % (ref 38–73)
NRBC BLD-RTO: 0 /100 WBC
OHS QRS DURATION: 70 MS
OHS QTC CALCULATION: 423 MS
OXA-48-LIKE (CARBAPENEM RESISTANT): NOT DETECTED
PHOSPHATE SERPL-MCNC: 3.7 MG/DL (ref 2.7–4.5)
PLATELET # BLD AUTO: 232 K/UL (ref 150–450)
PMV BLD AUTO: 10.2 FL (ref 9.2–12.9)
POTASSIUM SERPL-SCNC: 3.9 MMOL/L (ref 3.5–5.1)
PROT SERPL-MCNC: 5.8 G/DL (ref 6–8.4)
PROTEUS SPECIES: NOT DETECTED
PSEUDOMONAS AERUGINOSA: NOT DETECTED
RBC # BLD AUTO: 2.54 M/UL (ref 4–5.4)
SALMONELLA SP: NOT DETECTED
SERRATIA MARCESCENS: NOT DETECTED
SODIUM SERPL-SCNC: 137 MMOL/L (ref 136–145)
STAPHYLOCOCCUS AUREUS: NOT DETECTED
STAPHYLOCOCCUS EPIDERMIDIS: NOT DETECTED
STAPHYLOCOCCUS LUGDUNESIS: NOT DETECTED
STAPHYLOCOCCUS SPECIES: NOT DETECTED
STENOTROPHOMONAS MALTOPHILIA: NOT DETECTED
STREPTOCOCCUS AGALACTIAE: NOT DETECTED
STREPTOCOCCUS PNEUMONIAE: NOT DETECTED
STREPTOCOCCUS PYOGENES: NOT DETECTED
STREPTOCOCCUS SPECIES: NOT DETECTED
VAN A/B (VRE GENE): ABNORMAL
VANCOMYCIN SERPL-MCNC: 18.9 UG/ML
VIM GENE (CARBAPENEM RESISTANT): NOT DETECTED
WBC # BLD AUTO: 7.18 K/UL (ref 3.9–12.7)

## 2025-01-29 PROCEDURE — 25000003 PHARM REV CODE 250: Performed by: STUDENT IN AN ORGANIZED HEALTH CARE EDUCATION/TRAINING PROGRAM

## 2025-01-29 PROCEDURE — 83735 ASSAY OF MAGNESIUM: CPT | Performed by: STUDENT IN AN ORGANIZED HEALTH CARE EDUCATION/TRAINING PROGRAM

## 2025-01-29 PROCEDURE — 11046 DBRDMT MUSC&/FSCA EA ADDL: CPT | Mod: ,,, | Performed by: SURGERY

## 2025-01-29 PROCEDURE — 36415 COLL VENOUS BLD VENIPUNCTURE: CPT | Mod: XB | Performed by: STUDENT IN AN ORGANIZED HEALTH CARE EDUCATION/TRAINING PROGRAM

## 2025-01-29 PROCEDURE — 0KBP0ZZ EXCISION OF LEFT HIP MUSCLE, OPEN APPROACH: ICD-10-PCS | Performed by: SURGERY

## 2025-01-29 PROCEDURE — 36000707: Performed by: SURGERY

## 2025-01-29 PROCEDURE — 80202 ASSAY OF VANCOMYCIN: CPT | Performed by: STUDENT IN AN ORGANIZED HEALTH CARE EDUCATION/TRAINING PROGRAM

## 2025-01-29 PROCEDURE — 37000009 HC ANESTHESIA EA ADD 15 MINS: Performed by: SURGERY

## 2025-01-29 PROCEDURE — 37000008 HC ANESTHESIA 1ST 15 MINUTES: Performed by: SURGERY

## 2025-01-29 PROCEDURE — 63600175 PHARM REV CODE 636 W HCPCS: Performed by: NURSE ANESTHETIST, CERTIFIED REGISTERED

## 2025-01-29 PROCEDURE — 71000033 HC RECOVERY, INTIAL HOUR: Performed by: SURGERY

## 2025-01-29 PROCEDURE — 63600175 PHARM REV CODE 636 W HCPCS: Performed by: STUDENT IN AN ORGANIZED HEALTH CARE EDUCATION/TRAINING PROGRAM

## 2025-01-29 PROCEDURE — 0KBN0ZZ EXCISION OF RIGHT HIP MUSCLE, OPEN APPROACH: ICD-10-PCS | Performed by: SURGERY

## 2025-01-29 PROCEDURE — 36000706: Performed by: SURGERY

## 2025-01-29 PROCEDURE — 84100 ASSAY OF PHOSPHORUS: CPT | Performed by: STUDENT IN AN ORGANIZED HEALTH CARE EDUCATION/TRAINING PROGRAM

## 2025-01-29 PROCEDURE — 80053 COMPREHEN METABOLIC PANEL: CPT | Performed by: STUDENT IN AN ORGANIZED HEALTH CARE EDUCATION/TRAINING PROGRAM

## 2025-01-29 PROCEDURE — 11043 DBRDMT MUSC&/FSCA 1ST 20/<: CPT | Mod: ,,, | Performed by: SURGERY

## 2025-01-29 PROCEDURE — 87040 BLOOD CULTURE FOR BACTERIA: CPT | Performed by: STUDENT IN AN ORGANIZED HEALTH CARE EDUCATION/TRAINING PROGRAM

## 2025-01-29 PROCEDURE — 85025 COMPLETE CBC W/AUTO DIFF WBC: CPT | Performed by: STUDENT IN AN ORGANIZED HEALTH CARE EDUCATION/TRAINING PROGRAM

## 2025-01-29 PROCEDURE — 11000001 HC ACUTE MED/SURG PRIVATE ROOM

## 2025-01-29 PROCEDURE — 21400001 HC TELEMETRY ROOM

## 2025-01-29 PROCEDURE — 63600175 PHARM REV CODE 636 W HCPCS: Performed by: SURGERY

## 2025-01-29 PROCEDURE — 25000003 PHARM REV CODE 250: Performed by: NURSE ANESTHETIST, CERTIFIED REGISTERED

## 2025-01-29 RX ORDER — KETOROLAC TROMETHAMINE 30 MG/ML
15 INJECTION, SOLUTION INTRAMUSCULAR; INTRAVENOUS EVERY 8 HOURS PRN
Status: DISCONTINUED | OUTPATIENT
Start: 2025-01-29 | End: 2025-01-29 | Stop reason: HOSPADM

## 2025-01-29 RX ORDER — PANTOPRAZOLE SODIUM 40 MG/1
40 TABLET, DELAYED RELEASE ORAL DAILY
Status: DISCONTINUED | OUTPATIENT
Start: 2025-01-29 | End: 2025-02-03 | Stop reason: HOSPADM

## 2025-01-29 RX ORDER — ETOMIDATE 2 MG/ML
INJECTION INTRAVENOUS
Status: DISCONTINUED | OUTPATIENT
Start: 2025-01-29 | End: 2025-01-29

## 2025-01-29 RX ORDER — KETOROLAC TROMETHAMINE 30 MG/ML
INJECTION, SOLUTION INTRAMUSCULAR; INTRAVENOUS
Status: DISCONTINUED | OUTPATIENT
Start: 2025-01-29 | End: 2025-01-29

## 2025-01-29 RX ORDER — MUPIROCIN 20 MG/G
OINTMENT TOPICAL 2 TIMES DAILY
Status: COMPLETED | OUTPATIENT
Start: 2025-01-29 | End: 2025-02-02

## 2025-01-29 RX ORDER — ONDANSETRON HYDROCHLORIDE 2 MG/ML
INJECTION, SOLUTION INTRAVENOUS
Status: DISCONTINUED | OUTPATIENT
Start: 2025-01-29 | End: 2025-01-29

## 2025-01-29 RX ORDER — ROCURONIUM BROMIDE 10 MG/ML
INJECTION, SOLUTION INTRAVENOUS
Status: DISCONTINUED | OUTPATIENT
Start: 2025-01-29 | End: 2025-01-29

## 2025-01-29 RX ORDER — PROPOFOL 10 MG/ML
VIAL (ML) INTRAVENOUS
Status: DISCONTINUED | OUTPATIENT
Start: 2025-01-29 | End: 2025-01-29

## 2025-01-29 RX ORDER — FENTANYL CITRATE 50 UG/ML
25 INJECTION, SOLUTION INTRAMUSCULAR; INTRAVENOUS EVERY 5 MIN PRN
Status: DISCONTINUED | OUTPATIENT
Start: 2025-01-29 | End: 2025-01-29 | Stop reason: HOSPADM

## 2025-01-29 RX ORDER — ONDANSETRON HYDROCHLORIDE 2 MG/ML
4 INJECTION, SOLUTION INTRAVENOUS DAILY PRN
Status: DISCONTINUED | OUTPATIENT
Start: 2025-01-29 | End: 2025-01-29 | Stop reason: HOSPADM

## 2025-01-29 RX ORDER — LIDOCAINE HYDROCHLORIDE 10 MG/ML
INJECTION, SOLUTION INFILTRATION; PERINEURAL
Status: DISCONTINUED | OUTPATIENT
Start: 2025-01-29 | End: 2025-01-29 | Stop reason: HOSPADM

## 2025-01-29 RX ORDER — BUPIVACAINE HYDROCHLORIDE 2.5 MG/ML
INJECTION, SOLUTION EPIDURAL; INFILTRATION; INTRACAUDAL
Status: DISCONTINUED | OUTPATIENT
Start: 2025-01-29 | End: 2025-01-29 | Stop reason: HOSPADM

## 2025-01-29 RX ADMIN — PIPERACILLIN SODIUM AND TAZOBACTAM SODIUM 4.5 G: 4; .5 INJECTION, POWDER, FOR SOLUTION INTRAVENOUS at 04:01

## 2025-01-29 RX ADMIN — GABAPENTIN 300 MG: 300 CAPSULE ORAL at 09:01

## 2025-01-29 RX ADMIN — FUROSEMIDE 20 MG: 20 TABLET ORAL at 09:01

## 2025-01-29 RX ADMIN — ALLOPURINOL 100 MG: 100 TABLET ORAL at 09:01

## 2025-01-29 RX ADMIN — SODIUM CHLORIDE, SODIUM LACTATE, POTASSIUM CHLORIDE, AND CALCIUM CHLORIDE: .6; .31; .03; .02 INJECTION, SOLUTION INTRAVENOUS at 06:01

## 2025-01-29 RX ADMIN — LEVOTHYROXINE SODIUM 50 MCG: 0.05 TABLET ORAL at 05:01

## 2025-01-29 RX ADMIN — VANCOMYCIN HYDROCHLORIDE 1000 MG: 1 INJECTION, POWDER, LYOPHILIZED, FOR SOLUTION INTRAVENOUS at 11:01

## 2025-01-29 RX ADMIN — PANTOPRAZOLE SODIUM 40 MG: 40 TABLET, DELAYED RELEASE ORAL at 03:01

## 2025-01-29 RX ADMIN — METOPROLOL SUCCINATE 100 MG: 50 TABLET, EXTENDED RELEASE ORAL at 08:01

## 2025-01-29 RX ADMIN — POTASSIUM CITRATE 10 MEQ: 10 TABLET, EXTENDED RELEASE ORAL at 09:01

## 2025-01-29 RX ADMIN — FOLIC ACID 1000 MCG: 1 TABLET ORAL at 09:01

## 2025-01-29 RX ADMIN — ROCURONIUM BROMIDE 50 MG: 10 INJECTION, SOLUTION INTRAVENOUS at 07:01

## 2025-01-29 RX ADMIN — MUPIROCIN: 20 OINTMENT TOPICAL at 08:01

## 2025-01-29 RX ADMIN — MUPIROCIN: 20 OINTMENT TOPICAL at 09:01

## 2025-01-29 RX ADMIN — ETOMIDATE 5 MG: 2 INJECTION INTRAVENOUS at 07:01

## 2025-01-29 RX ADMIN — PROPOFOL 50 MG: 10 INJECTION, EMULSION INTRAVENOUS at 07:01

## 2025-01-29 RX ADMIN — Medication 400 MG: at 08:01

## 2025-01-29 RX ADMIN — ONDANSETRON 4 MG: 2 INJECTION INTRAMUSCULAR; INTRAVENOUS at 07:01

## 2025-01-29 RX ADMIN — METOPROLOL SUCCINATE 100 MG: 50 TABLET, EXTENDED RELEASE ORAL at 09:01

## 2025-01-29 RX ADMIN — GABAPENTIN 300 MG: 300 CAPSULE ORAL at 08:01

## 2025-01-29 RX ADMIN — KETOROLAC TROMETHAMINE 30 MG: 30 INJECTION, SOLUTION INTRAMUSCULAR; INTRAVENOUS at 07:01

## 2025-01-29 RX ADMIN — PIPERACILLIN SODIUM AND TAZOBACTAM SODIUM 4.5 G: 4; .5 INJECTION, POWDER, FOR SOLUTION INTRAVENOUS at 01:01

## 2025-01-29 RX ADMIN — Medication 400 MG: at 09:01

## 2025-01-29 RX ADMIN — GABAPENTIN 300 MG: 300 CAPSULE ORAL at 03:01

## 2025-01-29 RX ADMIN — PIPERACILLIN SODIUM AND TAZOBACTAM SODIUM 4.5 G: 4; .5 INJECTION, POWDER, FOR SOLUTION INTRAVENOUS at 08:01

## 2025-01-29 RX ADMIN — SUGAMMADEX 200 MG: 100 INJECTION, SOLUTION INTRAVENOUS at 07:01

## 2025-01-29 NOTE — PLAN OF CARE
Discussed poc with pt, pt verbalized understanding    Purposeful rounding every 2hours    VS wnl  Cardiac monitoring in use, pt is NSR, tele monitor # [id number]  Fall precautions in place, remains injury free  Pt denies c/o pain  Bed locked at lowest position  Call light within reach    Chart check complete  Will cont with POC

## 2025-01-29 NOTE — SUBJECTIVE & OBJECTIVE
Interval History: No acute events overnight, afebrile, hemodynamically stable. General surgery consulted and patient underwent surgical debridement.     Objective:     Vital Signs (Most Recent):  Temp: 97.6 °F (36.4 °C) (01/29/25 1200)  Pulse: 67 (01/29/25 1431)  Resp: 16 (01/29/25 1200)  BP: (!) 100/55 (01/29/25 1200)  SpO2: (!) 93 % (01/29/25 1200) Vital Signs (24h Range):  Temp:  [96.5 °F (35.8 °C)-99.3 °F (37.4 °C)] 97.6 °F (36.4 °C)  Pulse:  [66-91] 67  Resp:  [12-23] 16  SpO2:  [90 %-100 %] 93 %  BP: (100-165)/(55-68) 100/55     Weight: 82.6 kg (182 lb 1.6 oz)  Body mass index is 31.26 kg/m².    Intake/Output Summary (Last 24 hours) at 1/29/2025 1455  Last data filed at 1/29/2025 0400  Gross per 24 hour   Intake --   Output 350 ml   Net -350 ml         Physical Exam  Vitals and nursing note reviewed.   Constitutional:       General: She is not in acute distress.     Appearance: She is not ill-appearing, toxic-appearing or diaphoretic.   HENT:      Head: Normocephalic and atraumatic.      Mouth/Throat:      Mouth: Mucous membranes are moist.   Eyes:      General: No scleral icterus.        Right eye: No discharge.         Left eye: No discharge.   Cardiovascular:      Rate and Rhythm: Normal rate and regular rhythm.      Heart sounds: Normal heart sounds.   Pulmonary:      Effort: Pulmonary effort is normal. No respiratory distress.      Breath sounds: Normal breath sounds. No wheezing, rhonchi or rales.   Abdominal:      General: Bowel sounds are normal.      Palpations: Abdomen is soft.      Tenderness: There is no abdominal tenderness.   Musculoskeletal:      Cervical back: No rigidity.      Right lower leg: No edema.      Left lower leg: No edema.   Skin:     General: Skin is warm and dry.      Coloration: Skin is not jaundiced.      Comments: Sacral decubitus ulcer present   Neurological:      Mental Status: She is alert and oriented to person, place, and time. Mental status is at baseline.   Psychiatric:          Mood and Affect: Mood normal.         Behavior: Behavior normal.             Significant Labs: All pertinent labs within the past 24 hours have been reviewed.   Recent Labs   Lab 01/28/25  1137 01/29/25  0532    137   K 4.2 3.9   CL 99 98   CO2 27 34*   BUN 20 20   CREATININE 1.4 1.3   * 100   ANIONGAP 13 5*     Recent Labs   Lab 01/28/25 1137 01/29/25  0532   MG 1.9 1.8   PHOS  --  3.7     Recent Labs   Lab 01/28/25 1137 01/29/25  0532   AST 22 15   ALT 13 9*   ALKPHOS 73 55   BILITOT 0.4 0.5   ALBUMIN 2.6* 2.0*    Recent Labs   Lab 01/28/25 1137 01/29/25  0532   WBC 9.66 7.18   HGB 10.1* 8.0*   HCT 32.6* 26.0*    232   GRAN 71.8  6.9 56.7  4.1             Microbiology  Blood Cultures  Lab Results   Component Value Date    LABBLOO Gram stain kwaku bottle: Gram negative rods 01/28/2025    LABBLOO  01/28/2025     Results called to and read back by: Rose Erickson LPN 01/29/2025  14:55    LABBLOO No Growth to date 01/28/2025         Significant Imaging:  I have reviewed all pertinent imaging results/findings within the past 24 hours.   X-Ray Sacrum And Coccyx   Final Result      No evidence of osteomyelitis         Electronically signed by: Dariel Fine MD   Date:    01/28/2025   Time:    13:12      X-Ray Chest AP Portable   Final Result      No lung infiltrates.         Electronically signed by: Dariel Fine MD   Date:    01/28/2025   Time:    12:22          Inpatient Medications:  Continuous Infusions:    Scheduled Meds:   allopurinoL  100 mg Oral Daily    folic acid  1,000 mcg Oral Daily    furosemide  20 mg Oral Daily    gabapentin  300 mg Oral TID    levothyroxine  50 mcg Oral Before breakfast    magnesium oxide  400 mg Oral BID    metoprolol succinate  100 mg Oral BID    mupirocin   Nasal BID    pantoprazole  40 mg Oral Daily    piperacillin-tazobactam (Zosyn) IV (PEDS and ADULTS) (extended infusion is not appropriate)  4.5 g Intravenous Q8H    potassium citrate  10 mEq Oral  Daily       PRN Meds:  Current Facility-Administered Medications:     acetaminophen, 650 mg, Oral, Q6H PRN    albuterol sulfate, 2.5 mg, Nebulization, Q4H PRN    dextrose 50%, 12.5 g, Intravenous, PRN    dextrose 50%, 25 g, Intravenous, PRN    glucagon (human recombinant), 1 mg, Intramuscular, PRN    glucose, 16 g, Oral, PRN    glucose, 24 g, Oral, PRN    hydrALAZINE, 5 mg, Intravenous, Q6H PRN    melatonin, 6 mg, Oral, Nightly PRN    naloxone, 0.02 mg, Intravenous, PRN    ondansetron, 4 mg, Intravenous, Q8H PRN    polyethylene glycol, 17 g, Oral, BID PRN    prochlorperazine, 2.5 mg, Intravenous, Q8H PRN    senna, 2 tablet, Oral, BID PRN    sodium chloride 0.9%, 10 mL, Intravenous, Q12H PRN    Pharmacy to dose Vancomycin consult, , , Once **AND** vancomycin - pharmacy to dose, , Intravenous, pharmacy to manage frequency

## 2025-01-29 NOTE — HPI
"Ms. Wendy Ro is a 84 y.o. female who  has a past medical history of Arthritis, B12 deficiency, Chronic renal insufficiency, stage III (moderate), Class 3 severe obesity due to excess calories with serious comorbidity in adult, Combined hyperlipidemia associated with type 2 diabetes mellitus, DM (diabetes mellitus) type II Fatty liver, Gastroparesis, GERD (gastroesophageal reflux disease), Hiatal hernia, Hypertension associated with diabetes, Hypothyroid, Hypothyroidism, Intermittent asthma, Osteoporosis, Peripheral autonomic neuropathy due to diabetes mellitus, PONV (postoperative nausea and vomiting), Rheumatoid arthritis, Sleep apnea, and Thyroid nodule.    She  presented to the ED for evaluation of worsening sacral wound concerns by daughter.  At baseline, patient is bed-bound since bloody September after a fall when she fractured her left fibula which is being conservatively managed by Orthopedic surgery due to her comorbidities.  She reports the wound has been worsening over the past couple of weeks despite wound care nursing at home.    ED course notable for Hgb 10.1, WBC 9.66, , ESR >120, .2, BUN 20, creatinine 1.4. UA concerning for WBC>100, nitrite positive.  X-ray sacrum and coccyx noted "No evidence of osteomyelitis ".  "

## 2025-01-29 NOTE — ANESTHESIA PROCEDURE NOTES
Intubation    Date/Time: 1/29/2025 7:11 AM    Performed by: Ran Barrera CRNA  Authorized by: Rush Sethi II, MD    Intubation:     Induction:  Intravenous    Intubated:  Postinduction    Mask Ventilation:  Not attempted    Attempts:  1    Attempted By:  CRNA    Method of Intubation:  Video laryngoscopy    Blade:  Yadira 3    Laryngeal View Grade: Grade I - full view of cords      Difficult Airway Encountered?: No      Complications:  None    Airway Device:  Oral endotracheal tube    Airway Device Size:  7.5    Style/Cuff Inflation:  Cuffed (inflated to minimal occlusive pressure)    Inflation Amount (mL):  8    Tube secured:  20    Secured at:  The lips    Placement Verified By:  Capnometry    Complicating Factors:  None    Findings Post-Intubation:  BS equal bilateral

## 2025-01-29 NOTE — ANESTHESIA POSTPROCEDURE EVALUATION
Anesthesia Post Evaluation    Patient: Wendy Ro    Procedure(s) Performed: Procedure(s) (LRB):  DEBRIDEMENT, WOUND, SACRUM (N/A)    Final Anesthesia Type: general      Patient location during evaluation: PACU  Patient participation: Yes- Able to Participate  Level of consciousness: awake and alert  Post-procedure vital signs: reviewed and stable  Pain management: adequate  Airway patency: patent  GRACE mitigation strategies: Verification of full reversal of neuromuscular block  PONV status at discharge: No PONV  Anesthetic complications: no      Cardiovascular status: hemodynamically stable  Respiratory status: spontaneous ventilation  Hydration status: euvolemic  Follow-up not needed.              Vitals Value Taken Time   /67 01/29/25 0838   Temp 36.6 °C (97.8 °F) 01/29/25 0838   Pulse 78 01/29/25 0830   Resp 16 01/29/25 0838   SpO2 95 % 01/29/25 0838         Event Time   Out of Recovery 01/29/2025 08:31:51         Pain/Maureen Score: Pain Rating Prior to Med Admin: 5 (1/28/2025  8:30 PM)  Pain Rating Post Med Admin: 0 (1/28/2025  9:31 PM)  Maureen Score: 9 (1/29/2025  8:30 AM)

## 2025-01-29 NOTE — NURSING
Patient CISCO at shift change, no physical skin assessment was done. Night nurse gave report on patient.

## 2025-01-29 NOTE — HOSPITAL COURSE
"Admitted to Hospital Medicine for evaluation of worsening sacral wound concerns.  Started on empiric coverage with vancomycin/piperacillin tazobactam for sacral ulcer infection and UTI concerns.  Wound care consulted and recommendations for surgical debridement.  General surgery consulted and patient underwent surgical debridement 01/29 eva Jennings. Blood cultures and urine cultures with E coli. ID consulted with OPAT recs for IV Ceftriaxone 21 day course  (EOT  02/18/25) and Gentamicin bladder irrigation twice daily x 7 days. Home Health/Infusion being setup by CM/SW. Anticipate discharge home with HH once arrangements completed.  Home IV antibiotics set up.  Rocephin through a PICC line until 02/18/2025.  Home health orders placed at discharge.  Discharged home with HH in stable condition    BP (!) 174/69 (BP Location: Left arm, Patient Position: Lying)   Pulse 69   Temp 97.8 °F (36.6 °C) (Axillary)   Resp 18   Ht 5' 4" (1.626 m)   Wt 82.6 kg (182 lb 1.6 oz)   SpO2 96%   Breastfeeding No   BMI 31.26 kg/m²   "

## 2025-01-29 NOTE — PLAN OF CARE
01/29/25 1109   Post-Acute Status   Post-Acute Authorization HME;Home Health   HME Status Referrals Sent   Home Health Status Referrals Sent   Discharge Delays None known at this time   Discharge Plan   Discharge Plan A Home Health     Sw met with pt and dtr, Alexandru, at bedside to discuss HH needs upon d/c. Pt's dtr stated pt recently on hospice but they rescinded care yesterday and want to d/c home with HH. Sw provided options for HH in her area; preference is [facility] in [place] for services. Pt and dtr also requesting hospital bed since hospice will take back their DME. Sw notified pt/dtr that she is unsure if [facility] can provide hospital bed in pt's home area; Sw to check and notify family. Dtr, Alexandru, stated if [facility] is unable to provide DME then she is aware she will need to go through PCP for orders/arrangements.     Sw requested hospital bed order from Attending MD, Dr. Contreras.     1230 Sw spoke with Jack with [facility]; they can service pt's area with hospital bed delivery. Pending approval and delivery of hospital bed.     1540 hospital bed approved; plan to be delivered to home tomorrow.

## 2025-01-29 NOTE — ASSESSMENT & PLAN NOTE
Patient with Hypoxic Respiratory failure which is Chronic.  she is on home oxygen at 3 LPM. Supplemental oxygen was provided and noted- Oxygen Concentration (%):  [98] 98    .
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
## 2025-01-29 NOTE — ASSESSMENT & PLAN NOTE
History of chronic indwelling urinary catheter  Recent Labs   Lab 01/28/25  1144   COLORU Yellow   APPEARANCEUA Hazy*   PHUR 7.0   SPECGRAV 1.015   PROTEINUA Trace*   GLUCUA Negative   KETONESU Negative   BILIRUBINUA Negative   OCCULTUA Negative   NITRITE Positive*   UROBILINOGEN Negative   LEUKOCYTESUR 3+*   RBCUA 13*   WBCUA >100*   BACTERIA Moderate*   HYALINECASTS 9*       PLAN  Followup urine cultures, deescalate antibiotics as appropriate

## 2025-01-29 NOTE — PROGRESS NOTES
"[facility]  Progress Note    Patient Name: Wendy Ro  MRN: [id number]  Patient Class: IP- Inpatient   Admission Date: 1/28/2025  Length of Stay: 1 days  Attending Physician: Lorne Contreras DO  Primary Care Provider: Joseph Hutchinson MD        Subjective     Principal Problem:Pressure injury of sacral region, unstageable        HPI:  Ms. Wendy Ro is a 84 y.o. female who  has a past medical history of Arthritis, B12 deficiency, Chronic renal insufficiency, stage III (moderate), Class 3 severe obesity due to excess calories with serious comorbidity in adult, Combined hyperlipidemia associated with type 2 diabetes mellitus, DM (diabetes mellitus) type II Fatty liver, Gastroparesis, GERD (gastroesophageal reflux disease), Hiatal hernia, Hypertension associated with diabetes, Hypothyroid, Hypothyroidism, Intermittent asthma, Osteoporosis, Peripheral autonomic neuropathy due to diabetes mellitus, PONV (postoperative nausea and vomiting), Rheumatoid arthritis, Sleep apnea, and Thyroid nodule.    She  presented to the ED for evaluation of worsening sacral wound concerns by daughter.  At baseline, patient is bed-bound since bloody September after a fall when she fractured her left fibula which is being conservatively managed by Orthopedic surgery due to her comorbidities.  She reports the wound has been worsening over the past couple of weeks despite wound care nursing at home.    ED course notable for Hgb 10.1, WBC 9.66, , ESR >120, .2, BUN 20, creatinine 1.4. UA concerning for WBC>100, nitrite positive.  X-ray sacrum and coccyx noted "No evidence of osteomyelitis ".    Overview/Hospital Course:  Admitted to Hospital Medicine for evaluation of worsening sacral wound concerns.  Started on empiric coverage with vancomycin/piperacillin tazobactam for sacral ulcer infection and UTI concerns.  Wound care consulted and recommendations for surgical debridement.  General " surgery consulted and patient underwent surgical debridement.     Blood cultures concerning for Gram-negative rods    Interval History: No acute events overnight, afebrile, hemodynamically stable. General surgery consulted and patient underwent surgical debridement.     Objective:     Vital Signs (Most Recent):  Temp: 97.6 °F (36.4 °C) (01/29/25 1200)  Pulse: 67 (01/29/25 1431)  Resp: 16 (01/29/25 1200)  BP: (!) 100/55 (01/29/25 1200)  SpO2: (!) 93 % (01/29/25 1200) Vital Signs (24h Range):  Temp:  [96.5 °F (35.8 °C)-99.3 °F (37.4 °C)] 97.6 °F (36.4 °C)  Pulse:  [66-91] 67  Resp:  [12-23] 16  SpO2:  [90 %-100 %] 93 %  BP: (100-165)/(55-68) 100/55     Weight: 82.6 kg (182 lb 1.6 oz)  Body mass index is 31.26 kg/m².    Intake/Output Summary (Last 24 hours) at 1/29/2025 0045  Last data filed at 1/29/2025 0400  Gross per 24 hour   Intake --   Output 350 ml   Net -350 ml         Physical Exam  Vitals and nursing note reviewed.   Constitutional:       General: She is not in acute distress.     Appearance: She is not ill-appearing, toxic-appearing or diaphoretic.   HENT:      Head: Normocephalic and atraumatic.      Mouth/Throat:      Mouth: Mucous membranes are moist.   Eyes:      General: No scleral icterus.        Right eye: No discharge.         Left eye: No discharge.   Cardiovascular:      Rate and Rhythm: Normal rate and regular rhythm.      Heart sounds: Normal heart sounds.   Pulmonary:      Effort: Pulmonary effort is normal. No respiratory distress.      Breath sounds: Normal breath sounds. No wheezing, rhonchi or rales.   Abdominal:      General: Bowel sounds are normal.      Palpations: Abdomen is soft.      Tenderness: There is no abdominal tenderness.   Musculoskeletal:      Cervical back: No rigidity.      Right lower leg: No edema.      Left lower leg: No edema.   Skin:     General: Skin is warm and dry.      Coloration: Skin is not jaundiced.      Comments: Sacral decubitus ulcer present   Neurological:       Mental Status: She is alert and oriented to person, place, and time. Mental status is at baseline.   Psychiatric:         Mood and Affect: Mood normal.         Behavior: Behavior normal.             Significant Labs: All pertinent labs within the past 24 hours have been reviewed.   Recent Labs   Lab 01/28/25 1137 01/29/25  0532    137   K 4.2 3.9   CL 99 98   CO2 27 34*   BUN 20 20   CREATININE 1.4 1.3   * 100   ANIONGAP 13 5*     Recent Labs   Lab 01/28/25 1137 01/29/25  0532   MG 1.9 1.8   PHOS  --  3.7     Recent Labs   Lab 01/28/25 1137 01/29/25  0532   AST 22 15   ALT 13 9*   ALKPHOS 73 55   BILITOT 0.4 0.5   ALBUMIN 2.6* 2.0*    Recent Labs   Lab 01/28/25 1137 01/29/25  0532   WBC 9.66 7.18   HGB 10.1* 8.0*   HCT 32.6* 26.0*    232   GRAN 71.8  6.9 56.7  4.1             Microbiology  Blood Cultures  Lab Results   Component Value Date    LABBLOO Gram stain kwaku bottle: Gram negative rods 01/28/2025    LABBLOO  01/28/2025     Results called to and read back by: Rose Erickson LPN 01/29/2025  14:55    LABBLOO No Growth to date 01/28/2025         Significant Imaging:  I have reviewed all pertinent imaging results/findings within the past 24 hours.   X-Ray Sacrum And Coccyx   Final Result      No evidence of osteomyelitis         Electronically signed by: Dariel Fine MD   Date:    01/28/2025   Time:    13:12      X-Ray Chest AP Portable   Final Result      No lung infiltrates.         Electronically signed by: Dariel Fine MD   Date:    01/28/2025   Time:    12:22          Inpatient Medications:  Continuous Infusions:    Scheduled Meds:   allopurinoL  100 mg Oral Daily    folic acid  1,000 mcg Oral Daily    furosemide  20 mg Oral Daily    gabapentin  300 mg Oral TID    levothyroxine  50 mcg Oral Before breakfast    magnesium oxide  400 mg Oral BID    metoprolol succinate  100 mg Oral BID    mupirocin   Nasal BID    pantoprazole  40 mg Oral Daily    piperacillin-tazobactam  "(Zosyn) IV (PEDS and ADULTS) (extended infusion is not appropriate)  4.5 g Intravenous Q8H    potassium citrate  10 mEq Oral Daily       PRN Meds:  Current Facility-Administered Medications:     acetaminophen, 650 mg, Oral, Q6H PRN    albuterol sulfate, 2.5 mg, Nebulization, Q4H PRN    dextrose 50%, 12.5 g, Intravenous, PRN    dextrose 50%, 25 g, Intravenous, PRN    glucagon (human recombinant), 1 mg, Intramuscular, PRN    glucose, 16 g, Oral, PRN    glucose, 24 g, Oral, PRN    hydrALAZINE, 5 mg, Intravenous, Q6H PRN    melatonin, 6 mg, Oral, Nightly PRN    naloxone, 0.02 mg, Intravenous, PRN    ondansetron, 4 mg, Intravenous, Q8H PRN    polyethylene glycol, 17 g, Oral, BID PRN    prochlorperazine, 2.5 mg, Intravenous, Q8H PRN    senna, 2 tablet, Oral, BID PRN    sodium chloride 0.9%, 10 mL, Intravenous, Q12H PRN    Pharmacy to dose Vancomycin consult, , , Once **AND** vancomycin - pharmacy to dose, , Intravenous, pharmacy to manage frequency          Assessment and Plan     * Pressure injury of sacral region, unstageable  Presented to the ED for a family concerns of infection in sacral pressure ulcer.  Patient is bed-bound at baseline due to prior history of fracture that is being managed conservatively due to comorbidities    -X-ray sacrum and coccyx noted "No evidence of osteomyelitis "       PLAN:  Wound care consulted, follow-up recs  General surgery consulted, follow-up recs      UTI (urinary tract infection)  History of chronic indwelling urinary catheter  Recent Labs   Lab 01/28/25  1144   COLORU Yellow   APPEARANCEUA Hazy*   PHUR 7.0   SPECGRAV 1.015   PROTEINUA Trace*   GLUCUA Negative   KETONESU Negative   BILIRUBINUA Negative   OCCULTUA Negative   NITRITE Positive*   UROBILINOGEN Negative   LEUKOCYTESUR 3+*   RBCUA 13*   WBCUA >100*   BACTERIA Moderate*   HYALINECASTS 9*       PLAN  Followup urine cultures, deescalate antibiotics as appropriate      Chronic respiratory failure  Patient with Hypoxic " Respiratory failure which is Chronic.  she is on home oxygen at 3 LPM. Supplemental oxygen was provided and noted- Oxygen Concentration (%):  [98] 98    .     CKD (chronic kidney disease)  Creatine stable for now. BMP reviewed- noted Estimated Creatinine Clearance: 34.1 mL/min (based on SCr of 1.3 mg/dL). according to latest data. Based on current GFR, CKD stage is stage 3 - GFR 30-59.  Monitor UOP and serial BMP and adjust therapy as needed. Renally dose meds. Avoid nephrotoxic medications and procedures.    DM (diabetes mellitus) type II controlled, neurological manifestation  Continue home gabapentin    Chronic gout  Continue home allopurinol      Intermittent asthma  Chronic.  Stable.    PLAN:  Continue home albuterol p.r.n.      Hypothyroidism   Recent thyroid labs reviewed:  Lab Results   Component Value Date    TSH 3.957 10/08/2019    FREET4 1.23 05/20/2014       Hx of Hypothyroidism    PLAN:   Continue home levothyroxine        Hypertension associated with diabetes  Home meds for hypertension were reviewed and noted below.   Home Medications:  Hypertension Medications               furosemide (LASIX) 40 MG tablet Take 20 mg by mouth once daily.    metoprolol tartrate (LOPRESSOR) 100 MG tablet Take 100 mg by mouth 2 (two) times daily.            Patients blood pressure range in the last 24 hours was: BP  Min: 100/55  Max: 203/100.      PLAN:  -While in the hospital, will manage blood pressure as follows; Continue home antihypertensive regimen  -The patient's inpatient anti-hypertensive regimen is listed below:  Current Antihypertensives  metoprolol succinate (TOPROL-XL) 24 hr tablet 100 mg, 2 times daily, Oral  hydrALAZINE injection 5 mg, Every 6 hours PRN, Intravenous  furosemide tablet 20 mg, Daily, Oral  -Will utilize p.r.n. blood pressure medication only if patient's blood pressure greater than 180/110 and she develops symptoms such as worsening chest pain or shortness of breath.          VTE Risk  "Mitigation (From admission, onward)           Ordered     IP VTE HIGH RISK PATIENT  Once         01/28/25 1724     Place sequential compression device  Until discontinued         01/28/25 1724     Reason for No Pharmacological VTE Prophylaxis  Once        Question:  Reasons:  Answer:  Risk of Bleeding  Comment:  Pending surgical procedure    01/28/25 1724                    Discharge Planning   ANDREW: 1/30/2025     Code Status: Full Code   Medical Readiness for Discharge Date:   Discharge Plan A: Home Health   Discharge Delays: None known at this time    Hospital Bed DME justification:   Wendy requires a hospital bed due to her requiring positioning of the body in ways not feasible with an ordinary bed to alleviate pain and due to limited ability and cannot independently make changes in body position without the use of the bed.The positioning of the body cannot be sufficiently resolved by the use of pillows and wedges.    Low air loss mattress DME justification:   Per wound care eval 01/28/25, "Unstageable pressure injury noted to left sacrum that measures [id number] with 0.4cm undermining noted from 3-6 o'clock."     "Wound care routine: Unstageable pressure injury sacrum:  1. Cleanse with vashe and allow to dwell  2. Pat dry  3. Paint intact [place] wound skin with cavilon  4. Apply vashe moistened gauze to cover wound  5. Secure with large sacral foam dressing OR ABD pad and medipore tape  6. Change daily and prn excess drainage  7. Further recommendations from general surgery after debridement"        Lorne Contreras DO  Department of Hospital Medicine   [facility]    Voice recognition software was used in the creation of this note/communication and any sound-alike/typographical errors which may have occurred despite initial review prior to signing should be taken in context when interpreting.  If such errors prevent a clear understanding of the note/communication, please contact the provider/office for " clarification.

## 2025-01-29 NOTE — CONSULTS
Pharmacokinetic Assessment Follow Up: IV Vancomycin    Vancomycin serum concentration assessment(s):    The random level was drawn correctly and can be used to guide therapy at this time. The measurement is within the desired definitive target range of 10 to 20 mcg/mL.    Vancomycin Regimen Plan:    Give Vancomycin 1000 mg IV x 1 dose today.     Re-dose when the random level is less than 20 mcg/mL, next level to be drawn at 1015 on 1/30/25    Drug levels (last 3 results):  Recent Labs   Lab Result Units 01/29/25  0907   Vancomycin, Random ug/mL 18.9     Pharmacy will continue to follow and monitor vancomycin.    Please contact pharmacy at extension [phone] for questions regarding this assessment.    Thank you for the consult,   Ingrid Stanley PharmD       Patient brief summary:  Wendy Ro is a 84 y.o. female initiated on antimicrobial therapy with IV Vancomycin for treatment of  infected pressure injury of sacral region    The patient's current regimen is pulse dosing (dosing by level) when random level is less than 20 mcg/mL.    Drug Allergies:   Review of patient's allergies indicates:   Allergen Reactions    Adenosine      Other reaction(s): asthma attack    Codeine Nausea And Vomiting     Other reaction(s): Nausea    Duloxetine      sleepy    Hydrocodone-acetaminophen Nausea And Vomiting     Other reaction(s): Nausea    Keflex  [cephalexin] Nausea Only     Other reaction(s): pt says can take penicillins  Other reaction(s): Nausea    Macrolide antibiotics     Opioids - morphine analogues     Opium (anthroposophic)     Promethazine Nausea And Vomiting     Other reaction(s): Nausea    Tramadol        Actual Body Weight: 82.6 kg    Renal Function:   Estimated Creatinine Clearance: 34.1 mL/min (based on SCr of 1.3 mg/dL).,     Dialysis Method (if applicable):  N/A    CBC (last 72 hours):  Recent Labs   Lab Result Units 01/28/25  1137 01/29/25  0532   WBC K/uL 9.66 7.18   Hemoglobin g/dL 10.1* 8.0*   Hematocrit %  32.6* 26.0*   Platelets K/uL 259 232   Gran % % 71.8 56.7   Lymph % % 20.2 28.1   Mono % % 4.3 8.8   Eosinophil % % 3.0 5.6   Basophil % % 0.3 0.4   Differential Method  Automated Automated       Metabolic Panel (last 72 hours):  Recent Labs   Lab Result Units 01/28/25  1137 01/28/25  1144 01/29/25  0532   Sodium mmol/L 139  --  137   Potassium mmol/L 4.2  --  3.9   Chloride mmol/L 99  --  98   CO2 mmol/L 27  --  34*   Glucose mg/dL 128*  --  100   Glucose, UA   --  Negative  --    BUN mg/dL 20  --  20   Creatinine mg/dL 1.4  --  1.3   Albumin g/dL 2.6*  --  2.0*   Total Bilirubin mg/dL 0.4  --  0.5   Alkaline Phosphatase U/L 73  --  55   AST U/L 22  --  15   ALT U/L 13  --  9*   Magnesium mg/dL 1.9  --  1.8   Phosphorus mg/dL  --   --  3.7       Vancomycin Administrations:  vancomycin given in the last 96 hours                     vancomycin 2 g in 0.9% sodium chloride 500 mL IVPB (mg) 2,000 mg New Bag 01/28/25 1416                    Microbiologic Results:  Microbiology Results (last 7 days)       Procedure Component Value Units Date/Time    Blood culture x two cultures. Draw prior to antibiotics. [[id number]] Collected: 01/28/25 1133    Order Status: Completed Specimen: Blood from Peripheral, Forearm, Left Updated: 01/29/25 0115     Blood Culture, Routine No Growth to date    Narrative:      Aerobic and anaerobic    Blood culture x two cultures. Draw prior to antibiotics. [[id number]] Collected: 01/28/25 1149    Order Status: Completed Specimen: Blood from Peripheral, Forearm, Left Updated: 01/29/25 0115     Blood Culture, Routine No Growth to date    Narrative:      Aerobic and anaerobic    Urine culture [[id number]] Collected: 01/28/25 1144    Order Status: No result Specimen: Urine Updated: 01/28/25 1158          Thank you for allowing us to participate in this patient's care.     Ingrid Stanley PharmD 01/29/2025 10:14 AM

## 2025-01-29 NOTE — ASSESSMENT & PLAN NOTE
Home meds for hypertension were reviewed and noted below.   Home Medications:  Hypertension Medications               furosemide (LASIX) 40 MG tablet Take 20 mg by mouth once daily.    metoprolol tartrate (LOPRESSOR) 100 MG tablet Take 100 mg by mouth 2 (two) times daily.            Patients blood pressure range in the last 24 hours was: BP  Min: 141/59  Max: 203/100.      PLAN:  -While in the hospital, will manage blood pressure as follows; Continue home antihypertensive regimen  -The patient's inpatient anti-hypertensive regimen is listed below:  Current Antihypertensives  metoprolol succinate (TOPROL-XL) 24 hr tablet 100 mg, 2 times daily, Oral  hydrALAZINE injection 5 mg, Every 6 hours PRN, Intravenous  -Will utilize p.r.n. blood pressure medication only if patient's blood pressure greater than 180/110 and she develops symptoms such as worsening chest pain or shortness of breath.

## 2025-01-29 NOTE — ASSESSMENT & PLAN NOTE
Creatine stable for now. BMP reviewed- noted Estimated Creatinine Clearance: 31.7 mL/min (based on SCr of 1.4 mg/dL). according to latest data. Based on current GFR, CKD stage is stage 3 - GFR 30-59.  Monitor UOP and serial BMP and adjust therapy as needed. Renally dose meds. Avoid nephrotoxic medications and procedures.

## 2025-01-29 NOTE — PLAN OF CARE
Nutrition Recommendations/Interventions 1/29/25:   1. Recommend pt continues on a Regular diet as medically appropriate   2. Recommend 500 mg BID/day vitamin C supplement, per MD/NP   3. Recommend a bowel regimen (no BM x 3 days)   4. Recommend feeding assistance as warranted   5. Weigh twice weekly  6. Collaboration by nutrition professional with other providers     Goals:   1. Pt will tolerate and consume >75% EEN and EPN prior to RD follow up   2. Pt will receive vitamin C supplemt prior to RD follow up   3. Pt will have a BM prior to RD follow up    KEEGAN Gutierres, RDN, LDN

## 2025-01-29 NOTE — ACP (ADVANCE CARE PLANNING)
Advance Care Planning     Date: 01/28/2025    Code Status  In light of the patients advanced and life limiting illness,I engaged the the patient in a voluntary conversation about the patient's preferences for care  at the very end of life.  Along those lines, the patient DOES WISH to have CPR or other invasive treatments performed when her heart and/or breathing stops. I communicated to the patient that she would remain FULL CODE    A total of 15 min was spent on advance care planning, goals of care discussion, emotional support, formulating and communicating prognosis and exploring burden/benefit of various approaches of treatment. This discussion occurred on a fully voluntary basis with the verbal consent of the patient and/or family.

## 2025-01-29 NOTE — ASSESSMENT & PLAN NOTE
Home meds for hypertension were reviewed and noted below.   Home Medications:  Hypertension Medications               furosemide (LASIX) 40 MG tablet Take 20 mg by mouth once daily.    metoprolol tartrate (LOPRESSOR) 100 MG tablet Take 100 mg by mouth 2 (two) times daily.            Patients blood pressure range in the last 24 hours was: BP  Min: 100/55  Max: 203/100.      PLAN:  -While in the hospital, will manage blood pressure as follows; Continue home antihypertensive regimen  -The patient's inpatient anti-hypertensive regimen is listed below:  Current Antihypertensives  metoprolol succinate (TOPROL-XL) 24 hr tablet 100 mg, 2 times daily, Oral  hydrALAZINE injection 5 mg, Every 6 hours PRN, Intravenous  furosemide tablet 20 mg, Daily, Oral  -Will utilize p.r.n. blood pressure medication only if patient's blood pressure greater than 180/110 and she develops symptoms such as worsening chest pain or shortness of breath.

## 2025-01-29 NOTE — PLAN OF CARE
Discussed poc with pt, pt verbalized understanding  Purposeful rounding every 2 hours  VS wnl    Cardiac monitoring in use,  tele monitor #[id number]  Fall precautions in place, remains injury free  Size Wise bed.    Bed locked at lowest position  Call light within reach     Chart check complete  Will cont with POC

## 2025-01-29 NOTE — CONSULTS
[facility] - Med Surg  Adult Nutrition  Consult Note    SUMMARY     Recommendations    Recommendation/Intervention:   1. Recommend pt continues on a Regular diet as medically appropriate   2. Recommend 500 mg BID/day vitamin C supplement, per MD/NP   3. Recommend a bowel regimen (no BM x 3 days)   4. Recommend feeding assistance as warranted   5. Weigh twice weekly    Goals:   1. Pt will tolerate and consume >75% EEN and EPN prior to RD follow up   2. Pt will receive vitamin C supplemt prior to RD follow up   3. Pt will have a BM prior to RD follow up  Nutrition Goal Status: new  Communication of RD Recs: other (comment) (POC, sticky note)    Assessment and Plan    Nutrition Problem  Increased nutrient needs: (energy, vitamin C)  Altered GI function     Related to (etiology):   Increased demand for nutrition   Alteration in GI tract structure and/or function     Signs and Symptoms (as evidenced by):   Decubitus ulcer, Wound healing needs   Constipation (no BM x 3 days), abd cramping     Interventions/Recommendations (treatment strategy):  1. General healthful diet  2. Vitamin C supplement therapy  3. Management of nutrition related prescription medication   4. Feeding assistance management  5. Collaboration by nutrition professional with other providers    Nutrition Diagnosis Status:   New       Malnutrition Assessment     Skin (Micronutrient): bruised, wounds unhealed (incision; Luisito score = 13 (moderate risk)                                 Reason for Assessment    Reason For Assessment: consult, pressure injury/ wound (unstageable pressure injury - sacrum)  Diagnosis:  (Unstageable pressure injury of sacral region)  General Information Comments:   1/29/25: 84 y.o. Female admitted for Unstageable pressure injury of sacral region. PMH: HTN, T2DM, Hypothyroidism, Intermittent asthma, Chronic gout, CKD, Chronic respriatory failure, UTI; Hx: Obesity. Pt is currently on a Regular diet. RD consulted for unstageable  "pressure injury to sacrum. H&P noted that the pt presented to the ED for evaluation of worsening sacral wound concerns by daughter, noted pt has been bed bound since September 2024 after fracture of L fibula d/t fall, pt's daughter reported pt receives wound care at home from RN but PI worsening x couple of weeks PTA. EMR noted that the pt had debridement of sacrum performed today. Visited pt at bedside, pt resting in bed, intermittently falling asleep. Pt reported that she eats 1-2 meals at home d/t she lives alone and "hard to cook for just one person" also has to have someone go to the store for her, confirmed that she does drink ONS's at home sometimes and that she currently has a great appetite and consumed 100% of her lunch, provided pt with a menu to help encourage pt preferred food choices and discussed Suplena as ONS if needed, encourged PO intake at this time. Pt denied N/V/D and chewing/swallowing difficulties, reported some abd cramping d/t constipation. Pt stated her UBW is 150 lbs, visual NFPE performed, noted age related muscle wasting. Per chart: unstageable PI L sacral spine, DTPI bilateral calf/ R arm, skin tear R arm/ulceration L clavicle full thickness tissue loss, detials per Wound care note 1/28/25. Labs, meds, weights reviewed. Weight charted 1/28/25 182 lbs (BMI 31.26, Obese), no current previous weights charted, will perform NFPE at follow up. RD will continue to follow and monitor pt's nutritional status during admit.    Nutrition Discharge Planning: Consistent carbohydrate diet + 500 mg BID/day vitamin C supplement, per MD/NP + Suplena as warranted    Nutrition/Diet History    Nutrition Intake History: 1-2 meals/day + ONS  Spiritual, Cultural Beliefs, Zoroastrianism Practices, Values that Affect Care: no  Food Allergies: NKFA  Factors Affecting Nutritional Intake: None identified at this time    Nutrition Related Social Determinants of Health: SDOH: Adequate food in home environment and None " "Identified    Anthropometrics    Height: 5' 4" (162.6 cm)  Height (inches): 64 in  Weight: 82.6 kg (182 lb 1.6 oz)  Weight (lb): 182.1 lb  Weight Method: Bed Scale  Ideal Body Weight (IBW), Female: 120 lb  % Ideal Body Weight, Female (lb): 151.75 %  BMI (Calculated): 31.2  BMI Grade: 30 - 34.9- obesity - grade I       Wt Readings from Last 15 Encounters:   01/29/25 82.6 kg (182 lb 1.6 oz)   11/17/21 89.4 kg (197 lb)   09/23/21 89.4 kg (197 lb)   03/22/21 94.3 kg (208 lb)   03/11/21 93.9 kg (207 lb 2 oz)   11/30/20 97.5 kg (215 lb)   11/24/20 97.1 kg (214 lb)   06/30/20 109 kg (240 lb 3.2 oz)   06/15/20 110.4 kg (243 lb 6.2 oz)   06/02/20 108 kg (238 lb)   02/21/20 112.4 kg (247 lb 12.8 oz)   02/04/20 110.5 kg (243 lb 9.6 oz)   12/23/19 112.1 kg (247 lb 2.2 oz)   11/19/19 110.5 kg (243 lb 8 oz)   10/08/19 111.6 kg (246 lb)     Lab/Procedures/Meds    Pertinent Labs Reviewed: reviewed  Pertinent Medications Reviewed: reviewed    BMP  Lab Results   Component Value Date     01/29/2025    K 3.9 01/29/2025    CL 98 01/29/2025    CO2 34 (H) 01/29/2025    BUN 20 01/29/2025    CREATININE 1.3 01/29/2025    CALCIUM 9.2 01/29/2025    ANIONGAP 5 (L) 01/29/2025    EGFRNORACEVR 41 (A) 01/29/2025     Lab Results   Component Value Date    CALCIUM 9.2 01/29/2025    PHOS 3.7 01/29/2025      Lab Results   Component Value Date    ALBUMIN 2.0 (L) 01/29/2025     Lab Results   Component Value Date    ALT 9 (L) 01/29/2025    AST 15 01/29/2025    ALKPHOS 55 01/29/2025    BILITOT 0.5 01/29/2025     No results for input(s): "POCTGLUCOSE" in the last 24 hours.    Lab Results   Component Value Date    HGBA1C 6.2 03/04/2024     Lab Results   Component Value Date    WBC 7.18 01/29/2025    HGB 8.0 (L) 01/29/2025    HCT 26.0 (L) 01/29/2025     (H) 01/29/2025     01/29/2025       Scheduled Meds:   allopurinoL  100 mg Oral Daily    folic acid  1,000 mcg Oral Daily    furosemide  20 mg Oral Daily    gabapentin  300 mg Oral TID    " levothyroxine  50 mcg Oral Before breakfast    magnesium oxide  400 mg Oral BID    metoprolol succinate  100 mg Oral BID    mupirocin   Nasal BID    pantoprazole  40 mg Oral Daily    piperacillin-tazobactam (Zosyn) IV (PEDS and ADULTS) (extended infusion is not appropriate)  4.5 g Intravenous Q8H    potassium citrate  10 mEq Oral Daily     Continuous Infusions:  PRN Meds:.  Current Facility-Administered Medications:     acetaminophen, 650 mg, Oral, Q6H PRN    albuterol sulfate, 2.5 mg, Nebulization, Q4H PRN    dextrose 50%, 12.5 g, Intravenous, PRN    dextrose 50%, 25 g, Intravenous, PRN    glucagon (human recombinant), 1 mg, Intramuscular, PRN    glucose, 16 g, Oral, PRN    glucose, 24 g, Oral, PRN    hydrALAZINE, 5 mg, Intravenous, Q6H PRN    melatonin, 6 mg, Oral, Nightly PRN    naloxone, 0.02 mg, Intravenous, PRN    ondansetron, 4 mg, Intravenous, Q8H PRN    polyethylene glycol, 17 g, Oral, BID PRN    prochlorperazine, 2.5 mg, Intravenous, Q8H PRN    senna, 2 tablet, Oral, BID PRN    sodium chloride 0.9%, 10 mL, Intravenous, Q12H PRN    Pharmacy to dose Vancomycin consult, , , Once **AND** vancomycin - pharmacy to dose, , Intravenous, pharmacy to manage frequency      Estimated/Assessed Needs    Weight Used For Calorie Calculations: 82.6 kg (182 lb 1.6 oz)  Energy Calorie Requirements (kcal): 9256-3901 kcals (25-30 kcals/kg ABW (Pressure injury)  Energy Need Method: Kcal/kg  Protein Requirements: 49-66 g (0.6-0.8 g/kg ABW (CKD, no dialysis, diabetic)  Weight Used For Protein Calculations: 82.6 kg (182 lb 1.6 oz)  Fluid Requirements (mL): 3237-6989 mL (1 mL/kcal, per MD/NP)  Estimated Fluid Requirement Method: RDA Method  RDA Method (mL): 2065  CHO Requirement: 258-310 g (3476-2008 kcals/8)      Nutrition Prescription Ordered    Current Diet Order: Regular diet    Evaluation of Received Nutrient/Fluid Intake  I/O: (Net since admit):  1/29/25: -250 mL    Energy Calories Required: meeting needs  Protein Required:  meeting needs  Fluid Required: not meeting needs  Total Fluid Intake (mL): 100  Tolerance: tolerating  % Intake of Estimated Energy Needs: 75 - 100 %  % Meal Intake: 100%    Nutrition Risk    Level of Risk/Frequency of Follow-up: low (F/u x 1 weekly)       Monitor and Evaluation    Food and Nutrient Intake: energy intake, food and beverage intake  Food and Nutrient Adminstration: diet order  Knowledge/Beliefs/Attitudes: food and nutrition knowledge/skill, beliefs and attitudes  Physical Activity and Function: factors affecting access to physical activity  Anthropometric Measurements: weight, weight change, body mass index  Biochemical Data, Medical Tests and Procedures: electrolyte and renal panel, gastrointestinal profile, glucose/endocrine profile  Nutrition-Focused Physical Findings: overall appearance       Nutrition Follow-Up    RD Follow-up?: Yes

## 2025-01-29 NOTE — ASSESSMENT & PLAN NOTE
Patient with Hypoxic Respiratory failure which is Chronic.  she is on home oxygen at 3 LPM. Supplemental oxygen was provided and noted-      .

## 2025-01-29 NOTE — ASSESSMENT & PLAN NOTE
Recent thyroid labs reviewed:  Lab Results   Component Value Date    TSH 3.957 10/08/2019    FREET4 1.23 05/20/2014       Hx of Hypothyroidism    PLAN:   Continue home levothyroxine

## 2025-01-29 NOTE — ASSESSMENT & PLAN NOTE
"Presented to the ED for a family concerns of infection in sacral pressure ulcer.  Patient is bed-bound at baseline due to prior history of fracture that is being managed conservatively due to comorbidities    -X-ray sacrum and coccyx noted "No evidence of osteomyelitis "       PLAN:  Wound care consulted, follow-up recs  General surgery consulted, follow-up recs    "

## 2025-01-29 NOTE — ASSESSMENT & PLAN NOTE
Creatine stable for now. BMP reviewed- noted Estimated Creatinine Clearance: 34.1 mL/min (based on SCr of 1.3 mg/dL). according to latest data. Based on current GFR, CKD stage is stage 3 - GFR 30-59.  Monitor UOP and serial BMP and adjust therapy as needed. Renally dose meds. Avoid nephrotoxic medications and procedures.

## 2025-01-29 NOTE — TRANSFER OF CARE
"Anesthesia Transfer of Care Note    Patient: Wendy Ro    Procedure(s) Performed: Procedure(s) (LRB):  DEBRIDEMENT, WOUND, SACRUM (N/A)    Patient location: PACU    Anesthesia Type: MAC and general    Transport from OR: Transported from OR on room air with adequate spontaneous ventilation    Post pain: adequate analgesia    Post assessment: no apparent anesthetic complications    Post vital signs: stable    Level of consciousness: awake and alert    Nausea/Vomiting: no nausea/vomiting    Complications: none    Transfer of care protocol was followed      Last vitals: Visit Vitals  BP (!) 146/65 (BP Location: Right arm, Patient Position: Lying)   Pulse 69   Temp 36.5 °C (97.7 °F) (Oral)   Resp 16   Ht 5' 4" (1.626 m)   Wt 82.6 kg (182 lb 1.6 oz)   SpO2 99%   Breastfeeding No   BMI 31.26 kg/m²     "

## 2025-01-29 NOTE — PLAN OF CARE
Discussed poc with pt, pt verbalized understanding    Purposeful rounding every 2hours    VSS  Cardiac monitoring in use, pt is NSR  Fall precautions in place, remains injury free  Pt denies c/o nausea and vomiting   Pain under control with PRN meds      Patient continues to refuse q2 turns despite education on the risks and benefits   Abx given as prescribed  Bed locked at lowest position  Call light within reach    Chart check complete  Will cont with POC
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
## 2025-01-30 PROBLEM — R78.81 BACTEREMIA DUE TO ESCHERICHIA COLI: Status: ACTIVE | Noted: 2025-01-30

## 2025-01-30 PROBLEM — B96.20 BACTEREMIA DUE TO ESCHERICHIA COLI: Status: ACTIVE | Noted: 2025-01-30

## 2025-01-30 LAB
ALBUMIN SERPL BCP-MCNC: 1.9 G/DL (ref 3.5–5.2)
ALP SERPL-CCNC: 53 U/L (ref 40–150)
ALT SERPL W/O P-5'-P-CCNC: 9 U/L (ref 10–44)
ANION GAP SERPL CALC-SCNC: 5 MMOL/L (ref 8–16)
AST SERPL-CCNC: 12 U/L (ref 10–40)
BASOPHILS # BLD AUTO: 0.04 K/UL (ref 0–0.2)
BASOPHILS NFR BLD: 0.4 % (ref 0–1.9)
BILIRUB SERPL-MCNC: 0.3 MG/DL (ref 0.1–1)
BUN SERPL-MCNC: 23 MG/DL (ref 8–23)
CALCIUM SERPL-MCNC: 8.7 MG/DL (ref 8.7–10.5)
CHLORIDE SERPL-SCNC: 99 MMOL/L (ref 95–110)
CO2 SERPL-SCNC: 32 MMOL/L (ref 23–29)
CREAT SERPL-MCNC: 1.6 MG/DL (ref 0.5–1.4)
DIFFERENTIAL METHOD BLD: ABNORMAL
EOSINOPHIL # BLD AUTO: 0.6 K/UL (ref 0–0.5)
EOSINOPHIL NFR BLD: 6.3 % (ref 0–8)
ERYTHROCYTE [DISTWIDTH] IN BLOOD BY AUTOMATED COUNT: 13.6 % (ref 11.5–14.5)
EST. GFR  (NO RACE VARIABLE): 32 ML/MIN/1.73 M^2
GLUCOSE SERPL-MCNC: 105 MG/DL (ref 70–110)
HCT VFR BLD AUTO: 25 % (ref 37–48.5)
HGB BLD-MCNC: 7.9 G/DL (ref 12–16)
IMM GRANULOCYTES # BLD AUTO: 0.05 K/UL (ref 0–0.04)
IMM GRANULOCYTES NFR BLD AUTO: 0.5 % (ref 0–0.5)
LYMPHOCYTES # BLD AUTO: 2 K/UL (ref 1–4.8)
LYMPHOCYTES NFR BLD: 21.3 % (ref 18–48)
MAGNESIUM SERPL-MCNC: 2.1 MG/DL (ref 1.6–2.6)
MCH RBC QN AUTO: 32.1 PG (ref 27–31)
MCHC RBC AUTO-ENTMCNC: 31.6 G/DL (ref 32–36)
MCV RBC AUTO: 102 FL (ref 82–98)
MONOCYTES # BLD AUTO: 0.6 K/UL (ref 0.3–1)
MONOCYTES NFR BLD: 6 % (ref 4–15)
NEUTROPHILS # BLD AUTO: 6.1 K/UL (ref 1.8–7.7)
NEUTROPHILS NFR BLD: 65.5 % (ref 38–73)
NRBC BLD-RTO: 0 /100 WBC
PHOSPHATE SERPL-MCNC: 3.1 MG/DL (ref 2.7–4.5)
PLATELET # BLD AUTO: 235 K/UL (ref 150–450)
PMV BLD AUTO: 10.3 FL (ref 9.2–12.9)
POTASSIUM SERPL-SCNC: 4 MMOL/L (ref 3.5–5.1)
PROT SERPL-MCNC: 5.4 G/DL (ref 6–8.4)
RBC # BLD AUTO: 2.46 M/UL (ref 4–5.4)
SODIUM SERPL-SCNC: 136 MMOL/L (ref 136–145)
VANCOMYCIN SERPL-MCNC: 26.5 UG/ML
WBC # BLD AUTO: 9.35 K/UL (ref 3.9–12.7)

## 2025-01-30 PROCEDURE — 97530 THERAPEUTIC ACTIVITIES: CPT

## 2025-01-30 PROCEDURE — 25000003 PHARM REV CODE 250: Performed by: STUDENT IN AN ORGANIZED HEALTH CARE EDUCATION/TRAINING PROGRAM

## 2025-01-30 PROCEDURE — 36415 COLL VENOUS BLD VENIPUNCTURE: CPT | Performed by: STUDENT IN AN ORGANIZED HEALTH CARE EDUCATION/TRAINING PROGRAM

## 2025-01-30 PROCEDURE — 97166 OT EVAL MOD COMPLEX 45 MIN: CPT

## 2025-01-30 PROCEDURE — 63600175 PHARM REV CODE 636 W HCPCS: Performed by: STUDENT IN AN ORGANIZED HEALTH CARE EDUCATION/TRAINING PROGRAM

## 2025-01-30 PROCEDURE — 84100 ASSAY OF PHOSPHORUS: CPT | Performed by: STUDENT IN AN ORGANIZED HEALTH CARE EDUCATION/TRAINING PROGRAM

## 2025-01-30 PROCEDURE — 21400001 HC TELEMETRY ROOM

## 2025-01-30 PROCEDURE — 97163 PT EVAL HIGH COMPLEX 45 MIN: CPT

## 2025-01-30 PROCEDURE — 80202 ASSAY OF VANCOMYCIN: CPT | Performed by: STUDENT IN AN ORGANIZED HEALTH CARE EDUCATION/TRAINING PROGRAM

## 2025-01-30 PROCEDURE — 02HV33Z INSERTION OF INFUSION DEVICE INTO SUPERIOR VENA CAVA, PERCUTANEOUS APPROACH: ICD-10-PCS | Performed by: INTERNAL MEDICINE

## 2025-01-30 PROCEDURE — 80053 COMPREHEN METABOLIC PANEL: CPT | Performed by: STUDENT IN AN ORGANIZED HEALTH CARE EDUCATION/TRAINING PROGRAM

## 2025-01-30 PROCEDURE — 83735 ASSAY OF MAGNESIUM: CPT | Performed by: STUDENT IN AN ORGANIZED HEALTH CARE EDUCATION/TRAINING PROGRAM

## 2025-01-30 PROCEDURE — 85025 COMPLETE CBC W/AUTO DIFF WBC: CPT | Performed by: STUDENT IN AN ORGANIZED HEALTH CARE EDUCATION/TRAINING PROGRAM

## 2025-01-30 RX ORDER — CEFEPIME HYDROCHLORIDE 1 G/1
1 INJECTION, POWDER, FOR SOLUTION INTRAMUSCULAR; INTRAVENOUS
Status: DISCONTINUED | OUTPATIENT
Start: 2025-01-30 | End: 2025-01-31

## 2025-01-30 RX ORDER — METRONIDAZOLE 500 MG/100ML
500 INJECTION, SOLUTION INTRAVENOUS
Status: DISCONTINUED | OUTPATIENT
Start: 2025-01-30 | End: 2025-01-31

## 2025-01-30 RX ADMIN — FUROSEMIDE 20 MG: 20 TABLET ORAL at 09:01

## 2025-01-30 RX ADMIN — GABAPENTIN 300 MG: 300 CAPSULE ORAL at 03:01

## 2025-01-30 RX ADMIN — Medication 400 MG: at 09:01

## 2025-01-30 RX ADMIN — MUPIROCIN: 20 OINTMENT TOPICAL at 09:01

## 2025-01-30 RX ADMIN — PIPERACILLIN SODIUM AND TAZOBACTAM SODIUM 4.5 G: 4; .5 INJECTION, POWDER, FOR SOLUTION INTRAVENOUS at 03:01

## 2025-01-30 RX ADMIN — GABAPENTIN 300 MG: 300 CAPSULE ORAL at 09:01

## 2025-01-30 RX ADMIN — ACETAMINOPHEN 650 MG: 325 TABLET ORAL at 09:01

## 2025-01-30 RX ADMIN — CEFEPIME 1 G: 1 INJECTION, POWDER, FOR SOLUTION INTRAMUSCULAR; INTRAVENOUS at 09:01

## 2025-01-30 RX ADMIN — FOLIC ACID 1000 MCG: 1 TABLET ORAL at 09:01

## 2025-01-30 RX ADMIN — PANTOPRAZOLE SODIUM 40 MG: 40 TABLET, DELAYED RELEASE ORAL at 09:01

## 2025-01-30 RX ADMIN — CEFEPIME 1 G: 1 INJECTION, POWDER, FOR SOLUTION INTRAMUSCULAR; INTRAVENOUS at 08:01

## 2025-01-30 RX ADMIN — METRONIDAZOLE 500 MG: 500 INJECTION, SOLUTION INTRAVENOUS at 09:01

## 2025-01-30 RX ADMIN — METOPROLOL SUCCINATE 100 MG: 50 TABLET, EXTENDED RELEASE ORAL at 09:01

## 2025-01-30 RX ADMIN — GABAPENTIN 300 MG: 300 CAPSULE ORAL at 08:01

## 2025-01-30 RX ADMIN — METOPROLOL SUCCINATE 100 MG: 50 TABLET, EXTENDED RELEASE ORAL at 08:01

## 2025-01-30 RX ADMIN — ALLOPURINOL 100 MG: 100 TABLET ORAL at 09:01

## 2025-01-30 RX ADMIN — METRONIDAZOLE 500 MG: 500 INJECTION, SOLUTION INTRAVENOUS at 03:01

## 2025-01-30 RX ADMIN — Medication 400 MG: at 08:01

## 2025-01-30 RX ADMIN — LEVOTHYROXINE SODIUM 50 MCG: 0.05 TABLET ORAL at 05:01

## 2025-01-30 RX ADMIN — MUPIROCIN: 20 OINTMENT TOPICAL at 08:01

## 2025-01-30 RX ADMIN — POTASSIUM CITRATE 10 MEQ: 10 TABLET, EXTENDED RELEASE ORAL at 09:01

## 2025-01-30 NOTE — PLAN OF CARE
Discussed poc with pt, pt verbalized understanding     Purposeful rounding every 2 hours       VS wnl  Cardiac monitoring in use,tele monitor # [id number]  Fall precautions in place, remains injury free  Abx administered as prescribed.       Accurate I&Os  Bed locked at lowest position  Call light within reach     Chart check complete  Will cont with POC

## 2025-01-30 NOTE — PLAN OF CARE
Discussed poc with pt, pt verbalized understanding    Purposeful rounding every 2hours    VS wnl  Cardiac monitoring in use    Fall precautions in place, remains injury free    Accurate I&Os  Abx given as prescribed  Bed locked at lowest position  Call light within reach    Chart check continued  Will cont with POC

## 2025-01-30 NOTE — PLAN OF CARE
"   01/30/25 1539   Rounds   Attendance Provider;;Charge nurse;Physical therapist   Discharge Plan A Home;Home Health   Why the patient remains in the hospital Requires continued medical care   Transition of Care Barriers None     Assessment and Plan  * Pressure injury of sacral region, unstageable  Presented to the ED for a family concerns of infection in sacral pressure ulcer.  Patient is bed-bound at baseline due to prior history of fracture that is being managed conservatively due to comorbidities     -X-ray sacrum and coccyx noted "No evidence of osteomyelitis "         PLAN:  Wound care consulted, follow-up recs  General surgery consulted, follow-up recs        UTI (urinary tract infection)  History of chronic indwelling urinary catheter      Recent Labs   Lab 01/28/25  1144   COLORU Yellow   APPEARANCEUA Hazy*   PHUR 7.0   SPECGRAV 1.015   PROTEINUA Trace*   GLUCUA Negative   KETONESU Negative   BILIRUBINUA Negative   OCCULTUA Negative   NITRITE Positive*   UROBILINOGEN Negative   LEUKOCYTESUR 3+*   RBCUA 13*   WBCUA >100*   BACTERIA Moderate*   HYALINECASTS 9*         PLAN  Followup urine cultures, deescalate antibiotics as appropriate        Chronic respiratory failure  Patient with Hypoxic Respiratory failure which is Chronic.  she is on home oxygen at 3 LPM. Supplemental oxygen was provided and noted- Oxygen Concentration (%):  [98] 98     .      CKD (chronic kidney disease)  Creatine stable for now. BMP reviewed- noted Estimated Creatinine Clearance: 34.1 mL/min (based on SCr of 1.3 mg/dL). according to latest data. Based on current GFR, CKD stage is stage 3 - GFR 30-59.  Monitor UOP and serial BMP and adjust therapy as needed. Renally dose meds. Avoid nephrotoxic medications and procedures.     DM (diabetes mellitus) type II controlled, neurological manifestation  Continue home gabapentin     Chronic gout  Continue home allopurinol        Intermittent asthma  Chronic.  Stable.   "   PLAN:  Continue home albuterol p.r.n.        Hypothyroidism   Recent thyroid labs reviewed:        Lab Results   Component Value Date     TSH 3.957 10/08/2019     FREET4 1.23 05/20/2014         Hx of Hypothyroidism     PLAN:   Continue home levothyroxine           Hypertension associated with diabetes  Home meds for hypertension were reviewed and noted below.   Home Medications:  Hypertension Medications                    furosemide (LASIX) 40 MG tablet Take 20 mg by mouth once daily.     metoprolol tartrate (LOPRESSOR) 100 MG tablet Take 100 mg by mouth 2 (two) times daily.                Patients blood pressure range in the last 24 hours was: BP  Min: 100/55  Max: 203/100.        PLAN:  -While in the hospital, will manage blood pressure as follows; Continue home antihypertensive regimen  -The patient's inpatient anti-hypertensive regimen is listed below:  Current Antihypertensives  metoprolol succinate (TOPROL-XL) 24 hr tablet 100 mg, 2 times daily, Oral  hydrALAZINE injection 5 mg, Every 6 hours PRN, Intravenous  furosemide tablet 20 mg, Daily, Oral  -Will utilize p.r.n. blood pressure medication only if patient's blood pressure greater than 180/110 and she develops symptoms such as worsening chest pain or shortness of breath.              VTE Risk Mitigation (From admission, onward)              Ordered       IP VTE HIGH RISK PATIENT  Once         01/28/25 1724       Place sequential compression device  Until discontinued         01/28/25 1724       Reason for No Pharmacological VTE Prophylaxis  Once        Question:  Reasons:  Answer:  Risk of Bleeding  Comment:  Pending surgical procedure    01/28/25 1724                          Discharge Planning   ANDREW: 1/30/2025     Code Status: Full Code   Medical Readiness for Discharge Date:   Discharge Plan A: Home Health   Discharge Delays: None known at this time     Hospital Bed DME justification:   Wendy requires a hospital bed due to her requiring positioning  "of the body in ways not feasible with an ordinary bed to alleviate pain and due to limited ability and cannot independently make changes in body position without the use of the bed.The positioning of the body cannot be sufficiently resolved by the use of pillows and wedges.     Low air loss mattress DME justification:   Per wound care eval 01/28/25, "Unstageable pressure injury noted to left sacrum that measures 1y9s3oh with 0.4cm undermining noted from 3-6 o'clock."      "Wound care routine: Unstageable pressure injury sacrum:  1. Cleanse with vashe and allow to dwell  2. Pat dry  3. Paint intact [place] wound skin with cavilon  4. Apply vashe moistened gauze to cover wound  5. Secure with large sacral foam dressing OR ABD pad and medipore tape  6. Change daily and prn excess drainage  7. Further recommendations from general surgery after debridement"     "

## 2025-01-30 NOTE — PLAN OF CARE
Nithin lift ordered by Attending MD, Dr. Contreras, and approved by [facility]. Royce to reach out to family to discuss delivery.

## 2025-01-30 NOTE — PLAN OF CARE
P.T. eval complete. Pt requires total care for all gross func mobility as PLOF and will be D/C from acute P.T. 1/30/2025
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
## 2025-01-30 NOTE — CONSULTS
Pharmacokinetic Assessment Follow Up: IV Vancomycin    Vancomycin serum concentration assessment(s):    The random level was drawn correctly and can be used to guide therapy at this time. The measurement is above the desired definitive target range of 15 to 20 mcg/mL.    Vancomycin Regimen Plan:    Re-dose when the random level is less than 20 mcg/mL, next level to be drawn at 2300 on 1/30    Drug levels (last 3 results):  Recent Labs   Lab Result Units 01/29/25  0907 01/30/25  1105   Vancomycin, Random ug/mL 18.9 26.5       Pharmacy will continue to follow and monitor vancomycin.    Please contact pharmacy at extension [phone] for questions regarding this assessment.    Thank you for the consult,   Agnes Dinero       Patient brief summary:  Wendy Ro is a 84 y.o. female initiated on antimicrobial therapy with IV Vancomycin for treatment of bone/joint infection    The patient's current regimen is pulse dosing (dosing by level) when random level is less than 20 mcg/mL. The patient will not receive a dose following this most recent level of 26.5 mcg/ml. A 36 hour level is scheduled today at 2300 to determine when next dose will be.    Drug Allergies:   Review of patient's allergies indicates:   Allergen Reactions    Adenosine      Other reaction(s): asthma attack    Codeine Nausea And Vomiting     Other reaction(s): Nausea    Duloxetine      sleepy    Hydrocodone-acetaminophen Nausea And Vomiting     Other reaction(s): Nausea    Keflex  [cephalexin] Nausea Only     Other reaction(s): pt says can take penicillins  Other reaction(s): Nausea    Macrolide antibiotics     Opioids - morphine analogues     Opium (anthroposophic)     Promethazine Nausea And Vomiting     Other reaction(s): Nausea    Tramadol        Actual Body Weight:   82.6 kg    Renal Function:   Estimated Creatinine Clearance: 27.7 mL/min (A) (based on SCr of 1.6 mg/dL (H)).,     Dialysis Method (if applicable):  N/A    CBC (last 72 hours):  Recent  Labs   Lab Result Units 01/28/25  1137 01/29/25  0532 01/30/25  0546   WBC K/uL 9.66 7.18 9.35   Hemoglobin g/dL 10.1* 8.0* 7.9*   Hematocrit % 32.6* 26.0* 25.0*   Platelets K/uL 259 232 235   Gran % % 71.8 56.7 65.5   Lymph % % 20.2 28.1 21.3   Mono % % 4.3 8.8 6.0   Eosinophil % % 3.0 5.6 6.3   Basophil % % 0.3 0.4 0.4   Differential Method  Automated Automated Automated       Metabolic Panel (last 72 hours):  Recent Labs   Lab Result Units 01/28/25  1137 01/28/25  1144 01/29/25  0532 01/30/25  0546   Sodium mmol/L 139  --  137 136   Potassium mmol/L 4.2  --  3.9 4.0   Chloride mmol/L 99  --  98 99   CO2 mmol/L 27  --  34* 32*   Glucose mg/dL 128*  --  100 105   Glucose, UA   --  Negative  --   --    BUN mg/dL 20  --  20 23   Creatinine mg/dL 1.4  --  1.3 1.6*   Albumin g/dL 2.6*  --  2.0* 1.9*   Total Bilirubin mg/dL 0.4  --  0.5 0.3   Alkaline Phosphatase U/L 73  --  55 53   AST U/L 22  --  15 12   ALT U/L 13  --  9* 9*   Magnesium mg/dL 1.9  --  1.8 2.1   Phosphorus mg/dL  --   --  3.7 3.1       Vancomycin Administrations:  vancomycin given in the last 96 hours                     vancomycin (VANCOCIN) 1,000 mg in 0.9% NaCl 250 mL IVPB (admixture device) (mg) 1,000 mg New Bag 01/29/25 1148    vancomycin 2 g in 0.9% sodium chloride 500 mL IVPB (mg) 2,000 mg New Bag 01/28/25 1416                    Microbiologic Results:  Microbiology Results (last 7 days)       Procedure Component Value Units Date/Time    Blood culture x two cultures. Draw prior to antibiotics. [[id number]]  (Abnormal) Collected: 01/28/25 1133    Order Status: Completed Specimen: Blood from Peripheral, Forearm, Left Updated: 01/30/25 1325     Blood Culture, Routine Gram stain kwaku bottle: Gram negative rods      Positive results previously called 01/29/2025      ESCHERICHIA COLI  For susceptibility see order # [id number]      Narrative:      Aerobic and anaerobic    Blood culture x two cultures. Draw prior to antibiotics. [[id number]]  (Abnormal)  Collected: 01/28/25 1149    Order Status: Completed Specimen: Blood from Peripheral, Forearm, Left Updated: 01/30/25 1324     Blood Culture, Routine Gram stain kwaku bottle: Gram negative rods      Results called to and read back by: Rose Erickson LPN 01/29/2025  14:55      ESCHERICHIA COLI  Susceptibility pending      Narrative:      Aerobic and anaerobic    Blood culture [[id number]] Collected: 01/29/25 1525    Order Status: Completed Specimen: Blood from Peripheral, Antecubital, Right Updated: 01/30/25 0715     Blood Culture, Routine No Growth to date    Narrative:      [id number]    Blood culture [[id number]] Collected: 01/29/25 1525    Order Status: Completed Specimen: Blood from Peripheral, Hand, Right Updated: 01/30/25 0715     Blood Culture, Routine No Growth to date    Narrative:      [id number]    Urine culture [[id number]]  (Abnormal) Collected: 01/28/25 1144    Order Status: Completed Specimen: Urine Updated: 01/29/25 2044     Urine Culture, Routine GRAM NEGATIVE IAN  50,000 - 99,999 cfu/ml  Identification and susceptibility pending      Narrative:      Specimen Source->Urine    Rapid Organism ID by PCR (from Blood culture) [[id number]]  (Abnormal) Collected: 01/28/25 1149    Order Status: Completed Updated: 01/29/25 1631     Enterococcus faecalis Not Detected     Enterococcus faecium Not Detected     Listeria monocytogenes Not Detected     Staphylococcus spp. Not Detected     Staphylococcus aureus Not Detected     Staphylococcus epidermidis Not Detected     Staphylococcus lugdunensis Not Detected     Streptococcus species Not Detected     Streptococcus agalactiae Not Detected     Streptococcus pneumoniae Not Detected     Streptococcus pyogenes Not Detected     Acinetobacter calcoaceticus/baumannii complex Not Detected     Bacteroides fragilis Not Detected     Enterobacterales See species for ID     Enterobacter cloacae complex Not Detected     Escherichia coli Detected     Klebsiella aerogenes Not Detected      Klebsiella oxytoca Not Detected     Klebsiella pneumoniae group Not Detected     Proteus Not Detected     Salmonella sp Not Detected     Serratia marcescens Not Detected     Haemophilus influenzae Not Detected     Neisseria meningtidis Not Detected     Pseudomonas aeruginosa Not Detected     Stenotrophomonas maltophilia Not Detected     Candida albicans Not Detected     Candida auris Not Detected     Candida glabrata Not Detected     Candida krusei Not Detected     Candida parapsilosis Not Detected     Candida tropicalis Not Detected     Cryptococcus neoformans/gattii Not Detected     CTX-M (ESBL ) Not Detected     IMP (Carbapenem resistant) Not Detected     KPC resistance gene (Carbapenem resistant) Not Detected     mcr-1  Not Detected     mec A/C  Test Not Applicable     mec A/C and MREJ (MRSA) gene Test Not Applicable     NDM (Carbapenem resistant) Not Detected     OXA-48-like (Carbapenem resistant) Not Detected     van A/B (VRE gene) Test Not Applicable     VIM (Carbapenem resistant) Not Detected    Narrative:      Aerobic and anaerobic

## 2025-01-30 NOTE — OP NOTE
[facility] Surg  Surgery Department  Operative Note    SUMMARY     Date of Procedure: 1/29/2025     Procedure: Excision and debridement sacral decubitus ulcer      Surgeons and Role:     * Haile Jennings MD - Primary    Assisting Surgeon: None    Pre-Operative Diagnosis: Sacral Decubitus ulcer    Post-Operative Diagnosis: Sacral decubitus ulcer    Anesthesia: Choice    Operative Findings (including complications, if any): Excision and debridement sacral decubitus ulcer    Description of Technical Procedures: necrotic sacral decubitus ulcer      Estimated Blood Loss (EBL): 20cc           Implants: * No implants in log *    Specimens:   Specimen (24h ago, onward)      None                    Condition: Good    Disposition: PACU - hemodynamically stable.    Procedure in detail:   Patient was brought to the OR and underwent general anesthesia.  She was prepped and draped in usual sterile fashion the prone position.  Bovie electrocautery was used to excise the necrotic tissues associated with the sacral decubitus ulcer.  This included necrotic skin, subcutaneous tissue and muscle fascia.  This was an area approximately 8 cm in diameter by 3 cm deep.  Once debrided back to viable appearing tissue the wound was irrigated and hemostasis noted.  Local anesthetic was injected.  The wound was dressed with wet-to-dry Kerlix gauze.  Bandage was applied.  The patient was transferred to recovery in stable and satisfactory condition.
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
## 2025-01-31 LAB
ALBUMIN SERPL BCP-MCNC: 1.8 G/DL (ref 3.5–5.2)
ALP SERPL-CCNC: 44 U/L (ref 40–150)
ALT SERPL W/O P-5'-P-CCNC: 8 U/L (ref 10–44)
ANION GAP SERPL CALC-SCNC: 8 MMOL/L (ref 8–16)
AST SERPL-CCNC: 13 U/L (ref 10–40)
BACTERIA BLD CULT: ABNORMAL
BACTERIA UR CULT: ABNORMAL
BASOPHILS # BLD AUTO: 0.04 K/UL (ref 0–0.2)
BASOPHILS NFR BLD: 0.5 % (ref 0–1.9)
BILIRUB SERPL-MCNC: 0.2 MG/DL (ref 0.1–1)
BUN SERPL-MCNC: 21 MG/DL (ref 8–23)
CALCIUM SERPL-MCNC: 8.8 MG/DL (ref 8.7–10.5)
CHLORIDE SERPL-SCNC: 103 MMOL/L (ref 95–110)
CO2 SERPL-SCNC: 27 MMOL/L (ref 23–29)
CREAT SERPL-MCNC: 1.6 MG/DL (ref 0.5–1.4)
DIFFERENTIAL METHOD BLD: ABNORMAL
EOSINOPHIL # BLD AUTO: 0.9 K/UL (ref 0–0.5)
EOSINOPHIL NFR BLD: 10.5 % (ref 0–8)
ERYTHROCYTE [DISTWIDTH] IN BLOOD BY AUTOMATED COUNT: 13.6 % (ref 11.5–14.5)
EST. GFR  (NO RACE VARIABLE): 32 ML/MIN/1.73 M^2
GLUCOSE SERPL-MCNC: 97 MG/DL (ref 70–110)
HCT VFR BLD AUTO: 25.3 % (ref 37–48.5)
HGB BLD-MCNC: 7.8 G/DL (ref 12–16)
IMM GRANULOCYTES # BLD AUTO: 0.03 K/UL (ref 0–0.04)
IMM GRANULOCYTES NFR BLD AUTO: 0.4 % (ref 0–0.5)
LYMPHOCYTES # BLD AUTO: 2.1 K/UL (ref 1–4.8)
LYMPHOCYTES NFR BLD: 26.4 % (ref 18–48)
MAGNESIUM SERPL-MCNC: 2 MG/DL (ref 1.6–2.6)
MCH RBC QN AUTO: 31.1 PG (ref 27–31)
MCHC RBC AUTO-ENTMCNC: 30.8 G/DL (ref 32–36)
MCV RBC AUTO: 101 FL (ref 82–98)
MONOCYTES # BLD AUTO: 0.5 K/UL (ref 0.3–1)
MONOCYTES NFR BLD: 6.7 % (ref 4–15)
NEUTROPHILS # BLD AUTO: 4.5 K/UL (ref 1.8–7.7)
NEUTROPHILS NFR BLD: 55.5 % (ref 38–73)
NRBC BLD-RTO: 0 /100 WBC
PHOSPHATE SERPL-MCNC: 3.1 MG/DL (ref 2.7–4.5)
PLATELET # BLD AUTO: 261 K/UL (ref 150–450)
PMV BLD AUTO: 10.3 FL (ref 9.2–12.9)
POTASSIUM SERPL-SCNC: 4.1 MMOL/L (ref 3.5–5.1)
PROT SERPL-MCNC: 5.4 G/DL (ref 6–8.4)
RBC # BLD AUTO: 2.51 M/UL (ref 4–5.4)
SODIUM SERPL-SCNC: 138 MMOL/L (ref 136–145)
WBC # BLD AUTO: 8.08 K/UL (ref 3.9–12.7)

## 2025-01-31 PROCEDURE — 83735 ASSAY OF MAGNESIUM: CPT | Performed by: STUDENT IN AN ORGANIZED HEALTH CARE EDUCATION/TRAINING PROGRAM

## 2025-01-31 PROCEDURE — 84100 ASSAY OF PHOSPHORUS: CPT | Performed by: STUDENT IN AN ORGANIZED HEALTH CARE EDUCATION/TRAINING PROGRAM

## 2025-01-31 PROCEDURE — 36415 COLL VENOUS BLD VENIPUNCTURE: CPT | Performed by: STUDENT IN AN ORGANIZED HEALTH CARE EDUCATION/TRAINING PROGRAM

## 2025-01-31 PROCEDURE — 25000003 PHARM REV CODE 250: Performed by: STUDENT IN AN ORGANIZED HEALTH CARE EDUCATION/TRAINING PROGRAM

## 2025-01-31 PROCEDURE — 63600175 PHARM REV CODE 636 W HCPCS: Performed by: STUDENT IN AN ORGANIZED HEALTH CARE EDUCATION/TRAINING PROGRAM

## 2025-01-31 PROCEDURE — 21400001 HC TELEMETRY ROOM

## 2025-01-31 PROCEDURE — 85025 COMPLETE CBC W/AUTO DIFF WBC: CPT | Performed by: STUDENT IN AN ORGANIZED HEALTH CARE EDUCATION/TRAINING PROGRAM

## 2025-01-31 PROCEDURE — 80053 COMPREHEN METABOLIC PANEL: CPT | Performed by: STUDENT IN AN ORGANIZED HEALTH CARE EDUCATION/TRAINING PROGRAM

## 2025-01-31 RX ORDER — CEFTRIAXONE 2 G/1
2 INJECTION, POWDER, FOR SOLUTION INTRAMUSCULAR; INTRAVENOUS
Status: DISCONTINUED | OUTPATIENT
Start: 2025-02-01 | End: 2025-02-03 | Stop reason: HOSPADM

## 2025-01-31 RX ORDER — GABAPENTIN 300 MG/1
300 CAPSULE ORAL 2 TIMES DAILY
Status: DISCONTINUED | OUTPATIENT
Start: 2025-01-31 | End: 2025-02-03 | Stop reason: HOSPADM

## 2025-01-31 RX ADMIN — GABAPENTIN 300 MG: 300 CAPSULE ORAL at 09:01

## 2025-01-31 RX ADMIN — GABAPENTIN 300 MG: 300 CAPSULE ORAL at 10:01

## 2025-01-31 RX ADMIN — MUPIROCIN: 20 OINTMENT TOPICAL at 10:01

## 2025-01-31 RX ADMIN — MUPIROCIN: 20 OINTMENT TOPICAL at 09:01

## 2025-01-31 RX ADMIN — METOPROLOL SUCCINATE 100 MG: 50 TABLET, EXTENDED RELEASE ORAL at 09:01

## 2025-01-31 RX ADMIN — FOLIC ACID 1000 MCG: 1 TABLET ORAL at 10:01

## 2025-01-31 RX ADMIN — ALLOPURINOL 100 MG: 100 TABLET ORAL at 10:01

## 2025-01-31 RX ADMIN — ACETAMINOPHEN 650 MG: 325 TABLET ORAL at 02:01

## 2025-01-31 RX ADMIN — POTASSIUM CITRATE 10 MEQ: 10 TABLET, EXTENDED RELEASE ORAL at 10:01

## 2025-01-31 RX ADMIN — FUROSEMIDE 20 MG: 20 TABLET ORAL at 10:01

## 2025-01-31 RX ADMIN — ERTAPENEM 500 MG: 1 INJECTION INTRAMUSCULAR; INTRAVENOUS at 10:01

## 2025-01-31 RX ADMIN — METRONIDAZOLE 500 MG: 500 INJECTION, SOLUTION INTRAVENOUS at 12:01

## 2025-01-31 RX ADMIN — LEVOTHYROXINE SODIUM 50 MCG: 0.05 TABLET ORAL at 06:01

## 2025-01-31 RX ADMIN — PANTOPRAZOLE SODIUM 40 MG: 40 TABLET, DELAYED RELEASE ORAL at 10:01

## 2025-01-31 RX ADMIN — Medication 400 MG: at 10:01

## 2025-01-31 RX ADMIN — ACETAMINOPHEN 650 MG: 325 TABLET ORAL at 09:01

## 2025-01-31 RX ADMIN — METOPROLOL SUCCINATE 100 MG: 50 TABLET, EXTENDED RELEASE ORAL at 10:01

## 2025-01-31 NOTE — PROGRESS NOTES
"[facility] Medicine  Progress Note    Patient Name: Wendy Ro  MRN: [id number]  Patient Class: IP- Inpatient   Admission Date: 1/28/2025  Length of Stay: 2 days  Attending Physician: Lorne Contreras DO  Primary Care Provider: Joseph Hutchinson MD        Subjective     Principal Problem:Pressure injury of sacral region, unstageable        HPI:  Ms. Wendy Ro is a 84 y.o. female who  has a past medical history of Arthritis, B12 deficiency, Chronic renal insufficiency, stage III (moderate), Class 3 severe obesity due to excess calories with serious comorbidity in adult, Combined hyperlipidemia associated with type 2 diabetes mellitus, DM (diabetes mellitus) type II Fatty liver, Gastroparesis, GERD (gastroesophageal reflux disease), Hiatal hernia, Hypertension associated with diabetes, Hypothyroid, Hypothyroidism, Intermittent asthma, Osteoporosis, Peripheral autonomic neuropathy due to diabetes mellitus, PONV (postoperative nausea and vomiting), Rheumatoid arthritis, Sleep apnea, and Thyroid nodule.    She  presented to the ED for evaluation of worsening sacral wound concerns by daughter.  At baseline, patient is bed-bound since bloody September after a fall when she fractured her left fibula which is being conservatively managed by Orthopedic surgery due to her comorbidities.  She reports the wound has been worsening over the past couple of weeks despite wound care nursing at home.    ED course notable for Hgb 10.1, WBC 9.66, , ESR >120, .2, BUN 20, creatinine 1.4. UA concerning for WBC>100, nitrite positive.  X-ray sacrum and coccyx noted "No evidence of osteomyelitis ".    Overview/Hospital Course:  Admitted to Hospital Medicine for evaluation of worsening sacral wound concerns.  Started on empiric coverage with vancomycin/piperacillin tazobactam for sacral ulcer infection and UTI concerns.  Wound care consulted and recommendations for surgical debridement.  General " surgery consulted and patient underwent surgical debridement. Blood cultures concerning for E coli, susceptibility pending.  Urine cultures concerning for Gram-negative rods, identification pending    Interval History: No acute events overnight, afebrile, hemodynamically stable. Blood cultures concerning for E coli, susceptibility pending.  Urine cultures concerning for Gram-negative rods, identification pending.     Objective:     Vital Signs (Most Recent):  Temp: 98.1 °F (36.7 °C) (01/30/25 2009)  Pulse: 77 (01/30/25 2009)  Resp: 16 (01/30/25 2009)  BP: 139/65 (01/30/25 2040)  SpO2: (!) 94 % (01/30/25 2009) Vital Signs (24h Range):  Temp:  [97.6 °F (36.4 °C)-98.9 °F (37.2 °C)] 98.1 °F (36.7 °C)  Pulse:  [74-93] 77  Resp:  [16-17] 16  SpO2:  [90 %-95 %] 94 %  BP: (111-142)/(56-67) 139/65     Weight: 82.6 kg (182 lb 1.6 oz)  Body mass index is 31.26 kg/m².    Intake/Output Summary (Last 24 hours) at 1/30/2025 2053  Last data filed at 1/30/2025 0453  Gross per 24 hour   Intake --   Output 450 ml   Net -450 ml         Physical Exam  Vitals and nursing note reviewed.   Constitutional:       General: She is not in acute distress.     Appearance: She is not ill-appearing, toxic-appearing or diaphoretic.   HENT:      Head: Normocephalic and atraumatic.      Mouth/Throat:      Mouth: Mucous membranes are moist.   Eyes:      General: No scleral icterus.        Right eye: No discharge.         Left eye: No discharge.   Cardiovascular:      Rate and Rhythm: Normal rate and regular rhythm.      Heart sounds: Normal heart sounds.   Pulmonary:      Effort: Pulmonary effort is normal. No respiratory distress.      Breath sounds: Normal breath sounds. No wheezing, rhonchi or rales.   Abdominal:      General: Bowel sounds are normal.      Palpations: Abdomen is soft.      Tenderness: There is no abdominal tenderness.   Musculoskeletal:      Cervical back: No rigidity.      Right lower leg: No edema.      Left lower leg: No edema.    Skin:     General: Skin is warm and dry.      Coloration: Skin is not jaundiced.      Comments: Sacral decubitus ulcer present   Neurological:      Mental Status: She is alert and oriented to person, place, and time. Mental status is at baseline.   Psychiatric:         Mood and Affect: Mood normal.         Behavior: Behavior normal.             Significant Labs: All pertinent labs within the past 24 hours have been reviewed.   Recent Labs   Lab 01/28/25 1137 01/29/25  0532 01/30/25  0546    137 136   K 4.2 3.9 4.0   CL 99 98 99   CO2 27 34* 32*   BUN 20 20 23   CREATININE 1.4 1.3 1.6*   * 100 105   ANIONGAP 13 5* 5*     Recent Labs   Lab 01/28/25 1137 01/29/25  0532 01/30/25  0546   MG 1.9 1.8 2.1   PHOS  --  3.7 3.1     Recent Labs   Lab 01/28/25 1137 01/29/25  0532 01/30/25  0546   AST 22 15 12   ALT 13 9* 9*   ALKPHOS 73 55 53   BILITOT 0.4 0.5 0.3   ALBUMIN 2.6* 2.0* 1.9*    Recent Labs   Lab 01/28/25 1137 01/29/25  0532 01/30/25  0546   WBC 9.66 7.18 9.35   HGB 10.1* 8.0* 7.9*   HCT 32.6* 26.0* 25.0*    232 235   GRAN 71.8  6.9 56.7  4.1 65.5  6.1             Microbiology  Blood Cultures  Lab Results   Component Value Date    LABBLOO No Growth to date 01/29/2025    LABBLOO No Growth to date 01/29/2025    LABBLOO Gram stain kwaku bottle: Gram negative rods 01/28/2025    LABBLOO  01/28/2025     Results called to and read back by: Rose Erickson LPN 01/29/2025  14:55    LABBLOO ESCHERICHIA COLI  Susceptibility pending   (A) 01/28/2025         Significant Imaging:  I have reviewed all pertinent imaging results/findings within the past 24 hours.   X-Ray Sacrum And Coccyx   Final Result      No evidence of osteomyelitis         Electronically signed by: Dariel Fine MD   Date:    01/28/2025   Time:    13:12      X-Ray Chest AP Portable   Final Result      No lung infiltrates.         Electronically signed by: Dariel Fine MD   Date:    01/28/2025   Time:    12:22          Inpatient  "Medications:  Continuous Infusions:    Scheduled Meds:   allopurinoL  100 mg Oral Daily    ceFEPime IV (PEDS and ADULTS)  1 g Intravenous Q12H    folic acid  1,000 mcg Oral Daily    furosemide  20 mg Oral Daily    gabapentin  300 mg Oral TID    levothyroxine  50 mcg Oral Before breakfast    magnesium oxide  400 mg Oral BID    metoprolol succinate  100 mg Oral BID    metroNIDAZOLE IV (PEDS and ADULTS)  500 mg Intravenous Q8H    mupirocin   Nasal BID    pantoprazole  40 mg Oral Daily    potassium citrate  10 mEq Oral Daily       PRN Meds:  Current Facility-Administered Medications:     acetaminophen, 650 mg, Oral, Q6H PRN    albuterol sulfate, 2.5 mg, Nebulization, Q4H PRN    dextrose 50%, 12.5 g, Intravenous, PRN    dextrose 50%, 25 g, Intravenous, PRN    glucagon (human recombinant), 1 mg, Intramuscular, PRN    glucose, 16 g, Oral, PRN    glucose, 24 g, Oral, PRN    hydrALAZINE, 5 mg, Intravenous, Q6H PRN    melatonin, 6 mg, Oral, Nightly PRN    naloxone, 0.02 mg, Intravenous, PRN    ondansetron, 4 mg, Intravenous, Q8H PRN    polyethylene glycol, 17 g, Oral, BID PRN    prochlorperazine, 2.5 mg, Intravenous, Q8H PRN    senna, 2 tablet, Oral, BID PRN    sodium chloride 0.9%, 10 mL, Intravenous, Q12H PRN    Pharmacy to dose Vancomycin consult, , , Once **AND** vancomycin - pharmacy to dose, , Intravenous, pharmacy to manage frequency          Assessment and Plan     * Pressure injury of sacral region, unstageable  Presented to the ED for a family concerns of infection in sacral pressure ulcer.  Patient is bed-bound at baseline due to prior history of fracture that is being managed conservatively due to comorbidities    -X-ray sacrum and coccyx noted "No evidence of osteomyelitis "       PLAN:  Wound care consulted, follow-up recs  General surgery consulted and patient underwent debridement, follow-up recs      Bacteremia due to Escherichia coli  Potential source includes sacral wound.    PLAN:  Follow up cultures and " deescalate antibiotics as appropriate      UTI (urinary tract infection)  History of chronic indwelling urinary catheter  Recent Labs   Lab 01/28/25  1144   COLORU Yellow   APPEARANCEUA Hazy*   PHUR 7.0   SPECGRAV 1.015   PROTEINUA Trace*   GLUCUA Negative   KETONESU Negative   BILIRUBINUA Negative   OCCULTUA Negative   NITRITE Positive*   UROBILINOGEN Negative   LEUKOCYTESUR 3+*   RBCUA 13*   WBCUA >100*   BACTERIA Moderate*   HYALINECASTS 9*       PLAN  Followup urine cultures, deescalate antibiotics as appropriate      Chronic respiratory failure  Patient with Hypoxic Respiratory failure which is Chronic.  she is on home oxygen at 3 LPM. Supplemental oxygen was provided and noted-      .     CKD (chronic kidney disease)  Creatine stable for now. BMP reviewed- noted Estimated Creatinine Clearance: 27.7 mL/min (A) (based on SCr of 1.6 mg/dL (H)). according to latest data. Based on current GFR, CKD stage is stage 3 - GFR 30-59.  Monitor UOP and serial BMP and adjust therapy as needed. Renally dose meds. Avoid nephrotoxic medications and procedures.    DM (diabetes mellitus) type II controlled, neurological manifestation  Continue home gabapentin    Chronic gout  Continue home allopurinol      Intermittent asthma  Chronic.  Stable.    PLAN:  Continue home albuterol p.r.n.      Hypothyroidism   Recent thyroid labs reviewed:  Lab Results   Component Value Date    TSH 3.957 10/08/2019    FREET4 1.23 05/20/2014       Hx of Hypothyroidism    PLAN:   Continue home levothyroxine        Hypertension associated with diabetes  Home meds for hypertension were reviewed and noted below.   Home Medications:  Hypertension Medications               furosemide (LASIX) 40 MG tablet Take 20 mg by mouth once daily.    metoprolol tartrate (LOPRESSOR) 100 MG tablet Take 100 mg by mouth 2 (two) times daily.            Patients blood pressure range in the last 24 hours was: BP  Min: 100/55  Max: 203/100.      PLAN:  -While in the hospital,  will manage blood pressure as follows; Continue home antihypertensive regimen  -The patient's inpatient anti-hypertensive regimen is listed below:  Current Antihypertensives  metoprolol succinate (TOPROL-XL) 24 hr tablet 100 mg, 2 times daily, Oral  hydrALAZINE injection 5 mg, Every 6 hours PRN, Intravenous  furosemide tablet 20 mg, Daily, Oral  -Will utilize p.r.n. blood pressure medication only if patient's blood pressure greater than 180/110 and she develops symptoms such as worsening chest pain or shortness of breath.          VTE Risk Mitigation (From admission, onward)           Ordered     IP VTE HIGH RISK PATIENT  Once         01/28/25 1724     Place sequential compression device  Until discontinued         01/28/25 1724     Reason for No Pharmacological VTE Prophylaxis  Once        Question:  Reasons:  Answer:  Risk of Bleeding  Comment:  Pending surgical procedure    01/28/25 1724                    Discharge Planning   ANDREW: 2/1/2025     Code Status: Full Code   Medical Readiness for Discharge Date:   Discharge Plan A: Home, Home Health   Discharge Delays: None known at this time          Lorne Contreras DO  Department of Hospital Medicine   [facility]    Voice recognition software was used in the creation of this note/communication and any sound-alike/typographical errors which may have occurred despite initial review prior to signing should be taken in context when interpreting.  If such errors prevent a clear understanding of the note/communication, please contact the provider/office for clarification.

## 2025-01-31 NOTE — ASSESSMENT & PLAN NOTE
"Presented to the ED for a family concerns of infection in sacral pressure ulcer.  Patient is bed-bound at baseline due to prior history of fracture that is being managed conservatively due to comorbidities    -X-ray sacrum and coccyx noted "No evidence of osteomyelitis "       PLAN:  Wound care consulted, follow-up recs  General surgery consulted and patient underwent debridement, follow-up recs    "

## 2025-01-31 NOTE — ASSESSMENT & PLAN NOTE
--All cases of bacteremia pose risk of life threatening sepsis and metastatic infection  --Source in this case is urine; both urine and blood cx with matching E coli isolates  --R to quinolones  --Will treat with IV ceftriaxone 2 g daily for 21 days to cover blood, urine, and sacral wound   --Requires drug toxicity monitoring   --Nausea with cephalexin not an allergy   --Follow repeat blood cx for clearance; NGTD from 1/29  --Will schedule ID clinic follow up   --Above d/w primary team.      Outpatient Antibiotic Therapy Plan:    1) Infection: E coli bacteremia/UTI/sacral wound    2) Discharge Antibiotics:    Intravenous antibiotics:  IV ceftriaxone 2 g daily   Gentamicin bladder irrigation twice daily x 7 days     3) Therapy Duration:  21 days     Estimated end date of IV antibiotics: 2/18/25    4) Outpatient Weekly Labs:    Order the following labs to be drawn on Mondays:   CBC  CMP   CRP    5) Fax Lab Results to Infectious Diseases Provider: Dr. Estevez     [facility] Fax Number: [phone]     [facility]

## 2025-01-31 NOTE — SUBJECTIVE & OBJECTIVE
Interval History: Pod 2 s/p wound debridement. Denies pain. D/C planning per primary team. Nursing performing daily wound care    Medications:  Continuous Infusions:  Scheduled Meds:   allopurinoL  100 mg Oral Daily    ertapenem (INVanz) IV (PEDS and ADULTS)  500 mg Intravenous Q24H    folic acid  1,000 mcg Oral Daily    furosemide  20 mg Oral Daily    gabapentin  300 mg Oral BID    levothyroxine  50 mcg Oral Before breakfast    magnesium oxide  400 mg Oral BID    metoprolol succinate  100 mg Oral BID    mupirocin   Nasal BID    pantoprazole  40 mg Oral Daily    potassium citrate  10 mEq Oral Daily     PRN Meds:  Current Facility-Administered Medications:     acetaminophen, 650 mg, Oral, Q6H PRN    albuterol sulfate, 2.5 mg, Nebulization, Q4H PRN    dextrose 50%, 12.5 g, Intravenous, PRN    dextrose 50%, 25 g, Intravenous, PRN    glucagon (human recombinant), 1 mg, Intramuscular, PRN    glucose, 16 g, Oral, PRN    glucose, 24 g, Oral, PRN    hydrALAZINE, 5 mg, Intravenous, Q6H PRN    melatonin, 6 mg, Oral, Nightly PRN    naloxone, 0.02 mg, Intravenous, PRN    ondansetron, 4 mg, Intravenous, Q8H PRN    polyethylene glycol, 17 g, Oral, BID PRN    prochlorperazine, 2.5 mg, Intravenous, Q8H PRN    senna, 2 tablet, Oral, BID PRN    sodium chloride 0.9%, 10 mL, Intravenous, Q12H PRN     Review of patient's allergies indicates:   Allergen Reactions    Adenosine      Other reaction(s): asthma attack    Codeine Nausea And Vomiting     Other reaction(s): Nausea    Duloxetine      sleepy    Hydrocodone-acetaminophen Nausea And Vomiting     Other reaction(s): Nausea    Keflex  [cephalexin] Nausea Only     Other reaction(s): pt says can take penicillins  Other reaction(s): Nausea    Macrolide antibiotics     Opioids - morphine analogues     Opium (anthroposophic)     Promethazine Nausea And Vomiting     Other reaction(s): Nausea    Tramadol      Objective:     Vital Signs (Most Recent):  Temp: 97.8 °F (36.6 °C) (01/30/25  2329)  Pulse: 75 (01/31/25 1141)  Resp: 14 (01/30/25 2329)  BP: (!) 157/72 (01/31/25 0415)  SpO2: (!) 93 % (01/30/25 2329) Vital Signs (24h Range):  Temp:  [97.8 °F (36.6 °C)-98.1 °F (36.7 °C)] 97.8 °F (36.6 °C)  Pulse:  [70-78] 75  Resp:  [14-16] 14  SpO2:  [92 %-94 %] 93 %  BP: (139-162)/(65-72) 157/72     Weight: 82.6 kg (182 lb 1.6 oz)  Body mass index is 31.26 kg/m².    Intake/Output - Last 3 Shifts         01/29 0700 01/30 0659 01/30 0700 01/31 0659 01/31 0700 02/01 0659    IV Piggyback       Total Intake(mL/kg)       Urine (mL/kg/hr) 450 (0.2) 550 (0.3) 350 (0.8)    Stool 0  0    Total Output 450 550 350    Net -450 -550 -350           Stool Occurrence 1 x 4 x 4 x             Physical Exam  Vitals and nursing note reviewed.   Constitutional:       Appearance: She is well-developed. She is ill-appearing. She is not toxic-appearing.   HENT:      Head: Normocephalic and atraumatic.      Right Ear: External ear normal.      Left Ear: External ear normal.   Eyes:      Extraocular Movements: Extraocular movements intact.      Conjunctiva/sclera: Conjunctivae normal.   Cardiovascular:      Rate and Rhythm: Normal rate.   Pulmonary:      Effort: Pulmonary effort is normal. No respiratory distress.   Skin:     General: Skin is warm and dry.          Neurological:      General: No focal deficit present.      Mental Status: She is alert.   Psychiatric:         Behavior: Behavior normal.          Significant Labs:  I have reviewed all pertinent lab results within the past 24 hours.  CBC:   Recent Labs   Lab 01/31/25  0631   WBC 8.08   RBC 2.51*   HGB 7.8*   HCT 25.3*      *   MCH 31.1*   MCHC 30.8*     CMP:   Recent Labs   Lab 01/31/25  0631   GLU 97   CALCIUM 8.8   ALBUMIN 1.8*   PROT 5.4*      K 4.1   CO2 27      BUN 21   CREATININE 1.6*   ALKPHOS 44   ALT 8*   AST 13   BILITOT 0.2       Significant Diagnostics:  I have reviewed all pertinent imaging results/findings within the past 24  hours.  No new pertinent imaging

## 2025-01-31 NOTE — ASSESSMENT & PLAN NOTE
--U/A with >100 WBC  --Chronic indwelling [place]  --Urine culture has grown E coli R to quinolones which matches isolate from blood  --Will treat using IV ceftriaxone   --Recommend HH exchange [place] every 14 days   --Also recommend gentamicin bladder irrigation twice daily x 7 days   --Will discuss preventive measures in clinic

## 2025-01-31 NOTE — HPI
"This is a 85 yo F with hx of CKD, HLD, DM, GERD, HTN, and RA who presented to ER at behest of daughter due to worsening sacral wound. Daughter reports she was becoming frustrated with hospice care and ultimately revoked hospice status. [place] wound was not being cared for properly. XR with no evidence of osteomyelitis. Evaluated by surgery and taken to OR on 1/29 for excision and debridement sacral decubitus ulcer. Findings: "necrotic skin, subcutaneous tissue, and muscle fascia. This was an area approximately 8 cm in diameter by 3 cm deep. Once debrided back to viable appearing tissue the wound was irrigated." No evidence of bone involvement. Workup also revealed E coli UTI and bacteremia. ID consulted for OPAT recommendations.   "

## 2025-01-31 NOTE — NURSING
Patient dropped her phone on her face , and bruised her lip. Maroon color bruise on upper lip. Family member on line when happen , aware of occurrence. Documented on LDA, Picture uploaded in media.   Sacrum dressing changed. Replaced packing gauze, taped with medipore.

## 2025-01-31 NOTE — PROGRESS NOTES
[facility] Surg  Wound Care    Patient Name:  Wendy Ro   MRN:  [id number]  Date: 1/31/2025  Diagnosis: Pressure injury of sacral region, unstageable    History:     Past Medical History:   Diagnosis Date    Arthritis     B12 deficiency 3/28/2019    Chest pain 2012    past Hx, turned out to be asthma, gerd, stress test reported unremarkable per pt    Chronic gout     Chronic renal insufficiency, stage III (moderate)     Dr. Harrell    Class 3 severe obesity due to excess calories with serious comorbidity in adult 1/31/2012    The patient presents with obesity.  Denies bulimia, amenorrhea, cold intolerance, edema, hip pain, hirsutism, knee pain, polydipsia, polyuria, thirst and weakness.  The patient does not perform regular exercise.  Previous treatments for obesity :self-directed dieting without success.  The patient and I discussed the importance of exercise.   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    Hiatal hernia     Hypertension associated with diabetes     Hypothyroid 7/10/2012    Hypothyroidism     Intermittent asthma     last problem was around 2012    Osteoporosis     Osteoporosis 11/30/2016    Peripheral autonomic neuropathy due to diabetes mellitus     PONV (postoperative nausea and vomiting)     Severe, prefers Zofran, avoid narcotics if possible    Rheumatoid arthritis     on steroid daily    Sleep apnea     not compliant with BiPap, sleeps with HOB up    Thyroid nodule        Social History     Socioeconomic History    Marital status:    Tobacco Use    Smoking status: Passive Smoke Exposure -  Never Smoker    Smokeless tobacco: Never   Substance and Sexual Activity    Alcohol use: No    Drug use: No     Social Drivers of Health     Financial Resource Strain: Low Risk  (1/28/2025)    Overall Financial Resource Strain (CARDIA)     Difficulty of Paying Living Expenses: Not hard at all   Food Insecurity: No Food Insecurity (1/28/2025)    Hunger Vital Sign     Worried About Running Out of Food in the Last Year: Never true     Ran Out of Food in the Last Year: Never true   Transportation Needs: No Transportation Needs (1/28/2025)    TRANSPORTATION NEEDS     Transportation : No   Physical Activity: Inactive (1/28/2025)    Exercise Vital Sign     Days of Exercise per Week: 0 days     Minutes of Exercise per Session: 0 min   Stress: No Stress Concern Present (1/28/2025)    [employer] - Occupational Stress Questionnaire     Feeling of Stress : Not at all   Housing Stability: Low Risk  (1/28/2025)    Housing Stability Vital Sign     Unable to Pay for Housing in the Last Year: No     Homeless in the Last Year: No       Precautions:     Allergies as of 01/28/2025 - Reviewed 01/28/2025   Allergen Reaction Noted    Adenosine  01/06/2012    Codeine Nausea And Vomiting 01/31/2005    Duloxetine  05/04/2015    Hydrocodone-acetaminophen Nausea And Vomiting 02/22/2011    Keflex  [cephalexin] Nausea Only 12/02/2010    Macrolide antibiotics  05/04/2015    Opioids - morphine analogues  05/04/2015    Opium (anthroposophic)  05/04/2015    Promethazine Nausea And Vomiting 02/24/2011    Tramadol  05/04/2015       [facility] Assessment Details/Treatment     F/U visit with this 85 y/o female patient for ongoing wound care to several areas, patient is S/P debridment of sacral wound. Debridement was performed 1/29/25 by Dr. Jennings with general surgery.   Patient sitting up in bed, currently on sizewise/agiliti specialty evolution immerse ADRIANA bed.   Patient states she just had a bowel movement and needs to be  cleaned up.   --Patient turned onto Right side, noted moderate amount of loose green/brown stool. Incontinence care performed with bath wipes and no rinse foaming cleanser. Removed dressing from sacrum. Noted stage 4 pressure injury on the sacrum with some surrounding maroon/purple discoloration measuring 7.5x9x2.6cm post debridement. Wound bed is moist with tan/yellow adipose tissue present and exposed muscle. Blackened areas also noted in the wound bed that appears to be from craterization during debridement. Cleansed with vashe, allowed to dwell for 5 minutes. Patted dry. Packed with vashe moistened gauze and covered with large sacral foam.   --Noted area of dry flaky blanchable redness to the left buttock as well that was mostly removed with gentle cleansing. Refer to photo below. Painted with cavilon and left DILEEP.   --Patient turned back onto back to assess other wounds   --Again noted ulceration to Left clavicle wound bed is dried maroon/black eschar and some moist red tissue noted at one end. Cleansed with vashe. Patted dry. Applied strip of [employer] Hydrofiber and covered with bordered foam.   --Foam dressing gently removed from Right elbow. Wound bed is moist pink/red full thickness with a small amount of serosanguinous drainage noted. Cleansed with vashe. Patted dry. Applied strip of Aqu[employer] Hydrofiber and covered with bordered foam.   --DTPI again noted to left lateral and right later calf consisting of intact nonblanchable maroon/purple discoloration. No drainage noted. Painted with cavilon and left DILEEP.   --Left heel intact. Painted with cavilon. Callus that was previously present to Right heel was easily removed this visit and now area is intact blanchable redness, previous DTPI has resolved. Painted with cavilon and reapplied bordered heel foams and heel offloading boots.   --Intact purple blood-filled blister noted to patients right side of upper lip. No drainage noted at this time. Patient  states she dropped her phone on her lip. Left DILEEP.     Patient did experience two more bowel incontinence episdoes while wound care nurse was in the room. Assisted nurse with incontinence care and ensured sacral dressing was C/D/I.   Recommend continued pressure injury preventions and wound care as ordered. Plan to F/U in a week.             01/31/25 0916   WOCN Assessment   WOCN Total Time (mins) 60   Visit Date 01/31/25   Visit Time 0916   Consult Type Follow Up   WOCN Speciality Wound;Continence   Wound pressure;deep tissue injury;At risk for pressure Injury   Continence Type Urinary  (chronic indwelling oliva)   Intervention assessed;changed;applied;chart review;orders   Teaching on-going   Skin Interventions   Device Skin Pressure Protection adhesive use limited   Pressure Reduction Devices positioning supports utilized   Pressure Reduction Techniques frequent weight shift encouraged;weight shift assistance provided   Skin Protection incontinence pads utilized   Positioning   Body Position turned;right;side-lying   Head of Bed (HOB) Positioning HOB at 20-30 degrees   Positioning/Transfer Devices pillows;wedge;in use   Pressure Injury Prevention    Check Moisture Management Pad Done   Sacral Foam Dressing Peel back sacral foam dressing, assess skin and reapply   Heel protection technique Heel boot   Heel preventative measures Peel back dressing/boot, assess skin and reapply   Check Medical Devices Done        Wound 01/28/25 1122 Pressure Injury Left Sacral spine   Date First Assessed/Time First Assessed: 01/28/25 1122   Present on Original Admission: Yes  Primary Wound Type: Pressure Injury  Side: Left  Location: Sacral spine  Is this injury device related?: No   Wound Image    Pressure Injury Stage 4   Dressing Appearance Moist drainage   Drainage Amount Small   Drainage Characteristics/Odor Serosanguineous   Appearance Pink;Red;Tan;Yellow;Moist;Adipose;Muscle   Tissue loss description Full thickness   Black  (%), Wound Tissue Color 40 %   Red (%), Wound Tissue Color 10 %   Yellow (%), Wound Tissue Color 50 %   Periwound Area Ecchymotic;Maroon;Redness   Wound Edges Open   Wound Length (cm) 7.5 cm   Wound Width (cm) 9 cm   Wound Depth (cm) 2.6 cm   Wound Volume (cm^3) 175.5 cm^3   Wound Surface Area (cm^2) 67.5 cm^2   Care Cleansed with:;Antimicrobial agent;Applied:;Skin Barrier   Dressing Applied;Gauze, wet to moist;Gauze;Foam   Periwound Care Skin barrier film applied   Dressing Change Due 02/01/25        Wound 01/28/25 1123 Pressure Injury Left lateral Calf   Date First Assessed/Time First Assessed: 01/28/25 1123   Primary Wound Type: Pressure Injury  Side: Left  Orientation: lateral  Location: Calf   Wound Image    Pressure Injury Stage DTPI   Dressing Appearance Open to air   Drainage Amount None   Drainage Characteristics/Odor No odor   Appearance Intact;Maroon   Tissue loss description Not applicable   Periwound Area Dry   Wound Length (cm) 8 cm   Wound Width (cm) 2 cm   Wound Surface Area (cm^2) 16 cm^2   Care Cleansed with:;Antimicrobial agent;Applied:;Skin Barrier   Dressing Change Due 02/01/25        Wound 01/28/25 Pressure Injury Right lateral Calf   Date First Assessed: 01/28/25   Present on Original Admission: Yes  Primary Wound Type: Pressure Injury  Side: Right  Orientation: lateral  Location: Calf   Pressure Injury Stage DTPI   Dressing Appearance Open to air   Drainage Amount None   Drainage Characteristics/Odor No odor   Appearance Intact;Maroon;Red   Tissue loss description Not applicable   Wound Edges Defined   Wound Length (cm) 3 cm   Wound Width (cm) 1 cm   Wound Surface Area (cm^2) 3 cm^2   Care Cleansed with:;Antimicrobial agent;Applied:;Skin Barrier   Periwound Care Skin barrier film applied   Dressing Change Due 01/31/25   [REMOVED]      Wound 01/28/25 Pressure Injury Right Heel   Final Assessment Date/Final Assessment Time: 01/31/25 0916  Date First Assessed: 01/28/25   Present on Original  Admission: Yes  Primary Wound Type: Pressure Injury  Side: Right  Location: Heel  Wound Outcome: Healed   Wound Image     Dressing Appearance Open to air   Drainage Amount None   Drainage Characteristics/Odor No odor   Appearance Intact;Pink   Tissue loss description Not applicable   Periwound Area Intact   Wound Edges Callused   Care Cleansed with:;Antimicrobial agent;Applied:;Skin Barrier   Dressing Foam   Dressing Change Due 02/01/25        Wound 01/28/25 Skin Tear Right proximal;lower Arm   Date First Assessed: 01/28/25   Present on Original Admission: Yes  Primary Wound Type: Skin Tear  Side: Right  Orientation: proximal;lower  Location: Arm   Wound Image    Dressing Appearance Moist drainage   Drainage Amount Small   Drainage Characteristics/Odor Serosanguineous   Appearance Pink;Red;Tan;Yellow;Slough;Moist   Tissue loss description Full thickness   Red (%), Wound Tissue Color 90 %   Yellow (%), Wound Tissue Color 10 %   Periwound Area Intact;Dry   Wound Edges Jagged   Wound Length (cm) 4 cm   Wound Width (cm) 3 cm   Wound Depth (cm) 0.1 cm   Wound Volume (cm^3) 1.2 cm^3   Wound Surface Area (cm^2) 12 cm^2   Care Cleansed with:;Antimicrobial agent;Applied:;Skin Barrier   Dressing Applied;Hydrofiber;Silver;Foam   Periwound Care Skin barrier film applied   Dressing Change Due 02/04/25        Wound 01/28/25 Ulceration Left Clavicle   Date First Assessed: 01/28/25   Present on Original Admission: Yes  Primary Wound Type: Ulceration  Side: Left  Location: Clavicle   Wound Image    Dressing Appearance Moist drainage   Drainage Amount Scant   Drainage Characteristics/Odor Serosanguineous   Appearance Pink;Red;Dry   Tissue loss description Full thickness   Black (%), Wound Tissue Color 75 %   Red (%), Wound Tissue Color 25 %   Periwound Area Intact;Dry;Maroon   Wound Edges Open   Wound Length (cm) 1.5 cm   Wound Width (cm) 1.5 cm   Wound Depth (cm) 0.1 cm   Wound Volume (cm^3) 0.225 cm^3   Wound Surface Area (cm^2)  2.25 cm^2   Care Cleansed with:;Antimicrobial agent;Applied:;Skin Barrier   Dressing Applied;Hydrofiber;Silver;Foam   Periwound Care Skin barrier film applied   Dressing Change Due 02/04/25        Wound 01/30/25 1913 Other (comment) upper Lip   Date First Assessed/Time First Assessed: 01/30/25 1913   Primary Wound Type: (c) Other (comment)  Orientation: upper  Location: Lip   Wound Image    Dressing Appearance Open to air   Drainage Amount None   Drainage Characteristics/Odor No odor   Appearance Intact;Purple;Blistered   Tissue loss description Not applicable     01/31/2025

## 2025-01-31 NOTE — ASSESSMENT & PLAN NOTE
Potential source includes sacral wound.    PLAN:  Follow up cultures and deescalate antibiotics as appropriate

## 2025-01-31 NOTE — ASSESSMENT & PLAN NOTE
Creatine stable for now. BMP reviewed- noted Estimated Creatinine Clearance: 27.7 mL/min (A) (based on SCr of 1.6 mg/dL (H)). according to latest data. Based on current GFR, CKD stage is stage 3 - GFR 30-59.  Monitor UOP and serial BMP and adjust therapy as needed. Renally dose meds. Avoid nephrotoxic medications and procedures.
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
## 2025-01-31 NOTE — ASSESSMENT & PLAN NOTE
POD 2 s/p debridement.     - Daily local wound care, including wet-to-dry packing, to wound.   - Antibiotics and disposition per medicine  - Surgery will sign off. Call with any questions.

## 2025-01-31 NOTE — CONSULTS
IV abx referral sent to [facility]. Pending arrangements and teaching.     Updated  orders that include IV abx and labs to [facility].

## 2025-01-31 NOTE — PROGRESS NOTES
Vancomycin Consult Note    Therapy with vancomycin complete and/or consult discontinued by provider.  Pharmacy will sign off, please re-consult as needed.    Luz Braun PharmD  01/31/2025  12:32 AM

## 2025-01-31 NOTE — PLAN OF CARE
Discussed poc with pt, pt verbalized understanding    Purposeful rounding every 2hours    VS wnl  Cardiac monitoring in use, pt is NSR, tele monitor # [id number]  Fall precautions in place, remains injury free    Pain under control with PRN meds  Abx given as prescribed  Bed locked at lowest position  Call light within reach    Chart check complete  Will cont with POC

## 2025-01-31 NOTE — PLAN OF CARE
Discussed poc with pt, pt verbalized understanding    Purposeful rounding every 2hours    VS wnl  Cardiac monitoring in use tele monitor # [id number]    Fall precautions in place, remains injury free  Pt denies c/o anything  Accurate I&Os  Abx given as prescribed  Bed locked at lowest position  Call light within reach    Chart check continued  Will cont with POC
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
## 2025-01-31 NOTE — NURSING
Wound care to sacrum complete. Cleansed with Vashe. Packed with vashe soaked guaze. Covered with abd pad and medipore tape.

## 2025-01-31 NOTE — ASSESSMENT & PLAN NOTE
No evidence of osteomyelitis during debridement by general surgery. XR negative. Will cover for deep SSTI using ceftriaxone x 21 days. See bacteremia.

## 2025-01-31 NOTE — PROGRESS NOTES
[facility] Surg  General Surgery  Progress Note    Subjective:     History of Present Illness:  84 year old female with multiple medical conditions who presented to the ED for evaluation of her sacral decubitus wound.  She has been bed-bound since September after a fall when she fractured her left fibula.  She has been at home on hospice, and she presented here for worsening appearance of the sacral wound.  No leukocytosis on presentation.  She has a chronic indwelling catheter with UTI on presentation.  She has never had the area debrided before.    Post-Op Info:  Procedure(s) (LRB):  DEBRIDEMENT, WOUND, SACRUM (N/A)   2 Days Post-Op     Interval History: Pod 2 s/p wound debridement. Denies pain. D/C planning per primary team. Nursing performing daily wound care    Medications:  Continuous Infusions:  Scheduled Meds:   allopurinoL  100 mg Oral Daily    ertapenem (INVanz) IV (PEDS and ADULTS)  500 mg Intravenous Q24H    folic acid  1,000 mcg Oral Daily    furosemide  20 mg Oral Daily    gabapentin  300 mg Oral BID    levothyroxine  50 mcg Oral Before breakfast    magnesium oxide  400 mg Oral BID    metoprolol succinate  100 mg Oral BID    mupirocin   Nasal BID    pantoprazole  40 mg Oral Daily    potassium citrate  10 mEq Oral Daily     PRN Meds:  Current Facility-Administered Medications:     acetaminophen, 650 mg, Oral, Q6H PRN    albuterol sulfate, 2.5 mg, Nebulization, Q4H PRN    dextrose 50%, 12.5 g, Intravenous, PRN    dextrose 50%, 25 g, Intravenous, PRN    glucagon (human recombinant), 1 mg, Intramuscular, PRN    glucose, 16 g, Oral, PRN    glucose, 24 g, Oral, PRN    hydrALAZINE, 5 mg, Intravenous, Q6H PRN    melatonin, 6 mg, Oral, Nightly PRN    naloxone, 0.02 mg, Intravenous, PRN    ondansetron, 4 mg, Intravenous, Q8H PRN    polyethylene glycol, 17 g, Oral, BID PRN    prochlorperazine, 2.5 mg, Intravenous, Q8H PRN    senna, 2 tablet, Oral, BID PRN    sodium chloride 0.9%, 10 mL, Intravenous, Q12H PRN      Review of patient's allergies indicates:   Allergen Reactions    Adenosine      Other reaction(s): asthma attack    Codeine Nausea And Vomiting     Other reaction(s): Nausea    Duloxetine      sleepy    Hydrocodone-acetaminophen Nausea And Vomiting     Other reaction(s): Nausea    Keflex  [cephalexin] Nausea Only     Other reaction(s): pt says can take penicillins  Other reaction(s): Nausea    Macrolide antibiotics     Opioids - morphine analogues     Opium (anthroposophic)     Promethazine Nausea And Vomiting     Other reaction(s): Nausea    Tramadol      Objective:     Vital Signs (Most Recent):  Temp: 97.8 °F (36.6 °C) (01/30/25 2329)  Pulse: 75 (01/31/25 1141)  Resp: 14 (01/30/25 2329)  BP: (!) 157/72 (01/31/25 0415)  SpO2: (!) 93 % (01/30/25 2329) Vital Signs (24h Range):  Temp:  [97.8 °F (36.6 °C)-98.1 °F (36.7 °C)] 97.8 °F (36.6 °C)  Pulse:  [70-78] 75  Resp:  [14-16] 14  SpO2:  [92 %-94 %] 93 %  BP: (139-162)/(65-72) 157/72     Weight: 82.6 kg (182 lb 1.6 oz)  Body mass index is 31.26 kg/m².    Intake/Output - Last 3 Shifts         01/29 0700  01/30 0659 01/30 0700  01/31 0659 01/31 0700  02/01 0659    IV Piggyback       Total Intake(mL/kg)       Urine (mL/kg/hr) 450 (0.2) 550 (0.3) 350 (0.8)    Stool 0  0    Total Output 450 550 350    Net -450 -550 -350           Stool Occurrence 1 x 4 x 4 x             Physical Exam  Vitals and nursing note reviewed.   Constitutional:       Appearance: She is well-developed. She is ill-appearing. She is not toxic-appearing.   HENT:      Head: Normocephalic and atraumatic.      Right Ear: External ear normal.      Left Ear: External ear normal.   Eyes:      Extraocular Movements: Extraocular movements intact.      Conjunctiva/sclera: Conjunctivae normal.   Cardiovascular:      Rate and Rhythm: Normal rate.   Pulmonary:      Effort: Pulmonary effort is normal. No respiratory distress.   Skin:     General: Skin is warm and dry.          Neurological:      General: No  focal deficit present.      Mental Status: She is alert.   Psychiatric:         Behavior: Behavior normal.          Significant Labs:  I have reviewed all pertinent lab results within the past 24 hours.  CBC:   Recent Labs   Lab 01/31/25  0631   WBC 8.08   RBC 2.51*   HGB 7.8*   HCT 25.3*      *   MCH 31.1*   MCHC 30.8*     CMP:   Recent Labs   Lab 01/31/25  0631   GLU 97   CALCIUM 8.8   ALBUMIN 1.8*   PROT 5.4*      K 4.1   CO2 27      BUN 21   CREATININE 1.6*   ALKPHOS 44   ALT 8*   AST 13   BILITOT 0.2       Significant Diagnostics:  I have reviewed all pertinent imaging results/findings within the past 24 hours.  No new pertinent imaging  Assessment/Plan:     * Pressure injury of sacral region, unstageable  POD 2 s/p debridement.     - Daily local wound care, including wet-to-dry packing, to wound.   - Antibiotics and disposition per medicine  - Surgery will sign off. Call with any questions.     CKD (chronic kidney disease)  Per primary team    DM (diabetes mellitus) type II controlled, neurological manifestation  Per primary team    Hypertension associated with diabetes  Per primary team        Melinda Toribio PA-C  General Surgery  [facility]

## 2025-01-31 NOTE — SUBJECTIVE & OBJECTIVE
Interval History: No acute events overnight, afebrile, hemodynamically stable. Blood cultures concerning for E coli, susceptibility pending.  Urine cultures concerning for Gram-negative rods, identification pending.     Objective:     Vital Signs (Most Recent):  Temp: 98.1 °F (36.7 °C) (01/30/25 2009)  Pulse: 77 (01/30/25 2009)  Resp: 16 (01/30/25 2009)  BP: 139/65 (01/30/25 2040)  SpO2: (!) 94 % (01/30/25 2009) Vital Signs (24h Range):  Temp:  [97.6 °F (36.4 °C)-98.9 °F (37.2 °C)] 98.1 °F (36.7 °C)  Pulse:  [74-93] 77  Resp:  [16-17] 16  SpO2:  [90 %-95 %] 94 %  BP: (111-142)/(56-67) 139/65     Weight: 82.6 kg (182 lb 1.6 oz)  Body mass index is 31.26 kg/m².    Intake/Output Summary (Last 24 hours) at 1/30/2025 2053  Last data filed at 1/30/2025 0453  Gross per 24 hour   Intake --   Output 450 ml   Net -450 ml         Physical Exam  Vitals and nursing note reviewed.   Constitutional:       General: She is not in acute distress.     Appearance: She is not ill-appearing, toxic-appearing or diaphoretic.   HENT:      Head: Normocephalic and atraumatic.      Mouth/Throat:      Mouth: Mucous membranes are moist.   Eyes:      General: No scleral icterus.        Right eye: No discharge.         Left eye: No discharge.   Cardiovascular:      Rate and Rhythm: Normal rate and regular rhythm.      Heart sounds: Normal heart sounds.   Pulmonary:      Effort: Pulmonary effort is normal. No respiratory distress.      Breath sounds: Normal breath sounds. No wheezing, rhonchi or rales.   Abdominal:      General: Bowel sounds are normal.      Palpations: Abdomen is soft.      Tenderness: There is no abdominal tenderness.   Musculoskeletal:      Cervical back: No rigidity.      Right lower leg: No edema.      Left lower leg: No edema.   Skin:     General: Skin is warm and dry.      Coloration: Skin is not jaundiced.      Comments: Sacral decubitus ulcer present   Neurological:      Mental Status: She is alert and oriented to person,  place, and time. Mental status is at baseline.   Psychiatric:         Mood and Affect: Mood normal.         Behavior: Behavior normal.             Significant Labs: All pertinent labs within the past 24 hours have been reviewed.   Recent Labs   Lab 01/28/25 1137 01/29/25  0532 01/30/25  0546    137 136   K 4.2 3.9 4.0   CL 99 98 99   CO2 27 34* 32*   BUN 20 20 23   CREATININE 1.4 1.3 1.6*   * 100 105   ANIONGAP 13 5* 5*     Recent Labs   Lab 01/28/25 1137 01/29/25  0532 01/30/25  0546   MG 1.9 1.8 2.1   PHOS  --  3.7 3.1     Recent Labs   Lab 01/28/25 1137 01/29/25  0532 01/30/25  0546   AST 22 15 12   ALT 13 9* 9*   ALKPHOS 73 55 53   BILITOT 0.4 0.5 0.3   ALBUMIN 2.6* 2.0* 1.9*    Recent Labs   Lab 01/28/25 1137 01/29/25  0532 01/30/25  0546   WBC 9.66 7.18 9.35   HGB 10.1* 8.0* 7.9*   HCT 32.6* 26.0* 25.0*    232 235   GRAN 71.8  6.9 56.7  4.1 65.5  6.1             Microbiology  Blood Cultures  Lab Results   Component Value Date    LABBLOO No Growth to date 01/29/2025    LABBLOO No Growth to date 01/29/2025    LABBLOO Gram stain kwaku bottle: Gram negative rods 01/28/2025    LABBLOO  01/28/2025     Results called to and read back by: Rose Erickson LPN 01/29/2025  14:55    LABBLOO ESCHERICHIA COLI  Susceptibility pending   (A) 01/28/2025         Significant Imaging:  I have reviewed all pertinent imaging results/findings within the past 24 hours.   X-Ray Sacrum And Coccyx   Final Result      No evidence of osteomyelitis         Electronically signed by: Dariel Fine MD   Date:    01/28/2025   Time:    13:12      X-Ray Chest AP Portable   Final Result      No lung infiltrates.         Electronically signed by: Dariel Fine MD   Date:    01/28/2025   Time:    12:22          Inpatient Medications:  Continuous Infusions:    Scheduled Meds:   allopurinoL  100 mg Oral Daily    ceFEPime IV (PEDS and ADULTS)  1 g Intravenous Q12H    folic acid  1,000 mcg Oral Daily    furosemide  20 mg Oral  Daily    gabapentin  300 mg Oral TID    levothyroxine  50 mcg Oral Before breakfast    magnesium oxide  400 mg Oral BID    metoprolol succinate  100 mg Oral BID    metroNIDAZOLE IV (PEDS and ADULTS)  500 mg Intravenous Q8H    mupirocin   Nasal BID    pantoprazole  40 mg Oral Daily    potassium citrate  10 mEq Oral Daily       PRN Meds:  Current Facility-Administered Medications:     acetaminophen, 650 mg, Oral, Q6H PRN    albuterol sulfate, 2.5 mg, Nebulization, Q4H PRN    dextrose 50%, 12.5 g, Intravenous, PRN    dextrose 50%, 25 g, Intravenous, PRN    glucagon (human recombinant), 1 mg, Intramuscular, PRN    glucose, 16 g, Oral, PRN    glucose, 24 g, Oral, PRN    hydrALAZINE, 5 mg, Intravenous, Q6H PRN    melatonin, 6 mg, Oral, Nightly PRN    naloxone, 0.02 mg, Intravenous, PRN    ondansetron, 4 mg, Intravenous, Q8H PRN    polyethylene glycol, 17 g, Oral, BID PRN    prochlorperazine, 2.5 mg, Intravenous, Q8H PRN    senna, 2 tablet, Oral, BID PRN    sodium chloride 0.9%, 10 mL, Intravenous, Q12H PRN    Pharmacy to dose Vancomycin consult, , , Once **AND** vancomycin - pharmacy to dose, , Intravenous, pharmacy to manage frequency

## 2025-02-01 LAB
ALBUMIN SERPL BCP-MCNC: 1.9 G/DL (ref 3.5–5.2)
ALP SERPL-CCNC: 44 U/L (ref 40–150)
ALT SERPL W/O P-5'-P-CCNC: 6 U/L (ref 10–44)
ANION GAP SERPL CALC-SCNC: 5 MMOL/L (ref 8–16)
AST SERPL-CCNC: 10 U/L (ref 10–40)
BASOPHILS # BLD AUTO: 0.03 K/UL (ref 0–0.2)
BASOPHILS NFR BLD: 0.4 % (ref 0–1.9)
BILIRUB SERPL-MCNC: 0.2 MG/DL (ref 0.1–1)
BUN SERPL-MCNC: 19 MG/DL (ref 8–23)
CALCIUM SERPL-MCNC: 8.8 MG/DL (ref 8.7–10.5)
CHLORIDE SERPL-SCNC: 100 MMOL/L (ref 95–110)
CO2 SERPL-SCNC: 31 MMOL/L (ref 23–29)
CREAT SERPL-MCNC: 1.4 MG/DL (ref 0.5–1.4)
DIFFERENTIAL METHOD BLD: ABNORMAL
EOSINOPHIL # BLD AUTO: 0.9 K/UL (ref 0–0.5)
EOSINOPHIL NFR BLD: 11.7 % (ref 0–8)
ERYTHROCYTE [DISTWIDTH] IN BLOOD BY AUTOMATED COUNT: 13.8 % (ref 11.5–14.5)
EST. GFR  (NO RACE VARIABLE): 37 ML/MIN/1.73 M^2
GLUCOSE SERPL-MCNC: 90 MG/DL (ref 70–110)
HCT VFR BLD AUTO: 26.6 % (ref 37–48.5)
HGB BLD-MCNC: 8.1 G/DL (ref 12–16)
IMM GRANULOCYTES # BLD AUTO: 0.02 K/UL (ref 0–0.04)
IMM GRANULOCYTES NFR BLD AUTO: 0.3 % (ref 0–0.5)
LYMPHOCYTES # BLD AUTO: 2.4 K/UL (ref 1–4.8)
LYMPHOCYTES NFR BLD: 32.7 % (ref 18–48)
MAGNESIUM SERPL-MCNC: 1.9 MG/DL (ref 1.6–2.6)
MCH RBC QN AUTO: 30.9 PG (ref 27–31)
MCHC RBC AUTO-ENTMCNC: 30.5 G/DL (ref 32–36)
MCV RBC AUTO: 102 FL (ref 82–98)
MONOCYTES # BLD AUTO: 0.6 K/UL (ref 0.3–1)
MONOCYTES NFR BLD: 7.6 % (ref 4–15)
NEUTROPHILS # BLD AUTO: 3.5 K/UL (ref 1.8–7.7)
NEUTROPHILS NFR BLD: 47.3 % (ref 38–73)
NRBC BLD-RTO: 0 /100 WBC
PHOSPHATE SERPL-MCNC: 3.1 MG/DL (ref 2.7–4.5)
PLATELET # BLD AUTO: 288 K/UL (ref 150–450)
PMV BLD AUTO: 10.3 FL (ref 9.2–12.9)
POTASSIUM SERPL-SCNC: 3.7 MMOL/L (ref 3.5–5.1)
PROT SERPL-MCNC: 5.5 G/DL (ref 6–8.4)
RBC # BLD AUTO: 2.62 M/UL (ref 4–5.4)
SODIUM SERPL-SCNC: 136 MMOL/L (ref 136–145)
WBC # BLD AUTO: 7.35 K/UL (ref 3.9–12.7)

## 2025-02-01 PROCEDURE — 21400001 HC TELEMETRY ROOM

## 2025-02-01 PROCEDURE — 36573 INSJ PICC RS&I 5 YR+: CPT

## 2025-02-01 PROCEDURE — 80053 COMPREHEN METABOLIC PANEL: CPT | Performed by: STUDENT IN AN ORGANIZED HEALTH CARE EDUCATION/TRAINING PROGRAM

## 2025-02-01 PROCEDURE — 25000003 PHARM REV CODE 250: Performed by: STUDENT IN AN ORGANIZED HEALTH CARE EDUCATION/TRAINING PROGRAM

## 2025-02-01 PROCEDURE — 83735 ASSAY OF MAGNESIUM: CPT | Performed by: STUDENT IN AN ORGANIZED HEALTH CARE EDUCATION/TRAINING PROGRAM

## 2025-02-01 PROCEDURE — 63600175 PHARM REV CODE 636 W HCPCS: Performed by: INTERNAL MEDICINE

## 2025-02-01 PROCEDURE — 63600175 PHARM REV CODE 636 W HCPCS: Performed by: STUDENT IN AN ORGANIZED HEALTH CARE EDUCATION/TRAINING PROGRAM

## 2025-02-01 PROCEDURE — 85025 COMPLETE CBC W/AUTO DIFF WBC: CPT | Performed by: STUDENT IN AN ORGANIZED HEALTH CARE EDUCATION/TRAINING PROGRAM

## 2025-02-01 PROCEDURE — C1751 CATH, INF, PER/CENT/MIDLINE: HCPCS

## 2025-02-01 PROCEDURE — 36415 COLL VENOUS BLD VENIPUNCTURE: CPT | Performed by: STUDENT IN AN ORGANIZED HEALTH CARE EDUCATION/TRAINING PROGRAM

## 2025-02-01 PROCEDURE — 84100 ASSAY OF PHOSPHORUS: CPT | Performed by: STUDENT IN AN ORGANIZED HEALTH CARE EDUCATION/TRAINING PROGRAM

## 2025-02-01 RX ORDER — HEPARIN SODIUM 5000 [USP'U]/ML
5000 INJECTION, SOLUTION INTRAVENOUS; SUBCUTANEOUS EVERY 8 HOURS
Status: DISCONTINUED | OUTPATIENT
Start: 2025-02-01 | End: 2025-02-03 | Stop reason: HOSPADM

## 2025-02-01 RX ADMIN — HEPARIN SODIUM 5000 UNITS: 5000 INJECTION INTRAVENOUS; SUBCUTANEOUS at 09:02

## 2025-02-01 RX ADMIN — ACETAMINOPHEN 650 MG: 325 TABLET ORAL at 06:02

## 2025-02-01 RX ADMIN — ACETAMINOPHEN 650 MG: 325 TABLET ORAL at 12:02

## 2025-02-01 RX ADMIN — METOPROLOL SUCCINATE 100 MG: 50 TABLET, EXTENDED RELEASE ORAL at 09:02

## 2025-02-01 RX ADMIN — FUROSEMIDE 20 MG: 20 TABLET ORAL at 09:02

## 2025-02-01 RX ADMIN — ACETAMINOPHEN 650 MG: 325 TABLET ORAL at 09:02

## 2025-02-01 RX ADMIN — HEPARIN SODIUM 5000 UNITS: 5000 INJECTION INTRAVENOUS; SUBCUTANEOUS at 03:02

## 2025-02-01 RX ADMIN — MUPIROCIN: 20 OINTMENT TOPICAL at 09:02

## 2025-02-01 RX ADMIN — GABAPENTIN 300 MG: 300 CAPSULE ORAL at 09:02

## 2025-02-01 RX ADMIN — LEVOTHYROXINE SODIUM 50 MCG: 0.05 TABLET ORAL at 06:02

## 2025-02-01 RX ADMIN — FOLIC ACID 1000 MCG: 1 TABLET ORAL at 09:02

## 2025-02-01 RX ADMIN — PANTOPRAZOLE SODIUM 40 MG: 40 TABLET, DELAYED RELEASE ORAL at 09:02

## 2025-02-01 RX ADMIN — POTASSIUM CITRATE 10 MEQ: 10 TABLET, EXTENDED RELEASE ORAL at 10:02

## 2025-02-01 RX ADMIN — Medication 400 MG: at 09:02

## 2025-02-01 RX ADMIN — ALLOPURINOL 100 MG: 100 TABLET ORAL at 09:02

## 2025-02-01 RX ADMIN — CEFTRIAXONE SODIUM 2 G: 2 INJECTION, POWDER, FOR SOLUTION INTRAMUSCULAR; INTRAVENOUS at 10:02

## 2025-02-01 NOTE — PROCEDURES
"Wendy Ro is a 84 y.o. female patient.    Temp: 97.5 °F (36.4 °C) (02/01/25 1558)  Pulse: 66 (02/01/25 1558)  Resp: 16 (02/01/25 1558)  BP: (!) 163/72 (02/01/25 1558)  SpO2: 97 % (02/01/25 1558)  Weight: 82.6 kg (182 lb 1.6 oz) (01/29/25 1552)  Height: 5' 4" (162.6 cm) (01/29/25 1610)    PICC  Date/Time: 2/1/2025 5:30 PM  Performed by: Kelvin Teran RN  Consent Done: Yes  Time out: Immediately prior to procedure a time out was called to verify the correct patient, procedure, equipment, support staff and site/side marked as required  Indications: med administration  Anesthesia: local infiltration  Local anesthetic: lidocaine 1% without epinephrine  Anesthetic Total (mL): 2  Preparation: skin prepped with chlorhexidine (without alcohol)  Skin prep agent dried: skin prep agent completely dried prior to procedure  Sterile barriers: all five maximum sterile barriers used - cap, mask, sterile gown, sterile gloves, and large sterile sheet  Hand hygiene: hand hygiene performed prior to central venous catheter insertion  Location details: right basilic  Catheter type: double lumen  Catheter size: 4 Fr  Catheter Length: 35cm    Ultrasound guidance: yes  Vessel Caliber: large and patent, compressibility normal  Needle advanced into vessel with real time Ultrasound guidance.  Guidewire confirmed in vessel.  Sterile sheath used.  Number of attempts: 1  Post-procedure: blood return through all ports, chlorhexidine patch and sterile dressing applied  Specimens: No  Implants: No  Assessment: placement verified by x-ray, free fluid flow, no pneumothorax on x-ray and successful placement          Kelvin Teran RN  2/1/2025    "

## 2025-02-01 NOTE — PLAN OF CARE
Problem: Adult Inpatient Plan of Care  Goal: Plan of Care Review  Outcome: Progressing  Goal: Patient-Specific Goal (Individualized)  Outcome: Progressing  Goal: Absence of Hospital-Acquired Illness or Injury  Outcome: Progressing  Goal: Optimal Comfort and Wellbeing  Outcome: Progressing  Goal: Readiness for Transition of Care  Outcome: Progressing     Problem: Diabetes Comorbidity  Goal: Blood Glucose Level Within Targeted Range  Outcome: Progressing     Problem: Infection  Goal: Absence of Infection Signs and Symptoms  Outcome: Progressing     Problem: Wound  Goal: Optimal Coping  Outcome: Progressing  Goal: Optimal Functional Ability  Outcome: Progressing  Goal: Absence of Infection Signs and Symptoms  Outcome: Progressing  Goal: Improved Oral Intake  Outcome: Progressing  Goal: Optimal Pain Control and Function  Outcome: Progressing  Goal: Skin Health and Integrity  Outcome: Progressing  Goal: Optimal Wound Healing  Outcome: Progressing     Problem: Skin Injury Risk Increased  Goal: Skin Health and Integrity  Outcome: Progressing     Problem: Fall Injury Risk  Goal: Absence of Fall and Fall-Related Injury  Outcome: Progressing

## 2025-02-01 NOTE — PROGRESS NOTES
"[facility]  Progress Note    Patient Name: Wendy Ro  MRN: [id number]  Patient Class: IP- Inpatient   Admission Date: 1/28/2025  Length of Stay: 3 days  Attending Physician: Lorne Contreras DO  Primary Care Provider: Joseph Hutchinson MD        Subjective     Principal Problem:Pressure injury of sacral region, unstageable        HPI:  Ms. Wendy Ro is a 84 y.o. female who  has a past medical history of Arthritis, B12 deficiency, Chronic renal insufficiency, stage III (moderate), Class 3 severe obesity due to excess calories with serious comorbidity in adult, Combined hyperlipidemia associated with type 2 diabetes mellitus, DM (diabetes mellitus) type II Fatty liver, Gastroparesis, GERD (gastroesophageal reflux disease), Hiatal hernia, Hypertension associated with diabetes, Hypothyroid, Hypothyroidism, Intermittent asthma, Osteoporosis, Peripheral autonomic neuropathy due to diabetes mellitus, PONV (postoperative nausea and vomiting), Rheumatoid arthritis, Sleep apnea, and Thyroid nodule.    She  presented to the ED for evaluation of worsening sacral wound concerns by daughter.  At baseline, patient is bed-bound since bloody September after a fall when she fractured her left fibula which is being conservatively managed by Orthopedic surgery due to her comorbidities.  She reports the wound has been worsening over the past couple of weeks despite wound care nursing at home.    ED course notable for Hgb 10.1, WBC 9.66, , ESR >120, .2, BUN 20, creatinine 1.4. UA concerning for WBC>100, nitrite positive.  X-ray sacrum and coccyx noted "No evidence of osteomyelitis ".    Overview/Hospital Course:  Admitted to Hospital Medicine for evaluation of worsening sacral wound concerns.  Started on empiric coverage with vancomycin/piperacillin tazobactam for sacral ulcer infection and UTI concerns.  Wound care consulted and recommendations for surgical debridement.  General " surgery consulted and patient underwent surgical debridement. Blood cultures and urine cultures concerning for E coli. ID consulted with OPAT recs for IV Ceftriaxone 21 day course and Gentamicin bladder irrigation twice daily x 7 days. Home Health/Infusion being setup by CM/SW with preliminary plans for anticipated discharge 2/1 if BC remain NGTD.     Interval History: No acute events overnight, afebrile, hemodynamically stable.  Blood cultures and urine cultures concerning for E coli. ID consulted with OPAT recs for IV Ceftriaxone 21 day course and Gentamicin bladder irrigation twice daily x 7 days. Home Health/Infusion being setup by CM/SW with preliminary plans for anticipated discharge 2/1 if BC remain NGTD.     Objective:     Vital Signs (Most Recent):  Temp: 98.3 °F (36.8 °C) (01/31/25 2319)  Pulse: 77 (01/31/25 2319)  Resp: 16 (01/31/25 2319)  BP: (!) 120/57 (01/31/25 2319)  SpO2: 96 % (01/31/25 2319) Vital Signs (24h Range):  Temp:  [97.7 °F (36.5 °C)-98.3 °F (36.8 °C)] 98.3 °F (36.8 °C)  Pulse:  [70-78] 77  Resp:  [14-19] 16  SpO2:  [93 %-96 %] 96 %  BP: (120-157)/(57-72) 120/57     Weight: 82.6 kg (182 lb 1.6 oz)  Body mass index is 31.26 kg/m².    Intake/Output Summary (Last 24 hours) at 1/31/2025 2340  Last data filed at 1/31/2025 1141  Gross per 24 hour   Intake --   Output 350 ml   Net -350 ml         Physical Exam  Vitals and nursing note reviewed.   Constitutional:       General: She is not in acute distress.     Appearance: She is ill-appearing. She is not toxic-appearing or diaphoretic.   HENT:      Head: Normocephalic and atraumatic.      Mouth/Throat:      Mouth: Mucous membranes are moist.   Eyes:      General: No scleral icterus.        Right eye: No discharge.         Left eye: No discharge.   Cardiovascular:      Rate and Rhythm: Normal rate and regular rhythm.      Heart sounds: Normal heart sounds.   Pulmonary:      Effort: Pulmonary effort is normal. No respiratory distress.      Breath  sounds: Normal breath sounds. No wheezing, rhonchi or rales.   Abdominal:      General: Bowel sounds are normal.      Palpations: Abdomen is soft.      Tenderness: There is no abdominal tenderness.   Musculoskeletal:      Cervical back: No rigidity.      Right lower leg: No edema.      Left lower leg: No edema.   Skin:     General: Skin is warm and dry.      Coloration: Skin is not jaundiced.      Comments: Sacral decubitus ulcer present   Neurological:      Mental Status: She is alert and oriented to person, place, and time. Mental status is at baseline.   Psychiatric:         Mood and Affect: Mood normal.         Behavior: Behavior normal.             Significant Labs: All pertinent labs within the past 24 hours have been reviewed.   Recent Labs   Lab 01/29/25  0532 01/30/25  0546 01/31/25  0631    136 138   K 3.9 4.0 4.1   CL 98 99 103   CO2 34* 32* 27   BUN 20 23 21   CREATININE 1.3 1.6* 1.6*    105 97   ANIONGAP 5* 5* 8     Recent Labs   Lab 01/29/25  0532 01/30/25  0546 01/31/25  0631   MG 1.8 2.1 2.0   PHOS 3.7 3.1 3.1     Recent Labs   Lab 01/29/25  0532 01/30/25  0546 01/31/25  0631   AST 15 12 13   ALT 9* 9* 8*   ALKPHOS 55 53 44   BILITOT 0.5 0.3 0.2   ALBUMIN 2.0* 1.9* 1.8*    Recent Labs   Lab 01/29/25  0532 01/30/25  0546 01/31/25  0631   WBC 7.18 9.35 8.08   HGB 8.0* 7.9* 7.8*   HCT 26.0* 25.0* 25.3*    235 261   GRAN 56.7  4.1 65.5  6.1 55.5  4.5             Microbiology  Blood Cultures  Lab Results   Component Value Date    LABBLOO No Growth to date 01/29/2025    LABBLOO No Growth to date 01/29/2025    LABBLOO No Growth to date 01/29/2025    LABBLOO No Growth to date 01/29/2025    LABBLOO Gram stain kwaku bottle: Gram negative rods 01/28/2025    LABBLOO  01/28/2025     Results called to and read back by: Rose Erickson LPN 01/29/2025  14:55    LABBLOO ESCHERICHIA COLI (A) 01/28/2025         Significant Imaging:  I have reviewed all pertinent imaging results/findings within the  "past 24 hours.   X-Ray Sacrum And Coccyx   Final Result      No evidence of osteomyelitis         Electronically signed by: Dariel Fine MD   Date:    01/28/2025   Time:    13:12      X-Ray Chest AP Portable   Final Result      No lung infiltrates.         Electronically signed by: Dariel Fine MD   Date:    01/28/2025   Time:    12:22          Inpatient Medications:  Continuous Infusions:    Scheduled Meds:   allopurinoL  100 mg Oral Daily    [START ON 2/1/2025] cefTRIAXone (Rocephin) IV (PEDS and ADULTS)  2 g Intravenous Q24H    folic acid  1,000 mcg Oral Daily    furosemide  20 mg Oral Daily    gabapentin  300 mg Oral BID    levothyroxine  50 mcg Oral Before breakfast    magnesium oxide  400 mg Oral BID    metoprolol succinate  100 mg Oral BID    mupirocin   Nasal BID    pantoprazole  40 mg Oral Daily    potassium citrate  10 mEq Oral Daily       PRN Meds:  Current Facility-Administered Medications:     acetaminophen, 650 mg, Oral, Q6H PRN    albuterol sulfate, 2.5 mg, Nebulization, Q4H PRN    dextrose 50%, 12.5 g, Intravenous, PRN    dextrose 50%, 25 g, Intravenous, PRN    glucagon (human recombinant), 1 mg, Intramuscular, PRN    glucose, 16 g, Oral, PRN    glucose, 24 g, Oral, PRN    hydrALAZINE, 5 mg, Intravenous, Q6H PRN    melatonin, 6 mg, Oral, Nightly PRN    naloxone, 0.02 mg, Intravenous, PRN    ondansetron, 4 mg, Intravenous, Q8H PRN    polyethylene glycol, 17 g, Oral, BID PRN    prochlorperazine, 2.5 mg, Intravenous, Q8H PRN    senna, 2 tablet, Oral, BID PRN    sodium chloride 0.9%, 10 mL, Intravenous, Q12H PRN          Assessment and Plan     * Pressure injury of sacral region, unstageable  Presented to the ED for a family concerns of infection in sacral pressure ulcer.  Patient is bed-bound at baseline due to prior history of fracture that is being managed conservatively due to comorbidities    -X-ray sacrum and coccyx noted "No evidence of osteomyelitis "       PLAN:  Wound care consulted, " follow-up recs  General surgery consulted and patient underwent debridement, follow-up recs      Bacteremia due to Escherichia coli  Potential source includes sacral wound.    PLAN:  Follow up cultures and deescalate antibiotics as appropriate      UTI (urinary tract infection)  History of chronic indwelling urinary catheter  Recent Labs   Lab 01/28/25  1144   COLORU Yellow   APPEARANCEUA Hazy*   PHUR 7.0   SPECGRAV 1.015   PROTEINUA Trace*   GLUCUA Negative   KETONESU Negative   BILIRUBINUA Negative   OCCULTUA Negative   NITRITE Positive*   UROBILINOGEN Negative   LEUKOCYTESUR 3+*   RBCUA 13*   WBCUA >100*   BACTERIA Moderate*   HYALINECASTS 9*       PLAN  Followup urine cultures, deescalate antibiotics as appropriate      Chronic respiratory failure  Patient with Hypoxic Respiratory failure which is Chronic.  she is on home oxygen at 3 LPM. Supplemental oxygen was provided and noted-      .     CKD (chronic kidney disease)  Creatine stable for now. BMP reviewed- noted Estimated Creatinine Clearance: 27.7 mL/min (A) (based on SCr of 1.6 mg/dL (H)). according to latest data. Based on current GFR, CKD stage is stage 3 - GFR 30-59.  Monitor UOP and serial BMP and adjust therapy as needed. Renally dose meds. Avoid nephrotoxic medications and procedures.    DM (diabetes mellitus) type II controlled, neurological manifestation  Continue home gabapentin    Chronic gout  Continue home allopurinol      Intermittent asthma  Chronic.  Stable.    PLAN:  Continue home albuterol p.r.n.      Hypothyroidism   Recent thyroid labs reviewed:  Lab Results   Component Value Date    TSH 3.957 10/08/2019    FREET4 1.23 05/20/2014       Hx of Hypothyroidism    PLAN:   Continue home levothyroxine        Hypertension associated with diabetes  Home meds for hypertension were reviewed and noted below.   Home Medications:  Hypertension Medications               furosemide (LASIX) 40 MG tablet Take 20 mg by mouth once daily.    metoprolol  tartrate (LOPRESSOR) 100 MG tablet Take 100 mg by mouth 2 (two) times daily.            Patients blood pressure range in the last 24 hours was: BP  Min: 100/55  Max: 203/100.      PLAN:  -While in the hospital, will manage blood pressure as follows; Continue home antihypertensive regimen  -The patient's inpatient anti-hypertensive regimen is listed below:  Current Antihypertensives  metoprolol succinate (TOPROL-XL) 24 hr tablet 100 mg, 2 times daily, Oral  hydrALAZINE injection 5 mg, Every 6 hours PRN, Intravenous  furosemide tablet 20 mg, Daily, Oral  -Will utilize p.r.n. blood pressure medication only if patient's blood pressure greater than 180/110 and she develops symptoms such as worsening chest pain or shortness of breath.          VTE Risk Mitigation (From admission, onward)           Ordered     IP VTE HIGH RISK PATIENT  Once         01/28/25 1724     Place sequential compression device  Until discontinued         01/28/25 1724     Reason for No Pharmacological VTE Prophylaxis  Once        Question:  Reasons:  Answer:  Risk of Bleeding  Comment:  Pending surgical procedure    01/28/25 1724                    Discharge Planning   ANDREW: 2/1/2025     Code Status: Full Code   Medical Readiness for Discharge Date:   Discharge Plan A: Home, Home Health   Discharge Delays: None known at this time            Lorne Contreras DO  Department of Hospital Medicine   [facility]    Voice recognition software was used in the creation of this note/communication and any sound-alike/typographical errors which may have occurred despite initial review prior to signing should be taken in context when interpreting.  If such errors prevent a clear understanding of the note/communication, please contact the provider/office for clarification.

## 2025-02-01 NOTE — SUBJECTIVE & OBJECTIVE
Interval History: NAEON. Pt has no complaints today. Denies CP, SOB, abd pain, n/v. Awaiting home infusion arrangements     Review of Systems  Objective:     Vital Signs (Most Recent):  Temp: 97.5 °F (36.4 °C) (02/01/25 1216)  Pulse: 66 (02/01/25 1216)  Resp: 16 (02/01/25 1216)  BP: (!) 161/68 (02/01/25 1216)  SpO2: 97 % (02/01/25 1216) Vital Signs (24h Range):  Temp:  [97.5 °F (36.4 °C)-98.3 °F (36.8 °C)] 97.5 °F (36.4 °C)  Pulse:  [66-78] 66  Resp:  [16-19] 16  SpO2:  [95 %-97 %] 97 %  BP: (120-172)/(57-73) 161/68     Weight: 82.6 kg (182 lb 1.6 oz)  Body mass index is 31.26 kg/m².    Intake/Output Summary (Last 24 hours) at 2/1/2025 1346  Last data filed at 2/1/2025 1027  Gross per 24 hour   Intake 100 ml   Output 576 ml   Net -476 ml         Physical Exam  Vitals and nursing note reviewed.   Constitutional:       General: She is not in acute distress.     Appearance: Ill appearance: chronic.   HENT:      Mouth/Throat:      Comments: Ecchymosis to R upper lip  Cardiovascular:      Rate and Rhythm: Normal rate and regular rhythm.      Heart sounds: No murmur heard.  Pulmonary:      Effort: Pulmonary effort is normal.      Breath sounds: Normal breath sounds. No wheezing, rhonchi or rales.   Abdominal:      General: Bowel sounds are normal. There is no distension.      Palpations: Abdomen is soft.      Tenderness: There is no abdominal tenderness.   Musculoskeletal:      Right lower leg: No edema.      Left lower leg: No edema.   Neurological:      Mental Status: She is alert and oriented to person, place, and time.             Significant Labs: All pertinent labs within the past 24 hours have been reviewed.    Significant Imaging: I have reviewed all pertinent imaging results/findings within the past 24 hours.

## 2025-02-01 NOTE — SUBJECTIVE & OBJECTIVE
Interval History: No acute events overnight, afebrile, hemodynamically stable.  Blood cultures and urine cultures concerning for E coli. ID consulted with OPAT recs for IV Ceftriaxone 21 day course and Gentamicin bladder irrigation twice daily x 7 days. Home Health/Infusion being setup by CM/SW with preliminary plans for anticipated discharge 2/1 if BC remain NGTD.     Objective:     Vital Signs (Most Recent):  Temp: 98.3 °F (36.8 °C) (01/31/25 2319)  Pulse: 77 (01/31/25 2319)  Resp: 16 (01/31/25 2319)  BP: (!) 120/57 (01/31/25 2319)  SpO2: 96 % (01/31/25 2319) Vital Signs (24h Range):  Temp:  [97.7 °F (36.5 °C)-98.3 °F (36.8 °C)] 98.3 °F (36.8 °C)  Pulse:  [70-78] 77  Resp:  [14-19] 16  SpO2:  [93 %-96 %] 96 %  BP: (120-157)/(57-72) 120/57     Weight: 82.6 kg (182 lb 1.6 oz)  Body mass index is 31.26 kg/m².    Intake/Output Summary (Last 24 hours) at 1/31/2025 2340  Last data filed at 1/31/2025 1141  Gross per 24 hour   Intake --   Output 350 ml   Net -350 ml         Physical Exam  Vitals and nursing note reviewed.   Constitutional:       General: She is not in acute distress.     Appearance: She is ill-appearing. She is not toxic-appearing or diaphoretic.   HENT:      Head: Normocephalic and atraumatic.      Mouth/Throat:      Mouth: Mucous membranes are moist.   Eyes:      General: No scleral icterus.        Right eye: No discharge.         Left eye: No discharge.   Cardiovascular:      Rate and Rhythm: Normal rate and regular rhythm.      Heart sounds: Normal heart sounds.   Pulmonary:      Effort: Pulmonary effort is normal. No respiratory distress.      Breath sounds: Normal breath sounds. No wheezing, rhonchi or rales.   Abdominal:      General: Bowel sounds are normal.      Palpations: Abdomen is soft.      Tenderness: There is no abdominal tenderness.   Musculoskeletal:      Cervical back: No rigidity.      Right lower leg: No edema.      Left lower leg: No edema.   Skin:     General: Skin is warm and dry.       Coloration: Skin is not jaundiced.      Comments: Sacral decubitus ulcer present   Neurological:      Mental Status: She is alert and oriented to person, place, and time. Mental status is at baseline.   Psychiatric:         Mood and Affect: Mood normal.         Behavior: Behavior normal.             Significant Labs: All pertinent labs within the past 24 hours have been reviewed.   Recent Labs   Lab 01/29/25  0532 01/30/25  0546 01/31/25  0631    136 138   K 3.9 4.0 4.1   CL 98 99 103   CO2 34* 32* 27   BUN 20 23 21   CREATININE 1.3 1.6* 1.6*    105 97   ANIONGAP 5* 5* 8     Recent Labs   Lab 01/29/25  0532 01/30/25  0546 01/31/25  0631   MG 1.8 2.1 2.0   PHOS 3.7 3.1 3.1     Recent Labs   Lab 01/29/25  0532 01/30/25  0546 01/31/25  0631   AST 15 12 13   ALT 9* 9* 8*   ALKPHOS 55 53 44   BILITOT 0.5 0.3 0.2   ALBUMIN 2.0* 1.9* 1.8*    Recent Labs   Lab 01/29/25  0532 01/30/25  0546 01/31/25  0631   WBC 7.18 9.35 8.08   HGB 8.0* 7.9* 7.8*   HCT 26.0* 25.0* 25.3*    235 261   GRAN 56.7  4.1 65.5  6.1 55.5  4.5             Microbiology  Blood Cultures  Lab Results   Component Value Date    LABBLOO No Growth to date 01/29/2025    LABBLOO No Growth to date 01/29/2025    LABBLOO No Growth to date 01/29/2025    LABBLOO No Growth to date 01/29/2025    LABBLOO Gram stain kwaku bottle: Gram negative rods 01/28/2025    LABBLOO  01/28/2025     Results called to and read back by: Rose Erickson LPN 01/29/2025  14:55    LABBLOO ESCHERICHIA COLI (A) 01/28/2025         Significant Imaging:  I have reviewed all pertinent imaging results/findings within the past 24 hours.   X-Ray Sacrum And Coccyx   Final Result      No evidence of osteomyelitis         Electronically signed by: Dariel Fine MD   Date:    01/28/2025   Time:    13:12      X-Ray Chest AP Portable   Final Result      No lung infiltrates.         Electronically signed by: Dariel Fine MD   Date:    01/28/2025   Time:    12:22           Inpatient Medications:  Continuous Infusions:    Scheduled Meds:   allopurinoL  100 mg Oral Daily    [START ON 2/1/2025] cefTRIAXone (Rocephin) IV (PEDS and ADULTS)  2 g Intravenous Q24H    folic acid  1,000 mcg Oral Daily    furosemide  20 mg Oral Daily    gabapentin  300 mg Oral BID    levothyroxine  50 mcg Oral Before breakfast    magnesium oxide  400 mg Oral BID    metoprolol succinate  100 mg Oral BID    mupirocin   Nasal BID    pantoprazole  40 mg Oral Daily    potassium citrate  10 mEq Oral Daily       PRN Meds:  Current Facility-Administered Medications:     acetaminophen, 650 mg, Oral, Q6H PRN    albuterol sulfate, 2.5 mg, Nebulization, Q4H PRN    dextrose 50%, 12.5 g, Intravenous, PRN    dextrose 50%, 25 g, Intravenous, PRN    glucagon (human recombinant), 1 mg, Intramuscular, PRN    glucose, 16 g, Oral, PRN    glucose, 24 g, Oral, PRN    hydrALAZINE, 5 mg, Intravenous, Q6H PRN    melatonin, 6 mg, Oral, Nightly PRN    naloxone, 0.02 mg, Intravenous, PRN    ondansetron, 4 mg, Intravenous, Q8H PRN    polyethylene glycol, 17 g, Oral, BID PRN    prochlorperazine, 2.5 mg, Intravenous, Q8H PRN    senna, 2 tablet, Oral, BID PRN    sodium chloride 0.9%, 10 mL, Intravenous, Q12H PRN

## 2025-02-01 NOTE — ASSESSMENT & PLAN NOTE
History of chronic indwelling urinary catheter  Recent Labs   Lab 01/28/25  1144   COLORU Yellow   APPEARANCEUA Hazy*   PHUR 7.0   SPECGRAV 1.015   PROTEINUA Trace*   GLUCUA Negative   KETONESU Negative   BILIRUBINUA Negative   OCCULTUA Negative   NITRITE Positive*   UROBILINOGEN Negative   LEUKOCYTESUR 3+*   RBCUA 13*   WBCUA >100*   BACTERIA Moderate*   HYALINECASTS 9*       PLAN  Urine cx with E. Coli   Continue IV rocephin per ID  ID also recommend Gentamicin bladder irrigation BiD x 7d

## 2025-02-01 NOTE — PROGRESS NOTES
"[facility]  [facility] Note    Patient Name: Wendy Ro  MRN: [id number]  Patient Class: IP- Inpatient   Admission Date: 1/28/2025  Length of Stay: 4 days  Attending Physician: Ifrah Clark MD  Primary Care Provider: Joseph Hutchinson MD        Subjective     Principal Problem:Pressure injury of sacral region, unstageable        HPI:  Ms. Wendy Ro is a 84 y.o. female who  has a past medical history of Arthritis, B12 deficiency, Chronic renal insufficiency, stage III (moderate), Class 3 severe obesity due to excess calories with serious comorbidity in adult, Combined hyperlipidemia associated with type 2 diabetes mellitus, DM (diabetes mellitus) type II Fatty liver, Gastroparesis, GERD (gastroesophageal reflux disease), Hiatal hernia, Hypertension associated with diabetes, Hypothyroid, Hypothyroidism, Intermittent asthma, Osteoporosis, Peripheral autonomic neuropathy due to diabetes mellitus, PONV (postoperative nausea and vomiting), Rheumatoid arthritis, Sleep apnea, and Thyroid nodule.    She  presented to the ED for evaluation of worsening sacral wound concerns by daughter.  At baseline, patient is bed-bound since bloody September after a fall when she fractured her left fibula which is being conservatively managed by Orthopedic surgery due to her comorbidities.  She reports the wound has been worsening over the past couple of weeks despite wound care nursing at home.    ED course notable for Hgb 10.1, WBC 9.66, , ESR >120, .2, BUN 20, creatinine 1.4. UA concerning for WBC>100, nitrite positive.  X-ray sacrum and coccyx noted "No evidence of osteomyelitis ".    Overview/Hospital Course:  Admitted to Hospital Medicine for evaluation of worsening sacral wound concerns.  Started on empiric coverage with vancomycin/piperacillin tazobactam for sacral ulcer infection and UTI concerns.  Wound care consulted and recommendations for surgical debridement.  General " surgery consulted and patient underwent surgical debridement 01/29 eva Jennings. Blood cultures and urine cultures with E coli. ID consulted with OPAT recs for IV Ceftriaxone 21 day course  (EOT  02/18/25) and Gentamicin bladder irrigation twice daily x 7 days. Home Health/Infusion being setup by CM/SW. Anticipate discharge home with HH once arrangements completed.     Interval History: NAEON. Pt has no complaints today. Denies CP, SOB, abd pain, n/v. Awaiting home infusion arrangements     Review of Systems  Objective:     Vital Signs (Most Recent):  Temp: 97.5 °F (36.4 °C) (02/01/25 1216)  Pulse: 66 (02/01/25 1216)  Resp: 16 (02/01/25 1216)  BP: (!) 161/68 (02/01/25 1216)  SpO2: 97 % (02/01/25 1216) Vital Signs (24h Range):  Temp:  [97.5 °F (36.4 °C)-98.3 °F (36.8 °C)] 97.5 °F (36.4 °C)  Pulse:  [66-78] 66  Resp:  [16-19] 16  SpO2:  [95 %-97 %] 97 %  BP: (120-172)/(57-73) 161/68     Weight: 82.6 kg (182 lb 1.6 oz)  Body mass index is 31.26 kg/m².    Intake/Output Summary (Last 24 hours) at 2/1/2025 1346  Last data filed at 2/1/2025 1027  Gross per 24 hour   Intake 100 ml   Output 576 ml   Net -476 ml         Physical Exam  Vitals and nursing note reviewed.   Constitutional:       General: She is not in acute distress.     Appearance: Ill appearance: chronic.   HENT:      Mouth/Throat:      Comments: Ecchymosis to R upper lip  Cardiovascular:      Rate and Rhythm: Normal rate and regular rhythm.      Heart sounds: No murmur heard.  Pulmonary:      Effort: Pulmonary effort is normal.      Breath sounds: Normal breath sounds. No wheezing, rhonchi or rales.   Abdominal:      General: Bowel sounds are normal. There is no distension.      Palpations: Abdomen is soft.      Tenderness: There is no abdominal tenderness.   Musculoskeletal:      Right lower leg: No edema.      Left lower leg: No edema.   Neurological:      Mental Status: She is alert and oriented to person, place, and time.             Significant Labs:  "All pertinent labs within the past 24 hours have been reviewed.    Significant Imaging: I have reviewed all pertinent imaging results/findings within the past 24 hours.    Assessment and Plan     * Pressure injury of sacral region, unstageable  Presented to the ED for a family concerns of infection in sacral pressure ulcer.  Patient is bed-bound at baseline due to prior history of fracture that is being managed conservatively due to comorbidities  -X-ray sacrum and coccyx noted "No evidence of osteomyelitis "   Gen Surg consulted; s/p debridement on 1/29 with Dr. Jennings  Surgery recommend daily local wound care with wet to dry packing to wound- have signed off   Wound care following       UTI (urinary tract infection)  History of chronic indwelling urinary catheter  Recent Labs   Lab 01/28/25  1144   COLORU Yellow   APPEARANCEUA Hazy*   PHUR 7.0   SPECGRAV 1.015   PROTEINUA Trace*   GLUCUA Negative   KETONESU Negative   BILIRUBINUA Negative   OCCULTUA Negative   NITRITE Positive*   UROBILINOGEN Negative   LEUKOCYTESUR 3+*   RBCUA 13*   WBCUA >100*   BACTERIA Moderate*   HYALINECASTS 9*       PLAN  Urine cx with E. Coli   Continue IV rocephin per ID  ID also recommend Gentamicin bladder irrigation BiD x 7d       Bacteremia due to Escherichia coli  Likely urinary source  ID consulted  Continue IV Rocephin x 14 days (EOT 02/18/25)       Chronic respiratory failure  Patient with Hypoxic Respiratory failure which is Chronic.  she is on home oxygen at 3 LPM. Supplemental oxygen was provided and noted-      .     CKD (chronic kidney disease)  Creatine stable for now. BMP reviewed- noted Estimated Creatinine Clearance: 27.7 mL/min (A) (based on SCr of 1.6 mg/dL (H)). according to latest data. Based on current GFR, CKD stage is stage 3 - GFR 30-59.  Monitor UOP and serial BMP and adjust therapy as needed. Renally dose meds. Avoid nephrotoxic medications and procedures.    DM (diabetes mellitus) type II controlled, " neurological manifestation  Continue home gabapentin    Chronic gout  Continue home allopurinol      Intermittent asthma  Chronic.  Stable.    PLAN:  Continue home albuterol p.r.n.      Hypothyroidism   Recent thyroid labs reviewed:  Lab Results   Component Value Date    TSH 3.957 10/08/2019    FREET4 1.23 05/20/2014       Hx of Hypothyroidism    PLAN:   Continue home levothyroxine        Hypertension associated with diabetes  Home meds for hypertension were reviewed and noted below.   Home Medications:  Hypertension Medications               furosemide (LASIX) 40 MG tablet Take 20 mg by mouth once daily.    metoprolol tartrate (LOPRESSOR) 100 MG tablet Take 100 mg by mouth 2 (two) times daily.            Patients blood pressure range in the last 24 hours was: BP  Min: 100/55  Max: 203/100.      PLAN:  -While in the hospital, will manage blood pressure as follows; Continue home antihypertensive regimen  -The patient's inpatient anti-hypertensive regimen is listed below:  Current Antihypertensives  metoprolol succinate (TOPROL-XL) 24 hr tablet 100 mg, 2 times daily, Oral  hydrALAZINE injection 5 mg, Every 6 hours PRN, Intravenous  furosemide tablet 20 mg, Daily, Oral  -Will utilize p.r.n. blood pressure medication only if patient's blood pressure greater than 180/110 and she develops symptoms such as worsening chest pain or shortness of breath.          VTE Risk Mitigation (From admission, onward)           Ordered     heparin (porcine) injection 5,000 Units  Every 8 hours         02/01/25 1446     IP VTE HIGH RISK PATIENT  Once         01/28/25 1724     Place sequential compression device  Until discontinued         01/28/25 1724     Reason for No Pharmacological VTE Prophylaxis  Once        Question:  Reasons:  Answer:  Risk of Bleeding  Comment:  Pending surgical procedure    01/28/25 1724                    Discharge Planning   ANDREW: 2/1/2025     Code Status: Full Code   Medical Readiness for Discharge Date:    Discharge Plan A: Home, Home Health   Discharge Delays: None known at this time            Please place Justification for DME        Ifrah Clark MD  Department of Hospital Medicine   [facility] - Med Surg

## 2025-02-01 NOTE — ASSESSMENT & PLAN NOTE
"Presented to the ED for a family concerns of infection in sacral pressure ulcer.  Patient is bed-bound at baseline due to prior history of fracture that is being managed conservatively due to comorbidities  -X-ray sacrum and coccyx noted "No evidence of osteomyelitis "   Gen Surg consulted; s/p debridement on 1/29 with Dr. Jennings  Surgery recommend daily local wound care with wet to dry packing to wound- have signed off   Wound care following     "

## 2025-02-02 LAB
ALBUMIN SERPL BCP-MCNC: 1.9 G/DL (ref 3.5–5.2)
ALP SERPL-CCNC: 41 U/L (ref 40–150)
ALT SERPL W/O P-5'-P-CCNC: 8 U/L (ref 10–44)
ANION GAP SERPL CALC-SCNC: 6 MMOL/L (ref 8–16)
AST SERPL-CCNC: 10 U/L (ref 10–40)
BASOPHILS # BLD AUTO: 0.05 K/UL (ref 0–0.2)
BASOPHILS NFR BLD: 0.7 % (ref 0–1.9)
BILIRUB SERPL-MCNC: 0.1 MG/DL (ref 0.1–1)
BUN SERPL-MCNC: 17 MG/DL (ref 8–23)
CALCIUM SERPL-MCNC: 8.7 MG/DL (ref 8.7–10.5)
CHLORIDE SERPL-SCNC: 99 MMOL/L (ref 95–110)
CO2 SERPL-SCNC: 32 MMOL/L (ref 23–29)
CREAT SERPL-MCNC: 1.3 MG/DL (ref 0.5–1.4)
DIFFERENTIAL METHOD BLD: ABNORMAL
EOSINOPHIL # BLD AUTO: 0.8 K/UL (ref 0–0.5)
EOSINOPHIL NFR BLD: 11.4 % (ref 0–8)
ERYTHROCYTE [DISTWIDTH] IN BLOOD BY AUTOMATED COUNT: 14 % (ref 11.5–14.5)
EST. GFR  (NO RACE VARIABLE): 41 ML/MIN/1.73 M^2
GLUCOSE SERPL-MCNC: 95 MG/DL (ref 70–110)
HCT VFR BLD AUTO: 25.6 % (ref 37–48.5)
HGB BLD-MCNC: 7.7 G/DL (ref 12–16)
IMM GRANULOCYTES # BLD AUTO: 0.02 K/UL (ref 0–0.04)
IMM GRANULOCYTES NFR BLD AUTO: 0.3 % (ref 0–0.5)
LYMPHOCYTES # BLD AUTO: 2.7 K/UL (ref 1–4.8)
LYMPHOCYTES NFR BLD: 37.7 % (ref 18–48)
MAGNESIUM SERPL-MCNC: 1.8 MG/DL (ref 1.6–2.6)
MCH RBC QN AUTO: 30.7 PG (ref 27–31)
MCHC RBC AUTO-ENTMCNC: 30.1 G/DL (ref 32–36)
MCV RBC AUTO: 102 FL (ref 82–98)
MONOCYTES # BLD AUTO: 0.5 K/UL (ref 0.3–1)
MONOCYTES NFR BLD: 6.6 % (ref 4–15)
NEUTROPHILS # BLD AUTO: 3.1 K/UL (ref 1.8–7.7)
NEUTROPHILS NFR BLD: 43.3 % (ref 38–73)
NRBC BLD-RTO: 0 /100 WBC
PHOSPHATE SERPL-MCNC: 3.1 MG/DL (ref 2.7–4.5)
PLATELET # BLD AUTO: 300 K/UL (ref 150–450)
PMV BLD AUTO: 10.1 FL (ref 9.2–12.9)
POTASSIUM SERPL-SCNC: 3.8 MMOL/L (ref 3.5–5.1)
PROT SERPL-MCNC: 5.4 G/DL (ref 6–8.4)
RBC # BLD AUTO: 2.51 M/UL (ref 4–5.4)
SODIUM SERPL-SCNC: 137 MMOL/L (ref 136–145)
WBC # BLD AUTO: 7.22 K/UL (ref 3.9–12.7)

## 2025-02-02 PROCEDURE — 85025 COMPLETE CBC W/AUTO DIFF WBC: CPT | Performed by: STUDENT IN AN ORGANIZED HEALTH CARE EDUCATION/TRAINING PROGRAM

## 2025-02-02 PROCEDURE — 25000003 PHARM REV CODE 250: Performed by: STUDENT IN AN ORGANIZED HEALTH CARE EDUCATION/TRAINING PROGRAM

## 2025-02-02 PROCEDURE — 83735 ASSAY OF MAGNESIUM: CPT | Performed by: STUDENT IN AN ORGANIZED HEALTH CARE EDUCATION/TRAINING PROGRAM

## 2025-02-02 PROCEDURE — 63600175 PHARM REV CODE 636 W HCPCS: Performed by: INTERNAL MEDICINE

## 2025-02-02 PROCEDURE — 84100 ASSAY OF PHOSPHORUS: CPT | Performed by: STUDENT IN AN ORGANIZED HEALTH CARE EDUCATION/TRAINING PROGRAM

## 2025-02-02 PROCEDURE — 63600175 PHARM REV CODE 636 W HCPCS: Performed by: STUDENT IN AN ORGANIZED HEALTH CARE EDUCATION/TRAINING PROGRAM

## 2025-02-02 PROCEDURE — 21400001 HC TELEMETRY ROOM

## 2025-02-02 PROCEDURE — 80053 COMPREHEN METABOLIC PANEL: CPT | Performed by: STUDENT IN AN ORGANIZED HEALTH CARE EDUCATION/TRAINING PROGRAM

## 2025-02-02 RX ADMIN — ACETAMINOPHEN 650 MG: 325 TABLET ORAL at 08:02

## 2025-02-02 RX ADMIN — HEPARIN SODIUM 5000 UNITS: 5000 INJECTION INTRAVENOUS; SUBCUTANEOUS at 08:02

## 2025-02-02 RX ADMIN — LEVOTHYROXINE SODIUM 50 MCG: 0.05 TABLET ORAL at 05:02

## 2025-02-02 RX ADMIN — MUPIROCIN: 20 OINTMENT TOPICAL at 08:02

## 2025-02-02 RX ADMIN — FUROSEMIDE 20 MG: 20 TABLET ORAL at 08:02

## 2025-02-02 RX ADMIN — HEPARIN SODIUM 5000 UNITS: 5000 INJECTION INTRAVENOUS; SUBCUTANEOUS at 05:02

## 2025-02-02 RX ADMIN — HEPARIN SODIUM 5000 UNITS: 5000 INJECTION INTRAVENOUS; SUBCUTANEOUS at 02:02

## 2025-02-02 RX ADMIN — FOLIC ACID 1000 MCG: 1 TABLET ORAL at 08:02

## 2025-02-02 RX ADMIN — ACETAMINOPHEN 650 MG: 325 TABLET ORAL at 05:02

## 2025-02-02 RX ADMIN — ACETAMINOPHEN 650 MG: 325 TABLET ORAL at 10:02

## 2025-02-02 RX ADMIN — Medication 400 MG: at 08:02

## 2025-02-02 RX ADMIN — PANTOPRAZOLE SODIUM 40 MG: 40 TABLET, DELAYED RELEASE ORAL at 08:02

## 2025-02-02 RX ADMIN — GABAPENTIN 300 MG: 300 CAPSULE ORAL at 08:02

## 2025-02-02 RX ADMIN — METOPROLOL SUCCINATE 100 MG: 50 TABLET, EXTENDED RELEASE ORAL at 08:02

## 2025-02-02 RX ADMIN — CEFTRIAXONE SODIUM 2 G: 2 INJECTION, POWDER, FOR SOLUTION INTRAMUSCULAR; INTRAVENOUS at 10:02

## 2025-02-02 RX ADMIN — POTASSIUM CITRATE 10 MEQ: 10 TABLET, EXTENDED RELEASE ORAL at 08:02

## 2025-02-02 RX ADMIN — ALLOPURINOL 100 MG: 100 TABLET ORAL at 08:02

## 2025-02-02 NOTE — PROGRESS NOTES
"[facility]  [facility]    Patient Name: Wendy Ro  MRN: [id number]  Patient Class: IP- Inpatient   Admission Date: 1/28/2025  Length of Stay: 5 days  Attending Physician: Ifrah Clark MD  Primary Care Provider: Joseph Hutchinson MD        Subjective     Principal Problem:Pressure injury of sacral region, unstageable        HPI:  Ms. Wendy Ro is a 84 y.o. female who  has a past medical history of Arthritis, B12 deficiency, Chronic renal insufficiency, stage III (moderate), Class 3 severe obesity due to excess calories with serious comorbidity in adult, Combined hyperlipidemia associated with type 2 diabetes mellitus, DM (diabetes mellitus) type II Fatty liver, Gastroparesis, GERD (gastroesophageal reflux disease), Hiatal hernia, Hypertension associated with diabetes, Hypothyroid, Hypothyroidism, Intermittent asthma, Osteoporosis, Peripheral autonomic neuropathy due to diabetes mellitus, PONV (postoperative nausea and vomiting), Rheumatoid arthritis, Sleep apnea, and Thyroid nodule.    She  presented to the ED for evaluation of worsening sacral wound concerns by daughter.  At baseline, patient is bed-bound since bloody September after a fall when she fractured her left fibula which is being conservatively managed by Orthopedic surgery due to her comorbidities.  She reports the wound has been worsening over the past couple of weeks despite wound care nursing at home.    ED course notable for Hgb 10.1, WBC 9.66, , ESR >120, .2, BUN 20, creatinine 1.4. UA concerning for WBC>100, nitrite positive.  X-ray sacrum and coccyx noted "No evidence of osteomyelitis ".    Overview/Hospital Course:  Admitted to Hospital Medicine for evaluation of worsening sacral wound concerns.  Started on empiric coverage with vancomycin/piperacillin tazobactam for sacral ulcer infection and UTI concerns.  Wound care consulted and recommendations for surgical debridement.  General " surgery consulted and patient underwent surgical debridement 01/29 eva Jennings. Blood cultures and urine cultures with E coli. ID consulted with OPAT recs for IV Ceftriaxone 21 day course  (EOT  02/18/25) and Gentamicin bladder irrigation twice daily x 7 days. Home Health/Infusion being setup by CM/SW. Anticipate discharge home with HH once arrangements completed.     Interval History: NAEON. Pt reports some back pain today. Denies CP, SOB. PICC line placed 02/01. Awaiting home IV infusion arrangements for DC     Review of Systems  Objective:     Vital Signs (Most Recent):  Temp: 98.2 °F (36.8 °C) (02/02/25 0529)  Pulse: 68 (02/02/25 1140)  Resp: 16 (02/02/25 0359)  BP: (!) 164/73 (order parameters not met for antihypertensive) (02/02/25 0359)  SpO2: (!) 92 % (02/02/25 0359) Vital Signs (24h Range):  Temp:  [97.5 °F (36.4 °C)-98.2 °F (36.8 °C)] 98.2 °F (36.8 °C)  Pulse:  [] 68  Resp:  [16-18] 16  SpO2:  [92 %-97 %] 92 %  BP: (134-172)/(61-73) 164/73     Weight: 82.6 kg (182 lb 1.6 oz)  Body mass index is 31.26 kg/m².    Intake/Output Summary (Last 24 hours) at 2/2/2025 1229  Last data filed at 2/2/2025 0957  Gross per 24 hour   Intake --   Output 800 ml   Net -800 ml         Physical Exam  Vitals and nursing note reviewed.   Constitutional:       General: She is not in acute distress.     Appearance: Ill appearance: chronic.   Cardiovascular:      Rate and Rhythm: Normal rate and regular rhythm.      Heart sounds: No murmur heard.  Pulmonary:      Effort: Pulmonary effort is normal.      Breath sounds: Normal breath sounds. No wheezing or rales.      Comments: RA  Abdominal:      General: Bowel sounds are normal. There is no distension.      Palpations: Abdomen is soft.      Tenderness: There is no abdominal tenderness.   Genitourinary:     Comments: Booen in place   Skin:     Comments: Scaly dry skin to BLE    Neurological:      Mental Status: She is alert and oriented to person, place, and time. Mental  "status is at baseline.             Significant Labs: All pertinent labs within the past 24 hours have been reviewed.    Significant Imaging: I have reviewed all pertinent imaging results/findings within the past 24 hours.    Assessment and Plan     * Pressure injury of sacral region, unstageable  Presented to the ED for a family concerns of infection in sacral pressure ulcer.  Patient is bed-bound at baseline due to prior history of fracture that is being managed conservatively due to comorbidities  -X-ray sacrum and coccyx noted "No evidence of osteomyelitis "   Gen Surg consulted; s/p debridement on 1/29 with Dr. Jennings  Surgery recommend daily local wound care with wet to dry packing to wound- have signed off   Wound care following       UTI (urinary tract infection)  History of chronic indwelling urinary catheter, reports it is changed monthly as outpatient  Recent Labs   Lab 01/28/25  1144   COLORU Yellow   APPEARANCEUA Hazy*   PHUR 7.0   SPECGRAV 1.015   PROTEINUA Trace*   GLUCUA Negative   KETONESU Negative   BILIRUBINUA Negative   OCCULTUA Negative   NITRITE Positive*   UROBILINOGEN Negative   LEUKOCYTESUR 3+*   RBCUA 13*   WBCUA >100*   BACTERIA Moderate*   HYALINECASTS 9*       PLAN  Urine cx with E. Coli   Continue IV rocephin per ID  ID also recommend Gentamicin bladder irrigation BiD x 7d       Bacteremia due to Escherichia coli  Likely urinary source  ID consulted  Continue IV Rocephin x 14 days (EOT 02/18/25)       Chronic respiratory failure  Patient with Hypoxic Respiratory failure which is Chronic.  she is on home oxygen at 3 LPM. Supplemental oxygen was provided and noted-      .     CKD (chronic kidney disease)  Creatine stable for now. BMP reviewed- noted Estimated Creatinine Clearance: 27.7 mL/min (A) (based on SCr of 1.6 mg/dL (H)). according to latest data. Based on current GFR, CKD stage is stage 3 - GFR 30-59.  Monitor UOP and serial BMP and adjust therapy as needed. Renally dose meds. Avoid " nephrotoxic medications and procedures.    DM (diabetes mellitus) type II controlled, neurological manifestation  Continue home gabapentin    Chronic gout  Continue home allopurinol      Intermittent asthma  Chronic.  Stable.    PLAN:  Continue home albuterol p.r.n.      Hypothyroidism   Recent thyroid labs reviewed:  Lab Results   Component Value Date    TSH 3.957 10/08/2019    FREET4 1.23 05/20/2014       Hx of Hypothyroidism    PLAN:   Continue home levothyroxine        Hypertension associated with diabetes  Home meds for hypertension were reviewed and noted below.   Home Medications:  Hypertension Medications               furosemide (LASIX) 40 MG tablet Take 20 mg by mouth once daily.    metoprolol tartrate (LOPRESSOR) 100 MG tablet Take 100 mg by mouth 2 (two) times daily.            Patients blood pressure range in the last 24 hours was: BP  Min: 100/55  Max: 203/100.      PLAN:  -While in the hospital, will manage blood pressure as follows; Continue home antihypertensive regimen  -The patient's inpatient anti-hypertensive regimen is listed below:  Current Antihypertensives  metoprolol succinate (TOPROL-XL) 24 hr tablet 100 mg, 2 times daily, Oral  hydrALAZINE injection 5 mg, Every 6 hours PRN, Intravenous  furosemide tablet 20 mg, Daily, Oral  -Will utilize p.r.n. blood pressure medication only if patient's blood pressure greater than 180/110 and she develops symptoms such as worsening chest pain or shortness of breath.          VTE Risk Mitigation (From admission, onward)           Ordered     heparin (porcine) injection 5,000 Units  Every 8 hours         02/01/25 1446     IP VTE HIGH RISK PATIENT  Once         01/28/25 1724     Place sequential compression device  Until discontinued         01/28/25 1724     Reason for No Pharmacological VTE Prophylaxis  Once        Question:  Reasons:  Answer:  Risk of Bleeding  Comment:  Pending surgical procedure    01/28/25 1724                    Discharge Planning    ANDREW: 2/1/2025     Code Status: Full Code   Medical Readiness for Discharge Date:   Discharge Plan A: Home, Home Health   Discharge Delays: None known at this time            Please place Justification for DME        Ifrah Clark MD  Department of Hospital Medicine   [facility] - Med S[facility]

## 2025-02-02 NOTE — SUBJECTIVE & OBJECTIVE
Interval History: NAEON. Pt reports some back pain today. Denies CP, SOB. PICC line placed 02/01. Awaiting home IV infusion arrangements for DC     Review of Systems  Objective:     Vital Signs (Most Recent):  Temp: 98.2 °F (36.8 °C) (02/02/25 0529)  Pulse: 68 (02/02/25 1140)  Resp: 16 (02/02/25 0359)  BP: (!) 164/73 (order parameters not met for antihypertensive) (02/02/25 0359)  SpO2: (!) 92 % (02/02/25 0359) Vital Signs (24h Range):  Temp:  [97.5 °F (36.4 °C)-98.2 °F (36.8 °C)] 98.2 °F (36.8 °C)  Pulse:  [] 68  Resp:  [16-18] 16  SpO2:  [92 %-97 %] 92 %  BP: (134-172)/(61-73) 164/73     Weight: 82.6 kg (182 lb 1.6 oz)  Body mass index is 31.26 kg/m².    Intake/Output Summary (Last 24 hours) at 2/2/2025 1229  Last data filed at 2/2/2025 0957  Gross per 24 hour   Intake --   Output 800 ml   Net -800 ml         Physical Exam  Vitals and nursing note reviewed.   Constitutional:       General: She is not in acute distress.     Appearance: Ill appearance: chronic.   Cardiovascular:      Rate and Rhythm: Normal rate and regular rhythm.      Heart sounds: No murmur heard.  Pulmonary:      Effort: Pulmonary effort is normal.      Breath sounds: Normal breath sounds. No wheezing or rales.      Comments: RA  Abdominal:      General: Bowel sounds are normal. There is no distension.      Palpations: Abdomen is soft.      Tenderness: There is no abdominal tenderness.   Genitourinary:     Comments: [place] in place   Skin:     Comments: Scaly dry skin to BLE    Neurological:      Mental Status: She is alert and oriented to person, place, and time. Mental status is at baseline.             Significant Labs: All pertinent labs within the past 24 hours have been reviewed.    Significant Imaging: I have reviewed all pertinent imaging results/findings within the past 24 hours.

## 2025-02-02 NOTE — ASSESSMENT & PLAN NOTE
History of chronic indwelling urinary catheter, reports it is changed monthly as outpatient  Recent Labs   Lab 01/28/25  1144   COLORU Yellow   APPEARANCEUA Hazy*   PHUR 7.0   SPECGRAV 1.015   PROTEINUA Trace*   GLUCUA Negative   KETONESU Negative   BILIRUBINUA Negative   OCCULTUA Negative   NITRITE Positive*   UROBILINOGEN Negative   LEUKOCYTESUR 3+*   RBCUA 13*   WBCUA >100*   BACTERIA Moderate*   HYALINECASTS 9*       PLAN  Urine cx with E. Coli   Continue IV rocephin per ID  ID also recommend Gentamicin bladder irrigation BiD x 7d

## 2025-02-03 VITALS
HEART RATE: 69 BPM | RESPIRATION RATE: 18 BRPM | SYSTOLIC BLOOD PRESSURE: 174 MMHG | WEIGHT: 182.13 LBS | BODY MASS INDEX: 31.09 KG/M2 | DIASTOLIC BLOOD PRESSURE: 69 MMHG | HEIGHT: 64 IN | OXYGEN SATURATION: 96 % | TEMPERATURE: 98 F

## 2025-02-03 PROCEDURE — 25000003 PHARM REV CODE 250: Performed by: STUDENT IN AN ORGANIZED HEALTH CARE EDUCATION/TRAINING PROGRAM

## 2025-02-03 PROCEDURE — 63600175 PHARM REV CODE 636 W HCPCS: Performed by: STUDENT IN AN ORGANIZED HEALTH CARE EDUCATION/TRAINING PROGRAM

## 2025-02-03 PROCEDURE — 63600175 PHARM REV CODE 636 W HCPCS: Performed by: INTERNAL MEDICINE

## 2025-02-03 RX ORDER — CEFTRIAXONE 2 G/1
2 INJECTION, POWDER, FOR SOLUTION INTRAMUSCULAR; INTRAVENOUS DAILY
Start: 2025-02-03 | End: 2025-02-17

## 2025-02-03 RX ORDER — PANTOPRAZOLE SODIUM 40 MG/1
40 TABLET, DELAYED RELEASE ORAL DAILY
Qty: 90 TABLET | Refills: 3 | Status: SHIPPED | OUTPATIENT
Start: 2025-02-04 | End: 2026-02-04

## 2025-02-03 RX ADMIN — METOPROLOL SUCCINATE 100 MG: 50 TABLET, EXTENDED RELEASE ORAL at 08:02

## 2025-02-03 RX ADMIN — POTASSIUM CITRATE 10 MEQ: 10 TABLET, EXTENDED RELEASE ORAL at 08:02

## 2025-02-03 RX ADMIN — GABAPENTIN 300 MG: 300 CAPSULE ORAL at 08:02

## 2025-02-03 RX ADMIN — Medication 400 MG: at 08:02

## 2025-02-03 RX ADMIN — Medication 4 TABLET: at 11:02

## 2025-02-03 RX ADMIN — HEPARIN SODIUM 5000 UNITS: 5000 INJECTION INTRAVENOUS; SUBCUTANEOUS at 06:02

## 2025-02-03 RX ADMIN — Medication 4 TABLET: at 04:02

## 2025-02-03 RX ADMIN — HEPARIN SODIUM 5000 UNITS: 5000 INJECTION INTRAVENOUS; SUBCUTANEOUS at 03:02

## 2025-02-03 RX ADMIN — CEFTRIAXONE SODIUM 2 G: 2 INJECTION, POWDER, FOR SOLUTION INTRAMUSCULAR; INTRAVENOUS at 08:02

## 2025-02-03 RX ADMIN — LEVOTHYROXINE SODIUM 50 MCG: 0.05 TABLET ORAL at 06:02

## 2025-02-03 RX ADMIN — FUROSEMIDE 20 MG: 20 TABLET ORAL at 08:02

## 2025-02-03 RX ADMIN — ACETAMINOPHEN 650 MG: 325 TABLET ORAL at 06:02

## 2025-02-03 RX ADMIN — ACETAMINOPHEN 650 MG: 325 TABLET ORAL at 04:02

## 2025-02-03 RX ADMIN — ALLOPURINOL 100 MG: 100 TABLET ORAL at 08:02

## 2025-02-03 RX ADMIN — FOLIC ACID 1000 MCG: 1 TABLET ORAL at 08:02

## 2025-02-03 RX ADMIN — PANTOPRAZOLE SODIUM 40 MG: 40 TABLET, DELAYED RELEASE ORAL at 08:02

## 2025-02-03 NOTE — PLAN OF CARE
[facility] plan for Nicole to meet with pt and dtr at noon today for IV abx teaching. IV abx pending auth at this time. Plan for pt to d/c once auth approved.

## 2025-02-03 NOTE — PLAN OF CARE
Discussed poc with pt, pt verbalized understanding    Purposeful rounding every 2hours    VS wnl  Cardiac monitoring in use, pt is NSR, tele monitor # [id number]   Fall precautions in place, remains injury free  Pain under control with PRN meds  Abx given as prescribed  Bed locked at lowest position  Call light within reach    Chart check complete  Will cont with POC

## 2025-02-03 NOTE — PLAN OF CARE
Discharge education given to pt and daughter. Both verbalized an understanding. Pt to be discharged with PICC, RX meds and personal belongings. Patient awaiting [facility] at this time.

## 2025-02-03 NOTE — DISCHARGE SUMMARY
"[facility]  Discharge Summary      Patient Name: Wendy Ro  MRN: [id number]  CLAUDIA: [id number]  Patient Class: IP- Inpatient  Admission Date: 1/28/2025  Hospital Length of Stay: 6 days  Discharge Date and Time:  02/03/2025 12:42 PM  Attending Physician: Berny Regan MD   Discharging Provider: Berny Regan MD  Primary Care Provider: Joseph Hutchinson MD    Primary Care Team: Networked reference to record PCT     HPI:   Ms. Wendy Ro is a 84 y.o. female who  has a past medical history of Arthritis, B12 deficiency, Chronic renal insufficiency, stage III (moderate), Class 3 severe obesity due to excess calories with serious comorbidity in adult, Combined hyperlipidemia associated with type 2 diabetes mellitus, DM (diabetes mellitus) type II Fatty liver, Gastroparesis, GERD (gastroesophageal reflux disease), Hiatal hernia, Hypertension associated with diabetes, Hypothyroid, Hypothyroidism, Intermittent asthma, Osteoporosis, Peripheral autonomic neuropathy due to diabetes mellitus, PONV (postoperative nausea and vomiting), Rheumatoid arthritis, Sleep apnea, and Thyroid nodule.    She  presented to the ED for evaluation of worsening sacral wound concerns by daughter.  At baseline, patient is bed-bound since bloody September after a fall when she fractured her left fibula which is being conservatively managed by Orthopedic surgery due to her comorbidities.  She reports the wound has been worsening over the past couple of weeks despite wound care nursing at home.    ED course notable for Hgb 10.1, WBC 9.66, , ESR >120, .2, BUN 20, creatinine 1.4. UA concerning for WBC>100, nitrite positive.  X-ray sacrum and coccyx noted "No evidence of osteomyelitis ".    Procedure(s) (LRB):  DEBRIDEMENT, WOUND, SACRUM (N/A)      Hospital Course:   Admitted to Hospital Medicine for evaluation of worsening sacral wound concerns.  Started on empiric coverage with " "vancomycin/piperacillin tazobactam for sacral ulcer infection and UTI concerns.  Wound care consulted and recommendations for surgical debridement.  General surgery consulted and patient underwent surgical debridement 01/29 wotmariela Jennings. Blood cultures and urine cultures with E coli. ID consulted with OPAT recs for IV Ceftriaxone 21 day course  (EOT  02/18/25) and Gentamicin bladder irrigation twice daily x 7 days. Home Health/Infusion being setup by CM/SW. Anticipate discharge home with HH once arrangements completed.  Home IV antibiotics set up.  Rocephin through a PICC line until 02/18/2025.  Home health orders placed at discharge.  Discharged home with HH in stable condition    BP (!) 174/69 (BP Location: Left arm, Patient Position: Lying)   Pulse 69   Temp 97.8 °F (36.6 °C) (Axillary)   Resp 18   Ht 5' 4" (1.626 m)   Wt 82.6 kg (182 lb 1.6 oz)   SpO2 96%   Breastfeeding No   BMI 31.26 kg/m²      Goals of Care Treatment Preferences:  Code Status: Full Code      SDOH Screening:  The patient was screened for utility difficulties, food insecurity, transport difficulties, housing insecurity, and interpersonal safety and there were no concerns identified this admission.     Consults:   Consults (From admission, onward)          Status Ordering Provider     Inpatient consult to PICC team (NIAS)  Once        Provider:  (Not yet assigned)    Acknowledged CHUCK MASSEY     Inpatient consult to Social Work  Once        Provider:  (Not yet assigned)    Completed SAMMIE TATE     Inpatient consult to Infectious Diseases  Once        Provider:  Miguel Estevez DO    Acknowledged SAMMIE TATE     Inpatient consult to Registered Dietitian/Nutritionist  Once        Provider:  (Not yet assigned)    Completed SAMMIE TATE     Inpatient consult to General surgery  Once        Provider:  Haile Jennings MD    Completed MANJU BHATT     Inpatient consult to Social Work/Case Management  Once      " "  Provider:  (Not yet assigned)    Completed SAMMIE TATE            * Pressure injury of sacral region, unstageable  Presented to the ED for a family concerns of infection in sacral pressure ulcer.  Patient is bed-bound at baseline due to prior history of fracture that is being managed conservatively due to comorbidities  -X-ray sacrum and coccyx noted "No evidence of osteomyelitis "   Gen Surg consulted; s/p debridement on 1/29 with Dr. Jennings  Surgery recommend daily local wound care with wet to dry packing to wound- have signed off   Wound care following       Bacteremia due to Escherichia coli  Likely urinary source  ID consulted  Continue IV Rocephin x 14 days (EOT 02/18/25)       UTI (urinary tract infection)  History of chronic indwelling urinary catheter, reports it is changed monthly as outpatient  Recent Labs   Lab 01/28/25  1144   COLORU Yellow   APPEARANCEUA Hazy*   PHUR 7.0   SPECGRAV 1.015   PROTEINUA Trace*   GLUCUA Negative   KETONESU Negative   BILIRUBINUA Negative   OCCULTUA Negative   NITRITE Positive*   UROBILINOGEN Negative   LEUKOCYTESUR 3+*   RBCUA 13*   WBCUA >100*   BACTERIA Moderate*   HYALINECASTS 9*       PLAN  Urine cx with E. Coli   Continue IV rocephin per ID  ID also recommend Gentamicin bladder irrigation BiD x 7d       Chronic respiratory failure  Patient with Hypoxic Respiratory failure which is Chronic.  she is on home oxygen at 3 LPM. Supplemental oxygen was provided and noted-      .     CKD (chronic kidney disease)  Creatine stable for now. BMP reviewed- noted Estimated Creatinine Clearance: 27.7 mL/min (A) (based on SCr of 1.6 mg/dL (H)). according to latest data. Based on current GFR, CKD stage is stage 3 - GFR 30-59.  Monitor UOP and serial BMP and adjust therapy as needed. Renally dose meds. Avoid nephrotoxic medications and procedures.    DM (diabetes mellitus) type II controlled, neurological manifestation  Continue home gabapentin    Chronic gout  Continue home " allopurinol      Intermittent asthma  Chronic.  Stable.    PLAN:  Continue home albuterol p.r.n.      Hypothyroidism   Recent thyroid labs reviewed:  Lab Results   Component Value Date    TSH 3.957 10/08/2019    FREET4 1.23 05/20/2014       Hx of Hypothyroidism    PLAN:   Continue home levothyroxine        Hypertension associated with diabetes  Home meds for hypertension were reviewed and noted below.   Home Medications:  Hypertension Medications               furosemide (LASIX) 40 MG tablet Take 20 mg by mouth once daily.    metoprolol tartrate (LOPRESSOR) 100 MG tablet Take 100 mg by mouth 2 (two) times daily.            Patients blood pressure range in the last 24 hours was: BP  Min: 100/55  Max: 203/100.      PLAN:  -While in the hospital, will manage blood pressure as follows; Continue home antihypertensive regimen  -The patient's inpatient anti-hypertensive regimen is listed below:  Current Antihypertensives  metoprolol succinate (TOPROL-XL) 24 hr tablet 100 mg, 2 times daily, Oral  hydrALAZINE injection 5 mg, Every 6 hours PRN, Intravenous  furosemide tablet 20 mg, Daily, Oral  -Will utilize p.r.n. blood pressure medication only if patient's blood pressure greater than 180/110 and she develops symptoms such as worsening chest pain or shortness of breath.          Final Active Diagnoses:    Diagnosis Date Noted POA    PRINCIPAL PROBLEM:  Pressure injury of sacral region, unstageable [L89.150] 01/28/2025 Yes    Bacteremia due to Escherichia coli [R78.81, B96.20] 01/30/2025 Yes    Chronic respiratory failure [J96.10] 01/28/2025 Yes    UTI (urinary tract infection) [N39.0] 01/28/2025 Yes    CKD (chronic kidney disease) [N18.9] 05/03/2018 Yes    DM (diabetes mellitus) type II controlled, neurological manifestation [E11.49] 05/16/2014 Yes    Hypertension associated with diabetes [E11.59, I15.2]  Yes    Intermittent asthma [J45.20]  Yes    Hypothyroidism [E03.9]  Yes      Problems Resolved During this Admission:  "      Discharged Condition: good    Disposition: Home-Health Care [facility] [place] [facility]    Follow Up:   Contact information for after-discharge care       Durable Medical Equipment       [facility] .    Service: Durable Medical Equipment  Contact information:  [address] [facility] [id number] [phone]                     Dialysis/Infusion       [facility] .    Service: Infusion and IV Therapy  Contact information:  [address]  [facility] [address] [phone]                     Home Medical Care       [facility] .    Service: Home Health Services  Contact information:  [address] [facility] [address]  [facility] [address]  [phone]                                 Patient Instructions:      HOSPITAL BED FOR HOME USE   Order Comments: Low air loss mattress.       Per wound care eval 01/28/25, "Unstageable pressure injury noted to left sacrum that measures [id number] with 0.4cm undermining noted from 3-6 o'clock."     "Wound care routine: Unstageable pressure injury sacrum:  1. Cleanse with vashe and allow to dwell  2. Pat dry  3. Paint intact [place] wound skin with cavilon  4. Apply vashe moistened gauze to cover wound  5. Secure with large sacral foam dressing OR ABD pad and medipore tape  6. Change daily and prn excess drainage  7. Further recommendations from general surgery after debridement"     Order Specific Question Answer Comments   Type: Semi-electric    Length of need (1-99 months): 99    Does patient have medical equipment at home? oxygen    Does patient have medical equipment at home? hospital bed    Does patient have medical equipment at home? grab bar    Does patient have medical equipment at home? wheelchair    Height: 5' 4" (1.626 m)    Weight: 82.6 kg (182 lb 1.6 oz)    Please check all that apply: Patient requires positioning of the body in ways not feasible in an ordinary bed due to a medical condition which is " "expected to last at least one month.    Please check all that apply: Patient requires, for the alleviation of pain, positioning of the body in ways not feasible in an ordinary bed.      PATIENT (JUANJOSE) LIFT FOR HOME USE   Order Comments: Please include trappeze triangle     Order Specific Question Answer Comments   Height: 5' 4" (1.626 m)    Weight: 82.6 kg (182 lb 1.6 oz)    Does patient have medical equipment at home? oxygen    Does patient have medical equipment at home? hospital bed    Does patient have medical equipment at home? grab bar    Does patient have medical equipment at home? wheelchair    Length of need (1-99 months): 99      Ambulatory referral/consult to Home Health   Standing Status: Future   Referral Priority: Routine Referral Type: Home Health   Referral Reason: Specialty Services Required   Requested Specialty: Home Health Services   Number of Visits Requested: 1       Significant Diagnostic Studies: Labs: All labs within the past 24 hours have been reviewed    Pending Diagnostic Studies:       None           Medications:  Reconciled Home Medications:      Medication List        START taking these medications      cefTRIAXone 2 gram injection  Commonly known as: ROCEPHIN  Inject 2 g into the vein once daily. for 14 days     pantoprazole 40 MG tablet  Commonly known as: PROTONIX  Take 1 tablet (40 mg total) by mouth once daily.  Start taking on: February 4, 2025            CONTINUE taking these medications      albuterol 90 mcg/actuation inhaler  Commonly known as: VENTOLIN HFA  Inhale 2 puffs into the lungs every 6 (six) hours as needed for Wheezing.     alendronate 70 MG tablet  Commonly known as: FOSAMAX  Take 1 tablet (70 mg total) by mouth every 7 days.     allopurinoL 100 MG tablet  Commonly known as: ZYLOPRIM  Take 100 mg by mouth once daily.     aspirin 81 MG EC tablet  Commonly known as: ECOTRIN  Take 81 mg by mouth once daily.     cetirizine 10 MG tablet  Commonly known as: ZYRTEC  Take " 10 mg by mouth once daily.     folic acid 1 MG tablet  Commonly known as: FOLVITE  Take 1 tablet (1,000 mcg total) by mouth once daily.     furosemide 40 MG tablet  Commonly known as: LASIX  Take 20 mg by mouth once daily.     gabapentin 300 MG capsule  Commonly known as: NEURONTIN  Take 300 mg by mouth 3 (three) times daily.     levothyroxine 50 MCG tablet  Commonly known as: SYNTHROID  Take 50 mcg by mouth before breakfast.     magnesium oxide 400 mg (241.3 mg magnesium) tablet  Commonly known as: MAG-OX  Take 400 mg by mouth 2 (two) times daily.     metoprolol tartrate 100 MG tablet  Commonly known as: LOPRESSOR  Take 100 mg by mouth 2 (two) times daily.     potassium citrate 10 mEq (1,080 mg) Tbsr  Commonly known as: UROCIT-K  Take 10 mEq by mouth once daily.     SENNA 8.6 mg tablet  Generic drug: senna  Take 2 tablets by mouth 2 (two) times daily as needed.              Indwelling Lines/Drains at time of discharge:   Lines/Drains/Airways       Peripherally Inserted Central Catheter Line  Duration             PICC Double Lumen 02/01/25 1720 right basilic 1 day              Drain  Duration                  Urethral Catheter -- days                    Time spent on the discharge of patient: 31 minutes  of time spent on discharge including examining patient, providing discharge instructions, arranging follow-up and documentation.           Berny Regan MD  Department of Hospital Medicine  '[place] - Med Surg

## 2025-02-03 NOTE — PLAN OF CARE
[facility] - Med Surg  Discharge Final Note    Primary Care Provider: Joseph Hutchinson MD    Expected Discharge Date: 2/3/2025    Final Discharge Note (most recent)       Final Note - 02/03/25 1243          Final Note    Assessment Type Final Discharge Note     Anticipated Discharge Disposition Home-Health Care [facility]     Hospital Resources/Appts/Education Provided Post-Acute resouces added to AVS        Post-Acute Status    Post-Acute Authorization HME;Home Health;IV Infusion     HME Status Set-up Complete/Auth obtained     Home Health Status Set-up Complete/Auth obtained     IV Infusion Status Set-up Complete/Auth obtained     Discharge Delays None known at this time                   After-discharge care                Durable Medical Equipment       [facility]   Service: Durable Medical Equipment    [address] [facility] [facility] [address]   Phone: [phone]                 Dialysis/Infusion       [facility]   Service: Infusion and IV Therapy    [address] [place] [address]   Phone: [phone]                 Home Medical Care       *[facility]   Service: Home Health Services    [address] [facility] [address]  [facility] [address]   Phone: [phone]                     [facility] arranged; d/c orders sent via [employer].     [facility] Infusion arranged; auth obtained and teaching complete with pt/daughter.     Patient has no d/c needs at this time. Sw to follow up, as needed, for d/c planning purposes.

## 2025-02-04 ENCOUNTER — TELEPHONE (OUTPATIENT)
Dept: INFECTIOUS DISEASES | Facility: CLINIC | Age: 85
End: 2025-02-04
Payer: MEDICARE

## 2025-02-04 LAB
BACTERIA BLD CULT: NORMAL
BACTERIA BLD CULT: NORMAL

## 2025-02-04 NOTE — PHYSICIAN QUERY
Please clarify if there is any clinical correlation between UTI and Chronic Indwelling [place].   Due to or associated with each other

## 2025-02-04 NOTE — PHYSICIAN QUERY
Please provide the integumentary diagnosis related to the documentation of  Right Heel:  Deep Tissue Pressure Injury

## 2025-02-10 ENCOUNTER — LAB VISIT (OUTPATIENT)
Dept: LAB | Facility: HOSPITAL | Age: 85
End: 2025-02-10
Attending: INTERNAL MEDICINE
Payer: MEDICARE

## 2025-02-10 DIAGNOSIS — N39.0 URINARY TRACT INFECTION, SITE NOT SPECIFIED: ICD-10-CM

## 2025-02-10 DIAGNOSIS — L89.150 UNSTAGEABLE PRESSURE ULCER OF SACRAL REGION: ICD-10-CM

## 2025-02-10 LAB
ALBUMIN SERPL BCP-MCNC: 2.1 G/DL (ref 3.5–5.2)
ALP SERPL-CCNC: 45 U/L (ref 40–150)
ALT SERPL W/O P-5'-P-CCNC: <5 U/L (ref 10–44)
ANION GAP SERPL CALC-SCNC: 8 MMOL/L (ref 8–16)
AST SERPL-CCNC: 12 U/L (ref 10–40)
BASOPHILS # BLD AUTO: 0.05 K/UL (ref 0–0.2)
BASOPHILS NFR BLD: 0.6 % (ref 0–1.9)
BILIRUB SERPL-MCNC: 0.1 MG/DL (ref 0.1–1)
BUN SERPL-MCNC: 16 MG/DL (ref 8–23)
CALCIUM SERPL-MCNC: 8.5 MG/DL (ref 8.7–10.5)
CHLORIDE SERPL-SCNC: 105 MMOL/L (ref 95–110)
CO2 SERPL-SCNC: 26 MMOL/L (ref 23–29)
CREAT SERPL-MCNC: 0.9 MG/DL (ref 0.5–1.4)
CRP SERPL-MCNC: 47.2 MG/L (ref 0–8.2)
DIFFERENTIAL METHOD BLD: ABNORMAL
EOSINOPHIL # BLD AUTO: 0.6 K/UL (ref 0–0.5)
EOSINOPHIL NFR BLD: 6.6 % (ref 0–8)
ERYTHROCYTE [DISTWIDTH] IN BLOOD BY AUTOMATED COUNT: 15.1 % (ref 11.5–14.5)
EST. GFR  (NO RACE VARIABLE): >60 ML/MIN/1.73 M^2
GLUCOSE SERPL-MCNC: 125 MG/DL (ref 70–110)
HCT VFR BLD AUTO: 26.4 % (ref 37–48.5)
HGB BLD-MCNC: 8.1 G/DL (ref 12–16)
IMM GRANULOCYTES # BLD AUTO: 0.02 K/UL (ref 0–0.04)
IMM GRANULOCYTES NFR BLD AUTO: 0.2 % (ref 0–0.5)
LYMPHOCYTES # BLD AUTO: 2.6 K/UL (ref 1–4.8)
LYMPHOCYTES NFR BLD: 29.9 % (ref 18–48)
MCH RBC QN AUTO: 31.9 PG (ref 27–31)
MCHC RBC AUTO-ENTMCNC: 30.7 G/DL (ref 32–36)
MCV RBC AUTO: 104 FL (ref 82–98)
MONOCYTES # BLD AUTO: 0.6 K/UL (ref 0.3–1)
MONOCYTES NFR BLD: 6.5 % (ref 4–15)
NEUTROPHILS # BLD AUTO: 4.8 K/UL (ref 1.8–7.7)
NEUTROPHILS NFR BLD: 56.2 % (ref 38–73)
NRBC BLD-RTO: 0 /100 WBC
PLATELET # BLD AUTO: 313 K/UL (ref 150–450)
PMV BLD AUTO: 10.2 FL (ref 9.2–12.9)
POTASSIUM SERPL-SCNC: 3.8 MMOL/L (ref 3.5–5.1)
PROT SERPL-MCNC: 5.8 G/DL (ref 6–8.4)
RBC # BLD AUTO: 2.54 M/UL (ref 4–5.4)
SODIUM SERPL-SCNC: 139 MMOL/L (ref 136–145)
WBC # BLD AUTO: 8.59 K/UL (ref 3.9–12.7)

## 2025-02-10 PROCEDURE — 86140 C-REACTIVE PROTEIN: CPT | Performed by: INTERNAL MEDICINE

## 2025-02-10 PROCEDURE — 80053 COMPREHEN METABOLIC PANEL: CPT | Performed by: INTERNAL MEDICINE

## 2025-02-10 PROCEDURE — 85025 COMPLETE CBC W/AUTO DIFF WBC: CPT | Performed by: INTERNAL MEDICINE

## 2025-02-17 ENCOUNTER — LAB VISIT (OUTPATIENT)
Dept: LAB | Facility: HOSPITAL | Age: 85
End: 2025-02-17
Attending: STUDENT IN AN ORGANIZED HEALTH CARE EDUCATION/TRAINING PROGRAM
Payer: MEDICARE

## 2025-02-17 DIAGNOSIS — L89.156 PRESSURE INJURY OF DEEP TISSUE OF SACRAL REGION: ICD-10-CM

## 2025-02-17 LAB
ALBUMIN SERPL BCP-MCNC: 2.2 G/DL (ref 3.5–5.2)
ALP SERPL-CCNC: 42 U/L (ref 40–150)
ALT SERPL W/O P-5'-P-CCNC: <5 U/L (ref 10–44)
ANION GAP SERPL CALC-SCNC: 8 MMOL/L (ref 8–16)
AST SERPL-CCNC: 29 U/L (ref 10–40)
BASOPHILS # BLD AUTO: 0.05 K/UL (ref 0–0.2)
BASOPHILS NFR BLD: 0.5 % (ref 0–1.9)
BILIRUB SERPL-MCNC: 0.2 MG/DL (ref 0.1–1)
BUN SERPL-MCNC: 18 MG/DL (ref 8–23)
CALCIUM SERPL-MCNC: 8.7 MG/DL (ref 8.7–10.5)
CHLORIDE SERPL-SCNC: 104 MMOL/L (ref 95–110)
CO2 SERPL-SCNC: 28 MMOL/L (ref 23–29)
CREAT SERPL-MCNC: 1 MG/DL (ref 0.5–1.4)
CRP SERPL-MCNC: 43.4 MG/L (ref 0–8.2)
DIFFERENTIAL METHOD BLD: ABNORMAL
EOSINOPHIL # BLD AUTO: 0.5 K/UL (ref 0–0.5)
EOSINOPHIL NFR BLD: 5.7 % (ref 0–8)
ERYTHROCYTE [DISTWIDTH] IN BLOOD BY AUTOMATED COUNT: 15.5 % (ref 11.5–14.5)
EST. GFR  (NO RACE VARIABLE): 55.9 ML/MIN/1.73 M^2
GLUCOSE SERPL-MCNC: 84 MG/DL (ref 70–110)
HCT VFR BLD AUTO: 28.1 % (ref 37–48.5)
HGB BLD-MCNC: 8.6 G/DL (ref 12–16)
IMM GRANULOCYTES # BLD AUTO: 0.02 K/UL (ref 0–0.04)
IMM GRANULOCYTES NFR BLD AUTO: 0.2 % (ref 0–0.5)
LYMPHOCYTES # BLD AUTO: 2.8 K/UL (ref 1–4.8)
LYMPHOCYTES NFR BLD: 29.8 % (ref 18–48)
MCH RBC QN AUTO: 32 PG (ref 27–31)
MCHC RBC AUTO-ENTMCNC: 30.6 G/DL (ref 32–36)
MCV RBC AUTO: 105 FL (ref 82–98)
MONOCYTES # BLD AUTO: 0.6 K/UL (ref 0.3–1)
MONOCYTES NFR BLD: 6.2 % (ref 4–15)
NEUTROPHILS # BLD AUTO: 5.3 K/UL (ref 1.8–7.7)
NEUTROPHILS NFR BLD: 57.6 % (ref 38–73)
NRBC BLD-RTO: 0 /100 WBC
PLATELET # BLD AUTO: 286 K/UL (ref 150–450)
PMV BLD AUTO: 10.6 FL (ref 9.2–12.9)
POTASSIUM SERPL-SCNC: 3.7 MMOL/L (ref 3.5–5.1)
PROT SERPL-MCNC: 5.8 G/DL (ref 6–8.4)
RBC # BLD AUTO: 2.69 M/UL (ref 4–5.4)
SODIUM SERPL-SCNC: 140 MMOL/L (ref 136–145)
WBC # BLD AUTO: 9.26 K/UL (ref 3.9–12.7)

## 2025-02-17 PROCEDURE — 85025 COMPLETE CBC W/AUTO DIFF WBC: CPT | Performed by: STUDENT IN AN ORGANIZED HEALTH CARE EDUCATION/TRAINING PROGRAM

## 2025-02-17 PROCEDURE — 86140 C-REACTIVE PROTEIN: CPT | Performed by: STUDENT IN AN ORGANIZED HEALTH CARE EDUCATION/TRAINING PROGRAM

## 2025-02-17 PROCEDURE — 80053 COMPREHEN METABOLIC PANEL: CPT | Performed by: STUDENT IN AN ORGANIZED HEALTH CARE EDUCATION/TRAINING PROGRAM

## 2025-02-17 NOTE — PHYSICIAN QUERY
Please specify the diagnosis associated with the clinical findings from the query:  Functional quadriplegia

## 2025-02-21 ENCOUNTER — TELEPHONE (OUTPATIENT)
Dept: INFECTIOUS DISEASES | Facility: CLINIC | Age: 85
End: 2025-02-21
Payer: MEDICARE

## 2025-02-21 ENCOUNTER — TELEPHONE (OUTPATIENT)
Dept: INFECTIOUS DISEASES | Facility: CLINIC | Age: 85
End: 2025-02-21

## 2025-02-21 ENCOUNTER — OFFICE VISIT (OUTPATIENT)
Dept: INFECTIOUS DISEASES | Facility: CLINIC | Age: 85
End: 2025-02-21
Payer: MEDICARE

## 2025-02-21 DIAGNOSIS — T83.511A URINARY TRACT INFECTION ASSOCIATED WITH INDWELLING URETHRAL CATHETER, INITIAL ENCOUNTER: ICD-10-CM

## 2025-02-21 DIAGNOSIS — N39.0 URINARY TRACT INFECTION ASSOCIATED WITH INDWELLING URETHRAL CATHETER, INITIAL ENCOUNTER: ICD-10-CM

## 2025-02-21 DIAGNOSIS — E11.40 CONTROLLED TYPE 2 DIABETES MELLITUS WITH DIABETIC NEUROPATHY, WITHOUT LONG-TERM CURRENT USE OF INSULIN: ICD-10-CM

## 2025-02-21 DIAGNOSIS — E03.4 HYPOTHYROIDISM DUE TO ACQUIRED ATROPHY OF THYROID: ICD-10-CM

## 2025-02-21 DIAGNOSIS — I15.2 HYPERTENSION ASSOCIATED WITH DIABETES: ICD-10-CM

## 2025-02-21 DIAGNOSIS — B96.20 BACTEREMIA DUE TO ESCHERICHIA COLI: Primary | ICD-10-CM

## 2025-02-21 DIAGNOSIS — L89.150 PRESSURE INJURY OF SACRAL REGION, UNSTAGEABLE: ICD-10-CM

## 2025-02-21 DIAGNOSIS — E11.59 HYPERTENSION ASSOCIATED WITH DIABETES: ICD-10-CM

## 2025-02-21 DIAGNOSIS — R78.81 BACTEREMIA DUE TO ESCHERICHIA COLI: Primary | ICD-10-CM

## 2025-02-21 NOTE — PROGRESS NOTES
"The patient location is: [place]   The chief complaint leading to consultation is: Hospital follow up      Visit type: audiovisual     Notes:     Subjective:     HPI: 85 yo F with hx of CKD, HLD, DM, GERD, HTN, and RA who presented to ER at behest of daughter due to worsening sacral wound. Daughter reports she was becoming frustrated with hospice care and ultimately revoked hospice status. [place] wound was not being cared for properly. XR with no evidence of osteomyelitis. Evaluated by surgery and taken to OR on 1/29 for excision and debridement sacral decubitus ulcer. Findings: "necrotic skin, subcutaneous tissue, and muscle fascia. This was an area approximately 8 cm in diameter by 3 cm deep. Once debrided back to viable appearing tissue the wound was irrigated." No evidence of bone involvement. Workup also revealed E coli UTI and bacteremia. ID consulted for OPAT recommendations. Discharged on 3 week course of IV Rocephin.     2/21: Hospital follow up. Patient has felt better since discharge. Tolerating IV abx. Has wound VAC. Discussed CRP remaining elevated and recommended extending treatment a few more weeks. Once it normalizes will have PICC removed. Patient and daughter in agreement.     Procedure Component Value Units Date/Time   Blood culture [[id number]] Collected: 01/29/25 1525   Order Status: Completed Specimen: Blood from Peripheral, Hand, Right Updated: 02/04/25 0612    Blood Culture, Routine No growth after 5 days.   Narrative:     [id number]   Blood culture [[id number]] Collected: 01/29/25 1525   Order Status: Completed Specimen: Blood from Peripheral, Antecubital, Right Updated: 02/04/25 0612    Blood Culture, Routine No growth after 5 days.   Narrative:     [id number]   Blood culture x two cultures. Draw prior to antibiotics. [[id number]] (Abnormal)  Collected: 01/28/25 1149   Order Status: Completed Specimen: Blood from Peripheral, Forearm, Left Updated: 01/31/25 1013    Blood Culture, Routine Gram stain " kwaku bottle: Gram negative rods     Results called to and read back by: Rose Erickson LPN 01/29/2025  14:55     ESCHERICHIA COLI Abnormal    Narrative:     Aerobic and anaerobic   Susceptibility     Escherichia coli     CULTURE, BLOOD     Amox/K Clav'ate <=8/4 mcg/mL Sensitive     Amp/Sulbactam 8/4 mcg/mL Sensitive     Ampicillin >16 mcg/mL Resistant     Cefazolin <=2 mcg/mL Sensitive     Cefepime <=2 mcg/mL Sensitive     Ceftolozane/Tazobactam <=2 mcg/mL Sensitive     Ceftriaxone <=1 mcg/mL Sensitive     Ciprofloxacin >2 mcg/mL Resistant     Ertapenem <=0.5 mcg/mL Sensitive     Gentamicin <=2 mcg/mL Sensitive     Levofloxacin >4 mcg/mL Resistant     Meropenem <=1 mcg/mL Sensitive     Piperacillin/Tazo <=8 mcg/mL Sensitive     Tetracycline <=4 mcg/mL Sensitive     Tobramycin <=2 mcg/mL Sensitive     Trimeth/Sulfa 1/19 mcg/mL Sensitive               Linear View         Rapid Organism ID by PCR (from Blood culture) [[id number]] (Abnormal) Collected: 01/28/25 1149   Order Status: Completed Updated: 01/29/25 1631    Enterococcus faecalis Not Detected    Enterococcus faecium Not Detected    Listeria monocytogenes Not Detected    Staphylococcus spp. Not Detected    Staphylococcus aureus Not Detected    Staphylococcus epidermidis Not Detected    Staphylococcus lugdunensis Not Detected    Streptococcus species Not Detected    Streptococcus agalactiae Not Detected    Streptococcus pneumoniae Not Detected    Streptococcus pyogenes Not Detected    Acinetobacter calcoaceticus/baumannii complex Not Detected    Bacteroides fragilis Not Detected    Enterobacterales See species for ID Abnormal     Enterobacter cloacae complex Not Detected    Escherichia coli Detected Abnormal     Klebsiella aerogenes Not Detected    Klebsiella oxytoca Not Detected    Klebsiella pneumoniae group Not Detected    Proteus Not Detected    Salmonella sp Not Detected    Serratia marcescens Not Detected    Haemophilus influenzae Not Detected    Neisseria  meningtidis Not Detected    Pseudomonas aeruginosa Not Detected    Stenotrophomonas maltophilia Not Detected    Candida albicans Not Detected    Candida auris Not Detected    Candida glabrata Not Detected    Candida krusei Not Detected    Candida parapsilosis Not Detected    Candida tropicalis Not Detected    Cryptococcus neoformans/gattii Not Detected    CTX-M (ESBL ) Not Detected    IMP (Carbapenem resistant) Not Detected    KPC resistance gene (Carbapenem resistant) Not Detected    mcr-1 Not Detected    mec A/C Test Not Applicable    mec A/C and MREJ (MRSA) gene Test Not Applicable    NDM (Carbapenem resistant) Not Detected    OXA-48-like (Carbapenem resistant) Not Detected    van A/B (VRE gene) Test Not Applicable    VIM (Carbapenem resistant) Not Detected   Narrative:     Aerobic and anaerobic   Urine culture [[id number]] (Abnormal)  Collected: 01/28/25 1144   Order Status: Completed Specimen: Urine Updated: 01/31/25 0140    Urine Culture, Routine ESCHERICHIA COLI  50,000 - 99,999 cfu/ml   Abnormal    Narrative:     Specimen Source->Urine   Susceptibility     Escherichia coli     CULTURE, URINE     Amp/Sulbactam >16/8 mcg/mL Resistant     Ampicillin >16 mcg/mL Resistant     Cefazolin 8 mcg/mL Sensitive     Cefepime <=2 mcg/mL Sensitive     Ceftriaxone <=1 mcg/mL Sensitive     Ciprofloxacin >2 mcg/mL Resistant     Ertapenem <=0.5 mcg/mL Sensitive     Gentamicin >8 mcg/mL Resistant     Levofloxacin >4 mcg/mL Resistant     Meropenem <=1 mcg/mL Sensitive     Nitrofurantoin <=32 mcg/mL Sensitive     Piperacillin/Tazo <=8 mcg/mL Sensitive     Tobramycin >8 mcg/mL Resistant     Trimeth/Sulfa >2/38 mcg/mL Resistant               Linear View         Blood culture x two cultures. Draw prior to antibiotics. [[id number]] (Abnormal) Collected: 01/28/25 1133   Order Status: Completed Specimen: Blood from Peripheral, Forearm, Left Updated: 01/31/25 1014    Blood Culture, Routine Gram stain kwaku bottle: Gram negative  rods     Positive results previously called 01/29/2025     ESCHERICHIA COLI  For susceptibility see order # [id number]   Abnormal    Narrative:     Aerobic and anaerobic          Review of Systems:   Negative unless otherwise noted positive-  Gen- Weakness/ Fatigue  Neuro- Confusion  CV- Chest Pain/ Palpitations  : Dysuria/Frequency/Urgency   Resp: Cough/ SOB  GI- Nausea/vomiting  MSK- Arthralgias/myalgias      Objective:     Physical Exam:  General- Patient alert and oriented x3  HEENT- PERRLA, EOMI, OP clear  Resp- No increased WOB noted. Not using accessory muscles.  Extrem- No cyanosis, clubbing, edema.   Skin-  No Jaundice. No visible skin lesions.        Plan:  Hypertension associated with diabetes  Continue current medications and follow up with PCP        UTI (urinary tract infection)  --U/A with >100 WBC  --Chronic indwelling Bo[place]  --Urine culture has grown E coli R to quinolones which matches isolate from blood  --Have been treating with IV ceftriaxone   --Recommend HH exchange Boone every 14 days   --Has completed gentamicin bladder irrigation twice daily x 7 days   --Monitor for symptoms of recurrence      Bacteremia due to Escherichia coli  --All cases of bacteremia pose risk of life threatening sepsis and metastatic infection  --Source in this case is urine; both urine and blood cx with matching E coli isolates  --R to quinolones  --Initial plan was to treat with IV ceftriaxone 2 g daily for 21 days to cover blood, urine, and sacral wound   --Requires drug toxicity monitoring   --Nausea with cephalexin not an allergy   --Repeat blood cx finalized no growth  --Due to elevated CRP will extend ceftriaxone an additional 3 weeks; if it normalizes before then will stop sooner   --Follow up with me virtually in 3 weeks      Outpatient Antibiotic Therapy Plan:     1) Infection: E coli bacteremia/UTI/sacral wound     2) Discharge Antibiotics:     Intravenous antibiotics:  IV ceftriaxone 2 g daily      3)  Therapy Duration:  6 weeks total      Estimated end date of IV antibiotics: 3/14/25     4) Outpatient Weekly Labs:     Order the following labs to be drawn on Mondays:   CBC  CMP   CRP     5) Fax Lab Results to Infectious Diseases Provider: Dr. Estevez      [facility] Fax Number: [phone]     Please perform labs through [facility]      DM (diabetes mellitus) type II controlled, neurological manifestation  Continue current medications and follow up with PCP       Hypothyroidism  Continue current medications and follow up with PCP       Pressure injury of sacral region, unstageable  No evidence of osteomyelitis during debridement by general surgery. XR negative. Will cover for deep SSTI using ceftriaxone. CRP trending down but remains elevated. See bacteremia.       Face to Face time with patient: 7 minutes   30 minutes of total time spent on the encounter, which includes face to face time and non-face to face time preparing to see the patient (eg, review of tests), Obtaining and/or reviewing separately obtained history, Documenting clinical information in the electronic or other health record, Independently interpreting results (not separately reported) and communicating results to the patient/family/caregiver, or Care coordination (not separately reported).       Each patient to whom he or she provides medical services by telemedicine is:  (1) informed of the relationship between the physician and patient and the respective role of any other health care provider with respect to management of the patient; and (2) notified that he or she may decline to receive medical services by telemedicine and may withdraw from such care at any time.

## 2025-02-21 NOTE — TELEPHONE ENCOUNTER
Called  [facility] to give update per visit today. The office is closed; I will call back Monday to f/u.

## 2025-02-21 NOTE — TELEPHONE ENCOUNTER
----- Message from Nurse Marva sent at 2/21/2025  1:44 PM CST -----  Contact: Stacey/[facility]  nurse    ----- Message -----  From: Concha Rios  Sent: 2/21/2025   9:02 AM CST  To: Herb Rivera Staff    Type:   Call Back RequestWho Called:Stacey Message for Patient:Nurse What this is regarding?:Pull pick line Would the patient rather a call back or a response via [facility]? Call back Best Call Back Number:[phone] at for Stacey Additional Information: The nurse is at the home now and they want to pull her pick line

## 2025-02-21 NOTE — TELEPHONE ENCOUNTER
Returned call from [facility].  Nurse Noemí. Informed the pt has a virtual visit with Herb at 4pm. After the visit is completed I will contact with  F/U.

## 2025-02-24 ENCOUNTER — TELEPHONE (OUTPATIENT)
Dept: INFECTIOUS DISEASES | Facility: CLINIC | Age: 85
End: 2025-02-24
Payer: MEDICARE

## 2025-02-24 ENCOUNTER — LAB VISIT (OUTPATIENT)
Dept: LAB | Facility: HOSPITAL | Age: 85
End: 2025-02-24
Attending: STUDENT IN AN ORGANIZED HEALTH CARE EDUCATION/TRAINING PROGRAM
Payer: MEDICARE

## 2025-02-24 DIAGNOSIS — L89.156 PRESSURE INJURY OF DEEP TISSUE OF SACRAL REGION: ICD-10-CM

## 2025-02-24 LAB
ALBUMIN SERPL BCP-MCNC: 2.2 G/DL (ref 3.5–5.2)
ALP SERPL-CCNC: 38 U/L (ref 40–150)
ALT SERPL W/O P-5'-P-CCNC: 5 U/L (ref 10–44)
ANION GAP SERPL CALC-SCNC: 11 MMOL/L (ref 8–16)
AST SERPL-CCNC: 28 U/L (ref 10–40)
BASOPHILS # BLD AUTO: 0.03 K/UL (ref 0–0.2)
BASOPHILS NFR BLD: 0.4 % (ref 0–1.9)
BILIRUB SERPL-MCNC: 0.1 MG/DL (ref 0.1–1)
BUN SERPL-MCNC: 25 MG/DL (ref 8–23)
CALCIUM SERPL-MCNC: 9.1 MG/DL (ref 8.7–10.5)
CHLORIDE SERPL-SCNC: 103 MMOL/L (ref 95–110)
CO2 SERPL-SCNC: 26 MMOL/L (ref 23–29)
CREAT SERPL-MCNC: 1.1 MG/DL (ref 0.5–1.4)
CRP SERPL-MCNC: 55.7 MG/L (ref 0–8.2)
DIFFERENTIAL METHOD BLD: ABNORMAL
EOSINOPHIL # BLD AUTO: 0.5 K/UL (ref 0–0.5)
EOSINOPHIL NFR BLD: 6.8 % (ref 0–8)
ERYTHROCYTE [DISTWIDTH] IN BLOOD BY AUTOMATED COUNT: 15.8 % (ref 11.5–14.5)
EST. GFR  (NO RACE VARIABLE): 49.9 ML/MIN/1.73 M^2
GLUCOSE SERPL-MCNC: 78 MG/DL (ref 70–110)
HCT VFR BLD AUTO: 28.1 % (ref 37–48.5)
HGB BLD-MCNC: 8.5 G/DL (ref 12–16)
IMM GRANULOCYTES # BLD AUTO: 0.02 K/UL (ref 0–0.04)
IMM GRANULOCYTES NFR BLD AUTO: 0.3 % (ref 0–0.5)
LYMPHOCYTES # BLD AUTO: 2.8 K/UL (ref 1–4.8)
LYMPHOCYTES NFR BLD: 38.1 % (ref 18–48)
MCH RBC QN AUTO: 31.6 PG (ref 27–31)
MCHC RBC AUTO-ENTMCNC: 30.2 G/DL (ref 32–36)
MCV RBC AUTO: 105 FL (ref 82–98)
MONOCYTES # BLD AUTO: 0.6 K/UL (ref 0.3–1)
MONOCYTES NFR BLD: 8.3 % (ref 4–15)
NEUTROPHILS # BLD AUTO: 3.4 K/UL (ref 1.8–7.7)
NEUTROPHILS NFR BLD: 46.1 % (ref 38–73)
NRBC BLD-RTO: 0 /100 WBC
PLATELET # BLD AUTO: 278 K/UL (ref 150–450)
PMV BLD AUTO: 10.7 FL (ref 9.2–12.9)
POTASSIUM SERPL-SCNC: 3.9 MMOL/L (ref 3.5–5.1)
PROT SERPL-MCNC: 6 G/DL (ref 6–8.4)
RBC # BLD AUTO: 2.69 M/UL (ref 4–5.4)
SODIUM SERPL-SCNC: 140 MMOL/L (ref 136–145)
WBC # BLD AUTO: 7.33 K/UL (ref 3.9–12.7)

## 2025-02-24 PROCEDURE — 86140 C-REACTIVE PROTEIN: CPT | Performed by: STUDENT IN AN ORGANIZED HEALTH CARE EDUCATION/TRAINING PROGRAM

## 2025-02-24 PROCEDURE — 85025 COMPLETE CBC W/AUTO DIFF WBC: CPT | Performed by: STUDENT IN AN ORGANIZED HEALTH CARE EDUCATION/TRAINING PROGRAM

## 2025-02-24 PROCEDURE — 80053 COMPREHEN METABOLIC PANEL: CPT | Performed by: STUDENT IN AN ORGANIZED HEALTH CARE EDUCATION/TRAINING PROGRAM

## 2025-02-24 NOTE — TELEPHONE ENCOUNTER
----- Message from Nurse Sweeney sent at 2/21/2025  4:22 PM CST -----  [facility] per visit today. The office is closed; I will call back monday to f/u.please make sure  is exchanging Bo[place] every 14 days as well.ask [facility] in [place] to continue drawing CBC, CMP, and CRP for the next 3 weeks; I already asked [facility] to extend her Rocephin that long.

## 2025-02-25 ENCOUNTER — TELEPHONE (OUTPATIENT)
Dept: INFECTIOUS DISEASES | Facility: CLINIC | Age: 85
End: 2025-02-25
Payer: MEDICARE

## 2025-02-25 NOTE — TELEPHONE ENCOUNTER
Spoke with the Nurse Noemí with [facility]. Informed of patient needing Bo[place] changing every 14 days per Dr. Estevez. Noemí states moving forward every 14 days start now.

## 2025-03-10 ENCOUNTER — LAB VISIT (OUTPATIENT)
Dept: LAB | Facility: HOSPITAL | Age: 85
End: 2025-03-10
Attending: STUDENT IN AN ORGANIZED HEALTH CARE EDUCATION/TRAINING PROGRAM
Payer: MEDICARE

## 2025-03-10 DIAGNOSIS — E11.22 DIABETES MELLITUS WITH CHRONIC KIDNEY DISEASE: ICD-10-CM

## 2025-03-10 DIAGNOSIS — L89.156 PRESSURE INJURY OF DEEP TISSUE OF SACRAL REGION: ICD-10-CM

## 2025-03-10 DIAGNOSIS — J90 PLEURAL EFFUSION: Primary | ICD-10-CM

## 2025-03-10 PROCEDURE — 86140 C-REACTIVE PROTEIN: CPT | Performed by: STUDENT IN AN ORGANIZED HEALTH CARE EDUCATION/TRAINING PROGRAM

## 2025-03-10 PROCEDURE — 85025 COMPLETE CBC W/AUTO DIFF WBC: CPT | Performed by: STUDENT IN AN ORGANIZED HEALTH CARE EDUCATION/TRAINING PROGRAM

## 2025-03-10 PROCEDURE — 80053 COMPREHEN METABOLIC PANEL: CPT | Performed by: STUDENT IN AN ORGANIZED HEALTH CARE EDUCATION/TRAINING PROGRAM

## 2025-03-11 ENCOUNTER — HOSPITAL ENCOUNTER (EMERGENCY)
Facility: HOSPITAL | Age: 85
Discharge: HOME | End: 2025-03-12
Attending: EMERGENCY MEDICINE
Payer: MEDICARE

## 2025-03-11 DIAGNOSIS — N39.0 ACUTE LOWER UTI: Primary | ICD-10-CM

## 2025-03-11 DIAGNOSIS — Z97.8 CHRONIC INDWELLING FOLEY CATHETER: ICD-10-CM

## 2025-03-11 DIAGNOSIS — D64.9 CHRONIC ANEMIA: ICD-10-CM

## 2025-03-11 LAB
ALBUMIN SERPL BCP-MCNC: 2.4 G/DL (ref 3.5–5.2)
ALP SERPL-CCNC: 36 U/L (ref 40–150)
ALT SERPL W/O P-5'-P-CCNC: <5 U/L (ref 10–44)
ANION GAP SERPL CALC-SCNC: 9 MMOL/L (ref 8–16)
AST SERPL-CCNC: 41 U/L (ref 10–40)
BASOPHILS # BLD AUTO: 0.04 K/UL (ref 0–0.2)
BASOPHILS NFR BLD: 0.5 % (ref 0–1.9)
BILIRUB SERPL-MCNC: 0.1 MG/DL (ref 0.1–1)
BUN SERPL-MCNC: 27 MG/DL (ref 8–23)
CALCIUM SERPL-MCNC: 9.4 MG/DL (ref 8.7–10.5)
CHLORIDE SERPL-SCNC: 105 MMOL/L (ref 95–110)
CO2 SERPL-SCNC: 27 MMOL/L (ref 23–29)
CREAT SERPL-MCNC: 1 MG/DL (ref 0.5–1.4)
CRP SERPL-MCNC: 23.2 MG/L (ref 0–8.2)
DIFFERENTIAL METHOD BLD: ABNORMAL
EOSINOPHIL # BLD AUTO: 0.6 K/UL (ref 0–0.5)
EOSINOPHIL NFR BLD: 7 % (ref 0–8)
ERYTHROCYTE [DISTWIDTH] IN BLOOD BY AUTOMATED COUNT: 15.4 % (ref 11.5–14.5)
EST. GFR  (NO RACE VARIABLE): 55.9 ML/MIN/1.73 M^2
GLUCOSE SERPL-MCNC: 131 MG/DL (ref 70–110)
HCT VFR BLD AUTO: 30.4 % (ref 37–48.5)
HGB BLD-MCNC: 9.3 G/DL (ref 12–16)
IMM GRANULOCYTES # BLD AUTO: 0.02 K/UL (ref 0–0.04)
IMM GRANULOCYTES NFR BLD AUTO: 0.2 % (ref 0–0.5)
LYMPHOCYTES # BLD AUTO: 3.2 K/UL (ref 1–4.8)
LYMPHOCYTES NFR BLD: 37.1 % (ref 18–48)
MCH RBC QN AUTO: 31.8 PG (ref 27–31)
MCHC RBC AUTO-ENTMCNC: 30.6 G/DL (ref 32–36)
MCV RBC AUTO: 104 FL (ref 82–98)
MONOCYTES # BLD AUTO: 0.6 K/UL (ref 0.3–1)
MONOCYTES NFR BLD: 7.1 % (ref 4–15)
NEUTROPHILS # BLD AUTO: 4.2 K/UL (ref 1.8–7.7)
NEUTROPHILS NFR BLD: 48.1 % (ref 38–73)
NRBC BLD-RTO: 0 /100 WBC
PLATELET # BLD AUTO: 285 K/UL (ref 150–450)
PMV BLD AUTO: 10.7 FL (ref 9.2–12.9)
POTASSIUM SERPL-SCNC: 3.8 MMOL/L (ref 3.5–5.1)
PROT SERPL-MCNC: 6 G/DL (ref 6–8.4)
RBC # BLD AUTO: 2.92 M/UL (ref 4–5.4)
SODIUM SERPL-SCNC: 141 MMOL/L (ref 136–145)
WBC # BLD AUTO: 8.71 K/UL (ref 3.9–12.7)

## 2025-03-11 PROCEDURE — 51702 INSERT TEMP BLADDER CATH: CPT

## 2025-03-11 PROCEDURE — 99283 EMERGENCY DEPT VISIT LOW MDM: CPT | Mod: 25

## 2025-03-12 VITALS
HEART RATE: 81 BPM | BODY MASS INDEX: 30.73 KG/M2 | DIASTOLIC BLOOD PRESSURE: 65 MMHG | HEIGHT: 64 IN | TEMPERATURE: 98 F | OXYGEN SATURATION: 95 % | RESPIRATION RATE: 20 BRPM | WEIGHT: 180 LBS | SYSTOLIC BLOOD PRESSURE: 152 MMHG

## 2025-03-12 LAB
ALBUMIN SERPL BCP-MCNC: 2.7 G/DL (ref 3.5–5.2)
ALP SERPL-CCNC: 46 U/L (ref 40–150)
ALT SERPL W/O P-5'-P-CCNC: <5 U/L (ref 10–44)
ANION GAP SERPL CALC-SCNC: 9 MMOL/L (ref 8–16)
AST SERPL-CCNC: 14 U/L (ref 10–40)
BACTERIA #/AREA URNS HPF: ABNORMAL /HPF
BASOPHILS # BLD AUTO: 0.05 K/UL (ref 0–0.2)
BASOPHILS NFR BLD: 0.6 % (ref 0–1.9)
BILIRUB SERPL-MCNC: 0.2 MG/DL (ref 0.1–1)
BILIRUB UR QL STRIP: NEGATIVE
BUN SERPL-MCNC: 27 MG/DL (ref 8–23)
CALCIUM SERPL-MCNC: 10 MG/DL (ref 8.7–10.5)
CHLORIDE SERPL-SCNC: 103 MMOL/L (ref 95–110)
CLARITY UR: CLEAR
CO2 SERPL-SCNC: 27 MMOL/L (ref 23–29)
COLOR UR: YELLOW
CREAT SERPL-MCNC: 1.1 MG/DL (ref 0.5–1.4)
DIFFERENTIAL METHOD BLD: ABNORMAL
EOSINOPHIL # BLD AUTO: 0.6 K/UL (ref 0–0.5)
EOSINOPHIL NFR BLD: 7.7 % (ref 0–8)
ERYTHROCYTE [DISTWIDTH] IN BLOOD BY AUTOMATED COUNT: 14.7 % (ref 11.5–14.5)
EST. GFR  (NO RACE VARIABLE): 50 ML/MIN/1.73 M^2
GLUCOSE SERPL-MCNC: 122 MG/DL (ref 70–110)
GLUCOSE UR QL STRIP: NEGATIVE
HCT VFR BLD AUTO: 31.1 % (ref 37–48.5)
HGB BLD-MCNC: 9.8 G/DL (ref 12–16)
HGB UR QL STRIP: NEGATIVE
HYALINE CASTS #/AREA URNS LPF: 2 /LPF
IMM GRANULOCYTES # BLD AUTO: 0.02 K/UL (ref 0–0.04)
IMM GRANULOCYTES NFR BLD AUTO: 0.2 % (ref 0–0.5)
KETONES UR QL STRIP: NEGATIVE
LEUKOCYTE ESTERASE UR QL STRIP: ABNORMAL
LYMPHOCYTES # BLD AUTO: 3.1 K/UL (ref 1–4.8)
LYMPHOCYTES NFR BLD: 38.4 % (ref 18–48)
MCH RBC QN AUTO: 31.2 PG (ref 27–31)
MCHC RBC AUTO-ENTMCNC: 31.5 G/DL (ref 32–36)
MCV RBC AUTO: 99 FL (ref 82–98)
MICROSCOPIC COMMENT: ABNORMAL
MONOCYTES # BLD AUTO: 0.5 K/UL (ref 0.3–1)
MONOCYTES NFR BLD: 6.5 % (ref 4–15)
NEUTROPHILS # BLD AUTO: 3.8 K/UL (ref 1.8–7.7)
NEUTROPHILS NFR BLD: 46.6 % (ref 38–73)
NITRITE UR QL STRIP: NEGATIVE
NRBC BLD-RTO: 0 /100 WBC
PH UR STRIP: 7 [PH] (ref 5–8)
PLATELET # BLD AUTO: 271 K/UL (ref 150–450)
PMV BLD AUTO: 10.5 FL (ref 9.2–12.9)
POTASSIUM SERPL-SCNC: 3.8 MMOL/L (ref 3.5–5.1)
PROT SERPL-MCNC: 6.4 G/DL (ref 6–8.4)
PROT UR QL STRIP: ABNORMAL
RBC # BLD AUTO: 3.14 M/UL (ref 4–5.4)
RBC #/AREA URNS HPF: 4 /HPF (ref 0–4)
SODIUM SERPL-SCNC: 139 MMOL/L (ref 136–145)
SP GR UR STRIP: 1.02 (ref 1–1.03)
URN SPEC COLLECT METH UR: ABNORMAL
UROBILINOGEN UR STRIP-ACNC: NEGATIVE EU/DL
WBC # BLD AUTO: 8.15 K/UL (ref 3.9–12.7)
WBC #/AREA URNS HPF: 19 /HPF (ref 0–5)

## 2025-03-12 PROCEDURE — 25000003 PHARM REV CODE 250: Performed by: EMERGENCY MEDICINE

## 2025-03-12 PROCEDURE — 81000 URINALYSIS NONAUTO W/SCOPE: CPT | Performed by: EMERGENCY MEDICINE

## 2025-03-12 PROCEDURE — 80053 COMPREHEN METABOLIC PANEL: CPT | Performed by: EMERGENCY MEDICINE

## 2025-03-12 PROCEDURE — 85025 COMPLETE CBC W/AUTO DIFF WBC: CPT | Performed by: EMERGENCY MEDICINE

## 2025-03-12 PROCEDURE — 87086 URINE CULTURE/COLONY COUNT: CPT | Performed by: EMERGENCY MEDICINE

## 2025-03-12 RX ORDER — NITROFURANTOIN 25; 75 MG/1; MG/1
100 CAPSULE ORAL
Status: COMPLETED | OUTPATIENT
Start: 2025-03-12 | End: 2025-03-12

## 2025-03-12 RX ORDER — NITROFURANTOIN 25; 75 MG/1; MG/1
100 CAPSULE ORAL 2 TIMES DAILY
Qty: 10 CAPSULE | Refills: 0 | Status: SHIPPED | OUTPATIENT
Start: 2025-03-12 | End: 2025-03-17

## 2025-03-12 RX ADMIN — NITROFURANTOIN MONOHYDRATE/MACROCRYSTALS 100 MG: 25; 75 CAPSULE ORAL at 12:03
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
## 2025-03-12 NOTE — DISCHARGE INSTRUCTIONS
Maintain your catheter as before.  Take Macrobid as prescribed.  Drink plenty of fluids and follow up with your home health and primary care provider next available.  Return as needed for any worsening symptoms, problems, questions or concern

## 2025-03-13 LAB — BACTERIA UR CULT: NO GROWTH

## 2025-03-17 ENCOUNTER — TELEPHONE (OUTPATIENT)
Dept: INFECTIOUS DISEASES | Facility: CLINIC | Age: 85
End: 2025-03-17
Payer: MEDICARE

## 2025-03-25 ENCOUNTER — OFFICE VISIT (OUTPATIENT)
Dept: INFECTIOUS DISEASES | Facility: CLINIC | Age: 85
End: 2025-03-25
Payer: MEDICARE

## 2025-03-25 DIAGNOSIS — E11.59 HYPERTENSION ASSOCIATED WITH DIABETES: ICD-10-CM

## 2025-03-25 DIAGNOSIS — B96.20 BACTEREMIA DUE TO ESCHERICHIA COLI: Primary | ICD-10-CM

## 2025-03-25 DIAGNOSIS — I15.2 HYPERTENSION ASSOCIATED WITH DIABETES: ICD-10-CM

## 2025-03-25 DIAGNOSIS — E03.4 HYPOTHYROIDISM DUE TO ACQUIRED ATROPHY OF THYROID: ICD-10-CM

## 2025-03-25 DIAGNOSIS — T83.511A URINARY TRACT INFECTION ASSOCIATED WITH INDWELLING URETHRAL CATHETER, INITIAL ENCOUNTER: ICD-10-CM

## 2025-03-25 DIAGNOSIS — N39.0 URINARY TRACT INFECTION ASSOCIATED WITH INDWELLING URETHRAL CATHETER, INITIAL ENCOUNTER: ICD-10-CM

## 2025-03-25 DIAGNOSIS — R78.81 BACTEREMIA DUE TO ESCHERICHIA COLI: Primary | ICD-10-CM

## 2025-03-25 DIAGNOSIS — L89.150 PRESSURE INJURY OF SACRAL REGION, UNSTAGEABLE: ICD-10-CM

## 2025-03-25 DIAGNOSIS — E11.40 CONTROLLED TYPE 2 DIABETES MELLITUS WITH DIABETIC NEUROPATHY, WITHOUT LONG-TERM CURRENT USE OF INSULIN: ICD-10-CM

## 2025-03-25 NOTE — PROGRESS NOTES
"The patient location is: [place]   The chief complaint leading to consultation is: Hospital follow up      Visit type: audiovisual     Notes:     Subjective:     HPI: 83 yo F with hx of CKD, HLD, DM, GERD, HTN, and RA who presented to ER at behest of daughter due to worsening sacral wound. Daughter reports she was becoming frustrated with hospice care and ultimately revoked hospice status. [place] wound was not being cared for properly. XR with no evidence of osteomyelitis. Evaluated by surgery and taken to OR on 1/29 for excision and debridement sacral decubitus ulcer. Findings: "necrotic skin, subcutaneous tissue, and muscle fascia. This was an area approximately 8 cm in diameter by 3 cm deep. Once debrided back to viable appearing tissue the wound was irrigated." No evidence of bone involvement. Workup also revealed E coli UTI and bacteremia. ID consulted for OPAT recommendations. Discharged on 3 week course of IV Rocephin.      2/21: Hospital follow up. Patient has felt better since discharge. Tolerating IV abx. Has wound VAC. Discussed CRP remaining elevated and recommended extending treatment a few more weeks. Once it normalizes will have PICC removed. Patient and daughter in agreement.     3/25: CRP trended down as of 3/10. Has aches and pains in legs. Predominantly between knee and ankle. Appetite good. Daily bowel movement. No UTI symptoms. Do to cystitis earlier this month will set up for additional bladder irrigation via [facility]. No indication to continue systemic antibiotics at this time. Needs PCP follow up.      Procedure Component Value Units Date/Time   Blood culture [[id number]] Collected: 01/29/25 1525   Order Status: Completed Specimen: Blood from Peripheral, Hand, Right Updated: 02/04/25 0612     Blood Culture, Routine No growth after 5 days.   Narrative:     [id number]   Blood culture [[id number]] Collected: 01/29/25 1525   Order Status: Completed Specimen: Blood from Peripheral, Antecubital, Right " Updated: 02/04/25 0612     Blood Culture, Routine No growth after 5 days.   Narrative:     [id number]   Blood culture x two cultures. Draw prior to antibiotics. [[id number]] (Abnormal)  Collected: 01/28/25 1149   Order Status: Completed Specimen: Blood from Peripheral, Forearm, Left Updated: 01/31/25 1013     Blood Culture, Routine Gram stain kwaku bottle: Gram negative rods       Results called to and read back by: Rose Erickson LPN 01/29/2025  14:55       ESCHERICHIA COLI Abnormal    Narrative:     Aerobic and anaerobic   Susceptibility              Escherichia coli       CULTURE, BLOOD       Amox/K Clav'ate <=8/4 mcg/mL Sensitive       Amp/Sulbactam 8/4 mcg/mL Sensitive       Ampicillin >16 mcg/mL Resistant       Cefazolin <=2 mcg/mL Sensitive       Cefepime <=2 mcg/mL Sensitive       Ceftolozane/Tazobactam <=2 mcg/mL Sensitive       Ceftriaxone <=1 mcg/mL Sensitive       Ciprofloxacin >2 mcg/mL Resistant       Ertapenem <=0.5 mcg/mL Sensitive       Gentamicin <=2 mcg/mL Sensitive       Levofloxacin >4 mcg/mL Resistant       Meropenem <=1 mcg/mL Sensitive       Piperacillin/Tazo <=8 mcg/mL Sensitive       Tetracycline <=4 mcg/mL Sensitive       Tobramycin <=2 mcg/mL Sensitive       Trimeth/Sulfa 1/19 mcg/mL Sensitive                     Linear View         Rapid Organism ID by PCR (from Blood culture) [[id number]] (Abnormal) Collected: 01/28/25 1149   Order Status: Completed Updated: 01/29/25 1631     Enterococcus faecalis Not Detected     Enterococcus faecium Not Detected     Listeria monocytogenes Not Detected     Staphylococcus spp. Not Detected     Staphylococcus aureus Not Detected     Staphylococcus epidermidis Not Detected     Staphylococcus lugdunensis Not Detected     Streptococcus species Not Detected     Streptococcus agalactiae Not Detected     Streptococcus pneumoniae Not Detected     Streptococcus pyogenes Not Detected     Acinetobacter calcoaceticus/baumannii complex Not Detected     Bacteroides fragilis  Not Detected     Enterobacterales See species for ID Abnormal      Enterobacter cloacae complex Not Detected     Escherichia coli Detected Abnormal      Klebsiella aerogenes Not Detected     Klebsiella oxytoca Not Detected     Klebsiella pneumoniae group Not Detected     Proteus Not Detected     Salmonella sp Not Detected     Serratia marcescens Not Detected     Haemophilus influenzae Not Detected     Neisseria meningtidis Not Detected     Pseudomonas aeruginosa Not Detected     Stenotrophomonas maltophilia Not Detected     Candida albicans Not Detected     Candida auris Not Detected     Candida glabrata Not Detected     Candida krusei Not Detected     Candida parapsilosis Not Detected     Candida tropicalis Not Detected     Cryptococcus neoformans/gattii Not Detected     CTX-M (ESBL ) Not Detected     IMP (Carbapenem resistant) Not Detected     KPC resistance gene (Carbapenem resistant) Not Detected     mcr-1 Not Detected     mec A/C Test Not Applicable     mec A/C and MREJ (MRSA) gene Test Not Applicable     NDM (Carbapenem resistant) Not Detected     OXA-48-like (Carbapenem resistant) Not Detected     van A/B (VRE gene) Test Not Applicable     VIM (Carbapenem resistant) Not Detected   Narrative:     Aerobic and anaerobic   Urine culture [[id number]] (Abnormal)  Collected: 01/28/25 1144   Order Status: Completed Specimen: Urine Updated: 01/31/25 0140     Urine Culture, Routine ESCHERICHIA COLI  50,000 - 99,999 cfu/ml   Abnormal    Narrative:     Specimen Source->Urine   Susceptibility              Escherichia coli       CULTURE, URINE       Amp/Sulbactam >16/8 mcg/mL Resistant       Ampicillin >16 mcg/mL Resistant       Cefazolin 8 mcg/mL Sensitive       Cefepime <=2 mcg/mL Sensitive       Ceftriaxone <=1 mcg/mL Sensitive       Ciprofloxacin >2 mcg/mL Resistant       Ertapenem <=0.5 mcg/mL Sensitive       Gentamicin >8 mcg/mL Resistant       Levofloxacin >4 mcg/mL Resistant       Meropenem <=1 mcg/mL  Sensitive       Nitrofurantoin <=32 mcg/mL Sensitive       Piperacillin/Tazo <=8 mcg/mL Sensitive       Tobramycin >8 mcg/mL Resistant       Trimeth/Sulfa >2/38 mcg/mL Resistant                     Linear View         Blood culture x two cultures. Draw prior to antibiotics. [[id number]] (Abnormal) Collected: 01/28/25 1133   Order Status: Completed Specimen: Blood from Peripheral, Forearm, Left Updated: 01/31/25 1014     Blood Culture, Routine Gram stain kwaku bottle: Gram negative rods       Positive results previously called 01/29/2025       ESCHERICHIA COLI  For susceptibility see order # [id number]   Abnormal    Narrative:     Aerobic and anaerobic            Review of Systems:   Negative unless otherwise noted positive-  Gen- Weakness/ Fatigue  Neuro- Confusion  CV- Chest Pain/ Palpitations  : Dysuria/Frequency/Urgency   Resp: Cough/ SOB  GI- Nausea/vomiting  MSK- +Arthralgias/myalgias in both legs      Objective:     Physical Exam:  General- Patient alert and oriented x3  HEENT- PERRLA, EOMI, OP clear  Resp- No increased WOB noted. Not using accessory muscles.  Extrem- No cyanosis, clubbing, edema.   Skin-  No Jaundice. No visible skin lesions.        Plan:  Hypertension associated with diabetes  Continue current medications and follow up with PCP        UTI (urinary tract infection)  --Chronic indwelling [place]  --Recommend HH exchange [place] every 14 days   --Has completed gentamicin bladder irrigation twice daily x 7 days; will set up for additional 7 days   --Monitor for symptoms of recurrence     Outpatient Antibiotic Therapy Plan:     1) Infection: Recurrent UTI      2) Antibiotics:     Intravenous antibiotics:  Gentamicin bladder irrigation twice daily (24 mg in 50 mL NS instilled and clamped for 30-60 minutes then let drain)      3) Therapy Duration:  7 days      Bacteremia due to Escherichia coli  --All cases of bacteremia pose risk of life threatening sepsis and metastatic infection  --Source in this  case is urine; both urine and blood cx with matching E coli isolates  --R to quinolones  --Initial plan was to treat with IV ceftriaxone 2 g daily for 21 days to cover blood, urine, and sacral wound   --Nausea with cephalexin not an allergy   --Repeat blood cx finalized no growth  --Due to elevated CRP has completed a total 6 week course of ceftriaxone  --Follow up with me as needed     Outpatient Antibiotic Therapy Plan:     1) Infection: E coli bacteremia/UTI/sacral wound     2) Discharge Antibiotics:     Intravenous antibiotics:  IV ceftriaxone 2 g daily      3) Therapy Duration:  6 weeks total      Estimated end date of IV antibiotics: 3/14/25     4) Outpatient Weekly Labs:     Order the following labs to be drawn on Mondays:   CBC  CMP   CRP     5) Fax Lab Results to Infectious Diseases Provider: Dr. Estevez      [facility] Fax Number: [phone]     Please perform labs through [facility]      DM (diabetes mellitus) type II controlled, neurological manifestation  Continue current medications and follow up with PCP       Hypothyroidism  Continue current medications and follow up with PCP       Pressure injury of sacral region, unstageable  No evidence of osteomyelitis during debridement by general surgery. XR negative. 3.8 x 4.2 x 1.8 cm as of 3/24 measurements by nurse. Turns every 2-2 1/2 hrs daily. Continue wound care.       Face to Face time with patient: 9 minutes   25 minutes of total time spent on the encounter, which includes face to face time and non-face to face time preparing to see the patient (eg, review of tests), Obtaining and/or reviewing separately obtained history, Documenting clinical information in the electronic or other health record, Independently interpreting results (not separately reported) and communicating results to the patient/family/caregiver, or Care coordination (not separately reported).         Each patient to whom he or she provides medical services by telemedicine is:  (1)  informed of the relationship between the physician and patient and the respective role of any other health care provider with respect to management of the patient; and (2) notified that he or she may decline to receive medical services by telemedicine and may withdraw from such care at any time.

## 2025-03-31 ENCOUNTER — TELEPHONE (OUTPATIENT)
Dept: INFECTIOUS DISEASES | Facility: CLINIC | Age: 85
End: 2025-03-31
Payer: MEDICARE

## 2025-03-31 NOTE — TELEPHONE ENCOUNTER
----- Message from Domenica sent at 3/31/2025 11:58 AM CDT -----  Contact: Tex/with [facility]  Type: Request a call backName of Caller: [facility] Number: Additional Information:Tex is requesting a portable chest x ray on the patient. Pt is having increased shortness of breath.  Fax to [phone]  if any questions, call Tex at [phone]

## 2025-03-31 NOTE — TELEPHONE ENCOUNTER
Returned call to patient's home health, Stacey. They are requesting an order for portable chest xray. Patient is having increased shortness of breath,O2 on RA is between 89-92. I informed that patient may need to precede to the ER as Dr. Estevez doesn't treat shortness of breath but will route message to provider to be better advised.

## 2025-03-31 NOTE — TELEPHONE ENCOUNTER
Called and advised that Dr. Estevez agrees. Patient need to precede to the ER. Stacey expressed understanding.

## 2025-04-01 ENCOUNTER — HOSPITAL ENCOUNTER (INPATIENT)
Facility: HOSPITAL | Age: 85
LOS: 2 days | Discharge: HOME | DRG: 314 | End: 2025-04-03
Attending: EMERGENCY MEDICINE | Admitting: HOSPITALIST
Payer: MEDICARE

## 2025-04-01 DIAGNOSIS — J90 PLEURAL EFFUSION: ICD-10-CM

## 2025-04-01 DIAGNOSIS — E11.69 COMBINED HYPERLIPIDEMIA ASSOCIATED WITH TYPE 2 DIABETES MELLITUS: ICD-10-CM

## 2025-04-01 DIAGNOSIS — J90 PLEURAL EFFUSION, LEFT: ICD-10-CM

## 2025-04-01 DIAGNOSIS — E78.2 COMBINED HYPERLIPIDEMIA ASSOCIATED WITH TYPE 2 DIABETES MELLITUS: ICD-10-CM

## 2025-04-01 DIAGNOSIS — I31.39 PERICARDIAL EFFUSION: ICD-10-CM

## 2025-04-01 DIAGNOSIS — E11.59 HYPERTENSION ASSOCIATED WITH DIABETES: ICD-10-CM

## 2025-04-01 DIAGNOSIS — N39.0 CHRONIC UTI: ICD-10-CM

## 2025-04-01 DIAGNOSIS — I15.2 HYPERTENSION ASSOCIATED WITH DIABETES: ICD-10-CM

## 2025-04-01 DIAGNOSIS — R07.9 CHEST PAIN: ICD-10-CM

## 2025-04-01 DIAGNOSIS — R79.89 TROPONIN LEVEL ELEVATED: ICD-10-CM

## 2025-04-01 DIAGNOSIS — R07.9 CHEST PAIN, UNSPECIFIED TYPE: ICD-10-CM

## 2025-04-01 DIAGNOSIS — Z13.6 SCREENING FOR CARDIOVASCULAR CONDITION: ICD-10-CM

## 2025-04-01 DIAGNOSIS — L89.159 PRESSURE INJURY OF SKIN OF SACRAL REGION, UNSPECIFIED INJURY STAGE: ICD-10-CM

## 2025-04-01 DIAGNOSIS — B37.49 YEAST UTI: Primary | ICD-10-CM

## 2025-04-01 LAB
ABSOLUTE EOSINOPHIL (OHS): 0.27 K/UL
ABSOLUTE MONOCYTE (OHS): 0.6 K/UL (ref 0.3–1)
ABSOLUTE NEUTROPHIL COUNT (OHS): 7.76 K/UL (ref 1.8–7.7)
ALBUMIN SERPL BCP-MCNC: 2.3 G/DL (ref 3.5–5.2)
ALP SERPL-CCNC: 57 UNIT/L (ref 40–150)
ALT SERPL W/O P-5'-P-CCNC: 14 UNIT/L (ref 10–44)
ANION GAP (OHS): 10 MMOL/L (ref 8–16)
AST SERPL-CCNC: 29 UNIT/L (ref 11–45)
BASOPHILS # BLD AUTO: 0.03 K/UL
BASOPHILS NFR BLD AUTO: 0.3 %
BILIRUB SERPL-MCNC: 0.3 MG/DL (ref 0.1–1)
BILIRUB UR QL STRIP.AUTO: NEGATIVE
BNP SERPL-MCNC: 180 PG/ML (ref 0–99)
BUN SERPL-MCNC: 36 MG/DL (ref 8–23)
CALCIUM SERPL-MCNC: 9 MG/DL (ref 8.7–10.5)
CHLORIDE SERPL-SCNC: 101 MMOL/L (ref 95–110)
CLARITY UR: CLEAR
CO2 SERPL-SCNC: 25 MMOL/L (ref 23–29)
COLOR UR AUTO: YELLOW
CREAT SERPL-MCNC: 1.2 MG/DL (ref 0.5–1.4)
ERYTHROCYTE [DISTWIDTH] IN BLOOD BY AUTOMATED COUNT: 14.2 % (ref 11.5–14.5)
GFR SERPLBLD CREATININE-BSD FMLA CKD-EPI: 45 ML/MIN/1.73/M2
GLUCOSE SERPL-MCNC: 90 MG/DL (ref 70–110)
GLUCOSE UR QL STRIP: NEGATIVE
HCT VFR BLD AUTO: 31.7 % (ref 37–48.5)
HGB BLD-MCNC: 10 GM/DL (ref 12–16)
HGB UR QL STRIP: NEGATIVE
HYALINE CASTS UR QL AUTO: 9 /LPF (ref 0–1)
IMM GRANULOCYTES # BLD AUTO: 0.07 K/UL (ref 0–0.04)
IMM GRANULOCYTES NFR BLD AUTO: 0.6 % (ref 0–0.5)
INFLUENZA A MOLECULAR (OHS): NEGATIVE
INFLUENZA B MOLECULAR (OHS): NEGATIVE
KETONES UR QL STRIP: NEGATIVE
LACTATE SERPL-SCNC: 1 MMOL/L (ref 0.5–2.2)
LACTATE SERPL-SCNC: 1.5 MMOL/L (ref 0.5–2.2)
LEUKOCYTE ESTERASE UR QL STRIP: ABNORMAL
LYMPHOCYTES # BLD AUTO: 2.04 K/UL (ref 1–4.8)
MAGNESIUM SERPL-MCNC: 2.2 MG/DL (ref 1.6–2.6)
MCH RBC QN AUTO: 30.8 PG (ref 27–31)
MCHC RBC AUTO-ENTMCNC: 31.5 G/DL (ref 32–36)
MCV RBC AUTO: 98 FL (ref 82–98)
MICROSCOPIC COMMENT: ABNORMAL
NITRITE UR QL STRIP: NEGATIVE
NUCLEATED RBC (/100WBC) (OHS): 0 /100 WBC
OHS QRS DURATION: 70 MS
OHS QTC CALCULATION: 382 MS
PH UR STRIP: 6 [PH]
PHOSPHATE SERPL-MCNC: 3.5 MG/DL (ref 2.7–4.5)
PLATELET # BLD AUTO: 449 K/UL (ref 150–450)
PMV BLD AUTO: 10 FL (ref 9.2–12.9)
POTASSIUM SERPL-SCNC: 4.7 MMOL/L (ref 3.5–5.1)
PROCALCITONIN SERPL-MCNC: 18.58 NG/ML
PROT SERPL-MCNC: 7.6 GM/DL (ref 6–8.4)
PROT UR QL STRIP: NEGATIVE
RBC # BLD AUTO: 3.25 M/UL (ref 4–5.4)
RBC #/AREA URNS AUTO: 2 /HPF (ref 0–4)
RELATIVE EOSINOPHIL (OHS): 2.5 %
RELATIVE LYMPHOCYTE (OHS): 18.9 % (ref 18–48)
RELATIVE MONOCYTE (OHS): 5.6 % (ref 4–15)
RELATIVE NEUTROPHIL (OHS): 72.1 % (ref 38–73)
SARS-COV-2 RDRP RESP QL NAA+PROBE: NEGATIVE
SODIUM SERPL-SCNC: 136 MMOL/L (ref 136–145)
SP GR UR STRIP: 1.01
TROPONIN I SERPL DL<=0.01 NG/ML-MCNC: 0.04 NG/ML
TROPONIN I SERPL DL<=0.01 NG/ML-MCNC: 0.04 NG/ML
UROBILINOGEN UR STRIP-ACNC: NEGATIVE EU/DL
WBC # BLD AUTO: 10.77 K/UL (ref 3.9–12.7)
WBC #/AREA URNS AUTO: 19 /HPF (ref 0–5)
YEAST UR QL AUTO: ABNORMAL /HPF

## 2025-04-01 PROCEDURE — 63600175 PHARM REV CODE 636 W HCPCS: Performed by: EMERGENCY MEDICINE

## 2025-04-01 PROCEDURE — 36415 COLL VENOUS BLD VENIPUNCTURE: CPT | Performed by: EMERGENCY MEDICINE

## 2025-04-01 PROCEDURE — 21400001 HC TELEMETRY ROOM

## 2025-04-01 PROCEDURE — 81003 URINALYSIS AUTO W/O SCOPE: CPT | Performed by: EMERGENCY MEDICINE

## 2025-04-01 PROCEDURE — 93005 ELECTROCARDIOGRAM TRACING: CPT

## 2025-04-01 PROCEDURE — 36415 COLL VENOUS BLD VENIPUNCTURE: CPT | Performed by: NURSE PRACTITIONER

## 2025-04-01 PROCEDURE — 84145 PROCALCITONIN (PCT): CPT | Performed by: EMERGENCY MEDICINE

## 2025-04-01 PROCEDURE — 83605 ASSAY OF LACTIC ACID: CPT | Performed by: EMERGENCY MEDICINE

## 2025-04-01 PROCEDURE — 80053 COMPREHEN METABOLIC PANEL: CPT | Performed by: EMERGENCY MEDICINE

## 2025-04-01 PROCEDURE — 96360 HYDRATION IV INFUSION INIT: CPT

## 2025-04-01 PROCEDURE — 11000001 HC ACUTE MED/SURG PRIVATE ROOM

## 2025-04-01 PROCEDURE — 83880 ASSAY OF NATRIURETIC PEPTIDE: CPT | Performed by: NURSE PRACTITIONER

## 2025-04-01 PROCEDURE — 25000003 PHARM REV CODE 250: Performed by: EMERGENCY MEDICINE

## 2025-04-01 PROCEDURE — U0002 COVID-19 LAB TEST NON-CDC: HCPCS | Performed by: EMERGENCY MEDICINE

## 2025-04-01 PROCEDURE — 84484 ASSAY OF TROPONIN QUANT: CPT | Performed by: NURSE PRACTITIONER

## 2025-04-01 PROCEDURE — 85025 COMPLETE CBC W/AUTO DIFF WBC: CPT | Performed by: EMERGENCY MEDICINE

## 2025-04-01 PROCEDURE — 99285 EMERGENCY DEPT VISIT HI MDM: CPT | Mod: 25

## 2025-04-01 PROCEDURE — 83735 ASSAY OF MAGNESIUM: CPT | Performed by: EMERGENCY MEDICINE

## 2025-04-01 PROCEDURE — 84484 ASSAY OF TROPONIN QUANT: CPT | Performed by: EMERGENCY MEDICINE

## 2025-04-01 PROCEDURE — 93010 ELECTROCARDIOGRAM REPORT: CPT | Mod: GW,,, | Performed by: INTERNAL MEDICINE

## 2025-04-01 PROCEDURE — 87077 CULTURE AEROBIC IDENTIFY: CPT | Performed by: EMERGENCY MEDICINE

## 2025-04-01 PROCEDURE — 87040 BLOOD CULTURE FOR BACTERIA: CPT | Performed by: EMERGENCY MEDICINE

## 2025-04-01 PROCEDURE — 25000003 PHARM REV CODE 250: Performed by: NURSE PRACTITIONER

## 2025-04-01 PROCEDURE — 84100 ASSAY OF PHOSPHORUS: CPT | Performed by: EMERGENCY MEDICINE

## 2025-04-01 PROCEDURE — 87502 INFLUENZA DNA AMP PROBE: CPT | Performed by: EMERGENCY MEDICINE

## 2025-04-01 RX ORDER — ONDANSETRON HYDROCHLORIDE 2 MG/ML
4 INJECTION, SOLUTION INTRAVENOUS EVERY 8 HOURS PRN
Status: DISCONTINUED | OUTPATIENT
Start: 2025-04-01 | End: 2025-04-03 | Stop reason: HOSPADM

## 2025-04-01 RX ORDER — TALC
6 POWDER (GRAM) TOPICAL NIGHTLY PRN
Status: DISCONTINUED | OUTPATIENT
Start: 2025-04-01 | End: 2025-04-03 | Stop reason: HOSPADM

## 2025-04-01 RX ORDER — ALLOPURINOL 100 MG/1
100 TABLET ORAL DAILY
Status: DISCONTINUED | OUTPATIENT
Start: 2025-04-02 | End: 2025-04-03 | Stop reason: HOSPADM

## 2025-04-01 RX ORDER — FOLIC ACID 1 MG/1
1000 TABLET ORAL DAILY
Status: DISCONTINUED | OUTPATIENT
Start: 2025-04-02 | End: 2025-04-03 | Stop reason: HOSPADM

## 2025-04-01 RX ORDER — IBUPROFEN 200 MG
16 TABLET ORAL
Status: DISCONTINUED | OUTPATIENT
Start: 2025-04-01 | End: 2025-04-03 | Stop reason: HOSPADM

## 2025-04-01 RX ORDER — NALOXONE HCL 0.4 MG/ML
0.02 VIAL (ML) INJECTION
Status: DISCONTINUED | OUTPATIENT
Start: 2025-04-01 | End: 2025-04-03 | Stop reason: HOSPADM

## 2025-04-01 RX ORDER — GLUCAGON 1 MG
1 KIT INJECTION
Status: DISCONTINUED | OUTPATIENT
Start: 2025-04-01 | End: 2025-04-03 | Stop reason: HOSPADM

## 2025-04-01 RX ORDER — LANOLIN ALCOHOL/MO/W.PET/CERES
400 CREAM (GRAM) TOPICAL 2 TIMES DAILY
Status: DISCONTINUED | OUTPATIENT
Start: 2025-04-02 | End: 2025-04-03 | Stop reason: HOSPADM

## 2025-04-01 RX ORDER — FUROSEMIDE 20 MG/1
20 TABLET ORAL DAILY
Status: DISCONTINUED | OUTPATIENT
Start: 2025-04-02 | End: 2025-04-03 | Stop reason: HOSPADM

## 2025-04-01 RX ORDER — IBUPROFEN 200 MG
24 TABLET ORAL
Status: DISCONTINUED | OUTPATIENT
Start: 2025-04-01 | End: 2025-04-03 | Stop reason: HOSPADM

## 2025-04-01 RX ORDER — ALUMINUM HYDROXIDE, MAGNESIUM HYDROXIDE, AND SIMETHICONE 1200; 120; 1200 MG/30ML; MG/30ML; MG/30ML
30 SUSPENSION ORAL 4 TIMES DAILY PRN
Status: DISCONTINUED | OUTPATIENT
Start: 2025-04-01 | End: 2025-04-03 | Stop reason: HOSPADM

## 2025-04-01 RX ORDER — POTASSIUM CITRATE 1080 MG/1
10 TABLET, EXTENDED RELEASE ORAL DAILY
Status: DISCONTINUED | OUTPATIENT
Start: 2025-04-02 | End: 2025-04-03

## 2025-04-01 RX ORDER — ENOXAPARIN SODIUM 100 MG/ML
30 INJECTION SUBCUTANEOUS EVERY 24 HOURS
Status: DISCONTINUED | OUTPATIENT
Start: 2025-04-01 | End: 2025-04-01 | Stop reason: DRUGHIGH

## 2025-04-01 RX ORDER — IPRATROPIUM BROMIDE AND ALBUTEROL SULFATE 2.5; .5 MG/3ML; MG/3ML
3 SOLUTION RESPIRATORY (INHALATION) EVERY 4 HOURS PRN
Status: DISCONTINUED | OUTPATIENT
Start: 2025-04-01 | End: 2025-04-03 | Stop reason: HOSPADM

## 2025-04-01 RX ORDER — SODIUM CHLORIDE 0.9 % (FLUSH) 0.9 %
3 SYRINGE (ML) INJECTION EVERY 12 HOURS PRN
Status: DISCONTINUED | OUTPATIENT
Start: 2025-04-01 | End: 2025-04-03 | Stop reason: HOSPADM

## 2025-04-01 RX ORDER — POLYETHYLENE GLYCOL 3350 17 G/17G
17 POWDER, FOR SOLUTION ORAL DAILY PRN
Status: DISCONTINUED | OUTPATIENT
Start: 2025-04-01 | End: 2025-04-03 | Stop reason: HOSPADM

## 2025-04-01 RX ORDER — GABAPENTIN 300 MG/1
600 CAPSULE ORAL NIGHTLY
Status: DISCONTINUED | OUTPATIENT
Start: 2025-04-01 | End: 2025-04-03 | Stop reason: HOSPADM

## 2025-04-01 RX ORDER — CETIRIZINE HYDROCHLORIDE 10 MG/1
10 TABLET ORAL DAILY
Status: DISCONTINUED | OUTPATIENT
Start: 2025-04-02 | End: 2025-04-03 | Stop reason: HOSPADM

## 2025-04-01 RX ORDER — PANTOPRAZOLE SODIUM 40 MG/1
40 TABLET, DELAYED RELEASE ORAL DAILY
Status: DISCONTINUED | OUTPATIENT
Start: 2025-04-02 | End: 2025-04-03 | Stop reason: HOSPADM

## 2025-04-01 RX ORDER — SIMETHICONE 80 MG
1 TABLET,CHEWABLE ORAL 4 TIMES DAILY PRN
Status: DISCONTINUED | OUTPATIENT
Start: 2025-04-01 | End: 2025-04-03 | Stop reason: HOSPADM

## 2025-04-01 RX ORDER — METOPROLOL TARTRATE 50 MG/1
100 TABLET ORAL 2 TIMES DAILY
Status: DISCONTINUED | OUTPATIENT
Start: 2025-04-02 | End: 2025-04-02

## 2025-04-01 RX ORDER — ENOXAPARIN SODIUM 100 MG/ML
40 INJECTION SUBCUTANEOUS EVERY 24 HOURS
Status: DISCONTINUED | OUTPATIENT
Start: 2025-04-01 | End: 2025-04-03 | Stop reason: HOSPADM

## 2025-04-01 RX ORDER — LEVOTHYROXINE SODIUM 50 UG/1
50 TABLET ORAL
Status: DISCONTINUED | OUTPATIENT
Start: 2025-04-02 | End: 2025-04-03 | Stop reason: HOSPADM

## 2025-04-01 RX ADMIN — ENOXAPARIN SODIUM 40 MG: 40 INJECTION SUBCUTANEOUS at 09:04

## 2025-04-01 RX ADMIN — SODIUM CHLORIDE 1000 ML: 9 INJECTION, SOLUTION INTRAVENOUS at 04:04

## 2025-04-01 RX ADMIN — GABAPENTIN 600 MG: 300 CAPSULE ORAL at 11:04

## 2025-04-01 NOTE — ED TRIAGE NOTES
Wendy Ro, a 84 y.o. female presents to the ED w/ complaint of urinary retention. Pt's family reports only 300cc noted in urine bag today at 10am this morning from 3pm yesterday. Pt reports abdominal and lower back pain.      Triage note:  Chief Complaint   Patient presents with    Urinary Retention     Decrease in urine output since last night with SOB. Pt 94% on RA. Hx of COPD, oliva catheter in place and wound vac to sacral wound     Review of patient's allergies indicates:   Allergen Reactions    Adenosine      Other reaction(s): asthma attack    Codeine Nausea And Vomiting     Other reaction(s): Nausea    Duloxetine      sleepy    Hydrocodone-acetaminophen Nausea And Vomiting     Other reaction(s): Nausea    Macrolide antibiotics     Opioids - morphine analogues     Opium (anthroposophic)     Promethazine Nausea And Vomiting     Other reaction(s): Nausea    Tramadol     Keflex  [cephalexin] Nausea Only     Other reaction(s): pt says can take penicillins  Other reaction(s): Nausea  Patient tolerates Cefepime and Rocephin     Past Medical History:   Diagnosis Date    Arthritis     B12 deficiency 3/28/2019    Chest pain 2012    past Hx, turned out to be asthma, gerd, stress test reported unremarkable per pt    Chronic gout     Chronic renal insufficiency, stage III (moderate)     Dr. Harrell    Class 3 severe obesity due to excess calories with serious comorbidity in adult 1/31/2012    The patient presents with obesity.  Denies bulimia, amenorrhea, cold intolerance, edema, hip pain, hirsutism, knee pain, polydipsia, polyuria, thirst and weakness.  The patient does not perform regular exercise.  Previous treatments for obesity :self-directed dieting without success.  The patient and I discussed the importance of exercise.   Wt Readings from Last 4 Encounters: 03/11/19 113.3 kg (2    Combined hyperlipidemia associated with type 2 diabetes mellitus     Concussion 07/28/2016    DM (diabetes mellitus) type II  controlled with renal manifestation     DM (diabetes mellitus) type II controlled, neurological manifestation     no meds diet controlled//    Fatty liver     Gastroparesis     GERD (gastroesophageal reflux disease) 10/20/2014    UGI performed at [facility].    Hiatal hernia     Hypertension associated with diabetes     Hypothyroid 7/10/2012    Hypothyroidism     Intermittent asthma     last problem was around 2012    Osteoporosis     Osteoporosis 11/30/2016    Peripheral autonomic neuropathy due to diabetes mellitus     PONV (postoperative nausea and vomiting)     Severe, prefers Zofran, avoid narcotics if possible    Rheumatoid arthritis     on steroid daily    Sleep apnea     not compliant with BiPap, sleeps with HOB up    Thyroid nodule
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
## 2025-04-01 NOTE — ED NOTES
Bed: 01  Expected date:   Expected time:   Means of arrival: Ambulance Service  Comments:  When clean

## 2025-04-02 PROBLEM — J90 PLEURAL EFFUSION: Status: ACTIVE | Noted: 2025-04-02

## 2025-04-02 PROBLEM — I31.39 PERICARDIAL EFFUSION: Status: ACTIVE | Noted: 2025-04-02

## 2025-04-02 LAB
ABSOLUTE EOSINOPHIL (OHS): 0.11 K/UL
ABSOLUTE MONOCYTE (OHS): 0.65 K/UL (ref 0.3–1)
ABSOLUTE NEUTROPHIL COUNT (OHS): 6.38 K/UL (ref 1.8–7.7)
ALBUMIN SERPL BCP-MCNC: 2 G/DL (ref 3.5–5.2)
ALP SERPL-CCNC: 52 UNIT/L (ref 40–150)
ALT SERPL W/O P-5'-P-CCNC: 7 UNIT/L (ref 10–44)
ANION GAP (OHS): 10 MMOL/L (ref 8–16)
AORTIC ROOT ANNULUS: 3.24 CM
APPEARANCE FLD: CLEAR
ASCENDING AORTA: 2.64 CM
AST SERPL-CCNC: 14 UNIT/L (ref 11–45)
AV INDEX (PROSTH): 0.87
AV MEAN GRADIENT: 9 MMHG
AV PEAK GRADIENT: 12 MMHG
AV VALVE AREA BY VELOCITY RATIO: 2.2 CM²
AV VALVE AREA: 2.7 CM²
AV VELOCITY RATIO: 0.71
BASOPHILS # BLD AUTO: 0.06 K/UL
BASOPHILS NFR BLD AUTO: 0.6 %
BASOPHILS NFR FLD MANUAL: 1 %
BILIRUB SERPL-MCNC: 0.4 MG/DL (ref 0.1–1)
BSA FOR ECHO PROCEDURE: 1.92 M2
BUN SERPL-MCNC: 31 MG/DL (ref 8–23)
CALCIUM SERPL-MCNC: 8.5 MG/DL (ref 8.7–10.5)
CHLORIDE SERPL-SCNC: 102 MMOL/L (ref 95–110)
CO2 SERPL-SCNC: 23 MMOL/L (ref 23–29)
COLOR FLD: YELLOW
CREAT SERPL-MCNC: 1.1 MG/DL (ref 0.5–1.4)
CV ECHO LV RWT: 0.56 CM
DOP CALC AO PEAK VEL: 1.7 M/S
DOP CALC AO VTI: 33.4 CM
DOP CALC LVOT AREA: 3.1 CM2
DOP CALC LVOT DIAMETER: 2 CM
DOP CALC LVOT PEAK VEL: 1.2 M/S
DOP CALC LVOT STROKE VOLUME: 91.1 CM3
DOP CALC RVOT PEAK VEL: 0.71 M/S
DOP CALC RVOT VTI: 10.6 CM
DOP CALCLVOT PEAK VEL VTI: 29 CM
E WAVE DECELERATION TIME: 246 MSEC
E/A RATIO: 0.74
E/E' RATIO: 15 M/S
ECHO LV POSTERIOR WALL: 1.1 CM (ref 0.6–1.1)
EOSINOPHIL NFR FLD MANUAL: 12 %
ERYTHROCYTE [DISTWIDTH] IN BLOOD BY AUTOMATED COUNT: 14.2 % (ref 11.5–14.5)
FRACTIONAL SHORTENING: 30.8 % (ref 28–44)
GFR SERPLBLD CREATININE-BSD FMLA CKD-EPI: 50 ML/MIN/1.73/M2
GLUCOSE SERPL-MCNC: 80 MG/DL (ref 70–110)
HCT VFR BLD AUTO: 27.3 % (ref 37–48.5)
HGB BLD-MCNC: 8.6 GM/DL (ref 12–16)
IMM GRANULOCYTES # BLD AUTO: 0.06 K/UL (ref 0–0.04)
IMM GRANULOCYTES NFR BLD AUTO: 0.6 % (ref 0–0.5)
INTERVENTRICULAR SEPTUM: 1.1 CM (ref 0.6–1.1)
IVC DIAMETER: 2.1 CM
IVRT: 65 MSEC
LA MAJOR: 4.9 CM
LA MINOR: 4.9 CM
LA WIDTH: 3.3 CM
LEFT ATRIUM SIZE: 4.2 CM
LEFT ATRIUM VOLUME INDEX: 31 ML/M2
LEFT ATRIUM VOLUME: 58 CM3
LEFT INTERNAL DIMENSION IN SYSTOLE: 2.7 CM (ref 2.1–4)
LEFT VENTRICLE DIASTOLIC VOLUME INDEX: 35.29 ML/M2
LEFT VENTRICLE DIASTOLIC VOLUME: 66 ML
LEFT VENTRICLE MASS INDEX: 74.9 G/M2
LEFT VENTRICLE SYSTOLIC VOLUME INDEX: 14.4 ML/M2
LEFT VENTRICLE SYSTOLIC VOLUME: 27 ML
LEFT VENTRICULAR INTERNAL DIMENSION IN DIASTOLE: 3.9 CM (ref 3.5–6)
LEFT VENTRICULAR MASS: 140.1 G
LV LATERAL E/E' RATIO: 18 M/S
LV SEPTAL E/E' RATIO: 12.9 M/S
LVED V (TEICH): 65.61 ML
LVES V (TEICH): 27.02 ML
LVOT MG: 4.64 MMHG
LVOT MV: 1.06 CM/S
LYMPHOCYTES # BLD AUTO: 2.48 K/UL (ref 1–4.8)
LYMPHOCYTES NFR FLD MANUAL: 27 %
MCH RBC QN AUTO: 30.9 PG (ref 27–31)
MCHC RBC AUTO-ENTMCNC: 31.5 G/DL (ref 32–36)
MCV RBC AUTO: 98 FL (ref 82–98)
MESOTHL CELL NFR FLD MANUAL: 20 %
MONOS+MACROS NFR FLD MANUAL: 27 %
MV PEAK A VEL: 1.21 M/S
MV PEAK E VEL: 0.9 M/S
MV STENOSIS PRESSURE HALF TIME: 71.35 MS
MV VALVE AREA P 1/2 METHOD: 3.08 CM2
NEUTROPHILS NFR FLD MANUAL: 13 %
NUCLEATED RBC (/100WBC) (OHS): 0 /100 WBC
PISA MRMAX VEL: 3.79 M/S
PISA TR MAX VEL: 2.3 M/S
PLATELET # BLD AUTO: 403 K/UL (ref 150–450)
PMV BLD AUTO: 9.6 FL (ref 9.2–12.9)
POTASSIUM SERPL-SCNC: 3.7 MMOL/L (ref 3.5–5.1)
PROT SERPL-MCNC: 6.4 GM/DL (ref 6–8.4)
PV MEAN GRADIENT: 1 MMHG
RA MAJOR: 3.79 CM
RA PRESSURE ESTIMATED: 15 MMHG
RA WIDTH: 2 CM
RBC # BLD AUTO: 2.78 M/UL (ref 4–5.4)
RELATIVE EOSINOPHIL (OHS): 1.1 %
RELATIVE LYMPHOCYTE (OHS): 25.5 % (ref 18–48)
RELATIVE MONOCYTE (OHS): 6.7 % (ref 4–15)
RELATIVE NEUTROPHIL (OHS): 65.5 % (ref 38–73)
RV TB RVSP: 17 MMHG
SODIUM SERPL-SCNC: 135 MMOL/L (ref 136–145)
STJ: 2.68 CM
TDI LATERAL: 0.05 M/S
TDI SEPTAL: 0.07 M/S
TDI: 0.06 M/S
TR MAX PG: 20 MMHG
TRICUSPID ANNULAR PLANE SYSTOLIC EXCURSION: 1.91 CM
TROPONIN I SERPL DL<=0.01 NG/ML-MCNC: 0.03 NG/ML
TV REST PULMONARY ARTERY PRESSURE: 36 MMHG
WBC # BLD AUTO: 9.74 K/UL (ref 3.9–12.7)
WBC # FLD: 492 /CU MM
Z-SCORE OF LEFT VENTRICULAR DIMENSION IN END DIASTOLE: -2.9
Z-SCORE OF LEFT VENTRICULAR DIMENSION IN END SYSTOLE: -1.38

## 2025-04-02 PROCEDURE — 63600175 PHARM REV CODE 636 W HCPCS: Performed by: HOSPITALIST

## 2025-04-02 PROCEDURE — 11000001 HC ACUTE MED/SURG PRIVATE ROOM

## 2025-04-02 PROCEDURE — 87206 SMEAR FLUORESCENT/ACID STAI: CPT | Performed by: FAMILY MEDICINE

## 2025-04-02 PROCEDURE — 25000003 PHARM REV CODE 250: Performed by: FAMILY MEDICINE

## 2025-04-02 PROCEDURE — 85025 COMPLETE CBC W/AUTO DIFF WBC: CPT | Performed by: NURSE PRACTITIONER

## 2025-04-02 PROCEDURE — 88112 CYTOPATH CELL ENHANCE TECH: CPT | Mod: 26,,, | Performed by: STUDENT IN AN ORGANIZED HEALTH CARE EDUCATION/TRAINING PROGRAM

## 2025-04-02 PROCEDURE — 83615 LACTATE (LD) (LDH) ENZYME: CPT | Performed by: FAMILY MEDICINE

## 2025-04-02 PROCEDURE — 87205 SMEAR GRAM STAIN: CPT | Performed by: FAMILY MEDICINE

## 2025-04-02 PROCEDURE — 88342 IMHCHEM/IMCYTCHM 1ST ANTB: CPT | Mod: 26,,, | Performed by: STUDENT IN AN ORGANIZED HEALTH CARE EDUCATION/TRAINING PROGRAM

## 2025-04-02 PROCEDURE — 25000003 PHARM REV CODE 250: Performed by: NURSE PRACTITIONER

## 2025-04-02 PROCEDURE — 87070 CULTURE OTHR SPECIMN AEROBIC: CPT | Performed by: FAMILY MEDICINE

## 2025-04-02 PROCEDURE — 0W9B3ZZ DRAINAGE OF LEFT PLEURAL CAVITY, PERCUTANEOUS APPROACH: ICD-10-PCS | Performed by: STUDENT IN AN ORGANIZED HEALTH CARE EDUCATION/TRAINING PROGRAM

## 2025-04-02 PROCEDURE — 88341 IMHCHEM/IMCYTCHM EA ADD ANTB: CPT | Mod: 26,,, | Performed by: STUDENT IN AN ORGANIZED HEALTH CARE EDUCATION/TRAINING PROGRAM

## 2025-04-02 PROCEDURE — 94760 N-INVAS EAR/PLS OXIMETRY 1: CPT

## 2025-04-02 PROCEDURE — 99223 1ST HOSP IP/OBS HIGH 75: CPT | Mod: 25,GW,, | Performed by: INTERNAL MEDICINE

## 2025-04-02 PROCEDURE — 88305 TISSUE EXAM BY PATHOLOGIST: CPT | Mod: 26,,, | Performed by: STUDENT IN AN ORGANIZED HEALTH CARE EDUCATION/TRAINING PROGRAM

## 2025-04-02 PROCEDURE — 88342 IMHCHEM/IMCYTCHM 1ST ANTB: CPT | Mod: TC | Performed by: FAMILY MEDICINE

## 2025-04-02 PROCEDURE — 89051 BODY FLUID CELL COUNT: CPT | Performed by: FAMILY MEDICINE

## 2025-04-02 PROCEDURE — 25000003 PHARM REV CODE 250: Performed by: HOSPITALIST

## 2025-04-02 PROCEDURE — 87210 SMEAR WET MOUNT SALINE/INK: CPT | Performed by: FAMILY MEDICINE

## 2025-04-02 PROCEDURE — 63600175 PHARM REV CODE 636 W HCPCS: Performed by: EMERGENCY MEDICINE

## 2025-04-02 PROCEDURE — 36415 COLL VENOUS BLD VENIPUNCTURE: CPT | Performed by: NURSE PRACTITIONER

## 2025-04-02 PROCEDURE — 80053 COMPREHEN METABOLIC PANEL: CPT | Performed by: NURSE PRACTITIONER

## 2025-04-02 PROCEDURE — 21400001 HC TELEMETRY ROOM

## 2025-04-02 RX ORDER — METOPROLOL TARTRATE 50 MG/1
100 TABLET ORAL 2 TIMES DAILY
Status: DISCONTINUED | OUTPATIENT
Start: 2025-04-02 | End: 2025-04-03 | Stop reason: HOSPADM

## 2025-04-02 RX ORDER — ACETAMINOPHEN 325 MG/1
650 TABLET ORAL EVERY 6 HOURS PRN
Status: DISCONTINUED | OUTPATIENT
Start: 2025-04-02 | End: 2025-04-03 | Stop reason: HOSPADM

## 2025-04-02 RX ORDER — MUPIROCIN 20 MG/G
OINTMENT TOPICAL 2 TIMES DAILY
Status: DISCONTINUED | OUTPATIENT
Start: 2025-04-02 | End: 2025-04-03 | Stop reason: HOSPADM

## 2025-04-02 RX ADMIN — FUROSEMIDE 20 MG: 20 TABLET ORAL at 10:04

## 2025-04-02 RX ADMIN — MUPIROCIN: 20 OINTMENT TOPICAL at 10:04

## 2025-04-02 RX ADMIN — METOPROLOL TARTRATE 100 MG: 50 TABLET, FILM COATED ORAL at 10:04

## 2025-04-02 RX ADMIN — ALLOPURINOL 100 MG: 100 TABLET ORAL at 10:04

## 2025-04-02 RX ADMIN — PANTOPRAZOLE SODIUM 40 MG: 40 TABLET, DELAYED RELEASE ORAL at 10:04

## 2025-04-02 RX ADMIN — CETIRIZINE HYDROCHLORIDE 10 MG: 10 TABLET, FILM COATED ORAL at 10:04

## 2025-04-02 RX ADMIN — Medication 400 MG: at 10:04

## 2025-04-02 RX ADMIN — FOLIC ACID 1000 MCG: 1 TABLET ORAL at 10:04

## 2025-04-02 RX ADMIN — Medication 400 MG: at 09:04

## 2025-04-02 RX ADMIN — ENOXAPARIN SODIUM 40 MG: 40 INJECTION SUBCUTANEOUS at 05:04

## 2025-04-02 RX ADMIN — ACETAMINOPHEN 650 MG: 325 TABLET ORAL at 06:04

## 2025-04-02 RX ADMIN — GENTAMICIN SULFATE 24 MG: 40 INJECTION, SOLUTION INTRAMUSCULAR; INTRAVENOUS at 10:04

## 2025-04-02 RX ADMIN — MUPIROCIN: 20 OINTMENT TOPICAL at 09:04

## 2025-04-02 RX ADMIN — METOPROLOL TARTRATE 100 MG: 50 TABLET, FILM COATED ORAL at 12:04

## 2025-04-02 RX ADMIN — GABAPENTIN 600 MG: 300 CAPSULE ORAL at 09:04

## 2025-04-02 RX ADMIN — ACETAMINOPHEN 650 MG: 325 TABLET ORAL at 10:04

## 2025-04-02 RX ADMIN — GENTAMICIN SULFATE 24 MG: 40 INJECTION, SOLUTION INTRAMUSCULAR; INTRAVENOUS at 11:04

## 2025-04-02 RX ADMIN — ACETAMINOPHEN 650 MG: 325 TABLET ORAL at 05:04

## 2025-04-02 RX ADMIN — POTASSIUM CITRATE 10 MEQ: 10 TABLET, EXTENDED RELEASE ORAL at 10:04

## 2025-04-02 RX ADMIN — METOPROLOL TARTRATE 100 MG: 50 TABLET, FILM COATED ORAL at 09:04

## 2025-04-02 RX ADMIN — LEVOTHYROXINE SODIUM 50 MCG: 0.05 TABLET ORAL at 06:04

## 2025-04-02 NOTE — PLAN OF CARE
[id number]/[id number] CAMDEN Ro is a 84 y.o.female admitted on 4/1/2025 for Pleural effusion   Code Status: DNR MRN: [id number]   Review of patient's allergies indicates:   Allergen Reactions    Adenosine      Other reaction(s): asthma attack    Codeine Nausea And Vomiting     Other reaction(s): Nausea    Duloxetine      sleepy    Hydrocodone-acetaminophen Nausea And Vomiting     Other reaction(s): Nausea    Macrolide antibiotics     Opioids - morphine analogues     Opium (anthroposophic)     Promethazine Nausea And Vomiting     Other reaction(s): Nausea    Tramadol     Keflex  [cephalexin] Nausea Only     Other reaction(s): pt says can take penicillins  Other reaction(s): Nausea  Patient tolerates Cefepime and Rocephin     Past Medical History:   Diagnosis Date    Arthritis     B12 deficiency 3/28/2019    Chest pain 2012    past Hx, turned out to be asthma, gerd, stress test reported unremarkable per pt    Chronic gout     Chronic renal insufficiency, stage III (moderate)     Dr. Harrell    Class 3 severe obesity due to excess calories with serious comorbidity in adult 1/31/2012    The patient presents with obesity.  Denies bulimia, amenorrhea, cold intolerance, edema, hip pain, hirsutism, knee pain, polydipsia, polyuria, thirst and weakness.  The patient does not perform regular exercise.  Previous treatments for obesity :self-directed dieting without success.  The patient and I discussed the importance of exercise.   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    Hiatal hernia     Hypertension associated with diabetes      Hypothyroid 7/10/2012    Hypothyroidism     Intermittent asthma     last problem was around 2012    Osteoporosis     Osteoporosis 11/30/2016    Peripheral autonomic neuropathy due to diabetes mellitus     PONV (postoperative nausea and vomiting)     Severe, prefers Zofran, avoid narcotics if possible    Rheumatoid arthritis     on steroid daily    Sleep apnea     not compliant with BiPap, sleeps with HOB up    Thyroid nodule       PRN meds    acetaminophen, 650 mg, Q6H PRN  albuterol-ipratropium, 3 mL, Q4H PRN  aluminum-magnesium hydroxide-simethicone, 30 mL, QID PRN  dextrose 50%, 12.5 g, PRN  dextrose 50%, 25 g, PRN  glucagon (human recombinant), 1 mg, PRN  glucose, 16 g, PRN  glucose, 24 g, PRN  melatonin, 6 mg, Nightly PRN  naloxone, 0.02 mg, PRN  ondansetron, 4 mg, Q8H PRN  polyethylene glycol, 17 g, Daily PRN  simethicone, 1 tablet, QID PRN  sodium chloride 0.9%, 3 mL, Q12H PRN      Chart check completed. Will continue plan of care.      Orientation: oriented x 4  [place] Coma Scale Score: 15     Lead Monitored: Lead II Rhythm: normal sinus rhythm Frequency/Ectopy: PVCs  Cardiac/Telemetry Box Number: [id number]  VTE Core Measure: (SCDs) Sequential compression device initiated/maintained Last Bowel Movement: 04/02/25  Diet Heart Healthy  Voiding Characteristics: urethral catheter (bladder)  Luisito Score: 13  Fall Risk Score: 18  Accucheck []   Freq?      Lines/Drains/Airways       Drain  Duration                  Urethral Catheter 03/31/25 1200 2 days              Peripheral Intravenous Line  Duration                  Peripheral IV - Single Lumen 04/01/25 2216 22 G Posterior;Right Forearm <1 day                       Problem: Adult Inpatient Plan of Care  Goal: Plan of Care Review  Outcome: Not Progressing  Goal: Patient-Specific Goal (Individualized)  Outcome: Not Progressing  Goal: Absence of Hospital-Acquired Illness or Injury  Outcome: Not Progressing  Goal: Optimal Comfort and Wellbeing  Outcome: Not  Progressing  Goal: Readiness for Transition of Care  Outcome: Not Progressing     Problem: Diabetes Comorbidity  Goal: Blood Glucose Level Within Targeted Range  Outcome: Not Progressing     Problem: Wound  Goal: Optimal Coping  Outcome: Not Progressing  Goal: Optimal Functional Ability  Outcome: Not Progressing  Goal: Absence of Infection Signs and Symptoms  Outcome: Not Progressing  Goal: Improved Oral Intake  Outcome: Not Progressing  Goal: Optimal Pain Control and Function  Outcome: Not Progressing  Goal: Skin Health and Integrity  Outcome: Not Progressing  Goal: Optimal Wound Healing  Outcome: Not Progressing     Problem: Skin Injury Risk Increased  Goal: Skin Health and Integrity  Outcome: Not Progressing     Problem: Infection  Goal: Absence of Infection Signs and Symptoms  Outcome: Not Progressing     Problem: Fall Injury Risk  Goal: Absence of Fall and Fall-Related Injury  Outcome: Not Progressing

## 2025-04-02 NOTE — ACP (ADVANCE CARE PLANNING)
Code Status  In light of the patients advanced and life limiting illness,I engaged the the patient and family in a voluntary conversation about the patient's preferences for care  at the very end of life. The patient wishes to have a natural, peaceful death.  Along those lines, the patient does not wish to have CPR or other invasive treatments performed when her heart and/or breathing stops. I communicated to the patient and family that a DNR order would be placed in her medical record to reflect this preference.    A total of 21 min was spent on advance care planning, goals of care discussion, emotional support, formulating and communicating prognosis and exploring burden/benefit of various approaches of treatment. This discussion occurred on a fully voluntary basis with the verbal consent of the patient and/or family.

## 2025-04-02 NOTE — ASSESSMENT & PLAN NOTE
"Patient's FSGs are controlled on current medication regimen.  Last A1c reviewed-   Lab Results   Component Value Date    HGBA1C 6.2 03/04/2024     Most recent fingerstick glucose reviewed- No results for input(s): "POCTGLUCOSE" in the last 24 hours.  Current correctional scale  Low  Maintain anti-hyperglycemic dose as follows-   Antihyperglycemics (From admission, onward)      None          Hold Oral hypoglycemics while patient is in the hospital.  SSI  Accuchecks    "

## 2025-04-02 NOTE — H&P
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 (From admission, onward)           Ordered     enoxaparin injection 40 mg  Every 24 hours         04/01/25 1955     IP VTE HIGH RISK PATIENT  Once         04/01/25 1946     Place sequential compression device  Until discontinued         04/01/25 1946                    Pharmacist Renal Dose Adjustment Note    Wendy Ro is a 84 y.o. female being treated with the medication enoxaparin.     Patient Data:    Vital Signs (Most Recent):  Temp: 98.2 °F (36.8 °C) (04/01/25 1800)  Pulse: 104 (04/01/25 1930)  Resp: (!) 27 (04/01/25 1930)  BP: 131/63 (04/01/25 1930)  SpO2: 97 % (04/01/25 1930) Vital Signs (72h Range):  Temp:  [98.2 °F (36.8 °C)-98.4 °F (36.9 °C)]   Pulse:  []   Resp:  [16-27]   BP: (124-164)/(58-73)   SpO2:  [96 %-98 %]      Recent Labs   Lab 04/01/25  1525   CREATININE 1.2     Serum creatinine: 1.2 mg/dL 04/01/25 1525  Estimated creatinine clearance: 36.3 mL/min    Medication: enoxaparin 30 mg SQ every 24 hours will be changed to enoxaparin 40 mg SQ every 24 hours per pharmacy renal dose adjustment protocol for patients with CrCl greater than 30 mL/min.    Pharmacist's Name: Ingrid Stanley PharmD  Pharmacist's Extension: [phone]     Thank you for allowing us to participate in this patient's care.     Ingrid Stanley PharmD 04/01/2025 7:56 PM    //Core Measures   -DVT proph: JESSICAs,  lovenox   -Code status Full    -Surrogate:none present    Components of this note were documented using a voice recognition system and are subject to errors not corrected at the time the document was proof read. Please contact the author for any clarifications.     Karl Dunn NP  Department of Hospital Medicine  [facility] ([facility])
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
## 2025-04-02 NOTE — PLAN OF CARE
A243/A243 CAMDEN Ro is a 84 y.o.female admitted on 4/1/2025 for Pleural effusion   Code Status: Full Code MRN: [id number]   Review of patient's allergies indicates:   Allergen Reactions    Adenosine      Other reaction(s): asthma attack    Codeine Nausea And Vomiting     Other reaction(s): Nausea    Duloxetine      sleepy    Hydrocodone-acetaminophen Nausea And Vomiting     Other reaction(s): Nausea    Macrolide antibiotics     Opioids - morphine analogues     Opium (anthroposophic)     Promethazine Nausea And Vomiting     Other reaction(s): Nausea    Tramadol     Keflex  [cephalexin] Nausea Only     Other reaction(s): pt says can take penicillins  Other reaction(s): Nausea  Patient tolerates Cefepime and Rocephin     Past Medical History:   Diagnosis Date    Arthritis     B12 deficiency 3/28/2019    Chest pain 2012    past Hx, turned out to be asthma, gerd, stress test reported unremarkable per pt    Chronic gout     Chronic renal insufficiency, stage III (moderate)     Dr. Harrell    Class 3 severe obesity due to excess calories with serious comorbidity in adult 1/31/2012    The patient presents with obesity.  Denies bulimia, amenorrhea, cold intolerance, edema, hip pain, hirsutism, knee pain, polydipsia, polyuria, thirst and weakness.  The patient does not perform regular exercise.  Previous treatments for obesity :self-directed dieting without success.  The patient and I discussed the importance of exercise.   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    Hiatal hernia     Hypertension associated with diabetes      Hypothyroid 7/10/2012    Hypothyroidism     Intermittent asthma     last problem was around 2012    Osteoporosis     Osteoporosis 11/30/2016    Peripheral autonomic neuropathy due to diabetes mellitus     PONV (postoperative nausea and vomiting)     Severe, prefers Zofran, avoid narcotics if possible    Rheumatoid arthritis     on steroid daily    Sleep apnea     not compliant with BiPap, sleeps with HOB up    Thyroid nodule       PRN meds    acetaminophen, 650 mg, Q6H PRN  albuterol-ipratropium, 3 mL, Q4H PRN  aluminum-magnesium hydroxide-simethicone, 30 mL, QID PRN  dextrose 50%, 12.5 g, PRN  dextrose 50%, 25 g, PRN  glucagon (human recombinant), 1 mg, PRN  glucose, 16 g, PRN  glucose, 24 g, PRN  melatonin, 6 mg, Nightly PRN  naloxone, 0.02 mg, PRN  ondansetron, 4 mg, Q8H PRN  polyethylene glycol, 17 g, Daily PRN  simethicone, 1 tablet, QID PRN  sodium chloride 0.9%, 3 mL, Q12H PRN      Chart check completed.      Orientation: disoriented to, time  [place] Coma Scale Score: 15     Lead Monitored: Lead II Rhythm: normal sinus rhythm       VTE Core Measure: (SCDs) Sequential compression device initiated/maintained, Pharmacological prophylaxis initiated/maintained Last Bowel Movement: 04/01/25  Diet Heart Healthy  Voiding Characteristics: urethral catheter (bladder)  Luisito Score: 12  Fall Risk Score: 17       Lines/Drains/Airways       Drain  Duration                  Urethral Catheter 03/31/25 1200 1 day              Peripheral Intravenous Line  Duration                  Peripheral IV - Single Lumen 04/01/25 2216 22 G Posterior;Right Forearm <1 day                         Problem: Adult Inpatient Plan of Care  Goal: Plan of Care Review  Outcome: Progressing  Goal: Patient-Specific Goal (Individualized)  Outcome: Progressing  Goal: Absence of Hospital-Acquired Illness or Injury  Outcome: Progressing  Goal: Optimal Comfort and Wellbeing  Outcome: Progressing  Goal: Readiness for Transition of Care  Outcome:  Progressing     Problem: Diabetes Comorbidity  Goal: Blood Glucose Level Within Targeted Range  Outcome: Progressing     Problem: Wound  Goal: Optimal Coping  Outcome: Progressing  Goal: Optimal Functional Ability  Outcome: Progressing  Goal: Absence of Infection Signs and Symptoms  Outcome: Progressing  Goal: Improved Oral Intake  Outcome: Progressing  Goal: Optimal Pain Control and Function  Outcome: Progressing  Goal: Skin Health and Integrity  Outcome: Progressing  Goal: Optimal Wound Healing  Outcome: Progressing     Problem: Skin Injury Risk Increased  Goal: Skin Health and Integrity  Outcome: Progressing     Problem: Infection  Goal: Absence of Infection Signs and Symptoms  Outcome: Progressing     Problem: Fall Injury Risk  Goal: Absence of Fall and Fall-Related Injury  Outcome: Progressing
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
## 2025-04-02 NOTE — PROGRESS NOTES
[facility] Hospital Medicine  Progress Note    Patient Name: Wendy Ro  MRN: [id number]  Patient Class: IP- Inpatient   Admission Date: 4/1/2025  Length of Stay: 1 days  Attending Physician: Femi Cavazos MD  Primary Care Provider: Joseph Hutchinson MD        Subjective     Principal Problem:Pleural effusion        HPI:  Wendy Ro is a 84 y.o. 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, DM (diabetes mellitus) type II controlled, neurological manifestation, Fatty liver, Gastroparesis, GERD (gastroesophageal reflux disease) (10/20/2014), Hiatal hernia, Hypertension associated with diabetes, Hypothyroid (7/10/2012), Hypothyroidism, Intermittent asthma, Osteoporosis, Osteoporosis (11/30/2016), Peripheral autonomic neuropathy due to diabetes mellitus, PONV (postoperative nausea and vomiting), Rheumatoid arthritis, Sleep apnea, and Thyroid nodule.presented to the Emergency Department for evaluation of decreased urine output onset yesterday and SOB which onset within the past week. SOB worsens with minimal exertion. The pt is bed-bound due to bilateral leg weakness and states her sob worsens when laying on her left side. Pt's oxygen saturation was 78% on RA when the daughter checked. Pt is not on any home oxygen. The pt's daughter states the pt has had an episode of diaphoresis while sleeping the past week as well as an episode of CP/chest heaviness. Pt was recently prescribed gentamicin (bladder irrigation) by Dr. Estevez for 7 days bid, with EED tomorrow 4/2/25. Denies any constipation with last BM yesterday. Pt  did receive a suppository by daughter on Sunday, however. Pt 's daughter states the pt has a hx of severe CKD but no dialysis. Pt still produces urine for herself but is producing minimal urine more recently over the last day or two. Patient has chronic indwelling oliva catheter present. Symptoms are constant and moderate in severity. Associated sxs include chills. Patient also has a wound vac to her sacral area, which she has HH come and does dressing changes weekly. Patient denies any fever, cough, and all other sxs at this time. No further complaints or concerns at this time.     ER workup revealed CBC and CMP to be unremarkable and at baseline.  Procalcitonin level 18.58.  Lactic acid negative.  Initial troponin 0.041.  Flu/COVID negative.  UA to use to show signs of acute cystitis which patient is currently being treated for.  Urine and blood cultures pending.  Chest x-ray revealed left hemothorax.  CTA abdomen and pelvis without contrast revealed large pleural effusion on the left with complete collapse of left lung, small pleural effusion on the right with the adjacent atelectasis and large amount of stool in his sigmoid colon and rectum.  EKG revealed normal sinus rhythm with nonspecific T-wave abnormalities with a ventricular rate of 95 beats per minute with a QT/QTC of 304/382.  Vital signs stable.  Patient satting 99% on room air.  Patient received 1 L normal saline in ER.  Hospital Medicine consulted to admit patient for UTI, and bilateral pleural effusion (L>R), and pericardial effusion.  Patient in agreement with treatment plan.  Patient admitted under inpatient status.    PCP: Joseph Hutchinson      Overview/Hospital Course:  4/2 admitted for pleural effusion. Echocardiogram with early signs of cardiac tamponade with pericardial effusion. Pulmonology consulted and concerns for malignancy, hospice recommended. Interventional radiology consulted for pleural effusion with 650ccs removed.     Interval  History: See hospital course for today       Review of Systems   Unable to perform ROS: Acuity of condition     Objective:     Vital Signs (Most Recent):  Temp: 98 °F (36.7 °C) (04/02/25 1530)  Pulse: 80 (04/02/25 1530)  Resp: 20 (04/02/25 1530)  BP: 123/61 (04/02/25 1530)  SpO2: (!) 92 % (04/02/25 1530) Vital Signs (24h Range):  Temp:  [97.9 °F (36.6 °C)-98.9 °F (37.2 °C)] 98 °F (36.7 °C)  Pulse:  [] 80  Resp:  [16-28] 20  SpO2:  [90 %-99 %] 92 %  BP: (102-153)/(54-91) 123/61     Weight: 81.6 kg (180 lb)  Body mass index is 30.9 kg/m².    Intake/Output Summary (Last 24 hours) at 4/2/2025 1711  Last data filed at 4/2/2025 0610  Gross per 24 hour   Intake 501 ml   Output 750 ml   Net -249 ml         Physical Exam  Vitals and nursing note reviewed.   Constitutional:       General: She is not in acute distress.     Appearance: She is ill-appearing. She is not toxic-appearing.   HENT:      Head: Normocephalic and atraumatic.      Comments: Temporal wasting  Cardiovascular:      Rate and Rhythm: Normal rate.   Pulmonary:      Effort: Pulmonary effort is normal. No respiratory distress.   Abdominal:      Palpations: Abdomen is soft.      Tenderness: There is no abdominal tenderness.   Skin:     General: Skin is warm.      Coloration: Skin is pale.   Neurological:      Motor: Weakness present.               Significant Labs: All pertinent labs within the past 24 hours have been reviewed.  CBC:   Recent Labs   Lab 04/01/25  1525 04/02/25  0444   WBC 10.77 9.74   HGB 10.0* 8.6*   HCT 31.7* 27.3*    403     CMP:   Recent Labs   Lab 04/01/25  1525 04/02/25  0444    135*   K 4.7 3.7    102   CO2 25 23   BUN 36* 31*   CREATININE 1.2 1.1   CALCIUM 9.0 8.5*   ALBUMIN 2.3* 2.0*   BILITOT 0.3 0.4   ALKPHOS 57 52   AST 29 14   ALT 14 7*   ANIONGAP 10 10     Cardiac Markers:   Recent Labs   Lab 04/01/25 2050   *     Lactic Acid:   Recent Labs   Lab 04/01/25  1525 04/01/25 2050   LACTATE 1.0 1.5      Troponin:   Recent Labs   Lab 04/01/25  1525 04/01/25  2050 04/01/25  2328   TROPONINI 0.041* 0.035* 0.032*     Urine Studies:   Recent Labs   Lab 04/01/25  1646   COLORU Yellow   APPEARANCEUA Clear   PHUR 6.0   SPECGRAV 1.015   PROTEINUA Negative   GLUCUA Negative   BILIRUBINUA Negative   OCCULTUA Negative   NITRITE Negative   UROBILINOGEN Negative   LEUKOCYTESUR 1+*   RBCUA 2   WBCUA 19*   HYALINECASTS 9*     Flu and covid negative   Procalcitonin 18.58    Significant Imaging: I have reviewed all pertinent imaging results/findings within the past 24 hours.  CT: I have reviewed all pertinent results/findings within the past 24 hours and my personal findings are:  large pleural effusion, left lung collapse  CXR: I have reviewed all pertinent results/findings within the past 24 hours and my personal findings are:  right shoulder arthroplasty, left hemithorax white out  Echo: I have reviewed all pertinent results/findings within the past 24 hours and my personal findings are:  preserved ejection fraction, large circumferential effusion, early tamponade  U/S: I have reviewed all pertinent results/findings within the past 24 hours and my personal findings are:  guided thoracentesis with 675cc removed      Assessment & Plan  Pleural effusion  Patient found to have large pleural effusion on imaging. I have personally reviewed and interpreted the following imaging: Xray and CT. A thoracentesis was not ordered. Management to include  pulmonology consulted and recommended interventional radiology for thoracentesis, fluid studies pending  Diurese   Pericardial effusion  Cardiology consulted  Patient wants to avoid surgical intervention  Possible pericardiocentesis by interventionalist    UTI (urinary tract infection)  Urinalysis revealed:   Lab Results   Component Value Date    COLORU Yellow 04/01/2025    APPEARANCEUA Clear 04/01/2025    SPECGRAV 1.015 04/01/2025    PHUR 6.0 04/01/2025    PROTEINUA Negative 04/01/2025     "GLUCUA Negative 04/01/2025    KETONESU Negative 03/12/2025    NITRITE Negative 04/01/2025    UROBILINOGEN Negative 04/01/2025    BILIRUBINUA Negative 04/01/2025    LEUKOCYTESUR 1+ (A) 04/01/2025    RBCUA 2 04/01/2025    WBCUA 19 (H) 04/01/2025    BACTERIA Many (A) 03/12/2025    HYALINECASTS 9 (H) 04/01/2025   Continue gentamicin irrigation via bladder BID.  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  Last A1c reviewed-   Lab Results   Component Value Date    HGBA1C 6.2 03/04/2024     Most recent fingerstick glucose reviewed- No results for input(s): "POCTGLUCOSE" in the last 24 hours.  Current correctional scale  Low  Maintain anti-hyperglycemic dose as follows-   Antihyperglycemics (From admission, onward)      None          Hold Oral hypoglycemics while patient is in the hospital.  SSI  Accuchecks    Hypertension associated with diabetes  Chronic, controlled.  Latest blood pressure and vitals reviewed-   Temp:  [97.9 °F (36.6 °C)-98.9 °F (37.2 °C)]   Pulse:  []   Resp:  [16-28]   BP: (102-153)/(54-91)   SpO2:  [90 %-99 %] .   Home meds for hypertension were reviewed and noted below.   Hypertension Medications              furosemide (LASIX) 40 MG tablet Take 20 mg by mouth once daily.    metoprolol tartrate (LOPRESSOR) 100 MG tablet Take 100 mg by mouth 2 (two) times daily.            While in the hospital, will manage blood pressure as follows; Adjust home antihypertensive regimen as follows- hold home medication(s)     Will utilize p.r.n. blood pressure medication only if patient's blood pressure greater than  160/90 and she develops symptoms such as worsening chest pain or shortness of breath.      Peripheral autonomic neuropathy due to diabetes mellitus  -continue gabapentin    Combined hyperlipidemia " associated with type 2 diabetes mellitus  Patient is not chronically on statin.will not continue for now. Last Lipid Panel:   Lab Results   Component Value Date    CHOL 127 03/04/2022    HDL 36 03/04/2022    LDLCALC 61 03/04/2022    TRIG 149 03/04/2022    CHOLHDL 3.5 03/04/2022     Plan:  -low fat/low calorie diet      GERD (gastroesophageal reflux disease)  Chronic. Stable. Currently asymptomatic. Home medications include PPI/Antacids as needed.  Plan:  -Continue PPI/Antacids as needed       Hypothyroidism  Patient has chronic hypothyroidism. TFTs reviewed-   Lab Results   Component Value Date    TSH 3.957 10/08/2019   . Will continue chronic levothyroxine and adjust for and clinical changes.      Chronic gout  -continue allopurinol    VTE Risk Mitigation (From admission, onward)           Ordered     enoxaparin injection 40 mg  Every 24 hours         04/01/25 1955     IP VTE HIGH RISK PATIENT  Once         04/01/25 1946     Place sequential compression device  Until discontinued         04/01/25 1946                    Discharge Planning   ANDREW:      Code Status: Full Code   Medical Readiness for Discharge Date:   Discharge Plan A: Home with family                        Femi Cavazos MD  Department of Hospital Medicine   [facility] - Telemetry ([facility])

## 2025-04-02 NOTE — ASSESSMENT & PLAN NOTE
-Noted on CT scan  -TTE with normal EF and a moderate to large circumferential effusion. Early tamponade signs of RA and RV collapse in systole noted with dilated IVC with mild IVC collapse. Measures 1.5 cm appx lateral to RV. Left pleural effusion. Clinically correlate for tamponade.   -Wants to avoid surgical procedures  -Will discuss possible pericardiocentesis with interventionalist
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
## 2025-04-02 NOTE — ASSESSMENT & PLAN NOTE
Urinalysis revealed:   Lab Results   Component Value Date    COLORU Yellow 04/01/2025    APPEARANCEUA Clear 04/01/2025    SPECGRAV 1.015 04/01/2025    PHUR 6.0 04/01/2025    PROTEINUA Negative 04/01/2025    GLUCUA Negative 04/01/2025    KETONESU Negative 03/12/2025    NITRITE Negative 04/01/2025    UROBILINOGEN Negative 04/01/2025    BILIRUBINUA Negative 04/01/2025    LEUKOCYTESUR 1+ (A) 04/01/2025    RBCUA 2 04/01/2025    WBCUA 19 (H) 04/01/2025    BACTERIA Many (A) 03/12/2025    HYALINECASTS 9 (H) 04/01/2025   Continue gentamicin irrigation via bladder BID.  Plan:  -UA culture pending  -Continue Abx

## 2025-04-02 NOTE — ASSESSMENT & PLAN NOTE
Cardiology consulted  Patient wants to avoid surgical intervention  Possible pericardiocentesis by interventionalist

## 2025-04-02 NOTE — HOSPITAL COURSE
4/2 admitted for pleural effusion. Echocardiogram with early signs of cardiac tamponade with pericardial effusion. Pulmonology consulted and concerns for malignancy, hospice recommended. Interventional radiology consulted for pleural effusion with 650ccs removed.     4/3  No acute events overnight. No indication for intervention of pericardial effusion per cardiology. Physical/occupational therapy recommending homehealth physical/occupational therapy.     Urine collected in emergency department growing enterococcus faecalis. Asymptomatic. Empirically treat with po macrobid. Last  urine culture negative in March 2025. Has tolerated macrobid in the past. Several allergies to medication(s).    No acute distress. No respiratory distress. On room air. AO3. Frail. Ill appearing. Weakness. Family at bedside.    Patient seen and evaluated by me. Patient was determined to be suitable for discharge. Patient deemed stable for discharge to home with homehealth physical/occupational therapy and nurse practitioner to visit home program.

## 2025-04-02 NOTE — ASSESSMENT & PLAN NOTE
Chronic, controlled.  Latest blood pressure and vitals reviewed-   Temp:  [97.9 °F (36.6 °C)-98.9 °F (37.2 °C)]   Pulse:  []   Resp:  [16-28]   BP: (102-153)/(54-91)   SpO2:  [90 %-99 %] .   Home meds for hypertension were reviewed and noted below.   Hypertension Medications              furosemide (LASIX) 40 MG tablet Take 20 mg by mouth once daily.    metoprolol tartrate (LOPRESSOR) 100 MG tablet Take 100 mg by mouth 2 (two) times daily.            While in the hospital, will manage blood pressure as follows; Adjust home antihypertensive regimen as follows- hold home medication(s)     Will utilize p.r.n. blood pressure medication only if patient's blood pressure greater than  160/90 and she develops symptoms such as worsening chest pain or shortness of breath.

## 2025-04-02 NOTE — CONSULTS
Chart reviewed by Dr. Cho.       ASSESSMENT/PLAN:    Left sided pleural effusion    The order for a ultrasound thoracentesis has been placed and the procedure will be performed asap.   Increase risk of bleeding with Lovenox and ASA taken yesterday. Patient understood those risks.         Thank you for the consult.
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
## 2025-04-02 NOTE — ASSESSMENT & PLAN NOTE
Patient found to have large pleural effusion on imaging. I have personally reviewed and interpreted the following imaging: Xray and CT. A thoracentesis was not ordered. Management to include pulmonology consulted and recommended interventional radiology for thoracentesis, fluid studies pending  Diurese

## 2025-04-02 NOTE — ASSESSMENT & PLAN NOTE
Patient has chronic hypothyroidism. TFTs reviewed-   Lab Results   Component Value Date    TSH 3.957 10/08/2019   . Will continue chronic levothyroxine and adjust for and clinical changes.

## 2025-04-02 NOTE — ASSESSMENT & PLAN NOTE
Patient is not chronically on statin.will not continue for now. Last Lipid Panel:   Lab Results   Component Value Date    CHOL 127 03/04/2022    HDL 36 03/04/2022    LDLCALC 61 03/04/2022    TRIG 149 03/04/2022    CHOLHDL 3.5 03/04/2022     Plan:  -low fat/low calorie diet

## 2025-04-02 NOTE — CONSULTS
[facility] - Telemetry (Hospital)  Initial Discharge Assessment       Primary Care Provider: Joseph Hutchinson MD    Admission Diagnosis: Pericardial effusion [I31.39]  Screening for cardiovascular condition [Z13.6]  Chronic UTI [N39.0]  Troponin level elevated [R79.89]  Pleural effusion, left [J90]  Chest pain [R07.9]  Yeast UTI [B37.49]  Chest pain, unspecified type [R07.9]  Pressure injury of skin of sacral region, unspecified injury stage [L89.159]    Admission Date: 4/1/2025  Expected Discharge Date:     Transition of Care Barriers: None    Payor: MEDICARE / Plan: MEDICARE PART A & B / Product Type: Government /     Extended Emergency Contact Information  Primary Emergency Contact: EniokhanhAlexandru   [place]  Mobile Phone: [phone]  Relation: Daughter  Secondary Emergency Contact: Faith Ro  Address: [id number] [place] [address]  Mobile Phone: [phone]  Relation: Grandchild    Discharge Plan A: Home with family         [facility] [place] [address] [facility]  [address] [facility] [address]  Phone: [phone] Fax: [phone]      Initial Assessment (most recent)       Adult Discharge Assessment - 04/02/25 1645          Discharge Assessment    Assessment Type Discharge Planning Assessment     Confirmed/corrected address, phone number and insurance Yes     Confirmed Demographics Correct on Facesheet     Source of Information patient;family;health record     Communicated ANDREW with patient/caregiver Date not available/Unable to determine     Reason For Admission Pleural effusion     People in Home child(katerin), adult     Facility Arrived From: Home     Do you have help at home or someone to help you manage your care at home? Yes     Who are your caregiver(s) and their phone number(s)? Daughter     Walking or Climbing Stairs Difficulty yes     Mobility Management bed bound     Readmission within  30 days? No     Patient currently being followed by outpatient case management? No     Do you currently have service(s) that help you manage your care at home? No     Do you take prescription medications? Yes     Do you have prescription coverage? Yes     Do you have any problems affording any of your prescribed medications? No     Is the patient taking medications as prescribed? yes     Are you on dialysis? No     Do you take coumadin? No     Discharge Plan A Home with family     DME Needed Upon Discharge  none     Transition of Care Barriers None                   Charles cabello met with patient and daughter for hospice information visit. Daughter requesting further discussions with treatment team to assist with making a decision.   SW to follow.

## 2025-04-02 NOTE — NURSING
Thoracentesis was performed 675cc of puja fluid removed from the lt pleural space; catheter was removed from the lt back side with tip intact; manual pressure applied to site; bandaid placed C/D/I with no redness or swelling noted; pt tolerated well

## 2025-04-02 NOTE — ASSESSMENT & PLAN NOTE
Chronic, controlled.  Latest blood pressure and vitals reviewed-   Temp:  [98.2 °F (36.8 °C)-98.9 °F (37.2 °C)]   Pulse:  []   Resp:  [16-28]   BP: (102-164)/(54-91)   SpO2:  [90 %-99 %] .   Home meds for hypertension were reviewed and noted below.   Hypertension Medications              furosemide (LASIX) 40 MG tablet Take 20 mg by mouth once daily.    metoprolol tartrate (LOPRESSOR) 100 MG tablet Take 100 mg by mouth 2 (two) times daily.     While in the hospital, will manage blood pressure as follows; Continue home antihypertensive regimen    Will utilize p.r.n. blood pressure medication only if patient's blood pressure greater than  160/90 and she develops symptoms such as worsening chest pain or shortness of breath.

## 2025-04-02 NOTE — ASSESSMENT & PLAN NOTE
Patient found to have large pleural effusion on imaging. I have personally reviewed and interpreted the following imaging: CT. A thoracentesis was ordered. Most likely etiology includes  unknown . Management to include  Thoracentesis    -Bedbound status- atelectasis vs. Malignancy?  -Unclear etiology, history of CKD with recently decreased UOP, renal function appears stable  -CHF is unlikely due to unilateral pleural effusion  -Recommend IR consult for thoracentesis, will need cytology labs sent  -Discussed with patient and daughter  -Seems to be a poor candidate for aggressive interventions due to poor functional status

## 2025-04-02 NOTE — HPI
Wendy Ro is an 84 yr old female with history of HTN, DM II, RA, GRACE, CKD, bed bound since fall 2024 after a fall, chronic indwelling catheter, who presented to the ED on 4/1 with worsening dyspnea that developed over the past week. Patient lives with her daughter who checked her oxygen saturation, which was 78% on room air at home. She is not on home oxygen. Daughter also reports chest pressure/heaviness last week. Per daughter, patient has had decreased intake in the past 1-2 weeks with weight loss. Patient also has sacral wound with wound vac on admission. CTA shows large left pleural effusion with complete collapse of left lung, small pleural effusion on the right. Echo showing moderate to large circumferential pericardial effusion with early tamponade signs of RA and RV collapse in systole noted with dilated IVC with mild IVC collapse. Pulmonology consulted for pleural effusion.

## 2025-04-02 NOTE — HPI
Wendy Ro is a 84 y.o. 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, DM (diabetes mellitus) type II controlled, neurological manifestation, Fatty liver, Gastroparesis, GERD (gastroesophageal reflux disease) (10/20/2014), Hiatal hernia, Hypertension associated with diabetes, Hypothyroid (7/10/2012), Hypothyroidism, Intermittent asthma, Osteoporosis, Osteoporosis (11/30/2016), Peripheral autonomic neuropathy due to diabetes mellitus, PONV (postoperative nausea and vomiting), Rheumatoid arthritis, Sleep apnea, and Thyroid nodule.presented to the Emergency Department for evaluation of decreased urine output onset yesterday and SOB which onset within the past week. SOB worsens with minimal exertion. The pt is bed-bound due to bilateral leg weakness and states her sob worsens when laying on her left side. Pt's oxygen saturation was 78% on RA when the daughter checked. Pt is not on any home oxygen. The pt's daughter states the pt has had an episode of diaphoresis while sleeping the past week as well as an episode of CP/chest heaviness. Pt was recently prescribed gentamicin (bladder irrigation) by Dr. Estevez for 7 days bid, with EED tomorrow 4/2/25. Denies any constipation with last BM yesterday. Pt did receive a suppository by daughter on Sunday, however. Pt 's daughter states the pt has a hx of severe CKD but no dialysis. Pt still produces urine for herself but is producing minimal urine more recently over the last day or two. Patient has chronic indwelling oliva catheter present. Symptoms are constant and moderate in severity. Associated sxs  include chills. Patient also has a wound vac to her sacral area, which she has HH come and does dressing changes weekly. Patient denies any fever, cough, and all other sxs at this time. No further complaints or concerns at this time.     ER workup revealed CBC and CMP to be unremarkable and at baseline.  Procalcitonin level 18.58.  Lactic acid negative.  Initial troponin 0.041.  Flu/COVID negative.  UA to use to show signs of acute cystitis which patient is currently being treated for.  Urine and blood cultures pending.  Chest x-ray revealed left hemothorax.  CTA abdomen and pelvis without contrast revealed large pleural effusion on the left with complete collapse of left lung, small pleural effusion on the right with the adjacent atelectasis and large amount of stool in his sigmoid colon and rectum.  EKG revealed normal sinus rhythm with nonspecific T-wave abnormalities with a ventricular rate of 95 beats per minute with a QT/QTC of 304/382.  Vital signs stable.  Patient satting 99% on room air.  Patient received 1 L normal saline in ER.  Hospital Medicine consulted to admit patient for UTI, and bilateral pleural effusion (L>R), and pericardial effusion.  Patient in agreement with treatment plan.  Patient admitted under inpatient status.    PCP: Joseph Hutchinson

## 2025-04-02 NOTE — CONSULTS
[facility] - Telemetry ([facility])  Wound Care    Patient Name:  Wendy Ro   MRN:  [id number]  Date: 4/2/2025  Diagnosis: Pleural effusion    History:     Past Medical History:   Diagnosis Date    Arthritis     B12 deficiency 3/28/2019    Chest pain 2012    past Hx, turned out to be asthma, gerd, stress test reported unremarkable per pt    Chronic gout     Chronic renal insufficiency, stage III (moderate)     Dr. Harrell    Class 3 severe obesity due to excess calories with serious comorbidity in adult 1/31/2012    The patient presents with obesity.  Denies bulimia, amenorrhea, cold intolerance, edema, hip pain, hirsutism, knee pain, polydipsia, polyuria, thirst and weakness.  The patient does not perform regular exercise.  Previous treatments for obesity :self-directed dieting without success.  The patient and I discussed the importance of exercise.   Wt Readings from Last 4 Encounters: 03/11/19 113.3 kg (2    Combined hyperlipidemia associated with type 2 diabetes mellitus     Concussion 07/28/2016    DM (diabetes mellitus) type II controlled with renal manifestation     DM (diabetes mellitus) type II controlled, neurological manifestation     no meds diet controlled//    Fatty liver     Gastroparesis     GERD (gastroesophageal reflux disease) 10/20/2014    UGI performed at [facility].    Hiatal hernia     Hypertension associated with diabetes     Hypothyroid 7/10/2012    Hypothyroidism     Intermittent asthma     last problem was around 2012    Osteoporosis     Osteoporosis 11/30/2016    Peripheral autonomic neuropathy due to diabetes mellitus     PONV (postoperative nausea and vomiting)     Severe, prefers Zofran, avoid narcotics if possible    Rheumatoid arthritis     on steroid daily    Sleep apnea     not compliant with BiPap, sleeps with HOB up    Thyroid nodule        Social History[1]    Precautions:     Allergies as of 04/01/2025 - Reviewed 04/01/2025   Allergen Reaction Noted    Adenosine  01/06/2012    Codeine  Nausea And Vomiting 01/31/2005    Duloxetine  05/04/2015    Hydrocodone-acetaminophen Nausea And Vomiting 02/22/2011    Macrolide antibiotics  05/04/2015    Opioids - morphine analogues  05/04/2015    Opium (anthroposophic)  05/04/2015    Promethazine Nausea And Vomiting 02/24/2011    Tramadol  05/04/2015    Keflex  [cephalexin] Nausea Only 12/02/2010       [facility] Assessment Details/Treatment     Consulted on this 85 y/o female patient for wound to the sacrum with home wound vac in place. Patient was admitted 4/1/25 for pleural effusion, PMH significant for HTN, DM II, RA, GRACE, CKD, bed bound since fall 2024 after a fall, chronic indwelling catheter. Patient well known to wound care staff from previous admission where she underwent a debridement of her sacral wound on 1/29/25 per Dr. Jennings with general surgery.     Patient resting in bed, awake and alert with daughter present at bedside. Wound care nurse gave introduction and reason for visit. Patient care techs came to assist with turning as they needed to get patient cleaned up.     Home wound vac was paused, clamped, and disconnected.   Patient positioned onto Right side, noted sacral foam in place over wound vac dressing. Gently removed and noted wound vac dressing to be pulled up around 6 o'clock with fecal contamination noted underneath. Gently removed dressing. Wound bed is moist and consists of mostly tan/yellow fibrinous tissue and slough. One area around 3 o'clock consists of moist red/pink tissue. Wound measures 3 x 5 x 2.5 cm with undermining from 9-12 o'clock measuring 3.2 cm at the deepest point. Small amount of serosanguinous drainage noted. Surrounding skin is very moist and denuded with areas of excoriation with moist red wound beds. Gently cleansed with vashe, allowed to dwell. Patted dry. Applied zinc oxide moisture barrier to surrounding skin. Filled with vashe moistened gauze padded with dry gauze and secured with bordered sacral foam. Believe  wound would benefit from a few days of vashe gauze dressings and will revisit on Friday to reassess if appropriate to reapply wound vac dressing.   Explained findings to the daughter and instructed her to bring home wound vac back home and inform insurance that she is not on the machine at this time and has been admitted to the hospital.     BLE and Heels intact with no wounds or redness noted. Heel foams reapplied and applied heel offloading boots.     Bilateral groin noted to have some intertrigo consisting of intact moist pink/red discolored skin. Cleansed with bath wipes. Patted dry. Applied moisture barrier.       Orders placed. Plan to F/U Friday to reassess sacral wound appropriateness for wound vac application.          04/02/25 1106   WOCN Assessment   WOCN Total Time (mins) 45   Visit Date 04/02/25   Visit Time 1106   Consult Type New   WOCN Speciality Wound   Wound pressure;At risk for pressure Injury   Intervention assessed;changed;applied;chart review;orders   Teaching on-going   Skin Interventions   Device Skin Pressure Protection positioning supports utilized   Pressure Reduction Devices heel offloading device utilized;positioning supports utilized   Pressure Reduction Techniques frequent weight shift encouraged;weight shift assistance provided   Skin Protection incontinence pads utilized   Positioning   Body Position turned;right;side-lying   Head of Bed (HOB) Positioning HOB at 20-30 degrees   Positioning/Transfer Devices pillows;in use;wedge;applied   Pressure Injury Prevention    Check Moisture Management Pad Done   Sacral Foam Dressing Patient incontinent, barrier cream applied   Heel protection technique Heel boot   Heel preventative measures Peel back dressing/boot, assess skin and reapply   Check Medical Devices Done        Wound 01/28/25 1122 Pressure Injury Left Sacral spine   Date First Assessed/Time First Assessed: 01/28/25 1122   Present on Original Admission: Yes  Primary Wound Type:  Pressure Injury  Side: Left  Location: Sacral spine  Is this injury device related?: No   Wound Image    Pressure Injury Stage 4   Dressing Appearance Moist drainage   Drainage Amount Small   Drainage Characteristics/Odor Serosanguineous   Appearance Pink;Red;Tan;Yellow;Moist;Slough;Fibrin   Tissue loss description Full thickness   Red (%), Wound Tissue Color 10 %   Yellow (%), Wound Tissue Color 90 %   Periwound Area Excoriated;Macerated;Moist;Purple;Redness   Wound Edges Defined   Wound Length (cm) 3 cm   Wound Width (cm) 5 cm   Wound Depth (cm) 2.5 cm   Wound Volume (cm^3) 19.635 cm^3   Wound Surface Area (cm^2) 11.78 cm^2   Undermining (depth (cm)/location) 3.2cm @9-12   Care Cleansed with:;Antimicrobial agent;Applied:;Skin Barrier   Dressing Applied;Gauze, wet to moist;Gauze;Foam   Packing packed with;other (see comment)  (vaseh moistened gauze)   Periwound Care Moisture barrier applied   Dressing Change Due 04/03/25        Wound 04/02/25 1106 Intertrigo Groin   Date First Assessed/Time First Assessed: 04/02/25 1106   Present on Original Admission: Yes  Primary Wound Type: Intertrigo  Location: (c) Groin   Wound Image     Dressing Appearance Open to air   Drainage Amount None   Drainage Characteristics/Odor No odor   Appearance Intact;Pink;Red;Moist   Tissue loss description Not applicable   Periwound Area Intact;Moist   Care Cleansed with:;Soap and water   Dressing Applied;Other (comment)  (clear moisture barrier)   Periwound Care Moisture barrier applied   Dressing Change Due 04/02/25 04/02/2025         [1]   Social History  Socioeconomic History    Marital status:    Tobacco Use    Smoking status: Passive Smoke Exposure - Never Smoker    Smokeless tobacco: Never   Substance and Sexual Activity    Alcohol use: No    Drug use: No     Social Drivers of Health     Financial Resource Strain: Low Risk  (1/28/2025)    Overall Financial Resource Strain (CARDIA)     Difficulty of Paying Living Expenses:  Not hard at all   Food Insecurity: No Food Insecurity (1/28/2025)    Hunger Vital Sign     Worried About Running Out of Food in the Last Year: Never true     Ran Out of Food in the Last Year: Never true   Transportation Needs: No Transportation Needs (1/28/2025)    TRANSPORTATION NEEDS     Transportation : No   Physical Activity: Inactive (1/28/2025)    Exercise Vital Sign     Days of Exercise per Week: 0 days     Minutes of Exercise per Session: 0 min   Stress: No Stress Concern Present (1/28/2025)    [employer] - Occupational Stress Questionnaire     Feeling of Stress : Not at all   Housing Stability: Unknown (1/28/2025)    Housing Stability Vital Sign     Unable to Pay for Housing in the Last Year: No     Homeless in the Last Year: No
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
## 2025-04-02 NOTE — HPI
Ms. Ro is an 84 year old female patient whose current medical conditions include DM type II, fatty liver, HTN, thyroid nodule, hyperlipidemia, RA,  and CRI who presented to [facility] ED yesterday due to increased SOB over the past week. Associated symptoms included decreased urine output, hypoxia (78% on RA at home), diaphoresis, chills, and a single episode of substernal chest heaviness/pressure. No apparent associated fever, palpitations, near syncope, or syncope. Initial workup revealed pro-lele of 18, troponin of 0.041, and +UTI. CT of C/A/P revealed large-sided left pleural effusion with complete collapse of the left lung as well as a 3 cm pericardial effusiion. Patient subsequently admitted for further evaluation and treatment. Cardiology consulted to assist with management. Patient seen and examined today, resting in bed. Daughter reports patient is bed bound at baseline due to leg weakness. Remains SOB. CP free during exam. No history of MI or CAD. Labs reviewed. Troponin flat. TTE showed normal EF with moderate to large circumferential effusion. Early tamponade signs of RA and RV collapse in systole noted with dilated IVC with mild IVC collapse. Measures 1.5 cm appx lateral to RV. Left pleural effusion. Clinically correlate for tamponade. Pulmonary consulted for thoracentesis.

## 2025-04-02 NOTE — SUBJECTIVE & OBJECTIVE
Interval History: See hospital course for today       Review of Systems   Unable to perform ROS: Acuity of condition     Objective:     Vital Signs (Most Recent):  Temp: 98 °F (36.7 °C) (04/02/25 1530)  Pulse: 80 (04/02/25 1530)  Resp: 20 (04/02/25 1530)  BP: 123/61 (04/02/25 1530)  SpO2: (!) 92 % (04/02/25 1530) Vital Signs (24h Range):  Temp:  [97.9 °F (36.6 °C)-98.9 °F (37.2 °C)] 98 °F (36.7 °C)  Pulse:  [] 80  Resp:  [16-28] 20  SpO2:  [90 %-99 %] 92 %  BP: (102-153)/(54-91) 123/61     Weight: 81.6 kg (180 lb)  Body mass index is 30.9 kg/m².    Intake/Output Summary (Last 24 hours) at 4/2/2025 1711  Last data filed at 4/2/2025 0610  Gross per 24 hour   Intake 501 ml   Output 750 ml   Net -249 ml         Physical Exam  Vitals and nursing note reviewed.   Constitutional:       General: She is not in acute distress.     Appearance: She is ill-appearing. She is not toxic-appearing.   HENT:      Head: Normocephalic and atraumatic.      Comments: Temporal wasting  Cardiovascular:      Rate and Rhythm: Normal rate.   Pulmonary:      Effort: Pulmonary effort is normal. No respiratory distress.   Abdominal:      Palpations: Abdomen is soft.      Tenderness: There is no abdominal tenderness.   Skin:     General: Skin is warm.      Coloration: Skin is pale.   Neurological:      Motor: Weakness present.               Significant Labs: All pertinent labs within the past 24 hours have been reviewed.  CBC:   Recent Labs   Lab 04/01/25  1525 04/02/25  0444   WBC 10.77 9.74   HGB 10.0* 8.6*   HCT 31.7* 27.3*    403     CMP:   Recent Labs   Lab 04/01/25  1525 04/02/25  0444    135*   K 4.7 3.7    102   CO2 25 23   BUN 36* 31*   CREATININE 1.2 1.1   CALCIUM 9.0 8.5*   ALBUMIN 2.3* 2.0*   BILITOT 0.3 0.4   ALKPHOS 57 52   AST 29 14   ALT 14 7*   ANIONGAP 10 10     Cardiac Markers:   Recent Labs   Lab 04/01/25 2050   *     Lactic Acid:   Recent Labs   Lab 04/01/25  1525 04/01/25 2050   LACTATE 1.0  1.5     Troponin:   Recent Labs   Lab 04/01/25  1525 04/01/25  2050 04/01/25  2328   TROPONINI 0.041* 0.035* 0.032*     Urine Studies:   Recent Labs   Lab 04/01/25  1646   COLORU Yellow   APPEARANCEUA Clear   PHUR 6.0   SPECGRAV 1.015   PROTEINUA Negative   GLUCUA Negative   BILIRUBINUA Negative   OCCULTUA Negative   NITRITE Negative   UROBILINOGEN Negative   LEUKOCYTESUR 1+*   RBCUA 2   WBCUA 19*   HYALINECASTS 9*     Flu and covid negative   Procalcitonin 18.58    Significant Imaging: I have reviewed all pertinent imaging results/findings within the past 24 hours.  CT: I have reviewed all pertinent results/findings within the past 24 hours and my personal findings are:  large pleural effusion, left lung collapse  CXR: I have reviewed all pertinent results/findings within the past 24 hours and my personal findings are:  right shoulder arthroplasty, left hemithorax white out  Echo: I have reviewed all pertinent results/findings within the past 24 hours and my personal findings are:  preserved ejection fraction, large circumferential effusion, early tamponade  U/S: I have reviewed all pertinent results/findings within the past 24 hours and my personal findings are:  guided thoracentesis with 675cc removed

## 2025-04-02 NOTE — PROGRESS NOTES
Pharmacist Renal Dose Adjustment Note    Wendy Ro is a 84 y.o. female being treated with the medication enoxaparin.     Patient Data:    Vital Signs (Most Recent):  Temp: 98.2 °F (36.8 °C) (04/01/25 1800)  Pulse: 104 (04/01/25 1930)  Resp: (!) 27 (04/01/25 1930)  BP: 131/63 (04/01/25 1930)  SpO2: 97 % (04/01/25 1930) Vital Signs (72h Range):  Temp:  [98.2 °F (36.8 °C)-98.4 °F (36.9 °C)]   Pulse:  []   Resp:  [16-27]   BP: (124-164)/(58-73)   SpO2:  [96 %-98 %]      Recent Labs   Lab 04/01/25  1525   CREATININE 1.2     Serum creatinine: 1.2 mg/dL 04/01/25 1525  Estimated creatinine clearance: 36.3 mL/min    Medication: enoxaparin 30 mg SQ every 24 hours will be changed to enoxaparin 40 mg SQ every 24 hours per pharmacy renal dose adjustment protocol for patients with CrCl greater than 30 mL/min.    Pharmacist's Name: Ingrid Stanley PharmD  Pharmacist's Extension: [phone]     Thank you for allowing us to participate in this patient's care.     Ingrid Stanley PharmD 04/01/2025 7:56 PM

## 2025-04-02 NOTE — ASSESSMENT & PLAN NOTE
Patient has chronic hypothyroidism. TFTs reviewed-   Lab Results   Component Value Date    TSH 3.957 10/08/2019   Plan:  -Will continue chronic levothyroxine and adjust for and clinical changes.

## 2025-04-03 VITALS
BODY MASS INDEX: 30.71 KG/M2 | SYSTOLIC BLOOD PRESSURE: 110 MMHG | DIASTOLIC BLOOD PRESSURE: 56 MMHG | TEMPERATURE: 98 F | WEIGHT: 179.88 LBS | RESPIRATION RATE: 19 BRPM | OXYGEN SATURATION: 93 % | HEART RATE: 82 BPM | HEIGHT: 64 IN

## 2025-04-03 LAB
ABSOLUTE EOSINOPHIL (OHS): 0.32 K/UL
ABSOLUTE MONOCYTE (OHS): 0.53 K/UL (ref 0.3–1)
ABSOLUTE NEUTROPHIL COUNT (OHS): 5.39 K/UL (ref 1.8–7.7)
ACID FAST MOD KINY STN SPEC: NORMAL
ALBUMIN SERPL BCP-MCNC: 2 G/DL (ref 3.5–5.2)
ALP SERPL-CCNC: 49 UNIT/L (ref 40–150)
ALT SERPL W/O P-5'-P-CCNC: 10 UNIT/L (ref 10–44)
ANION GAP (OHS): 8 MMOL/L (ref 8–16)
AST SERPL-CCNC: 23 UNIT/L (ref 11–45)
BASOPHILS # BLD AUTO: 0.03 K/UL
BASOPHILS NFR BLD AUTO: 0.4 %
BILIRUB SERPL-MCNC: 0.3 MG/DL (ref 0.1–1)
BUN SERPL-MCNC: 28 MG/DL (ref 8–23)
CALCIUM SERPL-MCNC: 8.6 MG/DL (ref 8.7–10.5)
CHLORIDE SERPL-SCNC: 100 MMOL/L (ref 95–110)
CO2 SERPL-SCNC: 26 MMOL/L (ref 23–29)
CREAT SERPL-MCNC: 1.2 MG/DL (ref 0.5–1.4)
ERYTHROCYTE [DISTWIDTH] IN BLOOD BY AUTOMATED COUNT: 14.3 % (ref 11.5–14.5)
GFR SERPLBLD CREATININE-BSD FMLA CKD-EPI: 45 ML/MIN/1.73/M2
GLUCOSE SERPL-MCNC: 121 MG/DL (ref 70–110)
GRAM STN SPEC: NORMAL
GRAM STN SPEC: NORMAL
HCT VFR BLD AUTO: 29.6 % (ref 37–48.5)
HGB BLD-MCNC: 9.2 GM/DL (ref 12–16)
IMM GRANULOCYTES # BLD AUTO: 0.03 K/UL (ref 0–0.04)
IMM GRANULOCYTES NFR BLD AUTO: 0.4 % (ref 0–0.5)
KOH PREP SPEC: NORMAL
LYMPHOCYTES # BLD AUTO: 2 K/UL (ref 1–4.8)
MCH RBC QN AUTO: 30.8 PG (ref 27–31)
MCHC RBC AUTO-ENTMCNC: 31.1 G/DL (ref 32–36)
MCV RBC AUTO: 99 FL (ref 82–98)
NUCLEATED RBC (/100WBC) (OHS): 0 /100 WBC
PLATELET # BLD AUTO: 377 K/UL (ref 150–450)
PMV BLD AUTO: 9.6 FL (ref 9.2–12.9)
POTASSIUM SERPL-SCNC: 3.3 MMOL/L (ref 3.5–5.1)
PROT SERPL-MCNC: 6.5 GM/DL (ref 6–8.4)
RBC # BLD AUTO: 2.99 M/UL (ref 4–5.4)
RELATIVE EOSINOPHIL (OHS): 3.9 %
RELATIVE LYMPHOCYTE (OHS): 24.1 % (ref 18–48)
RELATIVE MONOCYTE (OHS): 6.4 % (ref 4–15)
RELATIVE NEUTROPHIL (OHS): 64.8 % (ref 38–73)
SODIUM SERPL-SCNC: 134 MMOL/L (ref 136–145)
WBC # BLD AUTO: 8.3 K/UL (ref 3.9–12.7)

## 2025-04-03 PROCEDURE — 25000003 PHARM REV CODE 250: Performed by: NURSE PRACTITIONER

## 2025-04-03 PROCEDURE — 99233 SBSQ HOSP IP/OBS HIGH 50: CPT | Mod: GW,,, | Performed by: PHYSICIAN ASSISTANT

## 2025-04-03 PROCEDURE — 80051 ELECTROLYTE PANEL: CPT | Performed by: PHYSICIAN ASSISTANT

## 2025-04-03 PROCEDURE — 97166 OT EVAL MOD COMPLEX 45 MIN: CPT

## 2025-04-03 PROCEDURE — 36415 COLL VENOUS BLD VENIPUNCTURE: CPT | Performed by: PHYSICIAN ASSISTANT

## 2025-04-03 PROCEDURE — 97162 PT EVAL MOD COMPLEX 30 MIN: CPT

## 2025-04-03 PROCEDURE — 25000003 PHARM REV CODE 250: Performed by: INTERNAL MEDICINE

## 2025-04-03 PROCEDURE — 97530 THERAPEUTIC ACTIVITIES: CPT

## 2025-04-03 PROCEDURE — 85025 COMPLETE CBC W/AUTO DIFF WBC: CPT | Performed by: PHYSICIAN ASSISTANT

## 2025-04-03 PROCEDURE — 25000003 PHARM REV CODE 250: Performed by: FAMILY MEDICINE

## 2025-04-03 RX ORDER — POTASSIUM CITRATE 1080 MG/1
20 TABLET, EXTENDED RELEASE ORAL DAILY
Status: DISCONTINUED | OUTPATIENT
Start: 2025-04-03 | End: 2025-04-03 | Stop reason: HOSPADM

## 2025-04-03 RX ORDER — NITROFURANTOIN 25; 75 MG/1; MG/1
100 CAPSULE ORAL 2 TIMES DAILY
Qty: 10 CAPSULE | Refills: 0 | Status: SHIPPED | OUTPATIENT
Start: 2025-04-03 | End: 2025-04-08

## 2025-04-03 RX ADMIN — LEVOTHYROXINE SODIUM 50 MCG: 0.05 TABLET ORAL at 06:04

## 2025-04-03 RX ADMIN — FOLIC ACID 1000 MCG: 1 TABLET ORAL at 08:04

## 2025-04-03 RX ADMIN — PANTOPRAZOLE SODIUM 40 MG: 40 TABLET, DELAYED RELEASE ORAL at 08:04

## 2025-04-03 RX ADMIN — CETIRIZINE HYDROCHLORIDE 10 MG: 10 TABLET, FILM COATED ORAL at 08:04

## 2025-04-03 RX ADMIN — POTASSIUM CITRATE 10 MEQ: 10 TABLET, EXTENDED RELEASE ORAL at 08:04

## 2025-04-03 RX ADMIN — ALLOPURINOL 100 MG: 100 TABLET ORAL at 08:04

## 2025-04-03 RX ADMIN — POTASSIUM CITRATE 20 MEQ: 10 TABLET, EXTENDED RELEASE ORAL at 12:04

## 2025-04-03 RX ADMIN — ACETAMINOPHEN 650 MG: 325 TABLET ORAL at 01:04

## 2025-04-03 RX ADMIN — FUROSEMIDE 20 MG: 20 TABLET ORAL at 08:04

## 2025-04-03 RX ADMIN — METOPROLOL TARTRATE 100 MG: 50 TABLET, FILM COATED ORAL at 08:04

## 2025-04-03 RX ADMIN — Medication 400 MG: at 08:04

## 2025-04-03 RX ADMIN — MUPIROCIN: 20 OINTMENT TOPICAL at 08:04

## 2025-04-03 NOTE — PLAN OF CARE
TX COMPLETED: facilitated bed mobility with total A x 2, transfers unable. Pt unable to tolerate sitting EOB for more than 3-5 mins due to increased pain at site of sacral wound. Recommend no further acute PT services.
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
## 2025-04-03 NOTE — ASSESSMENT & PLAN NOTE
-Noted on CT scan  -TTE with normal EF and a moderate to large circumferential effusion. Early tamponade signs of RA and RV collapse in systole noted with dilated IVC with mild IVC collapse. Measures 1.5 cm appx lateral to RV. Left pleural effusion. Clinically correlate for tamponade.   -Wants to avoid surgical procedures  -Will discuss possible pericardiocentesis with interventionalist    4/3/2025  -Hemodynamically stable at present time with improved SOB post thoracentesis  -Continue po Lasix  -Will continue to closely monitor patient for now, no pericardiocentesis planned at this juncture

## 2025-04-03 NOTE — PROGRESS NOTES
[facility] - Telemetry ([facility])  Cardiology  Progress Note    Patient Name: Wendy Ro  MRN: [id number]  Admission Date: 4/1/2025  Hospital Length of Stay: 2 days  Code Status: DNR   Attending Physician: Femi Cavazos MD   Primary Care Physician: Joseph Hutchinson MD  Expected Discharge Date:   Principal Problem:Pleural effusion    Subjective:   HPI:  Ms. Ro is an 84 year old female patient whose current medical conditions include DM type II, fatty liver, HTN, thyroid nodule, hyperlipidemia, RA,  and CRI who presented to [facility] ED yesterday due to increased SOB over the past week. Associated symptoms included decreased urine output, hypoxia (78% on RA at home), diaphoresis, chills, and a single episode of substernal chest heaviness/pressure. No apparent associated fever, palpitations, near syncope, or syncope. Initial workup revealed pro-lele of 18, troponin of 0.041, and +UTI. CT of C/A/P revealed large-sided left pleural effusion with complete collapse of the left lung as well as a 3 cm pericardial effusiion. Patient subsequently admitted for further evaluation and treatment. Cardiology consulted to assist with management. Patient seen and examined today, resting in bed. Daughter reports patient is bed bound at baseline due to leg weakness. Remains SOB. CP free during exam. No history of MI or CAD. Labs reviewed. Troponin flat. TTE showed normal EF with moderate to large circumferential effusion. Early tamponade signs of RA and RV collapse in systole noted with dilated IVC with mild IVC collapse. Measures 1.5 cm appx lateral to RV. Left pleural effusion. Clinically correlate for tamponade. Pulmonary consulted for thoracentesis.       Hospital Course:   4/3/2025-Patient seen and examined today with daughter present. Underwent left-sided thoracentesis yesterday, SOB improved. BP stable. Continue po diuretics. Discussed continued close monitoring for now with daughter as she is hemodynamically  stable.      Review of Systems   Constitutional: Positive for malaise/fatigue.   HENT: Negative.     Eyes: Negative.    Cardiovascular:  Positive for dyspnea on exertion.   Respiratory:  Positive for shortness of breath (improved).    Hematologic/Lymphatic: Bruises/bleeds easily.   Skin: Negative.    Musculoskeletal:  Positive for arthritis and joint pain.   Gastrointestinal: Negative.    Genitourinary: Negative.    Neurological:  Positive for weakness.   Psychiatric/Behavioral: Negative.     Allergic/Immunologic: Negative.      Objective:     Vital Signs (Most Recent):  Temp: 98.7 °F (37.1 °C) (04/03/25 1347)  Pulse: 83 (04/03/25 1246)  Resp: 18 (04/03/25 1246)  BP: 126/60 (04/03/25 1246)  SpO2: (!) 94 % (04/03/25 1246) Vital Signs (24h Range):  Temp:  [97.9 °F (36.6 °C)-98.7 °F (37.1 °C)] 98.7 °F (37.1 °C)  Pulse:  [73-87] 83  Resp:  [16-20] 18  SpO2:  [92 %-95 %] 94 %  BP: (123-132)/(57-64) 126/60     Weight: 81.6 kg (179 lb 14.3 oz)  Body mass index is 30.88 kg/m².     SpO2: (!) 94 %         Intake/Output Summary (Last 24 hours) at 4/3/2025 1447  Last data filed at 4/3/2025 0642  Gross per 24 hour   Intake --   Output 600 ml   Net -600 ml       Lines/Drains/Airways       Drain  Duration                  Urethral Catheter 03/31/25 1200 3 days              Peripheral Intravenous Line  Duration                  Peripheral IV - Single Lumen 04/01/25 2216 22 G Posterior;Right Forearm 1 day                       Physical Exam  Vitals and nursing note reviewed.   Constitutional:       Appearance: She is ill-appearing.   HENT:      Head: Normocephalic and atraumatic.   Eyes:      Pupils: Pupils are equal, round, and reactive to light.   Cardiovascular:      Rate and Rhythm: Normal rate and regular rhythm.      Heart sounds: S1 normal and S2 normal. No murmur heard.  Pulmonary:      Comments: Diminished BS left base but much improved from prior day  Musculoskeletal:      Right lower leg: No edema.      Left lower leg: No  "edema.   Skin:     General: Skin is warm and dry.   Neurological:      Mental Status: She is oriented to person, place, and time.   Psychiatric:         Mood and Affect: Mood normal.         Behavior: Behavior normal.            Significant Labs: CMP   Recent Labs   Lab 04/01/25  1525 04/02/25  0444 04/03/25  0905    135* 134*   K 4.7 3.7 3.3*    102 100   CO2 25 23 26   BUN 36* 31* 28*   CREATININE 1.2 1.1 1.2   CALCIUM 9.0 8.5* 8.6*   ALBUMIN 2.3* 2.0* 2.0*   BILITOT 0.3 0.4 0.3   ALKPHOS 57 52 49   AST 29 14 23   ALT 14 7* 10   ANIONGAP 10 10 8   , CBC   Recent Labs   Lab 04/01/25  1525 04/02/25  0444 04/03/25  0905   WBC 10.77 9.74 8.30   HGB 10.0* 8.6* 9.2*   HCT 31.7* 27.3* 29.6*    403 377   , Troponin No results for input(s): "TROPONINIHS" in the last 48 hours., and All pertinent lab results from the last 24 hours have been reviewed.    Significant Imaging: Echocardiogram: Transthoracic echo (TTE) complete (Cupid Only):   Results for orders placed or performed during the hospital encounter of 04/01/25   Echo   Result Value Ref Range    BSA 1.92 m2    LA WIDTH 3.3 cm    RA Width 2.0 cm    LVOT stroke volume 91.1 cm3    LVIDd 3.9 3.5 - 6.0 cm    LV Systolic Volume 27 mL    LV Systolic Volume Index 14.4 mL/m2    LVIDs 2.7 2.1 - 4.0 cm    LV Diastolic Volume 66 mL    LV Diastolic Volume Index 35.29 mL/m2    Left Ventricular End Systolic Volume by Teichholz Method 27.02 mL    Left Ventricular End Diastolic Volume by Teichholz Method 65.61 mL    IVS 1.1 0.6 - 1.1 cm    LVOT diameter 2.0 cm    LVOT area 3.1 cm2    FS 30.8 28 - 44 %    Left Ventricle Relative Wall Thickness 0.56 cm    PW 1.1 0.6 - 1.1 cm    LV mass 140.1 g    LV Mass Index 74.9 g/m2    MV Peak E Tavo 0.90 m/s    TDI LATERAL 0.05 m/s    TDI SEPTAL 0.07 m/s    E/E' ratio 15 m/s    MV Peak A Tavo 1.21 m/s    TR Max Tavo 2.3 m/s    E/A ratio 0.74     IVRT 65 msec    E wave deceleration time 246 msec    LV SEPTAL E/E' RATIO 12.9 m/s    " JEREMIAS 31 mL/m2    LV LATERAL E/E' RATIO 18.0 m/s    LA Vol 58 cm3    LVOT peak karey 1.2 m/s    Left Ventricular Outflow Tract Mean Velocity 1.06 cm/s    Left Ventricular Outflow Tract Mean Gradient 4.64 mmHg    RVOT peak VTI 10.6 cm    TAPSE 1.91 cm    LA size 4.2 cm    Left Atrium Minor Axis 4.9 cm    Left Atrium Major Axis 4.9 cm    RA Major Axis 3.79 cm    AV mean gradient 9 mmHg    AV peak gradient 12 mmHg    Ao peak karey 1.7 m/s    Ao VTI 33.4 cm    LVOT peak VTI 29.0 cm    AV valve area 2.7 cm²    AV Velocity Ratio 0.71     AV index (prosthetic) 0.87     MARIIA by Velocity Ratio 2.2 cm²    Mr max karey 3.79 m/s    MV stenosis pressure 1/2 time 71.35 ms    MV valve area p 1/2 method 3.08 cm2    Triscuspid Valve Regurgitation Peak Gradient 20 mmHg    PV mean gradient 1 mmHg    RVOT peak karey 0.71 m/s    Ao root annulus 3.24 cm    STJ 2.68 cm    Ascending aorta 2.64 cm    IVC diameter 2.10 cm    Mean e' 0.06 m/s    ZLVIDS -1.38     ZLVIDD -2.90     TV resting pulmonary artery pressure 36 mmHg    RV TB RVSP 17 mmHg    Est. RA pres 15 mmHg    Narrative      Left Ventricle: The left ventricle is normal in size. Mildly increased   wall thickness. There is mild concentric hypertrophy. There is normal   systolic function with a visually estimated ejection fraction of 60 - 65%.   Grade I diastolic dysfunction. Elevated left ventricular filling pressure.    Right Ventricle: The right ventricle is normal in size. Wall thickness   is normal. Systolic function is mildly reduced.    Aortic Valve: There is moderate aortic valve sclerosis. Mildly   calcified cusps. There is moderate annular calcification present.    Mitral Valve: There is mild bileaflet sclerosis. Moderately thickened   leaflets. Mildly calcified leaflets. There is moderate mitral annular   calcification. There is mild regurgitation.    Tricuspid Valve: There is mild regurgitation.    Pulmonary Artery: The estimated pulmonary artery systolic pressure is   36 mmHg.     IVC/SVC: Elevated venous pressure at 15 mmHg.    Pericardium: There is a moderate to large circumferential effusion.   Early tamponade signs of RA and RV collapse in systole noted with dilated   IVC with mild IVC collapse. Measures 1.5 cm appx lateral to RV. Left   pleural effusion. Clinically correlate for tamponade.     , EKG: Reviewed, and X-Ray: CXR: X-Ray Chest 1 View (CXR): No results found for this visit on 04/01/25.  Assessment and Plan:   Patient who presents with SOB/pleural effusion/pericardial effusion. Symptomatic improvement post thoracentesis. Hemodynamically stable, will continue to monitor.    * Pleural effusion  -Pulmonary consulted for thoracentesis  -Anibale    4/3/2025  -s/p left-sided thora    Pericardial effusion  -Noted on CT scan  -TTE with normal EF and a moderate to large circumferential effusion. Early tamponade signs of RA and RV collapse in systole noted with dilated IVC with mild IVC collapse. Measures 1.5 cm appx lateral to RV. Left pleural effusion. Clinically correlate for tamponade.   -Wants to avoid surgical procedures  -Will discuss possible pericardiocentesis with interventionalist    4/3/2025  -Hemodynamically stable at present time with improved SOB post thoracentesis  -Continue po Lasix  -Will continue to closely monitor patient for now, no pericardiocentesis planned at this juncture    UTI (urinary tract infection)  -Per hospital medicine, on abx    DM (diabetes mellitus) type II controlled with renal manifestation  -Per hospital medicine    Hypertension associated with diabetes  -Titrate medications        VTE Risk Mitigation (From admission, onward)           Ordered     enoxaparin injection 40 mg  Every 24 hours         04/01/25 1955     IP VTE HIGH RISK PATIENT  Once         04/01/25 1946     Place sequential compression device  Until discontinued         04/01/25 1946                    Paulette Arenas PA-C  Cardiology  [facility] - Telemetry ([facility])

## 2025-04-03 NOTE — DISCHARGE INSTRUCTIONS
You have been started on new medication(s) from this hospitalization. Please take as directed and schedule hospital follow up appointments with your primary providers.    Take probiotic/yogurt 2-3 hours after taking antibiotic to avoid diarrhea.     A nurse practitioner may be contacting you to assess your status post-hospitalization.     Homehealth with physical/occupational therapy order has been ordered. Someone will be in contact.     .Our goal at [facility] is to always give you outstanding care and exceptional service. You may receive a survey from [employer] by mail, text or e-mail in the next 24-48 hours asking about the care you received with us. The survey should only take 5-10 minutes to complete and is very important to us.     Your feedback provides us with a way to recognize our staff who work tirelessly to provide the best care! Also, your responses help us learn how to improve when your experience was below our aspiration of excellence. We are always looking for ways to improve your stay. We WILL use your feedback to continue making improvements to help us provide the highest quality care. We keep your personal information and feedback confidential. We appreciate your time completing this survey and can't wait to hear from you!!!    We look forward to your continued care with us! Thanks so much for choosing [facility] for your healthcare needs!

## 2025-04-03 NOTE — PLAN OF CARE
Problem: Adult Inpatient Plan of Care  Goal: Plan of Care Review  Outcome: Progressing  Goal: Patient-Specific Goal (Individualized)  Outcome: Progressing  Goal: Absence of Hospital-Acquired Illness or Injury  Outcome: Progressing  Goal: Optimal Comfort and Wellbeing  Outcome: Progressing  Goal: Readiness for Transition of Care  Outcome: Progressing     Problem: Diabetes Comorbidity  Goal: Blood Glucose Level Within Targeted Range  Outcome: Progressing     Problem: Wound  Goal: Optimal Coping  Outcome: Progressing  Goal: Optimal Functional Ability  Outcome: Progressing  Goal: Absence of Infection Signs and Symptoms  Outcome: Progressing  Goal: Improved Oral Intake  Outcome: Progressing  Goal: Optimal Pain Control and Function  Outcome: Progressing  Goal: Skin Health and Integrity  Outcome: Progressing  Goal: Optimal Wound Healing  Outcome: Progressing     Problem: Skin Injury Risk Increased  Goal: Skin Health and Integrity  Outcome: Progressing     Problem: Infection  Goal: Absence of Infection Signs and Symptoms  Outcome: Progressing     Problem: Fall Injury Risk  Goal: Absence of Fall and Fall-Related Injury  Outcome: Progressing

## 2025-04-03 NOTE — CONSULTS
[facility] - Telemetry ([facility])  Adult Nutrition  Consult Note    SUMMARY     Recommendations    Recommendation/Intervention:   1. Recommend a Cardiac, Fontana diet  2. Recommend Boost glucose control BID to assist filling nutritional gaps  3. Recommend Brenden BID for wound healing   4. Encourage PO and supplement intake, recommend feeding assistance as warranted  5. Weekly weights    Goals:   1. Pt will consume >75% EEN and EPN prior to RD F/u.   2. Pt will consume Brenden BID prior to RD F/u.    Nutrition Goal Status: new  Communication of RD Recs:  (Consult, POC, Sticky note)    Nutrition Discharge Planning     Nutrition Discharge Planning: Therapeutic diet (comments), Oral supplement regimen (comments)  Therapeutic diet (comments): Cardiac diet  Oral supplement regimen (comments): Boost Glucose Control TID, Juvin BID, daily multivitamin.    Assessment and Plan  Nutrition Problem  Inadequate protein-energy intake    Related to (etiology):   Increased demand for nutrition    Signs and Symptoms (as evidenced by):   Loss of skin integrity, wound healing needs, decreased muscle mass/wasting    Interventions/Recommendations (treatment strategy):  1. Decreased sodium and fat, Fontana modified diet  2. Commercial beverage medical food supplement therapy.  3. Amino acids, vitamins and minerals medical food supplement therapy  4. Feeding assistance management  5. Collaboration of nutrition professional with other providers.    Nutrition Diagnosis Status:   New       Malnutrition Assessment (4/3/25):  Malnutrition Context: chronic illness (Muscle/fat loss moderate noted)  Malnutrition Level: mild  Skin (Micronutrient): wounds unhealed (Pressure injury stage 4, WCN 4/2/25)       Hand  Strength, Right (Malnutrition): Poor  strength noted   Orbital Region (Subcutaneous Fat Loss): mild depletion  Upper Arm Region (Subcutaneous Fat Loss): moderate depletion   Hindu Region (Muscle Loss): mild depletion  Clavicle Bone Region  "(Muscle Loss): mild depletion  Dorsal Hand (Muscle Loss): moderate depletion                 Reason for Assessment    Reason For Assessment: consult, pressure injury/ wound  Diagnosis: cardiac disease, diabetes diagnosis/complications, renal disease, trauma  General Information Comments:   4/3/25. 84 y.o. female admited d/t Pleural effusion. PMH. HTN, DM, Chronic Gout, GERD, Pressure Injury of sacral region unstagable, Sleep apnea, Fatigue, CKD, Peripheral autonomic neuropathy due to diabetes mellitus. Pt currently on a cardiac diet w/o ONS. Nutrition consult for risk of delayed wound healing. Pt presented 25 for pleural effusion. RD intern visited pt and pt visitor at bedside. Pt reported 50% PO intake d/t reduced appetite and food being too spicy. Pt and Pt visitor reported PTA diet consisted of 2 meals/day home cooked meals with fruit and yogurt between meals, some ONS Boost supplmentation approximately 12 oz daily and pt being bed bound but having no social restrictions to food d/t assistance from family members. Labs, meds,wts reviewed. Wt charted 25 180#, BMI 30.9 (obese), 1. 182#, -2#, not significant. NFPE performed mild/moderate signs of malnutrition noted.     Nutrition/Diet History    Food Preferences: Non spicy foods, Vanilla flavored ONS, Fruit punch flavored Juvin  Food Allergies: NKFA  Factors Affecting Nutritional Intake: early satiety    Nutrition Related Social Determinants of Health: SDOH: Adequate food in home environment and None Identified    Anthropometrics    Height: 5' 4" (162.6 cm)  Height (inches): 64 in  Weight: 81.6 kg (179 lb 14.3 oz)  Weight (lb): 179.9 lb  Weight Method: Bed Scale  Ideal Body Weight (IBW), Female: 120 lb  % Ideal Body Weight, Female (lb): 149.92 %  BMI (Calculated): 30.9  BMI Grade: 30 - 34.9- obesity - grade I  Usual Body Weight (UBW), k.7 kg  % Usual Body Weight: 98.88  % Weight Change From Usual Weight: -1.33 %       Wt Readings from Last 15 " "Encounters:   04/03/25 81.6 kg (179 lb 14.3 oz)   03/12/25 81.6 kg (180 lb)   01/29/25 82.6 kg (182 lb 1.6 oz)   11/17/21 89.4 kg (197 lb)   09/23/21 89.4 kg (197 lb)   03/22/21 94.3 kg (208 lb)   03/11/21 93.9 kg (207 lb 2 oz)   11/30/20 97.5 kg (215 lb)   11/24/20 97.1 kg (214 lb)   06/30/20 109 kg (240 lb 3.2 oz)   06/15/20 110.4 kg (243 lb 6.2 oz)   06/02/20 108 kg (238 lb)   02/21/20 112.4 kg (247 lb 12.8 oz)   02/04/20 110.5 kg (243 lb 9.6 oz)   12/23/19 112.1 kg (247 lb 2.2 oz)     Lab/Procedures/Meds    Pertinent Labs Reviewed: reviewed  Pertinent Medications Reviewed: reviewed    BMP  Lab Results   Component Value Date     (L) 04/03/2025    K 3.3 (L) 04/03/2025     04/03/2025    CO2 26 04/03/2025    BUN 28 (H) 04/03/2025    CREATININE 1.2 04/03/2025    CALCIUM 8.6 (L) 04/03/2025    ANIONGAP 8 04/03/2025    EGFRNORACEVR 45 (L) 04/03/2025      Lab Results   Component Value Date    CALCIUM 8.6 (L) 04/03/2025    PHOS 3.5 04/01/2025     Lab Results   Component Value Date    ALBUMIN 2.0 (L) 04/03/2025     Lab Results   Component Value Date    ALT 10 04/03/2025    AST 23 04/03/2025    ALKPHOS 49 04/03/2025    BILITOT 0.3 04/03/2025     No results for input(s): "POCTGLUCOSE" in the last 24 hours.    Lab Results   Component Value Date    HGBA1C 6.2 03/04/2024     Lab Results   Component Value Date    WBC 8.30 04/03/2025    HGB 9.2 (L) 04/03/2025    HCT 29.6 (L) 04/03/2025    MCV 99 (H) 04/03/2025     04/03/2025        Medications Ordered Prior to Encounter[1]    Scheduled Meds:   allopurinoL  100 mg Oral Daily    cetirizine  10 mg Oral Daily    enoxparin  40 mg Subcutaneous Q24H (prophylaxis, 1700)    folic acid  1,000 mcg Oral Daily    furosemide  20 mg Oral Daily    gabapentin  600 mg Oral QHS    levothyroxine  50 mcg Oral Before breakfast    magnesium oxide  400 mg Oral BID    metoprolol tartrate  100 mg Oral BID    mupirocin   Nasal BID    pantoprazole  40 mg Oral Daily    potassium citrate  " 20 mEq Oral Daily     Continuous Infusions:  PRN Meds:.  Current Facility-Administered Medications:     acetaminophen, 650 mg, Oral, Q6H PRN    albuterol-ipratropium, 3 mL, Nebulization, Q4H PRN    aluminum-magnesium hydroxide-simethicone, 30 mL, Oral, QID PRN    dextrose 50%, 12.5 g, Intravenous, PRN    dextrose 50%, 25 g, Intravenous, PRN    glucagon (human recombinant), 1 mg, Intramuscular, PRN    glucose, 16 g, Oral, PRN    glucose, 24 g, Oral, PRN    melatonin, 6 mg, Oral, Nightly PRN    naloxone, 0.02 mg, Intravenous, PRN    ondansetron, 4 mg, Intravenous, Q8H PRN    polyethylene glycol, 17 g, Oral, Daily PRN    simethicone, 1 tablet, Oral, QID PRN    sodium chloride 0.9%, 3 mL, Intravenous, Q12H PRN     Estimated/Assessed Needs    Weight Used For Calorie Calculations: 81.6 kg (179 lb 14.3 oz)  Energy Calorie Requirements (kcal): 6887-8249 (25-30 kcal/kg (PI, obese))  Energy Need Method: Kcal/kg  Protein Requirements:  grams (1.25-1.5 g/day (PI ABW)  Weight Used For Protein Calculations: 81.6 kg (179 lb 14.3 oz)  Fluid Requirements (mL): 2040-24448 (1 ml/kcal)  Estimated Fluid Requirement Method: RDA Method  RDA Method (mL): 2040  CHO Requirement: 255-306 grams (3114-1991/8)      Nutrition Prescription Ordered    Current Diet Order: Cardiac    Evaluation of Received Nutrient/Fluid Intake  I/O: (Net since admit):  4/3/25: -849 mL    Energy Calories Required: not meeting needs  Protein Required: not meeting needs  Fluid Required: not meeting needs  Total Fluid Intake (mL/kg): 600  Tolerance: tolerating  % Intake of Estimated Energy Needs: 50 - 75 %  % Meal Intake: 50 - 75 %    Nutrition Risk    Level of Risk/Frequency of Follow-up: high (F/u x 2 weekly)       Monitor and Evaluation    Monitor and Evaluation: Energy intake, Food and beverage intake, Protein intake, Diet order, Weight, Gastrointestinal profile, Electrolyte and renal panel, Glucose/endocrine profile, Nutrition focused physical findings, Skin        Nutrition Follow-Up    RD Follow-up?: Yes  George Wilson RD intern  Kelley Montague, BS, RDN, LDN          [1]   No current facility-administered medications on file prior to encounter.     Current Outpatient Medications on File Prior to Encounter   Medication Sig Dispense Refill    albuterol (VENTOLIN HFA) 90 mcg/actuation inhaler Inhale 2 puffs into the lungs every 6 (six) hours as needed for Wheezing. 1 Inhaler 11    alendronate (FOSAMAX) 70 MG tablet Take 1 tablet (70 mg total) by mouth every 7 days. 4 tablet 11    allopurinoL (ZYLOPRIM) 100 MG tablet Take 100 mg by mouth once daily.      aspirin (ECOTRIN) 81 MG EC tablet Take 81 mg by mouth once daily.      cetirizine (ZYRTEC) 10 MG tablet Take 10 mg by mouth once daily.      folic acid (FOLVITE) 1 MG tablet Take 1 tablet (1,000 mcg total) by mouth once daily. 30 tablet 5    furosemide (LASIX) 40 MG tablet Take 20 mg by mouth once daily.      gabapentin (NEURONTIN) 300 MG capsule Take 300 mg by mouth 3 (three) times daily.      levothyroxine (SYNTHROID) 50 MCG tablet Take 50 mcg by mouth before breakfast.      magnesium oxide (MAG-OX) 400 mg (241.3 mg magnesium) tablet Take 400 mg by mouth 2 (two) times daily.      metoprolol tartrate (LOPRESSOR) 100 MG tablet Take 100 mg by mouth 2 (two) times daily.      pantoprazole (PROTONIX) 40 MG tablet Take 1 tablet (40 mg total) by mouth once daily. 90 tablet 3    potassium citrate (UROCIT-K) 10 mEq (1,080 mg) TbSR Take 10 mEq by mouth once daily.      SENNA 8.6 mg tablet Take 2 tablets by mouth 2 (two) times daily as needed.

## 2025-04-03 NOTE — PLAN OF CARE
[facility] - Telemetry (Hospital)  Discharge Final Note    Primary Care Provider: Joseph Hutchinson MD    Expected Discharge Date: 4/3/2025    Final Discharge Note (most recent)       Final Note - 04/03/25 1553          Final Note    Assessment Type Final Discharge Note     Anticipated Discharge Disposition Home-Health Care [facility]     Hospital Resources/Appts/Education Provided Appointments scheduled and added to AVS;Post-Acute resouces added to AVS        Post-Acute Status    Post-Acute Authorization Home Health     Home Health Status Set-up Complete/Auth obtained     Discharge Delays None known at this time                   Pt being discharged home today. Family agreeable to resuming home health care services with [facility]; orders provided via Epic.   AVS updated.     Important Message from Medicare              Follow-up providers       Joseph Hutchinson MD   Specialty: Internal Medicine   Relationship: PCP - General    [address] [facility]  [address]  [facility] [address]   Phone: [phone]       Next Steps: Schedule an appointment as soon as possible for a visit in 3 day(s)    Instructions: hospital follow up    Lyndsay Spear PA-C   Specialty: Pulmonary Disease    [address] [address]   Phone: [phone]       Next Steps: Schedule an appointment as soon as possible for a visit in 1 month(s)    Instructions: hospital follow up    Navin Cabrera MD   Specialty: Cardiology, Internal Medicine    [address]  [place] [address]   Phone: [phone]       Next Steps: Schedule an appointment as soon as possible for a visit in 1 week(s)    Instructions: hospital follow up              After-discharge care                Home Medical Care       *[facility]   Service: Home Health Services    [address]  [place] [facility] [address]   Phone: [phone]
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
## 2025-04-03 NOTE — DISCHARGE SUMMARY
[facility] Medicine  Discharge Summary      Patient Name: Wendy Ro  MRN: [id number]  CLAUDIA: [id number]  Patient Class: IP- Inpatient  Admission Date: 4/1/2025  Hospital Length of Stay: 2 days  Discharge Date and Time: 04/03/2025 3:27 PM  Attending Physician: Femi Cavazos MD   Discharging Provider: Femi Cavazos MD  Primary Care Provider: Joseph Hutchinson MD    Primary Care Team: [facility] MEDICINE D    HPI:   Wendy Ro is a 84 y.o. 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, DM (diabetes mellitus) type II controlled, neurological manifestation, Fatty liver, Gastroparesis, GERD (gastroesophageal reflux disease) (10/20/2014), Hiatal hernia, Hypertension associated with diabetes, Hypothyroid (7/10/2012), Hypothyroidism, Intermittent asthma, Osteoporosis, Osteoporosis (11/30/2016), Peripheral autonomic neuropathy due to diabetes mellitus, PONV (postoperative nausea and vomiting), Rheumatoid arthritis, Sleep apnea, and Thyroid nodule.presented to the Emergency Department for evaluation of decreased urine output onset yesterday and SOB which onset within the past week. SOB worsens with minimal exertion. The pt is bed-bound due to bilateral leg weakness and states her sob worsens when laying on her left side. Pt's oxygen saturation was 78% on RA when the daughter checked. Pt is not on any home oxygen. The pt's daughter states the pt has had an episode of diaphoresis while sleeping the past week as well as an episode of CP/chest heaviness. Pt was recently prescribed gentamicin (bladder irrigation) by   Estevez for 7 days bid, with EED tomorrow 4/2/25. Denies any constipation with last BM yesterday. Pt did receive a suppository by daughter on Sunday, however. Pt 's daughter states the pt has a hx of severe CKD but no dialysis. Pt still produces urine for herself but is producing minimal urine more recently over the last day or two. Patient has chronic indwelling oliva catheter present. Symptoms are constant and moderate in severity. Associated sxs include chills. Patient also has a wound vac to her sacral area, which she has HH come and does dressing changes weekly. Patient denies any fever, cough, and all other sxs at this time. No further complaints or concerns at this time.     ER workup revealed CBC and CMP to be unremarkable and at baseline.  Procalcitonin level 18.58.  Lactic acid negative.  Initial troponin 0.041.  Flu/COVID negative.  UA to use to show signs of acute cystitis which patient is currently being treated for.  Urine and blood cultures pending.  Chest x-ray revealed left hemothorax.  CTA abdomen and pelvis without contrast revealed large pleural effusion on the left with complete collapse of left lung, small pleural effusion on the right with the adjacent atelectasis and large amount of stool in his sigmoid colon and rectum.  EKG revealed normal sinus rhythm with nonspecific T-wave abnormalities with a ventricular rate of 95 beats per minute with a QT/QTC of 304/382.  Vital signs stable.  Patient satting 99% on room air.  Patient received 1 L normal saline in ER.  Hospital Medicine consulted to admit patient for UTI, and bilateral pleural effusion (L>R), and pericardial effusion.  Patient in agreement with treatment plan.  Patient admitted under inpatient status.    PCP: Joseph Hutchinson      * No surgery found *      Hospital Course:   4/2 admitted for pleural effusion. Echocardiogram with early signs of cardiac tamponade with pericardial effusion. Pulmonology consulted and concerns for  malignancy, hospice recommended. Interventional radiology consulted for pleural effusion with 650ccs removed.     4/3  No acute events overnight. No indication for intervention of pericardial effusion per cardiology. Physical/occupational therapy recommending homehealth physical/occupational therapy.     Urine collected in emergency department growing enterococcus faecalis. Asymptomatic. Empirically treat with po macrobid. Last  urine culture negative in March 2025. Has tolerated macrobid in the past. Several allergies to medication(s).    No acute distress. No respiratory distress. On room air. AO3. Frail. Ill appearing. Weakness. Family at bedside.    Patient seen and evaluated by me. Patient was determined to be suitable for discharge. Patient deemed stable for discharge to home with homehealth physical/occupational therapy and nurse practitioner to visit home program.       Goals of Care Treatment Preferences:  Code Status: DNR         Consults:   Consults (From admission, onward)          Status Ordering Provider     Inpatient consult to Social Work/Case Management  Once        Provider:  (Not yet assigned)    Acknowledged DENISE VACA     Inpatient consult to Social Work/Case Management  Once        Provider:  (Not yet assigned)    Completed DENISE VACA.     Inpatient consult to Registered Dietitian/Nutritionist  Once        Provider:  (Not yet assigned)    Completed DENISE VACA.     Inpatient consult to Interventional Radiology  Once        Provider:  Adrian Sahu PA    Completed DENISE VACA     Inpatient consult to Cardiology  Once        Provider:  Patti Sharif MD    Completed MESFIN LEYVA     Inpatient consult to Pulmonology  Once        Provider:  Caitlin Vivas MD    Completed MESFIN LEYVA            Assessment & Plan  Pleural effusion  Patient found to have large pleural effusion on imaging. I have personally reviewed and interpreted the following imaging: Xray and CT.  "A thoracentesis was not ordered. Management to include  pulmonology consulted and recommended interventional radiology for thoracentesis, fluid studies pending  Diurese   Pericardial effusion  Cardiology consulted  Patient wants to avoid surgical intervention  Possible pericardiocentesis by interventionalist    UTI (urinary tract infection)  Urinalysis revealed:   Lab Results   Component Value Date    COLORU Yellow 04/01/2025    APPEARANCEUA Clear 04/01/2025    SPECGRAV 1.015 04/01/2025    PHUR 6.0 04/01/2025    PROTEINUA Negative 04/01/2025    GLUCUA Negative 04/01/2025    KETONESU Negative 03/12/2025    NITRITE Negative 04/01/2025    UROBILINOGEN Negative 04/01/2025    BILIRUBINUA Negative 04/01/2025    LEUKOCYTESUR 1+ (A) 04/01/2025    RBCUA 2 04/01/2025    WBCUA 19 (H) 04/01/2025    BACTERIA Many (A) 03/12/2025    HYALINECASTS 9 (H) 04/01/2025   Continue gentamicin irrigation via bladder BID.  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  Last A1c reviewed-   Lab Results   Component Value Date    HGBA1C 6.2 03/04/2024     Most recent fingerstick glucose reviewed- No results for input(s): "POCTGLUCOSE" in the last 24 hours.  Current correctional scale  Low  Maintain anti-hyperglycemic dose as follows-   Antihyperglycemics (From admission, onward)      None          Hold Oral hypoglycemics while patient is in the hospital.  SSI  Accuchecks    Hypertension associated with diabetes  Chronic, controlled.  Latest blood pressure and vitals reviewed-   Temp:  [98.3 °F (36.8 °C)-98.7 °F (37.1 °C)]   Pulse:  [73-87]   Resp:  [16-18]   BP: (123-132)/(57-64)   SpO2:  [92 %-94 %] .   Home meds for hypertension were reviewed and noted below.   Hypertension Medications              furosemide (LASIX) 40 MG tablet Take 20 mg by " mouth once daily.    metoprolol tartrate (LOPRESSOR) 100 MG tablet Take 100 mg by mouth 2 (two) times daily.            While in the hospital, will manage blood pressure as follows; Adjust home antihypertensive regimen as follows- hold home medication(s)     Will utilize p.r.n. blood pressure medication only if patient's blood pressure greater than  160/90 and she develops symptoms such as worsening chest pain or shortness of breath.      Peripheral autonomic neuropathy due to diabetes mellitus  -continue gabapentin    Combined hyperlipidemia associated with type 2 diabetes mellitus  Patient is not chronically on statin.will not continue for now. Last Lipid Panel:   Lab Results   Component Value Date    CHOL 127 03/04/2022    HDL 36 03/04/2022    LDLCALC 61 03/04/2022    TRIG 149 03/04/2022    CHOLHDL 3.5 03/04/2022     Plan:  -low fat/low calorie diet      GERD (gastroesophageal reflux disease)  Chronic. Stable. Currently asymptomatic. Home medications include PPI/Antacids as needed.  Plan:  -Continue PPI/Antacids as needed       Hypothyroidism  Patient has chronic hypothyroidism. TFTs reviewed-   Lab Results   Component Value Date    TSH 3.957 10/08/2019   . Will continue chronic levothyroxine and adjust for and clinical changes.      Chronic gout  -continue allopurinol      Final Active Diagnoses:    Diagnosis Date Noted POA    PRINCIPAL PROBLEM:  Pleural effusion [J90] 04/02/2025 Yes    Pericardial effusion [I31.39] 04/02/2025 Yes    UTI (urinary tract infection) [N39.0] 01/28/2025 Yes    Pressure injury of sacral region, unstageable [L89.150] 01/28/2025 Yes    Combined hyperlipidemia associated with type 2 diabetes mellitus [E11.69, E78.2] 03/11/2019 Yes    DM (diabetes mellitus) type II controlled with renal manifestation [E11.29]  Yes    GERD (gastroesophageal reflux disease) [K21.9]  Yes    Peripheral autonomic neuropathy due to diabetes mellitus [E11.43]  Yes    Chronic gout [M1A.9XX0]  Yes     Hypothyroidism [E03.9]  Yes    Hypertension associated with diabetes [E11.59, I15.2]  Yes      Problems Resolved During this Admission:       Discharged Condition: stable    Disposition: Home or Self Care    Follow Up:   Follow-up Information       Joseph Hutchinson MD. Schedule an appointment as soon as possible for a visit in 3 day(s).    Specialty: Internal Medicine  Why: hospital follow up  Contact information:  [address]  [address]  [facility] [facility] [address]  [phone]               Nasreen Spear PA-C. Schedule an appointment as soon as possible for a visit in 1 month(s).    Specialty: Pulmonary Disease  Why: hospital follow up  Contact information:  [address] [facility] [id number] [phone]               Navin Cabrera MD. Schedule an appointment as soon as possible for a visit in 1 week(s).    Specialties: Cardiology, Internal Medicine  Why: hospital follow up  Contact information:  [address]  [place] [address]  [phone]                           Patient Instructions:      Ambulatory referral/consult to [facility]     Ambulatory referral/consult to Home Health   Standing Status: Future   Referral Priority: Routine Referral Type: Home Health   Referral Reason: Specialty Services Required   Requested Specialty: Home Health Services   Number of Visits Requested: 1     Ambulatory referral/consult to Pulmonology   Standing Status: Future   Referral Priority: Routine Referral Type: Consultation   Referral Reason: Specialty Services Required   Referred to Provider: NASREEN SPEAR Requested Specialty: Pulmonary Disease   Number of Visits Requested: 1       Significant Diagnostic Studies: Labs: CMP   Recent Labs   Lab 04/02/25  0444 04/03/25  0905   * 134*   K 3.7 3.3*    100   CO2 23 26   BUN 31* 28*   CREATININE 1.1 1.2   CALCIUM 8.5* 8.6*   ALBUMIN 2.0* 2.0*   BILITOT 0.4 0.3   ALKPHOS 52 49   AST 14 23   ALT 7* 10   ANIONGAP 10 8    , CBC   Recent Labs   Lab 04/02/25  0444 04/03/25  0905   WBC 9.74 8.30   HGB 8.6* 9.2*   HCT 27.3* 29.6*    377   , Troponin   Recent Labs   Lab 04/01/25  1525 04/01/25  2050 04/01/25  2328   TROPONINI 0.041* 0.035* 0.032*   , and All labs within the past 24 hours have been reviewed  Microbiology: Blood Culture   Lab Results   Component Value Date    LABBLOO No growth after 5 days. 01/29/2025    LABBLOO No growth after 5 days. 01/29/2025    and   Blood culture negative growth to date  Urine culture growing 50,000-99,999 cfu enterococcus faecalis  body fluid culture pending  Radiology: X-Ray: CXR: portable and post thoracentesis  CT scan: CT ABDOMEN PELVIS WITHOUT CONTRAST: No results found for this visit on 04/01/25.  Ultrasound: thoracentesis  Cardiac Graphics: Echocardiogram: Transthoracic echo (TTE) complete (Cupid Only):   Results for orders placed or performed during the hospital encounter of 04/01/25   Echo   Result Value Ref Range    BSA 1.92 m2    LA WIDTH 3.3 cm    RA Width 2.0 cm    LVOT stroke volume 91.1 cm3    LVIDd 3.9 3.5 - 6.0 cm    LV Systolic Volume 27 mL    LV Systolic Volume Index 14.4 mL/m2    LVIDs 2.7 2.1 - 4.0 cm    LV Diastolic Volume 66 mL    LV Diastolic Volume Index 35.29 mL/m2    Left Ventricular End Systolic Volume by Teichholz Method 27.02 mL    Left Ventricular End Diastolic Volume by Teichholz Method 65.61 mL    IVS 1.1 0.6 - 1.1 cm    LVOT diameter 2.0 cm    LVOT area 3.1 cm2    FS 30.8 28 - 44 %    Left Ventricle Relative Wall Thickness 0.56 cm    PW 1.1 0.6 - 1.1 cm    LV mass 140.1 g    LV Mass Index 74.9 g/m2    MV Peak E Tavo 0.90 m/s    TDI LATERAL 0.05 m/s    TDI SEPTAL 0.07 m/s    E/E' ratio 15 m/s    MV Peak A Tavo 1.21 m/s    TR Max Tavo 2.3 m/s    E/A ratio 0.74     IVRT 65 msec    E wave deceleration time 246 msec    LV SEPTAL E/E' RATIO 12.9 m/s    JEREMIAS 31 mL/m2    LV LATERAL E/E' RATIO 18.0 m/s    LA Vol 58 cm3    LVOT peak tavo 1.2 m/s    Left Ventricular  Outflow Tract Mean Velocity 1.06 cm/s    Left Ventricular Outflow Tract Mean Gradient 4.64 mmHg    RVOT peak VTI 10.6 cm    TAPSE 1.91 cm    LA size 4.2 cm    Left Atrium Minor Axis 4.9 cm    Left Atrium Major Axis 4.9 cm    RA Major Axis 3.79 cm    AV mean gradient 9 mmHg    AV peak gradient 12 mmHg    Ao peak karey 1.7 m/s    Ao VTI 33.4 cm    LVOT peak VTI 29.0 cm    AV valve area 2.7 cm²    AV Velocity Ratio 0.71     AV index (prosthetic) 0.87     MARIIA by Velocity Ratio 2.2 cm²    Mr max karey 3.79 m/s    MV stenosis pressure 1/2 time 71.35 ms    MV valve area p 1/2 method 3.08 cm2    Triscuspid Valve Regurgitation Peak Gradient 20 mmHg    PV mean gradient 1 mmHg    RVOT peak karey 0.71 m/s    Ao root annulus 3.24 cm    STJ 2.68 cm    Ascending aorta 2.64 cm    IVC diameter 2.10 cm    Mean e' 0.06 m/s    ZLVIDS -1.38     ZLVIDD -2.90     TV resting pulmonary artery pressure 36 mmHg    RV TB RVSP 17 mmHg    Est. RA pres 15 mmHg    Narrative      Left Ventricle: The left ventricle is normal in size. Mildly increased   wall thickness. There is mild concentric hypertrophy. There is normal   systolic function with a visually estimated ejection fraction of 60 - 65%.   Grade I diastolic dysfunction. Elevated left ventricular filling pressure.    Right Ventricle: The right ventricle is normal in size. Wall thickness   is normal. Systolic function is mildly reduced.    Aortic Valve: There is moderate aortic valve sclerosis. Mildly   calcified cusps. There is moderate annular calcification present.    Mitral Valve: There is mild bileaflet sclerosis. Moderately thickened   leaflets. Mildly calcified leaflets. There is moderate mitral annular   calcification. There is mild regurgitation.    Tricuspid Valve: There is mild regurgitation.    Pulmonary Artery: The estimated pulmonary artery systolic pressure is   36 mmHg.    IVC/SVC: Elevated venous pressure at 15 mmHg.    Pericardium: There is a moderate to large circumferential  effusion.   Early tamponade signs of RA and RV collapse in systole noted with dilated   IVC with mild IVC collapse. Measures 1.5 cm appx lateral to RV. Left   pleural effusion. Clinically correlate for tamponade.         Pending Diagnostic Studies:       Procedure Component Value Units Date/Time    Glucose, Peritoneal, Pleural Fluid or MICHELLE Drainage, In-House Pleural Fluid, Left [[id number]]     Order Status: Sent Lab Status: No result     Specimen: Body Fluid     LDH, Body Fluid (Reference Lab or [facility]) [facility]; Body Fluid; Thoracentesis Fluid [[id number]] Collected: 04/02/25 1447    Order Status: Sent Lab Status: In process Updated: 04/02/25 1459    Specimen: Body Fluid from Thoracentesis Fluid     Protein, Body Fluid ([facility] or [facility]) [facility]; Pleural Fluid, Left [[id number]]     Order Status: Sent Lab Status: No result     Specimen: Body Fluid            Medications:  Reconciled Home Medications:      Medication List        PAUSE taking these medications      aspirin 81 MG EC tablet  Wait to take this until your doctor or other care provider tells you to start again.  Commonly known as: ECOTRIN  Take 81 mg by mouth once daily.            START taking these medications      nitrofurantoin (macrocrystal-monohydrate) 100 MG capsule  Commonly known as: MACROBID  Take 1 capsule (100 mg total) by mouth 2 (two) times daily. for 5 days            CONTINUE taking these medications      albuterol 90 mcg/actuation inhaler  Commonly known as: VENTOLIN HFA  Inhale 2 puffs into the lungs every 6 (six) hours as needed for Wheezing.     alendronate 70 MG tablet  Commonly known as: FOSAMAX  Take 1 tablet (70 mg total) by mouth every 7 days.     allopurinoL 100 MG tablet  Commonly known as: ZYLOPRIM  Take 100 mg by mouth once daily.     cetirizine 10 MG tablet  Commonly known as: ZYRTEC  Take 10 mg by mouth once daily.     folic acid 1 MG tablet  Commonly known as: FOLVITE  Take 1 tablet (1,000 mcg total) by  mouth once daily.     furosemide 40 MG tablet  Commonly known as: LASIX  Take 20 mg by mouth once daily.     gabapentin 300 MG capsule  Commonly known as: NEURONTIN  Take 300 mg by mouth 3 (three) times daily.     levothyroxine 50 MCG tablet  Commonly known as: SYNTHROID  Take 50 mcg by mouth before breakfast.     magnesium oxide 400 mg (241.3 mg magnesium) tablet  Commonly known as: MAG-OX  Take 400 mg by mouth 2 (two) times daily.     metoprolol tartrate 100 MG tablet  Commonly known as: LOPRESSOR  Take 100 mg by mouth 2 (two) times daily.     pantoprazole 40 MG tablet  Commonly known as: PROTONIX  Take 1 tablet (40 mg total) by mouth once daily.     potassium citrate 10 mEq (1,080 mg) Tbsr  Commonly known as: UROCIT-K  Take 10 mEq by mouth once daily.     SENNA 8.6 mg tablet  Generic drug: senna  Take 2 tablets by mouth 2 (two) times daily as needed.              Indwelling Lines/Drains at time of discharge:   Lines/Drains/Airways       Drain  Duration                  Urethral Catheter 03/31/25 1200 3 days                    Time spent on the discharge of patient: 35 minutes         Femi Cavazos MD  Department of Hospital Medicine  '[facility] ([facility])

## 2025-04-03 NOTE — PLAN OF CARE
SW met with patient and daughter, Alexandru, at bedside regarding DC planning. Alexandru states family is not interested in hospice at this time. Alexandru had questions related to pt's CBC labs; questions relayed to MD via secure chat. Alexandru interested in SNF evaluation once pt's blood work has been addressed. Pt normally lives at home alone and has care assistance from her children that live near by. Alexandru reports pt was able to sit on side of bed at home during [facility] PT sessions. PT/OT eval ordered.   SW to follow

## 2025-04-03 NOTE — SUBJECTIVE & OBJECTIVE
Review of Systems   Constitutional: Positive for malaise/fatigue.   HENT: Negative.     Eyes: Negative.    Cardiovascular:  Positive for dyspnea on exertion.   Respiratory:  Positive for shortness of breath (improved).    Hematologic/Lymphatic: Bruises/bleeds easily.   Skin: Negative.    Musculoskeletal:  Positive for arthritis and joint pain.   Gastrointestinal: Negative.    Genitourinary: Negative.    Neurological:  Positive for weakness.   Psychiatric/Behavioral: Negative.     Allergic/Immunologic: Negative.      Objective:     Vital Signs (Most Recent):  Temp: 98.7 °F (37.1 °C) (04/03/25 1347)  Pulse: 83 (04/03/25 1246)  Resp: 18 (04/03/25 1246)  BP: 126/60 (04/03/25 1246)  SpO2: (!) 94 % (04/03/25 1246) Vital Signs (24h Range):  Temp:  [97.9 °F (36.6 °C)-98.7 °F (37.1 °C)] 98.7 °F (37.1 °C)  Pulse:  [73-87] 83  Resp:  [16-20] 18  SpO2:  [92 %-95 %] 94 %  BP: (123-132)/(57-64) 126/60     Weight: 81.6 kg (179 lb 14.3 oz)  Body mass index is 30.88 kg/m².     SpO2: (!) 94 %         Intake/Output Summary (Last 24 hours) at 4/3/2025 1447  Last data filed at 4/3/2025 0642  Gross per 24 hour   Intake --   Output 600 ml   Net -600 ml       Lines/Drains/Airways       Drain  Duration                  Urethral Catheter 03/31/25 1200 3 days              Peripheral Intravenous Line  Duration                  Peripheral IV - Single Lumen 04/01/25 2216 22 G Posterior;Right Forearm 1 day                       Physical Exam  Vitals and nursing note reviewed.   Constitutional:       Appearance: She is ill-appearing.   HENT:      Head: Normocephalic and atraumatic.   Eyes:      Pupils: Pupils are equal, round, and reactive to light.   Cardiovascular:      Rate and Rhythm: Normal rate and regular rhythm.      Heart sounds: S1 normal and S2 normal. No murmur heard.  Pulmonary:      Comments: Diminished BS left base but much improved from prior day  Musculoskeletal:      Right lower leg: No edema.      Left lower leg: No edema.  "  Skin:     General: Skin is warm and dry.   Neurological:      Mental Status: She is oriented to person, place, and time.   Psychiatric:         Mood and Affect: Mood normal.         Behavior: Behavior normal.            Significant Labs: CMP   Recent Labs   Lab 04/01/25  1525 04/02/25  0444 04/03/25  0905    135* 134*   K 4.7 3.7 3.3*    102 100   CO2 25 23 26   BUN 36* 31* 28*   CREATININE 1.2 1.1 1.2   CALCIUM 9.0 8.5* 8.6*   ALBUMIN 2.3* 2.0* 2.0*   BILITOT 0.3 0.4 0.3   ALKPHOS 57 52 49   AST 29 14 23   ALT 14 7* 10   ANIONGAP 10 10 8   , CBC   Recent Labs   Lab 04/01/25  1525 04/02/25  0444 04/03/25  0905   WBC 10.77 9.74 8.30   HGB 10.0* 8.6* 9.2*   HCT 31.7* 27.3* 29.6*    403 377   , Troponin No results for input(s): "TROPONINIHS" in the last 48 hours., and All pertinent lab results from the last 24 hours have been reviewed.    Significant Imaging: Echocardiogram: Transthoracic echo (TTE) complete (Cupid Only):   Results for orders placed or performed during the hospital encounter of 04/01/25   Echo   Result Value Ref Range    BSA 1.92 m2    LA WIDTH 3.3 cm    RA Width 2.0 cm    LVOT stroke volume 91.1 cm3    LVIDd 3.9 3.5 - 6.0 cm    LV Systolic Volume 27 mL    LV Systolic Volume Index 14.4 mL/m2    LVIDs 2.7 2.1 - 4.0 cm    LV Diastolic Volume 66 mL    LV Diastolic Volume Index 35.29 mL/m2    Left Ventricular End Systolic Volume by Teichholz Method 27.02 mL    Left Ventricular End Diastolic Volume by Teichholz Method 65.61 mL    IVS 1.1 0.6 - 1.1 cm    LVOT diameter 2.0 cm    LVOT area 3.1 cm2    FS 30.8 28 - 44 %    Left Ventricle Relative Wall Thickness 0.56 cm    PW 1.1 0.6 - 1.1 cm    LV mass 140.1 g    LV Mass Index 74.9 g/m2    MV Peak E Tavo 0.90 m/s    TDI LATERAL 0.05 m/s    TDI SEPTAL 0.07 m/s    E/E' ratio 15 m/s    MV Peak A Tavo 1.21 m/s    TR Max Tavo 2.3 m/s    E/A ratio 0.74     IVRT 65 msec    E wave deceleration time 246 msec    LV SEPTAL E/E' RATIO 12.9 m/s    JEREMIAS 31 " mL/m2    LV LATERAL E/E' RATIO 18.0 m/s    LA Vol 58 cm3    LVOT peak karey 1.2 m/s    Left Ventricular Outflow Tract Mean Velocity 1.06 cm/s    Left Ventricular Outflow Tract Mean Gradient 4.64 mmHg    RVOT peak VTI 10.6 cm    TAPSE 1.91 cm    LA size 4.2 cm    Left Atrium Minor Axis 4.9 cm    Left Atrium Major Axis 4.9 cm    RA Major Axis 3.79 cm    AV mean gradient 9 mmHg    AV peak gradient 12 mmHg    Ao peak karey 1.7 m/s    Ao VTI 33.4 cm    LVOT peak VTI 29.0 cm    AV valve area 2.7 cm²    AV Velocity Ratio 0.71     AV index (prosthetic) 0.87     MARIIA by Velocity Ratio 2.2 cm²    Mr max karey 3.79 m/s    MV stenosis pressure 1/2 time 71.35 ms    MV valve area p 1/2 method 3.08 cm2    Triscuspid Valve Regurgitation Peak Gradient 20 mmHg    PV mean gradient 1 mmHg    RVOT peak karey 0.71 m/s    Ao root annulus 3.24 cm    STJ 2.68 cm    Ascending aorta 2.64 cm    IVC diameter 2.10 cm    Mean e' 0.06 m/s    ZLVIDS -1.38     ZLVIDD -2.90     TV resting pulmonary artery pressure 36 mmHg    RV TB RVSP 17 mmHg    Est. RA pres 15 mmHg    Narrative      Left Ventricle: The left ventricle is normal in size. Mildly increased   wall thickness. There is mild concentric hypertrophy. There is normal   systolic function with a visually estimated ejection fraction of 60 - 65%.   Grade I diastolic dysfunction. Elevated left ventricular filling pressure.    Right Ventricle: The right ventricle is normal in size. Wall thickness   is normal. Systolic function is mildly reduced.    Aortic Valve: There is moderate aortic valve sclerosis. Mildly   calcified cusps. There is moderate annular calcification present.    Mitral Valve: There is mild bileaflet sclerosis. Moderately thickened   leaflets. Mildly calcified leaflets. There is moderate mitral annular   calcification. There is mild regurgitation.    Tricuspid Valve: There is mild regurgitation.    Pulmonary Artery: The estimated pulmonary artery systolic pressure is   36 mmHg.    IVC/SVC:  Elevated venous pressure at 15 mmHg.    Pericardium: There is a moderate to large circumferential effusion.   Early tamponade signs of RA and RV collapse in systole noted with dilated   IVC with mild IVC collapse. Measures 1.5 cm appx lateral to RV. Left   pleural effusion. Clinically correlate for tamponade.     , EKG: Reviewed, and X-Ray: CXR: X-Ray Chest 1 View (CXR): No results found for this visit on 04/01/25.

## 2025-04-03 NOTE — ASSESSMENT & PLAN NOTE
Chronic, controlled.  Latest blood pressure and vitals reviewed-   Temp:  [98.3 °F (36.8 °C)-98.7 °F (37.1 °C)]   Pulse:  [73-87]   Resp:  [16-18]   BP: (123-132)/(57-64)   SpO2:  [92 %-94 %] .   Home meds for hypertension were reviewed and noted below.   Hypertension Medications              furosemide (LASIX) 40 MG tablet Take 20 mg by mouth once daily.    metoprolol tartrate (LOPRESSOR) 100 MG tablet Take 100 mg by mouth 2 (two) times daily.            While in the hospital, will manage blood pressure as follows; Adjust home antihypertensive regimen as follows- hold home medication(s)     Will utilize p.r.n. blood pressure medication only if patient's blood pressure greater than  160/90 and she develops symptoms such as worsening chest pain or shortness of breath.

## 2025-04-03 NOTE — HOSPITAL COURSE
4/3/2025-Patient seen and examined today with daughter present. Underwent left-sided thoracentesis yesterday, SOB improved. BP stable. Continue po diuretics. Discussed continued close monitoring for now with daughter as she is hemodynamically stable.

## 2025-04-03 NOTE — PLAN OF CARE
Nutrition Recommendations/Interventions 4/3/25:   1. Recommend a Cardiac, [place] diet  2. Recommend Boost glucose control BID to assist filling nutritional gaps  3. Recommend Brenden BID for wound healing   4. Encourage PO and supplement intake, recommend feeding assistance as warranted  5. Weekly weights  6. Collaboration of nutrition professional with other providers    Goals:   1. Pt will consume >75% EEN and EPN prior to RD F/u.   2. Pt will consume Brenden BID prior to RD F/u.    George Wilson, JEREMIAH intern   Kelley Montague, KEEGAN, RDN, LDN

## 2025-04-04 ENCOUNTER — TELEPHONE (OUTPATIENT)
Dept: PULMONOLOGY | Facility: CLINIC | Age: 85
End: 2025-04-04
Payer: MEDICARE

## 2025-04-04 LAB
BACTERIA UR CULT: ABNORMAL
BODY FLD TYPE: NORMAL
LDH FLD L TO P-CCNC: 114 U/L

## 2025-04-04 NOTE — NURSING
Spoke with night provider Destiny SHARIF, who called patient's daughter Alexandru in regard to patient's oliva. VORB to not remove oliva as it is chronic due to patients immobility and chronic pressure ulcers to sacral area.

## 2025-04-04 NOTE — TELEPHONE ENCOUNTER
Left vm for patients daughter to call back----- Message from Med Assistant Sosa sent at 4/4/2025  4:10 PM CDT -----  Contact: Alexandru/ Daughter    ----- Message -----  From: Cally Lynne  Sent: 4/4/2025   3:53 PM CDT  To: [facility]    Alexandru is calling to speak to the nurse regarding the patient scheduled appointment on 04/25, she stated the bed is bed ritten it will be very hard to move her and asked if the appointment can be virtual, if not she will have to find a way to attend in person, please give her a call at  ThanksLJ

## 2025-04-05 ENCOUNTER — TELEPHONE (OUTPATIENT)
Dept: HEPATOLOGY | Facility: HOSPITAL | Age: 85
End: 2025-04-05
Payer: MEDICARE

## 2025-04-05 ENCOUNTER — RESULTS FOLLOW-UP (OUTPATIENT)
Dept: HEPATOLOGY | Facility: HOSPITAL | Age: 85
End: 2025-04-05

## 2025-04-05 PROCEDURE — G0179 MD RECERTIFICATION HHA PT: HCPCS | Mod: GW,,, | Performed by: STUDENT IN AN ORGANIZED HEALTH CARE EDUCATION/TRAINING PROGRAM

## 2025-04-05 NOTE — TELEPHONE ENCOUNTER
Called on call nurse with home[facility] [place]  Spoke to Tex ESTEVES    Discussed plan of care regarding urine culture growing enterococcus    Verbal order for 500mg bid amoxil x 3 days for asymptomatic urine culture

## 2025-04-07 ENCOUNTER — PATIENT MESSAGE (OUTPATIENT)
Dept: CARDIOLOGY | Facility: HOSPITAL | Age: 85
End: 2025-04-07
Payer: MEDICARE

## 2025-04-07 ENCOUNTER — OFFICE VISIT (OUTPATIENT)
Dept: CARDIOLOGY | Facility: CLINIC | Age: 85
End: 2025-04-07
Payer: MEDICARE

## 2025-04-07 ENCOUNTER — TELEPHONE (OUTPATIENT)
Dept: PULMONOLOGY | Facility: CLINIC | Age: 85
End: 2025-04-07
Payer: MEDICARE

## 2025-04-07 DIAGNOSIS — I31.39 PERICARDIAL EFFUSION: Primary | ICD-10-CM

## 2025-04-07 DIAGNOSIS — I50.32 CHRONIC DIASTOLIC (CONGESTIVE) HEART FAILURE: ICD-10-CM

## 2025-04-07 DIAGNOSIS — R07.9 CHEST PAIN, UNSPECIFIED TYPE: ICD-10-CM

## 2025-04-07 DIAGNOSIS — J90 PLEURAL EFFUSION, LEFT: ICD-10-CM

## 2025-04-07 DIAGNOSIS — J90 PLEURAL EFFUSION: ICD-10-CM

## 2025-04-07 DIAGNOSIS — R79.89 TROPONIN LEVEL ELEVATED: ICD-10-CM

## 2025-04-07 DIAGNOSIS — N39.0 CHRONIC UTI: ICD-10-CM

## 2025-04-07 LAB
BACTERIA BLD CULT: NORMAL
BACTERIA BLD CULT: NORMAL

## 2025-04-07 NOTE — TELEPHONE ENCOUNTER
Returned patients daughter call back. Daughter stated patient was bed bound and would be difficult to transport. Daughter stated they may have to cancel if condition improves. ----- Message from Julissa sent at 4/4/2025  4:15 PM CDT -----  Regarding: Appointment  Contact: tip Horvath  Type:   AppointmentWho Called:Clintonointment to be changed to virtual on 04/25/2025 because her mother is bedridden Regarding: a hospital follow upWould the patient rather a call back or a response via [facility]?  Call back Best Call Back Number: [phone] Information: Mai,COURTNEY

## 2025-04-07 NOTE — Clinical Note
Please schedule echo in 3 months, prefers Buddy, can do virtual visit follow up with me afterwards - maybe a few days after. Thanks
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
## 2025-04-08 LAB — BACTERIA FLD CULT: NORMAL

## 2025-04-09 LAB
ESTROGEN SERPL-MCNC: NORMAL PG/ML
INSULIN SERPL-ACNC: NORMAL U[IU]/ML
LAB AP COMMENTS: NORMAL
LAB AP GROSS DESCRIPTION: NORMAL
LAB AP NON-GYN INTERPRETATION SPECIMEN 1: NORMAL
LAB AP PERFORMING LOCATION(S): NORMAL

## 2025-04-10 PROBLEM — R79.89 TROPONIN LEVEL ELEVATED: Status: ACTIVE | Noted: 2025-04-10

## 2025-04-14 NOTE — PHYSICIAN QUERY
Please clarify the integumentary diagnosis related to the sacral area.   Pressure Injury/Decubitus Ulcer, Stage 4

## 2025-04-21 ENCOUNTER — EXTERNAL HOME HEALTH (OUTPATIENT)
Dept: HOME HEALTH SERVICES | Facility: HOSPITAL | Age: 85
End: 2025-04-21
Payer: MEDICARE

## 2025-04-23 ENCOUNTER — DOCUMENT SCAN (OUTPATIENT)
Dept: HOME HEALTH SERVICES | Facility: HOSPITAL | Age: 85
End: 2025-04-23
Payer: MEDICARE

## 2025-04-25 ENCOUNTER — OFFICE VISIT (OUTPATIENT)
Dept: PULMONOLOGY | Facility: CLINIC | Age: 85
End: 2025-04-25
Payer: MEDICARE

## 2025-04-25 DIAGNOSIS — J96.11 CHRONIC RESPIRATORY FAILURE WITH HYPOXIA: Primary | ICD-10-CM

## 2025-04-25 DIAGNOSIS — J90 PLEURAL EFFUSION: ICD-10-CM

## 2025-04-25 DIAGNOSIS — J90 PLEURAL EFFUSION, LEFT: ICD-10-CM

## 2025-04-25 NOTE — PROGRESS NOTES
"Subjective:       Patient ID: Wendy Ro is a 84 y.o. female.    The patient location is: home in [place]  The chief complaint leading to consultation is: hospital follow up    Visit type: audiovisual    Face to Face time with patient: 20 minutes  40 minutes of total time spent on the encounter, which includes face to face time and non-face to face time preparing to see the patient (eg, review of tests), Obtaining and/or reviewing separately obtained history, Documenting clinical information in the electronic or other health record, Independently interpreting results (not separately reported) and communicating results to the patient/family/caregiver, or Care coordination (not separately reported).         Each patient to whom he or she provides medical services by telemedicine is:  (1) informed of the relationship between the physician and patient and the respective role of any other health care provider with respect to management of the patient; and (2) notified that he or she may decline to receive medical services by telemedicine and may withdraw from such care at any time.    Notes:       New patient here for hospital follow up    "Hospital Course:   4/2 admitted for pleural effusion. Echocardiogram with early signs of cardiac tamponade with pericardial effusion. Pulmonology consulted and concerns for malignancy, hospice recommended. Interventional radiology consulted for pleural effusion with 650ccs removed.      4/3  No acute events overnight. No indication for intervention of pericardial effusion per cardiology. Physical/occupational therapy recommending homehealth physical/occupational therapy.      Urine collected in emergency department growing enterococcus faecalis. Asymptomatic. Empirically treat with po macrobid. Last  urine culture negative in March 2025. Has tolerated macrobid in the past. Several allergies to medication(s)."    Daughter is present for visit who informs me her mother has " hospice care at home through [facility]  She is currently on 3L oxygen  History of GRACE not on CPAP (unsure of DME company) - patient does complain of sleepiness  She is bedridden which is why requested virtual visit  No other history of plural effusion or pneumonia per daughter  I do see history of airspace opacities possible aspiration 2021  Denies history of lung disease or smoking although noted diagnosis on chart of emphysema  Thoracentesis done in hospital - no malignant cells, reactive cells noted  She completed 3 weeks IV rocephin for UTI   Denies any worsening SOB  No cough or chest pain  Daughter states her o2 sat is staying between 90-92 on room air although she keeps o2 on most of the day and at night    Immunization History   Administered Date(s) Administered    COVID-19, MRNA, LN-S, PF (Pfizer) (Purple Cap) 01/28/2021, 03/12/2021, 01/18/2022    Influenza (FLUAD) - Quadrivalent - Adjuvanted - PF *Preferred* (65+) 11/30/2021    Influenza - Trivalent - Afluria, Fluzone MDV 12/10/2004, 11/14/2005, 11/05/2009    Influenza - Trivalent - Fluzone High Dose - PF (65 years and older) 10/11/2012, 10/30/2013, 10/14/2015, 10/31/2016, 10/18/2017, 10/30/2018    Influenza Split 12/10/2004, 11/14/2005, 11/05/2009    Pneumococcal Conjugate - 13 Valent 11/23/2015, 03/02/2022    Pneumococcal Polysaccharide - 23 Valent 10/11/2012, 11/30/2016    Tdap 12/19/2017    Zoster 11/04/2015      Tobacco Use: Medium Risk (4/25/2025)    Patient History     Smoking Tobacco Use: Passive Smoke Exposure - Never Smoker     Smokeless Tobacco Use: Never     Passive Exposure: Yes      Past Medical History:   Diagnosis Date    Arthritis     B12 deficiency 3/28/2019    Chest pain 2012    past Hx, turned out to be asthma, gerd, stress test reported unremarkable per pt    Chronic gout     Chronic renal insufficiency, stage III (moderate)     Dr. Harrell    Class 3 severe obesity due to excess calories with serious comorbidity in adult 1/31/2012     The patient presents with obesity.  Denies bulimia, amenorrhea, cold intolerance, edema, hip pain, hirsutism, knee pain, polydipsia, polyuria, thirst and weakness.  The patient does not perform regular exercise.  Previous treatments for obesity :self-directed dieting without success.  The patient and I discussed the importance of exercise.   Wt Readings from Last 4 Encounters: 03/11/19 113.3 kg (2    Combined hyperlipidemia associated with type 2 diabetes mellitus     Concussion 07/28/2016    DM (diabetes mellitus) type II controlled with renal manifestation     DM (diabetes mellitus) type II controlled, neurological manifestation     no meds diet controlled//    Fatty liver     Gastroparesis     GERD (gastroesophageal reflux disease) 10/20/2014    UGI performed at [facility].    Hiatal hernia     Hypertension associated with diabetes     Hypothyroid 7/10/2012    Hypothyroidism     Intermittent asthma     last problem was around 2012    Osteoporosis     Osteoporosis 11/30/2016    Peripheral autonomic neuropathy due to diabetes mellitus     PONV (postoperative nausea and vomiting)     Severe, prefers Zofran, avoid narcotics if possible    Rheumatoid arthritis     on steroid daily    Sleep apnea     not compliant with BiPap, sleeps with HOB up    Thyroid nodule       Medications Ordered Prior to Encounter[1]       Objective:       There were no vitals filed for this visit.    Physical Exam   Constitutional: She is oriented to person, place, and time. She appears well-developed. No distress.   HENT:   Mouth/Throat: Oropharynx is clear and moist.   Pulmonary/Chest: Effort normal.   Musculoskeletal:         General: No edema.      Cervical back: Normal range of motion.   Neurological: She is alert and oriented to person, place, and time.   Psychiatric: She has a normal mood and affect.   Vitals reviewed.    Personal Diagnostic Review    US Thoracentesis with Imaging, Aspiration Only  Narrative: EXAMINATION:  US THORACENTESIS  WITH IMAGING, ASPIRATION ONLY    CLINICAL HISTORY:  pleural effusion;    TECHNIQUE:  Adrian HOUSE performed this procedure.  I supervised the procedure and was immediately available throughout.  The procedure for ultrasound-guided thoracentesis including risks and potential complications of pneumothorax, hemothorax, or empyema discussed with the patient prior to beginning the procedure. Questions were answered and written and verbal consent obtained prior to beginning the procedure. A procedural pause was performed.    Appropriate skin entry site marked over patient's left pleural space. Patient's skin prepped and draped in usual sterile fashion and local anesthesia achieved with 1% lidocaine.    A centesis needle and catheter were advanced into the pleural space and 675 cc of puja fluid was recovered.  Catheter was removed.    COMPARISON:  Chest radiograph 04/01/2025, CT chest abdomen pelvis 04/01/2025    FINDINGS:  The patient tolerated the procedure well with no evidence of immediate complication.  Impression: Left thoracentesis without evidence of immediate complication.  Follow-up laboratory analysis.    Electronically signed by: Ingrid Cho  Date:    04/02/2025  Time:    16:48  X-Ray Chest AP Portable  Narrative: EXAMINATION:  XR CHEST AP PORTABLE    CLINICAL HISTORY:  Pleural effusion, s/p left thoracentesis. 675 cc's removed;    COMPARISON:  04/01/2025 chest x-ray    FINDINGS:  Interval decrease in size of the left pleural effusion is noted.  No pneumothorax status post left thoracentesis.  Impression: See above.    Electronically signed by: Veto Walker MD  Date:    04/02/2025  Time:    15:50  Echo    Left Ventricle: The left ventricle is normal in size. Mildly increased   wall thickness. There is mild concentric hypertrophy. There is normal   systolic function with a visually estimated ejection fraction of 60 - 65%.   Grade I diastolic dysfunction. Elevated left ventricular filling  pressure.    Right Ventricle: The right ventricle is normal in size. Wall thickness   is normal. Systolic function is mildly reduced.    Aortic Valve: There is moderate aortic valve sclerosis. Mildly   calcified cusps. There is moderate annular calcification present.    Mitral Valve: There is mild bileaflet sclerosis. Moderately thickened   leaflets. Mildly calcified leaflets. There is moderate mitral annular   calcification. There is mild regurgitation.    Tricuspid Valve: There is mild regurgitation.    Pulmonary Artery: The estimated pulmonary artery systolic pressure is   36 mmHg.    IVC/SVC: Elevated venous pressure at 15 mmHg.    Pericardium: There is a moderate to large circumferential effusion.   Early tamponade signs of RA and RV collapse in systole noted with dilated   IVC with mild IVC collapse. Measures 1.5 cm appx lateral to RV. Left   pleural effusion. Clinically correlate for tamponade.            Assessment/Plan:       1. Chronic respiratory failure with hypoxia  Assessment & Plan:  3L oxygen continuous  Has sleep apnea with CPAP machine - only wearing oxygen right now. Advised to check supply company and we can order oxygen adaptor to cpap mask to use with sleep      2. Pleural effusion, left  -     Ambulatory referral/consult to Pulmonology    3. Pleural effusion  Assessment & Plan:  No malignant cells  Reactive  Completed antibiotics for UTI  No changes in oxygen status or shortness of breath at this time - ok for follow up Chest X Ray same day as Echo as she is bed ridden and difficulty leaving house          Discussed diagnosis, its evaluation, treatment and usual course. All questions answered.    Patient verbalized understanding of plan and left in no acute distress    Thank you for the courtesy of participating in the care of this patient    Elizabeth G Blough, PA-C [facility]         [1]   Current Outpatient Medications on File Prior to Visit   Medication Sig Dispense Refill     albuterol (VENTOLIN HFA) 90 mcg/actuation inhaler Inhale 2 puffs into the lungs every 6 (six) hours as needed for Wheezing. 1 Inhaler 11    alendronate (FOSAMAX) 70 MG tablet Take 1 tablet (70 mg total) by mouth every 7 days. 4 tablet 11    allopurinoL (ZYLOPRIM) 100 MG tablet Take 100 mg by mouth once daily.      cetirizine (ZYRTEC) 10 MG tablet Take 10 mg by mouth once daily.      folic acid (FOLVITE) 1 MG tablet Take 1 tablet (1,000 mcg total) by mouth once daily. 30 tablet 5    furosemide (LASIX) 40 MG tablet Take 20 mg by mouth once daily.      gabapentin (NEURONTIN) 300 MG capsule Take 300 mg by mouth 3 (three) times daily.      levothyroxine (SYNTHROID) 50 MCG tablet Take 50 mcg by mouth before breakfast.      magnesium oxide (MAG-OX) 400 mg (241.3 mg magnesium) tablet Take 400 mg by mouth 2 (two) times daily.      pantoprazole (PROTONIX) 40 MG tablet Take 1 tablet (40 mg total) by mouth once daily. 90 tablet 3    potassium citrate (UROCIT-K) 10 mEq (1,080 mg) TbSR Take 10 mEq by mouth once daily.      SENNA 8.6 mg tablet Take 2 tablets by mouth 2 (two) times daily as needed.       No current facility-administered medications on file prior to visit.

## 2025-04-25 NOTE — ASSESSMENT & PLAN NOTE
No malignant cells  Reactive  Completed antibiotics for UTI  No changes in oxygen status or shortness of breath at this time - ok for follow up Chest X Ray same day as Echo as she is bed ridden and difficulty leaving house

## 2025-04-25 NOTE — ASSESSMENT & PLAN NOTE
3L oxygen continuous  Has sleep apnea with CPAP machine - only wearing oxygen right now. Advised to check supply company and we can order oxygen adaptor to cpap mask to use with sleep

## 2025-06-11 ENCOUNTER — TELEPHONE (OUTPATIENT)
Dept: CARDIOLOGY | Facility: CLINIC | Age: 85
End: 2025-06-11
Payer: MEDICARE

## 2025-06-11 ENCOUNTER — DOCUMENT SCAN (OUTPATIENT)
Dept: HOME HEALTH SERVICES | Facility: HOSPITAL | Age: 85
End: 2025-06-11
Payer: MEDICARE

## 2025-06-11 NOTE — TELEPHONE ENCOUNTER
Copied from CRM #[id number]. Topic: General Inquiry - Patient Advice  >> Jun 11, 2025  1:35 PM Christiana wrote:  ..Type:  Needs Medical Advice    Who Called: Leland  Symptoms (please be specific):    How long has patient had these symptoms:    Pharmacy name and phone #:    Would the patient rather a call back or a response via [facility]? call  Best Call Back Number: [phone]   Additional Information:  Leland with [facility] in [place] is requesting a callback from the nurse today in regard to the referral they received.     Spoke to Leland to advise that Dr. Cabrera does not sign any home health orders, those must go through patient's PCP. Advised per chart the last referral was from sim Cavazos

## 2025-06-13 ENCOUNTER — DOCUMENT SCAN (OUTPATIENT)
Dept: HOME HEALTH SERVICES | Facility: HOSPITAL | Age: 85
End: 2025-06-13
Payer: MEDICARE

## 2025-07-11 ENCOUNTER — EXTERNAL HOME HEALTH (OUTPATIENT)
Dept: HOME HEALTH SERVICES | Facility: HOSPITAL | Age: 85
End: 2025-07-11
Payer: MEDICARE

## 2025-07-14 ENCOUNTER — DOCUMENT SCAN (OUTPATIENT)
Dept: HOME HEALTH SERVICES | Facility: HOSPITAL | Age: 85
End: 2025-07-14
Payer: MEDICARE

## 2025-08-01 ENCOUNTER — DOCUMENT SCAN (OUTPATIENT)
Dept: HOME HEALTH SERVICES | Facility: HOSPITAL | Age: 85
End: 2025-08-01
Payer: MEDICARE

## (undated) DEVICE — TAPE SILK 3IN

## (undated) DEVICE — MANIFOLD 4 PORT

## (undated) DEVICE — DRAPE LAP T SHT W/ INSTR PAD

## (undated) DEVICE — TRAY SKIN SCRUB WET 4 COMPART

## (undated) DEVICE — SOL NORMAL USPCA 0.9%

## (undated) DEVICE — PAD ABDOMINAL STERILE 8X10IN

## (undated) DEVICE — PACK BASIC SETUP SC BR

## (undated) DEVICE — NDL ECLIPSE SAF REG 25GX1.5IN

## (undated) DEVICE — COVER LIGHT HANDLE 80/CA

## (undated) DEVICE — SPONGE COTTON TRAY 4X4IN

## (undated) DEVICE — BANDAGE ROLL COTTN 4.5INX4.1YD

## (undated) DEVICE — ELECTRODE REM PLYHSV RETURN 9